# Patient Record
Sex: FEMALE | Race: WHITE | NOT HISPANIC OR LATINO
[De-identification: names, ages, dates, MRNs, and addresses within clinical notes are randomized per-mention and may not be internally consistent; named-entity substitution may affect disease eponyms.]

---

## 2019-03-01 ENCOUNTER — APPOINTMENT (OUTPATIENT)
Dept: FAMILY MEDICINE | Facility: CLINIC | Age: 79
End: 2019-03-01
Payer: MEDICARE

## 2019-03-01 VITALS
HEART RATE: 80 BPM | BODY MASS INDEX: 21.95 KG/M2 | DIASTOLIC BLOOD PRESSURE: 80 MMHG | SYSTOLIC BLOOD PRESSURE: 110 MMHG | WEIGHT: 128.56 LBS | RESPIRATION RATE: 16 BRPM | OXYGEN SATURATION: 97 % | HEIGHT: 64 IN

## 2019-03-01 DIAGNOSIS — K21.9 GASTRO-ESOPHAGEAL REFLUX DISEASE W/OUT ESOPHAGITIS: ICD-10-CM

## 2019-03-01 DIAGNOSIS — M81.0 AGE-RELATED OSTEOPOROSIS W/OUT CURRENT PATHOLOGICAL FRACTURE: ICD-10-CM

## 2019-03-01 DIAGNOSIS — Z82.49 FAMILY HISTORY OF ISCHEMIC HEART DISEASE AND OTHER DISEASES OF THE CIRCULATORY SYSTEM: ICD-10-CM

## 2019-03-01 DIAGNOSIS — Z82.69 FAMILY HISTORY OF OTHER DISEASES OF THE MUSCULOSKELETAL SYSTEM AND CONNECTIVE TISSUE: ICD-10-CM

## 2019-03-01 DIAGNOSIS — Z86.79 PERSONAL HISTORY OF OTHER DISEASES OF THE CIRCULATORY SYSTEM: ICD-10-CM

## 2019-03-01 DIAGNOSIS — F32.9 MAJOR DEPRESSIVE DISORDER, SINGLE EPISODE, UNSPECIFIED: ICD-10-CM

## 2019-03-01 PROCEDURE — 99204 OFFICE O/P NEW MOD 45 MIN: CPT

## 2019-03-02 LAB
INR PPP: 1.6 RATIO
PT BLD: 18.4 SEC

## 2019-03-04 PROBLEM — Z82.69 FAMILY HISTORY OF GOUT: Status: ACTIVE | Noted: 2019-03-04

## 2019-03-04 PROBLEM — Z86.79 HISTORY OF RHEUMATIC HEART DISEASE: Status: RESOLVED | Noted: 2019-03-04 | Resolved: 2019-03-04

## 2019-03-04 PROBLEM — F32.9 DEPRESSION: Status: ACTIVE | Noted: 2019-03-04

## 2019-03-04 PROBLEM — K21.9 GERD (GASTROESOPHAGEAL REFLUX DISEASE): Status: ACTIVE | Noted: 2019-03-01

## 2019-03-04 PROBLEM — M81.0 OSTEOPOROSIS: Status: ACTIVE | Noted: 2019-03-01

## 2019-03-04 PROBLEM — Z82.49 FAMILY HISTORY OF HYPERTENSION: Status: ACTIVE | Noted: 2019-03-04

## 2019-03-04 NOTE — ASSESSMENT
[FreeTextEntry1] : cardiology/cardiothoracic surgery referral: needs cardiothoracic surgeon for aneurysm, aortic, importance of soon appointment with cardiology discussed with patient. \par RTO 1 month for f/u and prn

## 2019-03-04 NOTE — HISTORY OF PRESENT ILLNESS
[FreeTextEntry8] : Patient presents with  for new patient visit to establish care. [place] the end of February to live with her daughter in [place] because her  was requiring more care due to his dementia. They now share home with daughter, living on the first floor of house. Feels well, needs f/u for mechanical heart valve here, had been seeing doctors in [place] regularly. Native of [place], came [place].  \par \par PSH: patient has h/o aortic valve replacement; +[facility] valve in place\par PMH: rheumatic fever, +aneurysm which was last followed last year (?aortic arch, patient unsure where)\par FmHx: father +lung cancer was a smoker\par quit smoking 1974\par cardiothoracic surgeon\par

## 2019-03-04 NOTE — PAST MEDICAL HISTORY
[Postmenopausal] : history of menopause having occurred [Menarche Age ____] : age at menarche was [unfilled] [Total Preg ___] : G: [unfilled] [Living ___] : Living: [unfilled]

## 2019-03-04 NOTE — PHYSICAL EXAM
[No Acute Distress] : no acute distress [Well Nourished] : well nourished [Well Developed] : well developed [Well-Appearing] : well-appearing [Normal Voice/Communication] : normal voice/communication [Normal Sclera/Conjunctiva] : normal sclera/conjunctiva [PERRL] : pupils equal round and reactive to light [No Respiratory Distress] : no respiratory distress  [Clear to Auscultation] : lungs were clear to auscultation bilaterally [No Accessory Muscle Use] : no accessory muscle use [Normal Rate] : normal rate  [Regular Rhythm] : with a regular rhythm [Normal S1, S2] : normal S1 and S2 [No Edema] : there was no peripheral edema [Normal Supraclavicular Nodes] : no supraclavicular lymphadenopathy [Normal Anterior Cervical Nodes] : no anterior cervical lymphadenopathy [No Joint Swelling] : no joint swelling [Grossly Normal Strength/Tone] : grossly normal strength/tone [Normal Gait] : normal gait [Speech Grossly Normal] : speech grossly normal [Memory Grossly Normal] : memory grossly normal [Normal Affect] : the affect was normal [Alert and Oriented x3] : oriented to person, place, and time [Normal Mood] : the mood was normal [Normal Insight/Judgement] : insight and judgment were intact [de-identified] : +upper dentures, bottom teeth have caps s/p fall about 5 years ago

## 2019-03-04 NOTE — REVIEW OF SYSTEMS
[Patient Intake Form Reviewed] : Patient intake form was reviewed [Fatigue] : fatigue [Recent Change In Weight] : ~T recent weight change [Hearing Loss] : hearing loss [Heartburn] : heartburn [Back Pain] : back pain [Hair Changes] : hair changes [Dizziness] : dizziness [Anxiety] : anxiety [Depression] : depression [Easy Bruising] : easy bruising [Negative] : Genitourinary [Fever] : no fever [Chills] : no chills [Hot Flashes] : no hot flashes [Night Sweats] : no night sweats [Earache] : no earache [Nosebleed] : no nosebleeds [Hoarseness] : no hoarseness [Nasal Discharge] : no nasal discharge [Sore Throat] : no sore throat [Postnasal Drip] : no postnasal drip [Abdominal Pain] : no abdominal pain [Nausea] : no nausea [Constipation] : no constipation [Diarrhea] : diarrhea [Vomiting] : no vomiting [Melena] : no melena [Joint Pain] : no joint pain [Joint Stiffness] : no joint stiffness [Joint Swelling] : no joint swelling [Muscle Weakness] : no muscle weakness [Muscle Pain] : no muscle pain [Itching] : no itching [Mole Changes] : no mole changes [Nail Changes] : no nail changes [Skin Rash] : no skin rash [Headache] : no headache [Fainting] : no fainting [Confusion] : no confusion [Memory Loss] : no memory loss [Unsteady Walking] : no ataxia [Suicidal] : not suicidal [Easy Bleeding] : no easy bleeding [Swollen Glands] : no swollen glands [FreeTextEntry2] : lost about 8 pounds over past month while in the stressful process of moving from [place] to [facility], appetite now normal.  [FreeTextEntry4] : +ringing in ears [FreeTextEntry7] : +irritable bowel [de-identified] : +hair loss [de-identified] : +feeling off balance [de-identified] : +taking blood thinner

## 2019-03-12 LAB
INR PPP: 3.12 RATIO
PT BLD: 37.2 SEC

## 2019-04-01 ENCOUNTER — APPOINTMENT (OUTPATIENT)
Dept: FAMILY MEDICINE | Facility: CLINIC | Age: 79
End: 2019-04-01
Payer: MEDICARE

## 2019-04-01 VITALS
HEIGHT: 64 IN | RESPIRATION RATE: 12 BRPM | BODY MASS INDEX: 22.02 KG/M2 | DIASTOLIC BLOOD PRESSURE: 79 MMHG | WEIGHT: 129 LBS | HEART RATE: 71 BPM | SYSTOLIC BLOOD PRESSURE: 140 MMHG

## 2019-04-01 DIAGNOSIS — E87.6 HYPOKALEMIA: ICD-10-CM

## 2019-04-01 PROCEDURE — 99214 OFFICE O/P EST MOD 30 MIN: CPT

## 2019-04-01 RX ORDER — POTASSIUM CHLORIDE 750 MG/1
10 TABLET, EXTENDED RELEASE ORAL DAILY
Qty: 30 | Refills: 3 | Status: DISCONTINUED | COMMUNITY
Start: 2019-03-04 | End: 2019-04-01

## 2019-04-02 NOTE — HISTORY OF PRESENT ILLNESS
[FreeTextEntry1] : Here for f/u, had cardiology apt today, missed the appointment, rescheduled was only available in June with Dr. Flood, advised to call and see any cardiologist in group sooner visit needed. feels well, saw Dr. Pereira for/with ,  is sick, he is losing memory 'so fast.' Difficult for patient. No HA, no blurry vision or epistaxis today, last night had headache, but resolved spontaneously. \par \par ROS: negative except as noted above\par

## 2019-04-02 NOTE — PHYSICAL EXAM
[No Acute Distress] : no acute distress [Well Nourished] : well nourished [Well Developed] : well developed [Well-Appearing] : well-appearing [Normal Voice/Communication] : normal voice/communication [Normal Sclera/Conjunctiva] : normal sclera/conjunctiva [Normal Outer Ear/Nose] : the outer ears and nose were normal in appearance [Normal Oropharynx] : the oropharynx was normal [No JVD] : no jugular venous distention [No Respiratory Distress] : no respiratory distress  [Clear to Auscultation] : lungs were clear to auscultation bilaterally [No Accessory Muscle Use] : no accessory muscle use [Normal Rate] : normal rate  [Regular Rhythm] : with a regular rhythm [Normal S1, S2] : normal S1 and S2 [Normal Gait] : normal gait [Speech Grossly Normal] : speech grossly normal [Memory Grossly Normal] : memory grossly normal [Normal Affect] : the affect was normal [Alert and Oriented x3] : oriented to person, place, and time [Normal Insight/Judgement] : insight and judgment were intact

## 2019-04-02 NOTE — ASSESSMENT
[FreeTextEntry1] : will do renal panel and inr today, paperwork given. \par call for sooner apt for cardiology\par RTO 1 month and prn

## 2019-04-03 LAB
ALBUMIN SERPL ELPH-MCNC: 4.3 G/DL
ANION GAP SERPL CALC-SCNC: 12 MMOL/L
BUN SERPL-MCNC: 13 MG/DL
CALCIUM SERPL-MCNC: 9.7 MG/DL
CHLORIDE SERPL-SCNC: 99 MMOL/L
CO2 SERPL-SCNC: 30 MMOL/L
CREAT SERPL-MCNC: 0.89 MG/DL
GLUCOSE SERPL-MCNC: 104 MG/DL
INR PPP: 2.51 RATIO
PHOSPHATE SERPL-MCNC: 3.6 MG/DL
POTASSIUM SERPL-SCNC: 3.3 MMOL/L
PT BLD: 29.4 SEC
SODIUM SERPL-SCNC: 141 MMOL/L

## 2019-04-08 ENCOUNTER — RX CHANGE (OUTPATIENT)
Age: 79
End: 2019-04-08

## 2019-04-09 ENCOUNTER — RX CHANGE (OUTPATIENT)
Age: 79
End: 2019-04-09

## 2019-04-11 ENCOUNTER — RX RENEWAL (OUTPATIENT)
Age: 79
End: 2019-04-11

## 2019-04-11 LAB — POTASSIUM SERPL-SCNC: 3.6 MMOL/L

## 2019-04-20 LAB
INR PPP: 2.99 RATIO
PT BLD: 34.9 SEC

## 2019-04-29 ENCOUNTER — APPOINTMENT (OUTPATIENT)
Dept: CARDIOLOGY | Facility: CLINIC | Age: 79
End: 2019-04-29
Payer: MEDICARE

## 2019-04-29 ENCOUNTER — APPOINTMENT (OUTPATIENT)
Dept: CARDIOLOGY | Facility: CLINIC | Age: 79
End: 2019-04-29

## 2019-04-29 ENCOUNTER — NON-APPOINTMENT (OUTPATIENT)
Age: 79
End: 2019-04-29

## 2019-04-29 VITALS
HEART RATE: 77 BPM | RESPIRATION RATE: 16 BRPM | SYSTOLIC BLOOD PRESSURE: 160 MMHG | DIASTOLIC BLOOD PRESSURE: 93 MMHG | BODY MASS INDEX: 21.68 KG/M2 | OXYGEN SATURATION: 98 % | HEIGHT: 64 IN | WEIGHT: 127 LBS

## 2019-04-29 VITALS — SYSTOLIC BLOOD PRESSURE: 152 MMHG | DIASTOLIC BLOOD PRESSURE: 86 MMHG

## 2019-04-29 PROCEDURE — 93000 ELECTROCARDIOGRAM COMPLETE: CPT

## 2019-04-29 PROCEDURE — 99204 OFFICE O/P NEW MOD 45 MIN: CPT

## 2019-04-29 NOTE — PHYSICAL EXAM
[General Appearance - Well Developed] : well developed [Normal Appearance] : normal appearance [Well Groomed] : well groomed [General Appearance - Well Nourished] : well nourished [No Deformities] : no deformities [General Appearance - In No Acute Distress] : no acute distress [Normal Conjunctiva] : the conjunctiva exhibited no abnormalities [Eyelids - No Xanthelasma] : the eyelids demonstrated no xanthelasmas [Normal Oral Mucosa] : normal oral mucosa [No Oral Pallor] : no oral pallor [No Oral Cyanosis] : no oral cyanosis [Normal Jugular Venous A Waves Present] : normal jugular venous A waves present [Normal Jugular Venous V Waves Present] : normal jugular venous V waves present [No Jugular Venous West A Waves] : no jugular venous west A waves [Normal Rate] : normal [Normal S1] : normal S1 [Normal S2] : normal S2 [S3] : no S3 [S4] : no S4 [No Murmur] : no murmurs heard [Right Carotid Bruit] : no bruit heard over the right carotid [Left Carotid Bruit] : no bruit heard over the left carotid [Right Femoral Bruit] : no bruit heard over the right femoral artery [Left Femoral Bruit] : no bruit heard over the left femoral artery [2+] : left 2+ [No Abnormalities] : the abdominal aorta was not enlarged and no bruit was heard [No Pitting Edema] : no pitting edema present [Respiration, Rhythm And Depth] : normal respiratory rhythm and effort [Exaggerated Use Of Accessory Muscles For Inspiration] : no accessory muscle use [Auscultation Breath Sounds / Voice Sounds] : lungs were clear to auscultation bilaterally [Abdomen Soft] : soft [Abdomen Tenderness] : non-tender [Abdomen Mass (___ Cm)] : no abdominal mass palpated [Abnormal Walk] : normal gait [Gait - Sufficient For Exercise Testing] : the gait was sufficient for exercise testing [Nail Clubbing] : no clubbing of the fingernails [Cyanosis, Localized] : no localized cyanosis [Petechial Hemorrhages (___cm)] : no petechial hemorrhages [Skin Color & Pigmentation] : normal skin color and pigmentation [] : no rash [No Venous Stasis] : no venous stasis [Skin Lesions] : no skin lesions [No Skin Ulcers] : no skin ulcer [No Xanthoma] : no  xanthoma was observed [Oriented To Time, Place, And Person] : oriented to person, place, and time [Affect] : the affect was normal [Mood] : the mood was normal [No Anxiety] : not feeling anxious

## 2019-04-29 NOTE — REASON FOR VISIT
[Consultation] : a consultation regarding [FreeTextEntry2] : pt relocaTING S/P avr-MECHAN 1994, PT HAD AORTIC ANeurysm followed by CT surg, no sscp or sob

## 2019-04-29 NOTE — DISCUSSION/SUMMARY
[Hypertension] : hypertension [Responding to Treatment] : responding to treatment [Chest CT] : chest CT [Echocardiogram] : echocardiogram [de-identified] : f/u with cts -aortic aneurysm

## 2019-05-10 ENCOUNTER — APPOINTMENT (OUTPATIENT)
Dept: FAMILY MEDICINE | Facility: CLINIC | Age: 79
End: 2019-05-10
Payer: MEDICARE

## 2019-05-10 VITALS
HEIGHT: 64 IN | SYSTOLIC BLOOD PRESSURE: 140 MMHG | RESPIRATION RATE: 16 BRPM | HEART RATE: 80 BPM | TEMPERATURE: 98.2 F | OXYGEN SATURATION: 98 % | BODY MASS INDEX: 21.93 KG/M2 | WEIGHT: 128.44 LBS | DIASTOLIC BLOOD PRESSURE: 80 MMHG

## 2019-05-10 DIAGNOSIS — Z20.828 CONTACT WITH AND (SUSPECTED) EXPOSURE TO OTHER VIRAL COMMUNICABLE DISEASES: ICD-10-CM

## 2019-05-10 LAB
INR PPP: 2.58 RATIO
PT BLD: 30.3 SEC

## 2019-05-10 PROCEDURE — 99214 OFFICE O/P EST MOD 30 MIN: CPT

## 2019-05-10 RX ORDER — TEMAZEPAM 30 MG/1
30 CAPSULE ORAL
Qty: 30 | Refills: 0 | Status: DISCONTINUED | COMMUNITY
Start: 2019-04-01 | End: 2019-05-10

## 2019-05-12 ENCOUNTER — EMERGENCY (EMERGENCY)
Facility: HOSPITAL | Age: 79
LOS: 1 days | Discharge: HOME | End: 2019-05-12
Attending: EMERGENCY MEDICINE | Admitting: EMERGENCY MEDICINE
Payer: MEDICARE

## 2019-05-12 VITALS
WEIGHT: 128.09 LBS | RESPIRATION RATE: 18 BRPM | TEMPERATURE: 98 F | DIASTOLIC BLOOD PRESSURE: 87 MMHG | SYSTOLIC BLOOD PRESSURE: 155 MMHG | OXYGEN SATURATION: 98 % | HEART RATE: 76 BPM

## 2019-05-12 DIAGNOSIS — Z98.891 HISTORY OF UTERINE SCAR FROM PREVIOUS SURGERY: Chronic | ICD-10-CM

## 2019-05-12 DIAGNOSIS — Z95.2 PRESENCE OF PROSTHETIC HEART VALVE: Chronic | ICD-10-CM

## 2019-05-12 PROCEDURE — 99283 EMERGENCY DEPT VISIT LOW MDM: CPT

## 2019-05-12 RX ORDER — AZITHROMYCIN 500 MG/1
500 TABLET, FILM COATED ORAL ONCE
Refills: 0 | Status: COMPLETED | OUTPATIENT
Start: 2019-05-12 | End: 2019-05-12

## 2019-05-12 RX ORDER — AZITHROMYCIN 500 MG/1
1 TABLET, FILM COATED ORAL
Qty: 4 | Refills: 0
Start: 2019-05-12 | End: 2019-05-15

## 2019-05-12 RX ORDER — ACETAMINOPHEN 500 MG
975 TABLET ORAL ONCE
Refills: 0 | Status: COMPLETED | OUTPATIENT
Start: 2019-05-12 | End: 2019-05-12

## 2019-05-12 RX ADMIN — Medication 40 MILLIGRAM(S): at 21:06

## 2019-05-12 RX ADMIN — Medication 975 MILLIGRAM(S): at 21:06

## 2019-05-12 RX ADMIN — AZITHROMYCIN 500 MILLIGRAM(S): 500 TABLET, FILM COATED ORAL at 21:06

## 2019-05-12 NOTE — ED PROVIDER NOTE - CLINICAL SUMMARY MEDICAL DECISION MAKING FREE TEXT BOX
hoarse voice, throat pain, cough. c/w laryngitis. on tamiflu, not c/w flu. will dc tamiflu, start zpak, prednisone. ENT fu

## 2019-05-12 NOTE — ED PROVIDER NOTE - OBJECTIVE STATEMENT
pt c PMH aortic valve replacement on coumadin, c/o 3 days of hoarseness, lost voice, severe, assoc c mild pain in throat, coughing, fullness of bilat ears. no fever/chills. no sob/chest pain.  started on tamiflu for 2 days. no myalgias

## 2019-05-12 NOTE — ED ADULT NURSE NOTE - CHPI ED NUR SYMPTOMS NEG
no chills/no dizziness/no weakness/no nausea/no tingling/no vomiting/no fever/no decreased eating/drinking

## 2019-05-12 NOTE — ED PROVIDER NOTE - ENMT, MLM
Airway patent, Nasal mucosa clear. Mouth with normal mucosa. Throat has no vesicles, no oropharyngeal exudates and uvula is midline.  hoarse, decreased voice. no stridor. no neck swelling

## 2019-05-20 ENCOUNTER — APPOINTMENT (OUTPATIENT)
Dept: FAMILY MEDICINE | Facility: CLINIC | Age: 79
End: 2019-05-20
Payer: MEDICARE

## 2019-05-20 ENCOUNTER — APPOINTMENT (OUTPATIENT)
Dept: OTOLARYNGOLOGY | Facility: CLINIC | Age: 79
End: 2019-05-20
Payer: MEDICARE

## 2019-05-20 VITALS
HEIGHT: 64 IN | TEMPERATURE: 98.2 F | BODY MASS INDEX: 21.53 KG/M2 | RESPIRATION RATE: 14 BRPM | SYSTOLIC BLOOD PRESSURE: 130 MMHG | WEIGHT: 126.13 LBS | HEART RATE: 79 BPM | OXYGEN SATURATION: 97 % | DIASTOLIC BLOOD PRESSURE: 72 MMHG

## 2019-05-20 VITALS
BODY MASS INDEX: 21.51 KG/M2 | SYSTOLIC BLOOD PRESSURE: 130 MMHG | HEART RATE: 77 BPM | DIASTOLIC BLOOD PRESSURE: 74 MMHG | HEIGHT: 64 IN | WEIGHT: 126 LBS

## 2019-05-20 DIAGNOSIS — H92.20 OTORRHAGIA, UNSPECIFIED EAR: ICD-10-CM

## 2019-05-20 DIAGNOSIS — Z87.09 PERSONAL HISTORY OF OTHER DISEASES OF THE RESPIRATORY SYSTEM: ICD-10-CM

## 2019-05-20 DIAGNOSIS — R13.12 DYSPHAGIA, OROPHARYNGEAL PHASE: ICD-10-CM

## 2019-05-20 PROBLEM — E78.00 PURE HYPERCHOLESTEROLEMIA, UNSPECIFIED: Chronic | Status: ACTIVE | Noted: 2019-05-12

## 2019-05-20 PROBLEM — I10 ESSENTIAL (PRIMARY) HYPERTENSION: Chronic | Status: ACTIVE | Noted: 2019-05-12

## 2019-05-20 PROCEDURE — 99214 OFFICE O/P EST MOD 30 MIN: CPT

## 2019-05-20 PROCEDURE — 85610 PROTHROMBIN TIME: CPT | Mod: QW

## 2019-05-20 PROCEDURE — 99204 OFFICE O/P NEW MOD 45 MIN: CPT | Mod: 25

## 2019-05-20 PROCEDURE — 31575 DIAGNOSTIC LARYNGOSCOPY: CPT

## 2019-05-20 NOTE — REVIEW OF SYSTEMS
[Seasonal Allergies] : seasonal allergies [Post Nasal Drip] : post nasal drip [Ear Pain] : ear pain [Ear Itch] : ear itch [Dizziness] : dizziness [Vertigo] : vertigo [Problem Snoring] : problem snoring [Hoarseness] : hoarseness [Throat Clearing] : throat clearing [Throat Pain] : throat pain [Throat Dryness] : throat dryness [Throat Itching] : throat itching [Cough] : cough [Heartburn] : heartburn [Depression] : depression [Negative] : Heme/Lymph [Patient Intake Form Reviewed] : Patient intake form was reviewed [FreeTextEntry1] : difficulty swallowing

## 2019-05-20 NOTE — PHYSICAL EXAM
[No Acute Distress] : no acute distress [Well Nourished] : well nourished [Normal Sclera/Conjunctiva] : normal sclera/conjunctiva [PERRL] : pupils equal round and reactive to light [EOMI] : extraocular movements intact [No JVD] : no jugular venous distention [Supple] : supple [No Respiratory Distress] : no respiratory distress  [No Accessory Muscle Use] : no accessory muscle use [Clear to Auscultation] : lungs were clear to auscultation bilaterally [Soft] : abdomen soft [de-identified] : blood noted right ear canal base , no bulging tm or perforation noted

## 2019-05-20 NOTE — HISTORY OF PRESENT ILLNESS
[FreeTextEntry1] : follow up form er [de-identified] : patient seen in er with laryngitis may 12, complaining of throat pain ear pain\par states she was started on zpak and prednisone 20 once a day for 4 days\par patient currently states she feels better, no ear pain but right ear bleeding 2 days\par also wants rx for Inr, states Dr. cooney office did not order

## 2019-05-20 NOTE — CONSULT LETTER
[FreeTextEntry2] : dr. sanchez [FreeTextEntry1] : Dear Dr.  none,\par \par Thank you for your kind referral. Please refer to my enclosed office notes for SONI ANN . If there are any questions free to contact me.\par  [FreeTextEntry3] : Elio Aquino MD, FACS\par

## 2019-05-20 NOTE — HISTORY OF PRESENT ILLNESS
[de-identified] : uri 1 week ago and ear aches\par dx viral rx antibiotic and prednisone due to dysphagia\par in er\par z pack  completed\par still w throat pain but better\par can swallow food now\par coumadin for aortic valve\par co rt ear blood ?hearing loss-longstanding

## 2019-05-20 NOTE — PROCEDURE
[de-identified] : Indication for procedure:Unable to examine laryngeal structures with mirror exam\par Scope # 5\par Topical anesthesia with viscous xylocaine 2% is applied to the anterior nares.\par A flexible fiberoptic laryngoscope is than introduced through the nares.\par The nasopharynx is clear without mass or inflammation.\par The posterior pharyngeal wall is unremarkable.\par The tongue base and vallecula are unremarkable.  The hypopharynx is unremarkable and unobstructed.\par The supraglottic larynx is within normal limits.\par Both vocal cords are fully mobile with no nodule, polyp or other lesion present.\par There is no edema or erythema overlying the arytenoid cartilages or the inter-arytenoid space.\par The subglottic space is clear.\par The voice has a normal quality.\par

## 2019-05-20 NOTE — ASSESSMENT
[FreeTextEntry1] : small ad canal lacertio w bleeding \par anticoag rx contributing factor\par acute dysphagia ?viral\par now better exam pharynx wnl\par ofloxin gtts\par fu 1 week for removal dried heme ad

## 2019-05-20 NOTE — ASSESSMENT
[FreeTextEntry1] : patient should have INR followed by Dr. cooney, \par will provide patient with inr today and call made to Dr. cooney office re coumadin monitoring\par

## 2019-05-21 LAB
INR PPP: 3.32 RATIO
PT BLD: 39.3 SEC

## 2019-05-22 RX ORDER — BUSPIRONE HYDROCHLORIDE 10 MG/1
10 TABLET ORAL DAILY
Qty: 90 | Refills: 3 | Status: COMPLETED | COMMUNITY
Start: 2019-03-04 | End: 2019-05-22

## 2019-05-22 RX ORDER — AZITHROMYCIN 250 MG/1
250 TABLET, FILM COATED ORAL
Qty: 4 | Refills: 0 | Status: COMPLETED | COMMUNITY
Start: 2019-05-12 | End: 2019-05-22

## 2019-05-22 RX ORDER — TEMAZEPAM 30 MG/1
30 CAPSULE ORAL
Qty: 30 | Refills: 0 | Status: COMPLETED | COMMUNITY
Start: 2019-03-04 | End: 2019-05-22

## 2019-05-24 NOTE — ASSESSMENT
[FreeTextEntry1] : tamiflu instructions reviewed with patient\par Return to office if you feel worse or do not feel better\par

## 2019-05-24 NOTE — HISTORY OF PRESENT ILLNESS
[FreeTextEntry8] : Patient presents with  c/o son in law had flu 3 weeks ago, dtr had it one week ago. Patient feels fatigued, but no fever, no n/v/d. +sore throat, +body aches. \par \par ROS: negative except as noted above\par \par \par

## 2019-05-24 NOTE — PHYSICAL EXAM
[No Acute Distress] : no acute distress [Well Nourished] : well nourished [Well Developed] : well developed [Well-Appearing] : well-appearing [Normal Voice/Communication] : normal voice/communication [Normal Sclera/Conjunctiva] : normal sclera/conjunctiva [Normal Outer Ear/Nose] : the outer ears and nose were normal in appearance [Supple] : supple [No Lymphadenopathy] : no lymphadenopathy [No Respiratory Distress] : no respiratory distress  [Clear to Auscultation] : lungs were clear to auscultation bilaterally [No Accessory Muscle Use] : no accessory muscle use [Normal Rate] : normal rate  [Regular Rhythm] : with a regular rhythm [Normal S1, S2] : normal S1 and S2 [Normal Supraclavicular Nodes] : no supraclavicular lymphadenopathy [Normal Anterior Cervical Nodes] : no anterior cervical lymphadenopathy [Speech Grossly Normal] : speech grossly normal [Memory Grossly Normal] : memory grossly normal [Normal Affect] : the affect was normal [Alert and Oriented x3] : oriented to person, place, and time [Normal Mood] : the mood was normal [Normal Insight/Judgement] : insight and judgment were intact [de-identified] : + erythema, no tonsillar exudates

## 2019-05-28 ENCOUNTER — APPOINTMENT (OUTPATIENT)
Dept: OTOLARYNGOLOGY | Facility: CLINIC | Age: 79
End: 2019-05-28

## 2019-05-29 ENCOUNTER — RX RENEWAL (OUTPATIENT)
Age: 79
End: 2019-05-29

## 2019-06-04 ENCOUNTER — APPOINTMENT (OUTPATIENT)
Dept: NEUROLOGY | Facility: CLINIC | Age: 79
End: 2019-06-04
Payer: MEDICARE

## 2019-06-04 VITALS
OXYGEN SATURATION: 98 % | TEMPERATURE: 98.2 F | HEIGHT: 64 IN | BODY MASS INDEX: 21.85 KG/M2 | HEART RATE: 74 BPM | DIASTOLIC BLOOD PRESSURE: 60 MMHG | WEIGHT: 128 LBS | SYSTOLIC BLOOD PRESSURE: 130 MMHG | RESPIRATION RATE: 17 BRPM

## 2019-06-04 PROCEDURE — 99204 OFFICE O/P NEW MOD 45 MIN: CPT

## 2019-06-04 RX ORDER — LOSARTAN POTASSIUM AND HYDROCHLOROTHIAZIDE 12.5; 5 MG/1; MG/1
50-12.5 TABLET ORAL
Qty: 90 | Refills: 0 | Status: DISCONTINUED | COMMUNITY
Start: 2019-01-08 | End: 2019-06-04

## 2019-06-04 RX ORDER — PREDNISONE 20 MG/1
20 TABLET ORAL
Qty: 8 | Refills: 0 | Status: DISCONTINUED | COMMUNITY
Start: 2019-05-12 | End: 2019-06-04

## 2019-06-04 RX ORDER — OFLOXACIN OTIC 3 MG/ML
0.3 SOLUTION AURICULAR (OTIC) TWICE DAILY
Qty: 1 | Refills: 3 | Status: DISCONTINUED | COMMUNITY
Start: 2019-05-20 | End: 2019-06-04

## 2019-06-04 RX ORDER — TEMAZEPAM 30 MG/1
30 CAPSULE ORAL
Refills: 0 | Status: DISCONTINUED | COMMUNITY
End: 2019-06-04

## 2019-06-04 RX ORDER — OSELTAMIVIR PHOSPHATE 75 MG/1
75 CAPSULE ORAL TWICE DAILY
Qty: 10 | Refills: 0 | Status: DISCONTINUED | COMMUNITY
Start: 2019-05-10 | End: 2019-06-04

## 2019-06-04 RX ORDER — POTASSIUM CHLORIDE 750 MG/1
10 TABLET, FILM COATED, EXTENDED RELEASE ORAL
Qty: 90 | Refills: 0 | Status: DISCONTINUED | COMMUNITY
Start: 2018-12-11 | End: 2019-06-04

## 2019-06-04 NOTE — DISCUSSION/SUMMARY
[FreeTextEntry1] : 79 W who is here for initial consultation of bilateral hand tremors that is likely related to glucose vs. essential tremor. will refer for endocrinology consultation. Perhaps glucose monitor may be helpful. Will have her return with name of medication that was tried by Dr. Fisher. She will f/u with me. Will have staff send release form for records from Dr. Fisher.\par \par Patient was advised to continue to monitor for neurologic symptoms and to notify my office or go to the nearest emergency room if there are any changes. Neurologic issues were discussed with the patient. All questions were answered to complete satisfaction. Education was provided to the patient during this encounter.\par Side effects of the above medications were discussed in detail including but not limited to applicable black box warning and teratogenicity as appropriate. \par Patient was advised to bring previous records to my office. \par \par

## 2019-06-04 NOTE — CONSULT LETTER
[Dear  ___] : Dear  [unfilled], [FreeTextEntry1] : \par \par Thank you very much for allowing me to participate in the care of your patient. Please don't hesitate to contact me with any questions or concerns. \par \par Sincerely,\par Obdulia Pereira MD\par Neurology\par [facility]\par [address]\[place] [address]\par Tel: [phone]\par Email: [email or web address]\par \par

## 2019-06-04 NOTE — HISTORY OF PRESENT ILLNESS
[FreeTextEntry1] : 79 W who is here for initial consultation of many years' duration of bilateral hand tremors. She saw Dr. Fisher in [place] and she was given medication that she cannot recall. It made her sedated and she stopped it. She was asked to bring the medication to the next visit. She has no bradykinesia, no trouble with gait. Her tremors occurred between meals. She states she has seen her PMD in [place] and diabetes was never detected.

## 2019-06-04 NOTE — PHYSICAL EXAM
[General Appearance - Alert] : alert [Oriented To Time, Place, And Person] : oriented to person, place, and time [Person] : oriented to person [Time] : oriented to time [Place] : oriented to place [Short Term Intact] : short term memory intact [Span Intact] : the attention span was normal [Naming Objects] : no difficulty naming common objects [Fluency] : fluency intact [Cranial Nerves Optic (II)] : visual acuity intact bilaterally,  visual fields full to confrontation, pupils equal round and reactive to light [Current Events] : adequate knowledge of current events [Cranial Nerves Oculomotor (III)] : extraocular motion intact [Cranial Nerves Trigeminal (V)] : facial sensation intact symmetrically [Cranial Nerves Glossopharyngeal (IX)] : tongue and palate midline [Cranial Nerves Vestibulocochlear (VIII)] : hearing was intact bilaterally [Cranial Nerves Facial (VII)] : face symmetrical [Motor Tone] : muscle tone was normal in all four extremities [Cranial Nerves Accessory (XI - Cranial And Spinal)] : head turning and shoulder shrug symmetric [Cranial Nerves Hypoglossal (XII)] : there was no tongue deviation with protrusion [Sensation Pain / Temperature Decrease] : pain and temperature was intact [Sensation Tactile Decrease] : light touch was intact [Motor Strength] : muscle strength was normal in all four extremities [Coordination - Dysmetria Impaired Finger-to-Nose Bilateral] : not present [1+] : Patella left 1+ [FreeTextEntry7] : no bradykinesia [Hearing Threshold Finger Rub Not [place]] : hearing was normal [FreeTextEntry8] : no en bloc turning.  [Extraocular Movements] : extraocular movements were intact [Abnormal Walk] : normal gait [Full Pulse] : the pedal pulses are present [] : no rash

## 2019-06-04 NOTE — DATA REVIEWED
[de-identified] : MRI of brain on cd was reviewed. There were some wmid. no major mass or infarct noted. no report waw available.

## 2019-06-06 ENCOUNTER — APPOINTMENT (OUTPATIENT)
Dept: OTOLARYNGOLOGY | Facility: CLINIC | Age: 79
End: 2019-06-06

## 2019-06-07 LAB
25(OH)D3 SERPL-MCNC: 76.8 NG/ML
ALBUMIN SERPL ELPH-MCNC: 4 G/DL
ALP BLD-CCNC: 83 U/L
ALT SERPL-CCNC: 17 U/L
ANION GAP SERPL CALC-SCNC: 12 MMOL/L
AST SERPL-CCNC: 19 U/L
BASOPHILS # BLD AUTO: 0.04 K/UL
BASOPHILS NFR BLD AUTO: 0.8 %
BILIRUB SERPL-MCNC: 0.3 MG/DL
BUN SERPL-MCNC: 16 MG/DL
CALCIUM SERPL-MCNC: 9.6 MG/DL
CHLORIDE SERPL-SCNC: 105 MMOL/L
CHOLEST SERPL-MCNC: 189 MG/DL
CHOLEST/HDLC SERPL: 2.8 RATIO
CO2 SERPL-SCNC: 30 MMOL/L
CREAT SERPL-MCNC: 0.93 MG/DL
EOSINOPHIL # BLD AUTO: 0.19 K/UL
EOSINOPHIL NFR BLD AUTO: 4 %
ESTIMATED AVERAGE GLUCOSE: 126 MG/DL
GLUCOSE SERPL-MCNC: 101 MG/DL
HBA1C MFR BLD HPLC: 6 %
HCT VFR BLD CALC: 38.6 %
HDLC SERPL-MCNC: 67 MG/DL
HGB BLD-MCNC: 12.2 G/DL
IMM GRANULOCYTES NFR BLD AUTO: 0.4 %
INR PPP: 3.96 RATIO
LDLC SERPL CALC-MCNC: 99 MG/DL
LYMPHOCYTES # BLD AUTO: 1.59 K/UL
LYMPHOCYTES NFR BLD AUTO: 33.4 %
MAN DIFF?: NORMAL
MCHC RBC-ENTMCNC: 28.6 PG
MCHC RBC-ENTMCNC: 31.6 GM/DL
MCV RBC AUTO: 90.4 FL
MONOCYTES # BLD AUTO: 0.35 K/UL
MONOCYTES NFR BLD AUTO: 7.4 %
NEUTROPHILS # BLD AUTO: 2.57 K/UL
NEUTROPHILS NFR BLD AUTO: 54 %
PLATELET # BLD AUTO: 195 K/UL
POTASSIUM SERPL-SCNC: 4.4 MMOL/L
PROT SERPL-MCNC: 6.3 G/DL
PT BLD: 47.1 SEC
RBC # BLD: 4.27 M/UL
RBC # FLD: 13.5 %
SODIUM SERPL-SCNC: 146 MMOL/L
TRIGL SERPL-MCNC: 114 MG/DL
TSH SERPL-ACNC: 2.18 UIU/ML
WBC # FLD AUTO: 4.76 K/UL

## 2019-06-20 NOTE — PHYSICAL EXAM
Gastritis / GERD  Not well controlled, not complaint  Hx/o H. pylori therapy  EGD (4/30/18): Gastritis  Cont PPI prn  Avoid triggers and elevate HOB 30 degrees  GI appt 7/2019, referral given   [Midline] : trachea located in midline position [Laryngoscopy Performed] : laryngoscopy was performed, see procedure section for findings [Normal] : no rashes [de-identified] : blood in ad canal tm intact

## 2019-07-18 ENCOUNTER — RX RENEWAL (OUTPATIENT)
Age: 79
End: 2019-07-18

## 2019-07-24 ENCOUNTER — APPOINTMENT (OUTPATIENT)
Dept: FAMILY MEDICINE | Facility: CLINIC | Age: 79
End: 2019-07-24
Payer: MEDICARE

## 2019-07-24 VITALS
SYSTOLIC BLOOD PRESSURE: 124 MMHG | BODY MASS INDEX: 21.68 KG/M2 | HEIGHT: 64 IN | RESPIRATION RATE: 14 BRPM | TEMPERATURE: 98.4 F | OXYGEN SATURATION: 98 % | WEIGHT: 127 LBS | DIASTOLIC BLOOD PRESSURE: 72 MMHG | HEART RATE: 75 BPM

## 2019-07-24 PROCEDURE — 99214 OFFICE O/P EST MOD 30 MIN: CPT | Mod: 25

## 2019-07-24 PROCEDURE — 90670 PCV13 VACCINE IM: CPT

## 2019-07-24 PROCEDURE — G0009: CPT

## 2019-07-24 RX ORDER — BUSPIRONE HYDROCHLORIDE 10 MG/1
10 TABLET ORAL
Refills: 0 | Status: DISCONTINUED | COMMUNITY
End: 2019-07-24

## 2019-07-24 RX ORDER — TOPIRAMATE 25 MG/1
25 TABLET, FILM COATED ORAL DAILY
Qty: 30 | Refills: 11 | Status: DISCONTINUED | COMMUNITY
Start: 2019-03-04 | End: 2019-07-24

## 2019-07-24 NOTE — HISTORY OF PRESENT ILLNESS
[FreeTextEntry1] : Patient presents for f/u visit +mechanical valve and f/u INR check. Just returned from vacation with her son, had a relaxing time in [place].No concerns, needs INR checked, and +h/o hypokalemia.  No palpitations, no bleeding, feels well. Stopped her sleeping med because thinks it is affecting her memory. Med list reconciled with patient. Still having some insomnia. \par \par ROS: negative except as noted above\par

## 2019-07-24 NOTE — PHYSICAL EXAM
[Well Nourished] : well nourished [No Acute Distress] : no acute distress [Well Developed] : well developed [Normal Voice/Communication] : normal voice/communication [Well-Appearing] : well-appearing [Normal Sclera/Conjunctiva] : normal sclera/conjunctiva [Normal Outer Ear/Nose] : the outer ears and nose were normal in appearance [Normal Oropharynx] : the oropharynx was normal [No Accessory Muscle Use] : no accessory muscle use [No Respiratory Distress] : no respiratory distress  [Clear to Auscultation] : lungs were clear to auscultation bilaterally [Normal Rate] : normal rate  [Regular Rhythm] : with a regular rhythm [Normal S1, S2] : normal S1 and S2 [No Edema] : there was no peripheral edema [Speech Grossly Normal] : speech grossly normal [Memory Grossly Normal] : memory grossly normal [Normal Affect] : the affect was normal [Alert and Oriented x3] : oriented to person, place, and time [Normal Insight/Judgement] : insight and judgment were intact [de-identified] : patient declines starting medication for depression now

## 2019-07-29 ENCOUNTER — NON-APPOINTMENT (OUTPATIENT)
Age: 79
End: 2019-07-29

## 2019-07-29 ENCOUNTER — APPOINTMENT (OUTPATIENT)
Dept: CARDIOLOGY | Facility: CLINIC | Age: 79
End: 2019-07-29
Payer: MEDICARE

## 2019-07-29 VITALS
BODY MASS INDEX: 21.68 KG/M2 | HEIGHT: 64 IN | WEIGHT: 127 LBS | SYSTOLIC BLOOD PRESSURE: 125 MMHG | RESPIRATION RATE: 17 BRPM | HEART RATE: 69 BPM | DIASTOLIC BLOOD PRESSURE: 81 MMHG | OXYGEN SATURATION: 97 %

## 2019-07-29 DIAGNOSIS — R94.31 ABNORMAL ELECTROCARDIOGRAM [ECG] [EKG]: ICD-10-CM

## 2019-07-29 PROCEDURE — 93000 ELECTROCARDIOGRAM COMPLETE: CPT

## 2019-07-29 PROCEDURE — 99214 OFFICE O/P EST MOD 30 MIN: CPT

## 2019-07-29 NOTE — PHYSICAL EXAM
[General Appearance - Well Developed] : well developed [Normal Appearance] : normal appearance [Well Groomed] : well groomed [General Appearance - Well Nourished] : well nourished [No Deformities] : no deformities [Normal Conjunctiva] : the conjunctiva exhibited no abnormalities [General Appearance - In No Acute Distress] : no acute distress [Eyelids - No Xanthelasma] : the eyelids demonstrated no xanthelasmas [Normal Oral Mucosa] : normal oral mucosa [No Oral Pallor] : no oral pallor [No Oral Cyanosis] : no oral cyanosis [Normal Jugular Venous A Waves Present] : normal jugular venous A waves present [Normal Jugular Venous V Waves Present] : normal jugular venous V waves present [No Jugular Venous West A Waves] : no jugular venous west A waves [Exaggerated Use Of Accessory Muscles For Inspiration] : no accessory muscle use [Respiration, Rhythm And Depth] : normal respiratory rhythm and effort [Auscultation Breath Sounds / Voice Sounds] : lungs were clear to auscultation bilaterally [Abdomen Soft] : soft [Abdomen Tenderness] : non-tender [Abdomen Mass (___ Cm)] : no abdominal mass palpated [Abnormal Walk] : normal gait [Gait - Sufficient For Exercise Testing] : the gait was sufficient for exercise testing [Nail Clubbing] : no clubbing of the fingernails [Cyanosis, Localized] : no localized cyanosis [Petechial Hemorrhages (___cm)] : no petechial hemorrhages [Skin Color & Pigmentation] : normal skin color and pigmentation [] : no rash [No Venous Stasis] : no venous stasis [Skin Lesions] : no skin lesions [No Skin Ulcers] : no skin ulcer [No Xanthoma] : no  xanthoma was observed [Oriented To Time, Place, And Person] : oriented to person, place, and time [Affect] : the affect was normal [Mood] : the mood was normal [No Anxiety] : not feeling anxious [Normal Rate] : normal [Normal S1] : normal S1 [Normal S2] : normal S2 [No Murmur] : no murmurs heard [2+] : left 2+ [No Abnormalities] : the abdominal aorta was not enlarged and no bruit was heard [No Pitting Edema] : no pitting edema present [S3] : no S3 [S4] : no S4 [Right Carotid Bruit] : no bruit heard over the right carotid [Left Carotid Bruit] : no bruit heard over the left carotid [Right Femoral Bruit] : no bruit heard over the right femoral artery [Left Femoral Bruit] : no bruit heard over the left femoral artery

## 2019-07-29 NOTE — REASON FOR VISIT
[Follow-Up - Clinic] : a clinic follow-up of [FreeTextEntry2] : pt relocaTING S/P avr-MECHAN 1985, PT HAD AORTIC ANeurysm followed by CT surg, no sscp or sob

## 2019-07-29 NOTE — DISCUSSION/SUMMARY
[Hypertension] : hypertension [Responding to Treatment] : responding to treatment [Chest CT] : chest CT [Echocardiogram] : echocardiogram [Stable] : stable [de-identified] : f/u with cts -aortic aneurysm

## 2019-08-01 LAB
ALBUMIN SERPL ELPH-MCNC: 4.1 G/DL
ANION GAP SERPL CALC-SCNC: 10 MMOL/L
BUN SERPL-MCNC: 17 MG/DL
CALCIUM SERPL-MCNC: 9.7 MG/DL
CHLORIDE SERPL-SCNC: 101 MMOL/L
CO2 SERPL-SCNC: 30 MMOL/L
CREAT SERPL-MCNC: 0.89 MG/DL
GLUCOSE SERPL-MCNC: 106 MG/DL
INR PPP: 1.86 RATIO
PHOSPHATE SERPL-MCNC: 3 MG/DL
POTASSIUM SERPL-SCNC: 4.2 MMOL/L
PT BLD: 21.4 SEC
SODIUM SERPL-SCNC: 141 MMOL/L

## 2019-08-04 ENCOUNTER — RX RENEWAL (OUTPATIENT)
Age: 79
End: 2019-08-04

## 2019-08-06 ENCOUNTER — APPOINTMENT (OUTPATIENT)
Dept: NEUROLOGY | Facility: CLINIC | Age: 79
End: 2019-08-06
Payer: MEDICARE

## 2019-08-06 VITALS
OXYGEN SATURATION: 97 % | DIASTOLIC BLOOD PRESSURE: 70 MMHG | HEART RATE: 68 BPM | RESPIRATION RATE: 16 BRPM | TEMPERATURE: 98.2 F | SYSTOLIC BLOOD PRESSURE: 140 MMHG | WEIGHT: 128 LBS | HEIGHT: 64 IN | BODY MASS INDEX: 21.85 KG/M2

## 2019-08-06 DIAGNOSIS — G25.2 OTHER SPECIFIED FORMS OF TREMOR: ICD-10-CM

## 2019-08-06 PROCEDURE — 99214 OFFICE O/P EST MOD 30 MIN: CPT

## 2019-08-06 NOTE — HISTORY OF PRESENT ILLNESS
[FreeTextEntry1] : 79 W who is here for initial consultation of many years' duration of bilateral hand tremors. She saw Dr. Fisher in [place] and she was given medication that she cannot recall. It made her sedated and she stopped it. She was asked to bring the medication to the next visit. She has no bradykinesia, no trouble with gait. Her tremors occurred between meals. She states she has seen her PMD in [place] and diabetes was never detected. \par \par Interval history: Patient records from Dr. Fisher were not received. Patient came in with bottles of Topamax 50 and 25. Patient states she had bad side effects from the Topamax. Her tremors in her hands resolved now that she is eating a regular basis and making sure that she is never hungry. Patient states that she sometimes has upper lip tremor. I reviewed her medications unknown cause tremors. Patient drinks 4 cups ofCuban coffee and 2 cups of American coffee every day. She was advised slowly cut down on the amount of caffeine she takes.

## 2019-08-06 NOTE — PHYSICAL EXAM
[General Appearance - Alert] : alert [Oriented To Time, Place, And Person] : oriented to person, place, and time [Person] : oriented to person [Place] : oriented to place [Time] : oriented to time [Short Term Intact] : short term memory intact [Span Intact] : the attention span was normal [Naming Objects] : no difficulty naming common objects [Fluency] : fluency intact [Current Events] : adequate knowledge of current events [Cranial Nerves Optic (II)] : visual acuity intact bilaterally,  visual fields full to confrontation, pupils equal round and reactive to light [Cranial Nerves Oculomotor (III)] : extraocular motion intact [Cranial Nerves Trigeminal (V)] : facial sensation intact symmetrically [Cranial Nerves Facial (VII)] : face symmetrical [Cranial Nerves Vestibulocochlear (VIII)] : hearing was intact bilaterally [Cranial Nerves Glossopharyngeal (IX)] : tongue and palate midline [Cranial Nerves Accessory (XI - Cranial And Spinal)] : head turning and shoulder shrug symmetric [Cranial Nerves Hypoglossal (XII)] : there was no tongue deviation with protrusion [Motor Tone] : muscle tone was normal in all four extremities [Motor Strength] : muscle strength was normal in all four extremities [Sensation Tactile Decrease] : light touch was intact [Sensation Pain / Temperature Decrease] : pain and temperature was intact [Coordination - Dysmetria Impaired Finger-to-Nose Bilateral] : not present [1+] : Patella left 1+ [FreeTextEntry7] : no bradykinesia [FreeTextEntry8] : no en bloc turning.  [Extraocular Movements] : extraocular movements were intact [Hearing Threshold Finger Rub Not [place]] : hearing was normal [Full Pulse] : the pedal pulses are present [Abnormal Walk] : normal gait [] : no rash

## 2019-08-06 NOTE — DISCUSSION/SUMMARY
[FreeTextEntry1] : 79-year-old woman who is here for initial consultation of bilateral hand tremors to have improved with the regular administration of her meals. She will also followup with PMD to make sure she does not have glucose intolerance. She will try to cut down on caffeine and follow up with me in 3 months. \par \par Patient was advised to continue to monitor for neurologic symptoms and to notify my office or go to the nearest emergency room if there are any changes. Neurologic issues were discussed with the patient. All questions were answered to complete satisfaction. Education was provided to the patient during this encounter.\par Side effects of the above medications were discussed in detail including but not limited to applicable black box warning and teratogenicity as appropriate. \par Patient was advised to bring previous records to my office. \par \par

## 2019-09-03 ENCOUNTER — RX RENEWAL (OUTPATIENT)
Age: 79
End: 2019-09-03

## 2019-09-13 LAB
INR PPP: 3.43 RATIO
PT BLD: 40.6 SEC

## 2019-09-19 LAB
INR PPP: 2.12 RATIO
PT BLD: 24.7 SEC

## 2019-09-30 PROBLEM — Z23 FLU VACCINE NEED: Status: ACTIVE | Noted: 2019-09-30

## 2019-10-01 ENCOUNTER — APPOINTMENT (OUTPATIENT)
Dept: FAMILY MEDICINE | Facility: CLINIC | Age: 79
End: 2019-10-01
Payer: MEDICARE

## 2019-10-01 DIAGNOSIS — Z23 ENCOUNTER FOR IMMUNIZATION: ICD-10-CM

## 2019-10-01 PROCEDURE — G0008: CPT

## 2019-10-01 PROCEDURE — 90686 IIV4 VACC NO PRSV 0.5 ML IM: CPT

## 2019-10-02 ENCOUNTER — MED ADMIN CHARGE (OUTPATIENT)
Age: 79
End: 2019-10-02

## 2019-10-21 LAB
INR PPP: 2.71 RATIO
PT BLD: 32.2 SEC

## 2019-10-25 ENCOUNTER — APPOINTMENT (OUTPATIENT)
Dept: FAMILY MEDICINE | Facility: CLINIC | Age: 79
End: 2019-10-25
Payer: MEDICARE

## 2019-10-25 VITALS — SYSTOLIC BLOOD PRESSURE: 138 MMHG | DIASTOLIC BLOOD PRESSURE: 70 MMHG

## 2019-10-25 VITALS
WEIGHT: 130 LBS | RESPIRATION RATE: 12 BRPM | HEART RATE: 77 BPM | OXYGEN SATURATION: 96 % | DIASTOLIC BLOOD PRESSURE: 87 MMHG | SYSTOLIC BLOOD PRESSURE: 149 MMHG | HEIGHT: 64 IN | BODY MASS INDEX: 22.2 KG/M2

## 2019-10-25 DIAGNOSIS — R73.03 PREDIABETES.: ICD-10-CM

## 2019-10-25 PROCEDURE — 99214 OFFICE O/P EST MOD 30 MIN: CPT

## 2019-10-25 RX ORDER — TEMAZEPAM 30 MG/1
30 CAPSULE ORAL
Qty: 10 | Refills: 0 | Status: DISCONTINUED | COMMUNITY
Start: 2019-05-10 | End: 2019-10-25

## 2019-10-31 NOTE — HISTORY OF PRESENT ILLNESS
[FreeTextEntry1] : Patient presents for f/u visit HTN, mechanical heart valve, insomnia, with . Feeling well. Taking coumadin as directed. Aware should call me on my cell phone the day after blood draw for INR until you get your result that day (coumadin check for blood thinness explained to patient). Still having insomnia, trying not to use Temazepam if not needed, but needs often. Will consider trying ambien. RBA discussed. \par \par ROS: negative except as noted above\par

## 2019-10-31 NOTE — ASSESSMENT
[FreeTextEntry1] : d/w patient blood sugar as it relates to HgA1c\par continue to call for INR results on my cell phone the day after blood draw, every time\par start metformin one tab daily with food, with largest meal\par RTO 3 months for f/u visit

## 2019-10-31 NOTE — PHYSICAL EXAM
[No Acute Distress] : no acute distress [Well Nourished] : well nourished [Well Developed] : well developed [Well-Appearing] : well-appearing [Normal Voice/Communication] : normal voice/communication [Normal Sclera/Conjunctiva] : normal sclera/conjunctiva [Normal Outer Ear/Nose] : the outer ears and nose were normal in appearance [No Respiratory Distress] : no respiratory distress  [No Accessory Muscle Use] : no accessory muscle use [Clear to Auscultation] : lungs were clear to auscultation bilaterally [Normal Rate] : normal rate  [Regular Rhythm] : with a regular rhythm [Normal S1, S2] : normal S1 and S2 [No Edema] : there was no peripheral edema

## 2019-11-22 LAB
INR PPP: 2.96 RATIO
PT BLD: 34.9 SEC

## 2019-11-25 ENCOUNTER — APPOINTMENT (OUTPATIENT)
Dept: CARDIOLOGY | Facility: CLINIC | Age: 79
End: 2019-11-25
Payer: MEDICARE

## 2019-11-25 ENCOUNTER — APPOINTMENT (OUTPATIENT)
Dept: FAMILY MEDICINE | Facility: CLINIC | Age: 79
End: 2019-11-25
Payer: MEDICARE

## 2019-11-25 ENCOUNTER — NON-APPOINTMENT (OUTPATIENT)
Age: 79
End: 2019-11-25

## 2019-11-25 VITALS
TEMPERATURE: 98.2 F | RESPIRATION RATE: 14 BRPM | OXYGEN SATURATION: 98 % | BODY MASS INDEX: 22.4 KG/M2 | HEIGHT: 64 IN | SYSTOLIC BLOOD PRESSURE: 110 MMHG | HEART RATE: 67 BPM | DIASTOLIC BLOOD PRESSURE: 62 MMHG | WEIGHT: 131.19 LBS

## 2019-11-25 VITALS
HEIGHT: 64 IN | OXYGEN SATURATION: 97 % | RESPIRATION RATE: 17 BRPM | HEART RATE: 69 BPM | SYSTOLIC BLOOD PRESSURE: 149 MMHG | DIASTOLIC BLOOD PRESSURE: 75 MMHG | WEIGHT: 134 LBS | BODY MASS INDEX: 22.88 KG/M2

## 2019-11-25 DIAGNOSIS — L03.019 CELLULITIS OF UNSPECIFIED FINGER: ICD-10-CM

## 2019-11-25 PROCEDURE — 99214 OFFICE O/P EST MOD 30 MIN: CPT

## 2019-11-25 PROCEDURE — 93000 ELECTROCARDIOGRAM COMPLETE: CPT

## 2019-11-25 PROCEDURE — 93306 TTE W/DOPPLER COMPLETE: CPT

## 2019-11-25 NOTE — PHYSICAL EXAM
[No Acute Distress] : no acute distress [Well Nourished] : well nourished [Normal Voice/Communication] : normal voice/communication [Well-Appearing] : well-appearing [Well Developed] : well developed [Normal Sclera/Conjunctiva] : normal sclera/conjunctiva [No JVD] : no jugular venous distention [Normal Outer Ear/Nose] : the outer ears and nose were normal in appearance [No Accessory Muscle Use] : no accessory muscle use [No Respiratory Distress] : no respiratory distress  [Clear to Auscultation] : lungs were clear to auscultation bilaterally [Regular Rhythm] : with a regular rhythm [Normal Rate] : normal rate  [Normal S1, S2] : normal S1 and S2 [de-identified] : right distal second digit: approx 6 cm round scab, distal finger slightly erythematous, sl tender

## 2019-11-25 NOTE — PHYSICAL EXAM
[General Appearance - Well Developed] : well developed [Normal Appearance] : normal appearance [Well Groomed] : well groomed [General Appearance - Well Nourished] : well nourished [No Deformities] : no deformities [General Appearance - In No Acute Distress] : no acute distress [Normal Conjunctiva] : the conjunctiva exhibited no abnormalities [Eyelids - No Xanthelasma] : the eyelids demonstrated no xanthelasmas [No Oral Pallor] : no oral pallor [Normal Oral Mucosa] : normal oral mucosa [No Oral Cyanosis] : no oral cyanosis [Normal Jugular Venous A Waves Present] : normal jugular venous A waves present [Normal Jugular Venous V Waves Present] : normal jugular venous V waves present [No Jugular Venous West A Waves] : no jugular venous west A waves [Respiration, Rhythm And Depth] : normal respiratory rhythm and effort [Exaggerated Use Of Accessory Muscles For Inspiration] : no accessory muscle use [Auscultation Breath Sounds / Voice Sounds] : lungs were clear to auscultation bilaterally [Abdomen Soft] : soft [Abdomen Tenderness] : non-tender [Abdomen Mass (___ Cm)] : no abdominal mass palpated [Abnormal Walk] : normal gait [Gait - Sufficient For Exercise Testing] : the gait was sufficient for exercise testing [Nail Clubbing] : no clubbing of the fingernails [Cyanosis, Localized] : no localized cyanosis [Petechial Hemorrhages (___cm)] : no petechial hemorrhages [Skin Color & Pigmentation] : normal skin color and pigmentation [] : no rash [No Venous Stasis] : no venous stasis [Skin Lesions] : no skin lesions [No Skin Ulcers] : no skin ulcer [No Xanthoma] : no  xanthoma was observed [Oriented To Time, Place, And Person] : oriented to person, place, and time [Affect] : the affect was normal [No Anxiety] : not feeling anxious [Mood] : the mood was normal [Normal Rate] : normal [Normal S1] : normal S1 [Normal S2] : normal S2 [No Murmur] : no murmurs heard [2+] : left 2+ [No Pitting Edema] : no pitting edema present [No Abnormalities] : the abdominal aorta was not enlarged and no bruit was heard [S3] : no S3 [S4] : no S4 [Right Carotid Bruit] : no bruit heard over the right carotid [Right Femoral Bruit] : no bruit heard over the right femoral artery [Left Carotid Bruit] : no bruit heard over the left carotid [Left Femoral Bruit] : no bruit heard over the left femoral artery

## 2019-11-25 NOTE — ASSESSMENT
[FreeTextEntry1] : keflex, retake INR in 1 week advised\par if no improvement in 24-48 hours, or worse at any time RTO or go to ER\par

## 2019-11-25 NOTE — HISTORY OF PRESENT ILLNESS
[FreeTextEntry8] : Patient presents c/o almost 2 week ago right second digit distal, hurt on broken chair, painful. No fever. +redness. \par \par ROS: negative except as noted above\par \par \par

## 2019-11-25 NOTE — DISCUSSION/SUMMARY
[Stable] : stable [Hypertension] : hypertension [Responding to Treatment] : responding to treatment [Chest CT] : chest CT [Echocardiogram] : echocardiogram [de-identified] : f/u with cts -aortic aneurysm, get old records

## 2019-12-05 ENCOUNTER — FORM ENCOUNTER (OUTPATIENT)
Age: 79
End: 2019-12-05

## 2019-12-06 ENCOUNTER — OUTPATIENT (OUTPATIENT)
Dept: OUTPATIENT SERVICES | Facility: HOSPITAL | Age: 79
LOS: 1 days | End: 2019-12-06
Payer: MEDICARE

## 2019-12-06 ENCOUNTER — APPOINTMENT (OUTPATIENT)
Dept: CT IMAGING | Facility: HOSPITAL | Age: 79
End: 2019-12-06
Payer: MEDICARE

## 2019-12-06 DIAGNOSIS — Z95.2 PRESENCE OF PROSTHETIC HEART VALVE: Chronic | ICD-10-CM

## 2019-12-06 DIAGNOSIS — Z98.891 HISTORY OF UTERINE SCAR FROM PREVIOUS SURGERY: Chronic | ICD-10-CM

## 2019-12-06 DIAGNOSIS — I72.9 ANEURYSM OF UNSPECIFIED SITE: ICD-10-CM

## 2019-12-06 PROCEDURE — 71275 CT ANGIOGRAPHY CHEST: CPT | Mod: 26

## 2019-12-06 PROCEDURE — 71275 CT ANGIOGRAPHY CHEST: CPT

## 2019-12-06 PROCEDURE — 82565 ASSAY OF CREATININE: CPT

## 2019-12-09 ENCOUNTER — APPOINTMENT (OUTPATIENT)
Dept: FAMILY MEDICINE | Facility: CLINIC | Age: 79
End: 2019-12-09
Payer: MEDICARE

## 2019-12-09 VITALS
DIASTOLIC BLOOD PRESSURE: 62 MMHG | RESPIRATION RATE: 16 BRPM | SYSTOLIC BLOOD PRESSURE: 122 MMHG | BODY MASS INDEX: 22.58 KG/M2 | OXYGEN SATURATION: 96 % | WEIGHT: 132.25 LBS | HEIGHT: 64 IN | HEART RATE: 65 BPM

## 2019-12-09 PROCEDURE — 99214 OFFICE O/P EST MOD 30 MIN: CPT

## 2019-12-09 RX ORDER — CEPHALEXIN 500 MG/1
500 TABLET ORAL
Qty: 30 | Refills: 0 | Status: DISCONTINUED | COMMUNITY
Start: 2019-11-25 | End: 2019-12-09

## 2019-12-10 LAB
INR PPP: 2.98 RATIO
PT BLD: 35.4 SEC

## 2019-12-11 NOTE — PHYSICAL EXAM
[No Acute Distress] : no acute distress [Well Nourished] : well nourished [Well-Appearing] : well-appearing [Well Developed] : well developed [Normal Voice/Communication] : normal voice/communication [Normal Sclera/Conjunctiva] : normal sclera/conjunctiva [Normal Outer Ear/Nose] : the outer ears and nose were normal in appearance [No Lymphadenopathy] : no lymphadenopathy [No Respiratory Distress] : no respiratory distress  [No Accessory Muscle Use] : no accessory muscle use [Normal S1, S2] : normal S1 and S2 [Regular Rhythm] : with a regular rhythm [Clear to Auscultation] : lungs were clear to auscultation bilaterally [Normal Rate] : normal rate  [Normal Supraclavicular Nodes] : no supraclavicular lymphadenopathy [Normal Anterior Cervical Nodes] : no anterior cervical lymphadenopathy [Memory Grossly Normal] : memory grossly normal [No Edema] : there was no peripheral edema [Speech Grossly Normal] : speech grossly normal [Alert and Oriented x3] : oriented to person, place, and time [Normal Affect] : the affect was normal [Normal Insight/Judgement] : insight and judgment were intact [Normal Mood] : the mood was normal

## 2019-12-20 ENCOUNTER — APPOINTMENT (OUTPATIENT)
Dept: NEUROLOGY | Facility: CLINIC | Age: 79
End: 2019-12-20

## 2020-01-14 LAB
INR PPP: 2.66 RATIO
PT BLD: 31.3 SEC

## 2020-02-05 ENCOUNTER — APPOINTMENT (OUTPATIENT)
Dept: FAMILY MEDICINE | Facility: CLINIC | Age: 80
End: 2020-02-05
Payer: MEDICARE

## 2020-02-05 VITALS
OXYGEN SATURATION: 96 % | RESPIRATION RATE: 16 BRPM | HEART RATE: 78 BPM | SYSTOLIC BLOOD PRESSURE: 130 MMHG | BODY MASS INDEX: 22.63 KG/M2 | DIASTOLIC BLOOD PRESSURE: 80 MMHG | HEIGHT: 64 IN | WEIGHT: 132.56 LBS

## 2020-02-05 DIAGNOSIS — I10 ESSENTIAL (PRIMARY) HYPERTENSION: ICD-10-CM

## 2020-02-05 DIAGNOSIS — B35.1 TINEA UNGUIUM: ICD-10-CM

## 2020-02-05 PROCEDURE — 99214 OFFICE O/P EST MOD 30 MIN: CPT

## 2020-02-11 LAB
ESTIMATED AVERAGE GLUCOSE: 114 MG/DL
HBA1C MFR BLD HPLC: 5.6 %
INR PPP: 3.18 RATIO
PT BLD: 37.3 SEC

## 2020-02-13 PROBLEM — I10 HYPERTENSION: Status: ACTIVE | Noted: 2019-03-01

## 2020-02-13 NOTE — HISTORY OF PRESENT ILLNESS
[FreeTextEntry1] : Patient presents for visit with  f/u HTN, atrial fibrillation. Feels well, no complaints. Has become more difficult to care for her  as his dementia worsens.\par \par ROS: negative except as noted above\par

## 2020-02-13 NOTE — PHYSICAL EXAM
[No Acute Distress] : no acute distress [Well Nourished] : well nourished [Well Developed] : well developed [Well-Appearing] : well-appearing [Normal Voice/Communication] : normal voice/communication [Normal Sclera/Conjunctiva] : normal sclera/conjunctiva [No Respiratory Distress] : no respiratory distress  [Normal Outer Ear/Nose] : the outer ears and nose were normal in appearance [No Accessory Muscle Use] : no accessory muscle use [Clear to Auscultation] : lungs were clear to auscultation bilaterally [Normal Rate] : normal rate  [Normal S1, S2] : normal S1 and S2 [Regular Rhythm] : with a regular rhythm [No Edema] : there was no peripheral edema

## 2020-02-24 LAB
INR PPP: 2.93 RATIO
PT BLD: 34.2 SEC

## 2020-03-12 DIAGNOSIS — M26.629 ARTHRALGIA OF TEMPOROMANDIBULAR JOINT,: ICD-10-CM

## 2020-03-18 ENCOUNTER — RESULT CHARGE (OUTPATIENT)
Age: 80
End: 2020-03-18

## 2020-03-18 LAB
INR PPP: 2.71 RATIO
PT BLD: 31.9 SEC

## 2020-03-24 RX ORDER — METFORMIN HYDROCHLORIDE 500 MG/1
500 TABLET, COATED ORAL DAILY
Qty: 90 | Refills: 3 | Status: ACTIVE | COMMUNITY
Start: 2019-10-25 | End: 1900-01-01

## 2020-04-14 LAB
INR PPP: 3.04 RATIO
PT BLD: 35.6 SEC

## 2020-04-22 ENCOUNTER — LABORATORY RESULT (OUTPATIENT)
Age: 80
End: 2020-04-22

## 2020-04-22 ENCOUNTER — RESULT CHARGE (OUTPATIENT)
Age: 80
End: 2020-04-22

## 2020-05-20 LAB
INR PPP: 1.39 RATIO
PT BLD: 16 SEC

## 2020-05-22 DIAGNOSIS — Z20.828 CONTACT WITH AND (SUSPECTED) EXPOSURE TO OTHER VIRAL COMMUNICABLE DISEASES: ICD-10-CM

## 2020-05-27 ENCOUNTER — RESULT CHARGE (OUTPATIENT)
Age: 80
End: 2020-05-27

## 2020-05-27 LAB
INR PPP: 3.4 RATIO
PT BLD: 40.3 SEC

## 2020-06-01 ENCOUNTER — APPOINTMENT (OUTPATIENT)
Dept: CARDIOLOGY | Facility: CLINIC | Age: 80
End: 2020-06-01
Payer: MEDICARE

## 2020-06-01 VITALS — DIASTOLIC BLOOD PRESSURE: 88 MMHG | SYSTOLIC BLOOD PRESSURE: 147 MMHG

## 2020-06-01 VITALS — HEART RATE: 71 BPM | DIASTOLIC BLOOD PRESSURE: 83 MMHG | SYSTOLIC BLOOD PRESSURE: 152 MMHG

## 2020-06-01 PROCEDURE — 99214 OFFICE O/P EST MOD 30 MIN: CPT | Mod: 95

## 2020-06-01 NOTE — HISTORY OF PRESENT ILLNESS
[Home] : at home, [unfilled] , at the time of the visit. [Medical Office: ([facility])___] : at the medical office located in  [Verbal consent obtained from patient] : the patient, [unfilled]

## 2020-06-01 NOTE — PHYSICAL EXAM
[General Appearance - Well Developed] : well developed [Normal Appearance] : normal appearance [Well Groomed] : well groomed [General Appearance - Well Nourished] : well nourished [No Deformities] : no deformities [General Appearance - In No Acute Distress] : no acute distress [Eyelids - No Xanthelasma] : the eyelids demonstrated no xanthelasmas [Normal Conjunctiva] : the conjunctiva exhibited no abnormalities [Normal Oral Mucosa] : normal oral mucosa [No Oral Pallor] : no oral pallor [No Oral Cyanosis] : no oral cyanosis [Normal Jugular Venous A Waves Present] : normal jugular venous A waves present [Normal Jugular Venous V Waves Present] : normal jugular venous V waves present [No Jugular Venous West A Waves] : no jugular venous west A waves [Abnormal Walk] : normal gait [Gait - Sufficient For Exercise Testing] : the gait was sufficient for exercise testing [Nail Clubbing] : no clubbing of the fingernails [Cyanosis, Localized] : no localized cyanosis [Petechial Hemorrhages (___cm)] : no petechial hemorrhages [Skin Color & Pigmentation] : normal skin color and pigmentation [] : no rash [No Venous Stasis] : no venous stasis [Skin Lesions] : no skin lesions [No Skin Ulcers] : no skin ulcer [No Xanthoma] : no  xanthoma was observed [Oriented To Time, Place, And Person] : oriented to person, place, and time [Affect] : the affect was normal [Mood] : the mood was normal [No Anxiety] : not feeling anxious

## 2020-06-01 NOTE — PHYSICAL EXAM
[General Appearance - Well Developed] : well developed [Normal Appearance] : normal appearance [Well Groomed] : well groomed [General Appearance - Well Nourished] : well nourished [No Deformities] : no deformities [General Appearance - In No Acute Distress] : no acute distress [Normal Conjunctiva] : the conjunctiva exhibited no abnormalities [Eyelids - No Xanthelasma] : the eyelids demonstrated no xanthelasmas [Normal Oral Mucosa] : normal oral mucosa [No Oral Pallor] : no oral pallor [No Oral Cyanosis] : no oral cyanosis [Normal Jugular Venous A Waves Present] : normal jugular venous A waves present [Normal Jugular Venous V Waves Present] : normal jugular venous V waves present [No Jugular Venous West A Waves] : no jugular venous west A waves [Abnormal Walk] : normal gait [Gait - Sufficient For Exercise Testing] : the gait was sufficient for exercise testing [Nail Clubbing] : no clubbing of the fingernails [Cyanosis, Localized] : no localized cyanosis [Petechial Hemorrhages (___cm)] : no petechial hemorrhages [Skin Color & Pigmentation] : normal skin color and pigmentation [] : no rash [No Venous Stasis] : no venous stasis [Skin Lesions] : no skin lesions [No Skin Ulcers] : no skin ulcer [No Xanthoma] : no  xanthoma was observed [Oriented To Time, Place, And Person] : oriented to person, place, and time [Affect] : the affect was normal [Mood] : the mood was normal [No Anxiety] : not feeling anxious

## 2020-06-03 NOTE — DISCUSSION/SUMMARY
[Stable] : stable [Hypertension] : hypertension [Responding to Treatment] : responding to treatment [Chest CT] : chest CT [Echocardiogram] : echocardiogram [de-identified] : f/u with cts -aortic aneurysm, get old records

## 2020-06-10 ENCOUNTER — APPOINTMENT (OUTPATIENT)
Dept: CARDIOTHORACIC SURGERY | Facility: CLINIC | Age: 80
End: 2020-06-10
Payer: MEDICARE

## 2020-06-10 VITALS
RESPIRATION RATE: 15 BRPM | SYSTOLIC BLOOD PRESSURE: 150 MMHG | OXYGEN SATURATION: 98 % | TEMPERATURE: 98 F | HEART RATE: 82 BPM | DIASTOLIC BLOOD PRESSURE: 85 MMHG

## 2020-06-10 VITALS — BODY MASS INDEX: 23.39 KG/M2 | WEIGHT: 137 LBS | HEIGHT: 64 IN

## 2020-06-10 VITALS — DIASTOLIC BLOOD PRESSURE: 89 MMHG | SYSTOLIC BLOOD PRESSURE: 146 MMHG

## 2020-06-10 PROCEDURE — 99203 OFFICE O/P NEW LOW 30 MIN: CPT

## 2020-06-10 RX ORDER — WARFARIN SODIUM 4 MG/1
4 TABLET ORAL DAILY
Qty: 90 | Refills: 3 | Status: COMPLETED | COMMUNITY
Start: 2019-03-04 | End: 2020-06-10

## 2020-06-10 RX ORDER — DILTIAZEM HYDROCHLORIDE 180 MG/1
180 CAPSULE, EXTENDED RELEASE ORAL
Qty: 90 | Refills: 3 | Status: COMPLETED | COMMUNITY
Start: 2019-07-18 | End: 2020-06-10

## 2020-06-10 RX ORDER — WARFARIN SODIUM 3 MG/1
3 TABLET ORAL
Qty: 90 | Refills: 3 | Status: COMPLETED | COMMUNITY
Start: 2019-03-04 | End: 2020-06-10

## 2020-06-10 NOTE — DATA REVIEWED
[FreeTextEntry1] : CTA of chest on 12/2019\par HEART AND VASCULATURE: Status post sternotomy and aortic valve \par replacement. Normal heart size without pericardial effusion. \par There is a left sided aortic arch with normal branching pattern of the \par great vessels. No dissection or intramural hematoma. \par Thoracic aorta  luminal measurements:\par *  Aortic root: 31 x 37 mm \par *  Mid ascending aorta at the level of the right pulmonary artery: 48 x \par 50 mm\par *  Transverse arch: 25 x 26 mm\par *  Proximal descending aorta: 30 x 30 mm\par *  Mid descending at the level of the left pulmonary artery: 26 x 26 mm\par *  Diaphragm level: 23 x 23 mm\par LUNGS, AIRWAYS, PLEURA: Patent central airways. \par 1.2 cm left lower lobe nodule (series 5 image 68). Tree-in-bud \par micronodules within the periphery of this nodule likely representing \par impacted airways.\par 0.4 cm basilar right lower lobe nodule (series 5 image 129). 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 0.4 cm basilar \par right lower lobe nodule (series 5 image 129). Recommend follow-up \par noncontrast chest CT in 3 months to determine stability.\par MARIANELA SARMIENTO \par This document has been electronically signed. Dec  9 2019 10:02AM\par

## 2020-06-10 NOTE — HISTORY OF PRESENT ILLNESS
[FreeTextEntry1] : Sarah is a 80 year old female with PMH of afib, HTN, HLD, depression, GERD, status post 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  CTA of chest in 2018 reported ascending aorta 5.0cm, arch 2.8 cm, root was 3.0cm. She feels well today, denies chest pain, palpitations, SOB, JAMES, PND, or syncope. She lives with her  and is active, walking everyday and gardening.

## 2020-06-10 NOTE — CONSULT LETTER
[Dear  ___] : Dear ~APURVA, [Courtesy Letter:] : I had the pleasure of seeing your patient, [unfilled], in my office today. [Please see my note below.] : Please see my note below. [Sincerely,] : Sincerely, [Consult Closing:] : Thank you very much for allowing me to participate in the care of this patient.  If you have any questions, please do not hesitate to contact me. [FreeTextEntry2] : Jamie Adam MD\par [address], [address]\[place]  [address]\par [phone]\par [phone] [FreeTextEntry3] : Elio Vargas MD, FACS\par Attending Surgeon \par Cardiovascular & Thoracic Surgery\par  \par [facility] [employer]\par

## 2020-06-10 NOTE — ASSESSMENT
[FreeTextEntry1] : Sarah is a 80 year old female with PMH of afib, HTN, HLD, depression, GERD, status post 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  CTA of chest in 2018 reported ascending aorta 5.0cm, arch 2.8 cm, root was 3.0cm. She feels well today, denies chest pain, palpitations, SOB, JAMES, PND, or syncope. She lives with her  and is active, walking everyday and gardening.  She had a recent CT scan in 12/19 and at that time she had a 5 cmm ascending aorta.  She is s/p mechanical AVR. in 1994. Upon reviewing her previous reports her aorta has been stable in size since 2013.  \par \par Plan:\par 1) Repeat CTA of December 2020 with follow up visit\par 2) Blood pressure management\par 3) Follow up with Dr. Adam

## 2020-06-10 NOTE — PHYSICAL EXAM
[General Appearance - Alert] : alert [General Appearance - In No Acute Distress] : in no acute distress [Sclera] : the sclera and conjunctiva were normal [PERRL With Normal Accommodation] : pupils were equal in size, round, and reactive to light [Extraocular Movements] : extraocular movements were intact [Outer Ear] : the ears and nose were normal in appearance [Oropharynx] : the oropharynx was normal [Jugular Venous Distention Increased] : there was no jugular-venous distention [Respiration, Rhythm And Depth] : normal respiratory rhythm and effort [] : no respiratory distress [Heart Rate And Rhythm] : heart rate was normal and rhythm regular [Breast Appearance] : normal in appearance [Abdomen Soft] : soft [Bowel Sounds] : normal bowel sounds [Cervical Lymph Nodes Enlarged Posterior Bilaterally] : posterior cervical [Cervical Lymph Nodes Enlarged Anterior Bilaterally] : anterior cervical [No CVA Tenderness] : no ~M costovertebral angle tenderness [Abnormal Walk] : normal gait [Skin Color & Pigmentation] : normal skin color and pigmentation [No Focal Deficits] : no focal deficits [Impaired Insight] : insight and judgment were intact [Oriented To Time, Place, And Person] : oriented to person, place, and time [FreeTextEntry1] : + metallic sounds [Right Carotid Bruit] : no bruit heard over the right carotid [Left Carotid Bruit] : no bruit heard over the left carotid

## 2020-06-29 LAB
INR PPP: 2.26 RATIO
PT BLD: 25.6 SEC

## 2020-07-15 ENCOUNTER — TRANSCRIPTION ENCOUNTER (OUTPATIENT)
Age: 80
End: 2020-07-15

## 2020-08-04 LAB
INR PPP: 3.18 RATIO
PT BLD: 35.5 SEC

## 2020-08-10 ENCOUNTER — APPOINTMENT (OUTPATIENT)
Dept: FAMILY MEDICINE | Facility: CLINIC | Age: 80
End: 2020-08-10

## 2020-09-03 NOTE — ED ADULT NURSE NOTE - OBJECTIVE STATEMENT
no
Pt came to ED c/o sore throat and bilateral ear pain. Pain is 10/10. Pt denies fever, chills, cp, sob, n/v. Pt states that it hurts when she swallows. Pt seen by PCP 2 days ago. Symptoms began 2 days ago and have worsened since then.

## 2020-09-03 NOTE — ED ADULT TRIAGE NOTE - PAIN RATING/NUMBER SCALE (0-10): REST
Patient educated on Rt groin care, no s/s bleeding, discharge instructions provided, pca took patient via wc to front hospitial for patient discharge home  [facility] 10

## 2020-09-14 LAB
25(OH)D3 SERPL-MCNC: 37 NG/ML
ALBUMIN SERPL ELPH-MCNC: 4.4 G/DL
ALP BLD-CCNC: 65 U/L
ALT SERPL-CCNC: 14 U/L
ANION GAP SERPL CALC-SCNC: 13 MMOL/L
AST SERPL-CCNC: 20 U/L
BASOPHILS # BLD AUTO: 0.04 K/UL
BASOPHILS NFR BLD AUTO: 0.6 %
BILIRUB SERPL-MCNC: 0.5 MG/DL
BUN SERPL-MCNC: 18 MG/DL
CALCIUM SERPL-MCNC: 9.9 MG/DL
CHLORIDE SERPL-SCNC: 103 MMOL/L
CHOLEST SERPL-MCNC: 192 MG/DL
CHOLEST/HDLC SERPL: 3 RATIO
CO2 SERPL-SCNC: 26 MMOL/L
CREAT SERPL-MCNC: 1.13 MG/DL
EOSINOPHIL # BLD AUTO: 0.14 K/UL
EOSINOPHIL NFR BLD AUTO: 2.2 %
GLUCOSE SERPL-MCNC: 105 MG/DL
HCT VFR BLD CALC: 38.3 %
HDLC SERPL-MCNC: 65 MG/DL
HGB BLD-MCNC: 12.3 G/DL
IMM GRANULOCYTES NFR BLD AUTO: 0.3 %
LDLC SERPL CALC-MCNC: 99 MG/DL
LYMPHOCYTES # BLD AUTO: 1.49 K/UL
LYMPHOCYTES NFR BLD AUTO: 23.7 %
MAN DIFF?: NORMAL
MCHC RBC-ENTMCNC: 28.5 PG
MCHC RBC-ENTMCNC: 32.1 GM/DL
MCV RBC AUTO: 88.9 FL
MONOCYTES # BLD AUTO: 0.49 K/UL
MONOCYTES NFR BLD AUTO: 7.8 %
NEUTROPHILS # BLD AUTO: 4.12 K/UL
NEUTROPHILS NFR BLD AUTO: 65.4 %
PLATELET # BLD AUTO: 190 K/UL
POTASSIUM SERPL-SCNC: 3.6 MMOL/L
PROT SERPL-MCNC: 6.5 G/DL
RBC # BLD: 4.31 M/UL
RBC # FLD: 14.1 %
SODIUM SERPL-SCNC: 142 MMOL/L
TRIGL SERPL-MCNC: 141 MG/DL
TSH SERPL-ACNC: 0.91 UIU/ML
WBC # FLD AUTO: 6.3 K/UL

## 2020-10-05 ENCOUNTER — APPOINTMENT (OUTPATIENT)
Dept: FAMILY MEDICINE | Facility: CLINIC | Age: 80
End: 2020-10-05
Payer: MEDICARE

## 2020-10-05 PROCEDURE — 99441: CPT

## 2020-10-05 NOTE — HISTORY OF PRESENT ILLNESS
[Home] : at home, [unfilled] , at the time of the visit. [Medical Office: ([facility])___] : at the medical office located in  [Verbal consent obtained from patient] : the patient, [unfilled] [FreeTextEntry1] : Patient presents for telephone visit for f/u re: insomnia and atrial fibrillation. States needs a refill on zolpidem, and going to [place] until about March in a few days. She is feeling well, and will have her inr tested in the next few days before she leaves. Patient advised that her last level was slightly elevated, and should repeat in one week (sooner than 1 month) in the future, patient acknowledged understanding. States she uses ambien sparingly, not every night. \par \par ROS: negative except as noted above\par

## 2020-10-05 NOTE — ASSESSMENT
[FreeTextEntry1] : RTO 3 months for f/u and prn, can be telehealth if you are in [facility]\par

## 2020-10-06 LAB
INR PPP: 3.64 RATIO
PT BLD: 40.4 SEC

## 2020-11-24 ENCOUNTER — RX RENEWAL (OUTPATIENT)
Age: 80
End: 2020-11-24

## 2020-12-21 PROBLEM — Z87.09 HISTORY OF ACUTE PHARYNGITIS: Status: RESOLVED | Noted: 2019-05-20 | Resolved: 2020-12-21

## 2021-06-15 ENCOUNTER — RX RENEWAL (OUTPATIENT)
Age: 81
End: 2021-06-15

## 2021-08-24 NOTE — ED ADULT TRIAGE NOTE - NSWEIGHTCALCTOOLDRUG_GEN_A_CORE
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                  Vtach    24 hour events:   YOLANDA  Subjective:  No abd pain, ROMA in place with serosanguinous output    Medications  Current Facility-Administered Medications   Medication Dose Route Frequency     amiodarone  400 mg Oral BID    Followed by     [START ON 8/31/2021] amiodarone  200 mg Oral Daily     enoxaparin ANTICOAGULANT  40 mg Subcutaneous Q24H     furosemide  40 mg Oral Daily     insulin aspart  1-6 Units Subcutaneous Q4H     lactulose  20 g Oral BID     piperacillin-tazobactam  3.375 g Intravenous Q6H     potassium & sodium phosphates  1 packet Oral or Feeding Tube BID     sodium chloride (PF)  3 mL Intravenous Q8H     spironolactone  100 mg Oral Daily     ursodiol  300 mg Oral BID       Review of systems  A 10-point review of systems was negative.    Examination  BP 94/60 (BP Location: Left arm)   Pulse 79   Temp 98.4  F (36.9  C) (Oral)   Resp 16   Ht 1.651 m (5' 5\")   Wt 68 kg (149 lb 14.6 oz)   SpO2 97%   BMI 24.95 kg/m      Intake/Output Summary (Last 24 hours) at 8/23/2021 0938  Last data filed at 8/23/2021 0500  Gross per 24 hour   Intake 397.84 ml   Output 960 ml   Net -562.16 ml       Constitutional: cooperative, pleasant  Eyes: Sclera icteric  Ears/nose/mouth/throat: mucus membranes moist  Neck: supple  CV: No edema  Respiratory: Unlabored breathing on O2  Abd: Nondistended, +bs, nontender, incisions in place, ROMA with serosanguinous output  Skin: warm, perfused, jaundiced  Neuro: AAO x 3, no asterixis      MELD-Na score: 19 at 8/24/2021  3:39 AM  MELD score: 18 at 8/24/2021  3:39 AM  Calculated from:  Serum Creatinine: 0.58 mg/dL (Using min of 1 mg/dL) at 8/24/2021  3:39 AM  Serum Sodium: 136 mmol/L at 8/24/2021  3:39 AM  Total Bilirubin: 4.2 mg/dL at 8/24/2021  3:39 AM  INR(ratio): 1.81 at 8/24/2021  3:39 AM  Age: 59 years    Radiology  CT Chest 8/19/21:  IMPRESSION:   1. No " evidence of pulmonary embolism.  Contrast mixing artifact in the  left lower lobe superior segment pulmonary artery. No evidence of  right heart strain.  2. Endotracheal tube abutting the caryn.  3. Atelectasis/collapse of the posterior aspects of the bilateral  upper and lower lobes.   4. Tree in bud nodularity involving the left upper, left lower, and  the left middle ribs. Possibilities include infectious and  inflammatory etiologies. Recommend follow-up until resolution.  5. 5 mm pulmonary nodule of the right upper lobe, if the patient is  considered high risk for lung cancer consider follow-up low-dose chest  CT in 12 months.    US RUQ 8/23/21:  Impression:   1.  Cirrhotic liver with retrograde flow in the left portal vein  suggestive of portal hypertension. No evidence for portal venous  thrombosis.  2.  Mild ascites.      Assessment  59 year old with past medical history of hypertension, microscopic otitis, ulcerative colitis, pancreatic mucinous cystadenoma, PSC with cirrhosis who presented for liver transplantation but went into V. tach and surgery was aborted.  Patient was requiring pressor support and was transferred to ICU.    PSC cirrhosis  Intra-op VT   Patient has been extubated and not on pressors, her clinical course is more consistent with V. tach causing instability, less likely to be cholangitis given remarkable improvement.  Pt had cardiology evaluation with recommendation of  cardiac cath as definitive to r/o CAD as possible cause of VTach. Pt has contrast allergy, will be pre-medicated today. Plan for angiogram tomorrow    Recommendations  -- Angiogram as per cardiology  -- Lactulose PO, or Miralax  -- Continue Ursodiol  -- Monitor transaminases, bilirubin, INR  -- Ensure sodium restriction to 2000 mg per day  -- Continue home diuretics  -- Adequate protein diet (1.2 - 1.5g/kg/day)   - Recommend multiple meals during the day, Snacks and shakes during the day/night   - Can use Ensure/Boost  jeffrey GUNDERSON        Thank you for involving us in this patient's care. Please do not hesitate to contact the GI service with any questions or concerns.      Pt care plan discussed with Dr. Leventhal, Hepatology staff physician.    This note was created with voice recognition software, and while reviewed for accuracy, typos may remain.    Audra Fuentes MD  Advance and Transplant Hepatology Fellow  Pager: [phone]        used

## 2022-01-21 NOTE — DISCUSSION/SUMMARY
Linda Sommer is a 18 year old female who presents for her physical exam.    Concerns raised today include sleeping.  She has trouble falling asleep and then has trouble staying asleep.  She is tired throughout the day.  She doesn't nap during the day.  This hasn't adversely affected her school work.  She is a perfectionist with her school work and is under a lot of pressure at school.  She has tried melatonin.  She has been seeing a therapist, she likes her therapist but doesn't see the therapist regularly.   She is also concerned about her periods.  She has irregular periods, there is heavy bleeding with some clots.  She is interested in getting an IUD.  She does have cramps and headaches with her cramps.      In the household no one is ill. In the household no one is on immunosuppressive drugs (e.g., prednisone); no one has cancer; and no one has an immunodeficiency disease including HIV/AIDS.  No TB exposure.      REVIEW OF SYSTEMS see HPI; otherwise all negative.  She tolerated her previous immunizations well.  The patient has seen the dentist and has no major dental problems.   She is now driving, always uses a seat belt.  No shortness of breath, syncope or chest pain with sports.  She is not sexually active but is attracted to both males and females.  The patient does not use tobacco, drugs or alcohol.      PHQ-9 Score:   Recent PHQ 2/9 Score    PHQ 2:  Date Peds PHQ 2 Score Peds PHQ 2 Interpretation   1/21/2022 2 No further screening needed       PHQ 9:  Date Peds PHQ 9 Score Peds PHQ 9 Interpretation   1/21/2022 8 Mild Depression     PAST HISTORY is reviewed.   See tab.    FAMILY HISTORY is reviewed.   See grid.   There is nothing new in the family history since his last examination.    SOCIAL HISTORY is reviewed.   She is at home with her mother, sister, dog and cat.  No smokers at home.  She is doing very well in school, wants to be first in her class and go to [employer] to study aerospace engineering.       PHYSICAL EXAM  GENERAL: Linda Sommer is an alert, vigorous female with appropriate affect. She is quiet today and answers questions politely and is able to engage in conversation. She is in no acute distress.  SKIN: Skin color, texture, turgor are normal. There are no bruises, rashes or lesions. There are no bruises or other signs of injury.  HEAD: The head is atraumatic and normocephalic.  EYES: The eyelids are normal. Both eyes open. The conjunctivae appear normal.  The corneae are clear. There is no fixed deviation of gaze. The monocular cover test at 3 meters reveals no eye movement. Extraocular muscle movements are intact. Red reflexes are seen bilaterally; no dark spots are visualized. The disc margins are sharp bilaterally.  EARS: Linda Sommer responds to the spoken word. Exam of the ears reveals the pinnae to be normal. The external auditory canals are clear and the tympanic membranes are normal.   NOSE: There is no nasal flaring.  THROAT: The oropharynx is normal.   TEETH: The teeth are normal.  NECK: The neck is normal. The thyroid is not palpably enlarged.  LYMPH NODES: There are no palpably enlarged lymph nodes in the neck, axillae, or groin.  TRUNK AND THORAX: There are no lesions on the trunk. The thorax is symmetrical and longer in the transverse than in the AP diameter. There are no retractions.  BREASTS: Elpidio stage V.  LUNGS: The lung fields are clear to auscultation.  HEART: The precordium is quiet. The heart rhythm is grossly regular. S1 and S2 are normal. The heart tones are strong. There are no murmurs. The femoral pulses are normal.  ABDOMEN: There is not an umbilical hernia. The abdomen is flat and soft and not tender. There are no masses. Neither the liver nor the spleen is enlarged. The bowel sounds are normal.  PELVIC EXAM: Elpidio stage V  EXTREMITIES: The extremities are normal. There is no clubbing. There is no edema. Any injuries since the last complete exam have healed with  [Stable] : stable no residua.  NEUROLOGIC: Linda Sommer displays normal tone, sensation, and strength throughout. There are no focal deficits.  PSYCHIATRIC: Linda Sommer is oriented to person, place, time, and general circumstances She has symptoms of depression.  BACK:  Slight scoliosis as noted previously       ASSESSMENT: Well 18 year old female adolescent                                Myopia with astigmatism                                Menorrhagia with irregular cycle                                 Scoliosis                                Positive depression screen    BEHAVIOR: Tobacco, ETOH, drug avoidance - DISCUSSED                 Sexual activity & avoidance / safe sex - DISCUSSED                 Puberty / menstruation - DISCUSSED    GUIDANCE:  School achievement: Discussed  Peer relationships: Discussed  Family relationships: Discussed  Goals for life: Discussed  Regular physical activity: Discussed    PLAN:  Plan per orders.  Follow up with Ophthalmology to monitor vision.  Will refer to GYN to discuss IUD.  Observe scoliosis for now.  Discussed getting in touch with therapist to increase frequency of visits to help address sleep issues.  Diet and exercise discussed.  Will check a CBC, Free T4, TSH and Vitamin D level.  Will update the immunizations today.   The parent was counseled about each component of the vaccine including possible side effects, risks and benefits.    Return for next well exam in 1 year.  RTC sooner prn.                [Hypertension] : hypertension [Chest CT] : chest CT [Echocardiogram] : echocardiogram [Not Responding to Treatment] : not responding to treatment [Ambulatory BP Monitoring] : ambulatory blood pressure monitoring [Outpatient Evaluation] : outpatient evaluation [de-identified] : hi salt in diet, caffeine [de-identified] : may need to inc meds

## 2022-05-07 NOTE — ED PROVIDER NOTE - PRO INTERPRETER NEED 2
Final Anesthesia Post-op Assessment    Patient: Bar Gregory  Procedure(s) Performed: CLOSED REDUCTION WITH INTRADENTAL FIXATION OF LAFORTE FRACTURE  Anesthesia type: General    Vitals Value Taken Time   Temp 36.4 05/06/22 2015   Pulse 74 05/06/22 2015   Resp 15 05/06/22 2015   SpO2 99 05/06/22 2015   /94 05/06/22 2015         Patient Location: PACU Phase 1  Post-op Vital Signs:stable  Level of Consciousness: awake, alert and return to baseline  Respiratory Status: spontaneous ventilation  Cardiovascular stable  Hydration: euvolemic  Pain Management: adequately controlled  Handoff: Handoff to receiving nurse was performed and questions were answered  Vomiting: none  Nausea: None  Airway Patency:patent  Post-op Assessment: no complications and patient tolerated procedure well with no complications      No complications documented.   
English

## 2023-01-09 ENCOUNTER — INPATIENT (INPATIENT)
Facility: HOSPITAL | Age: 83
LOS: 4 days | Discharge: ANOTHER HOSPITAL | DRG: 813 | End: 2023-01-14
Attending: STUDENT IN AN ORGANIZED HEALTH CARE EDUCATION/TRAINING PROGRAM | Admitting: STUDENT IN AN ORGANIZED HEALTH CARE EDUCATION/TRAINING PROGRAM
Payer: MEDICARE

## 2023-01-09 VITALS
RESPIRATION RATE: 16 BRPM | HEART RATE: 77 BPM | SYSTOLIC BLOOD PRESSURE: 132 MMHG | HEIGHT: 62 IN | OXYGEN SATURATION: 95 % | DIASTOLIC BLOOD PRESSURE: 84 MMHG | TEMPERATURE: 98 F | WEIGHT: 138.01 LBS

## 2023-01-09 DIAGNOSIS — Z98.891 HISTORY OF UTERINE SCAR FROM PREVIOUS SURGERY: Chronic | ICD-10-CM

## 2023-01-09 DIAGNOSIS — K92.2 GASTROINTESTINAL HEMORRHAGE, UNSPECIFIED: ICD-10-CM

## 2023-01-09 DIAGNOSIS — Z95.2 PRESENCE OF PROSTHETIC HEART VALVE: Chronic | ICD-10-CM

## 2023-01-09 LAB
ALBUMIN SERPL ELPH-MCNC: 3.8 G/DL — SIGNIFICANT CHANGE UP (ref 3.3–5)
ALP SERPL-CCNC: 88 U/L — SIGNIFICANT CHANGE UP (ref 40–120)
ALT FLD-CCNC: 55 U/L — HIGH (ref 10–45)
ANION GAP SERPL CALC-SCNC: 13 MMOL/L — SIGNIFICANT CHANGE UP (ref 5–17)
APPEARANCE UR: CLEAR — SIGNIFICANT CHANGE UP
APTT BLD: 54.7 SEC — HIGH (ref 27.5–35.5)
AST SERPL-CCNC: 74 U/L — HIGH (ref 10–40)
BACTERIA # UR AUTO: NEGATIVE /HPF — SIGNIFICANT CHANGE UP
BASOPHILS # BLD AUTO: 0 K/UL — SIGNIFICANT CHANGE UP (ref 0–0.2)
BASOPHILS NFR BLD AUTO: 0 % — SIGNIFICANT CHANGE UP (ref 0–2)
BILIRUB SERPL-MCNC: 0.3 MG/DL — SIGNIFICANT CHANGE UP (ref 0.2–1.2)
BILIRUB UR-MCNC: NEGATIVE — SIGNIFICANT CHANGE UP
BLD GP AB SCN SERPL QL: SIGNIFICANT CHANGE UP
BUN SERPL-MCNC: 32 MG/DL — HIGH (ref 7–23)
CALCIUM SERPL-MCNC: 9.8 MG/DL — SIGNIFICANT CHANGE UP (ref 8.4–10.5)
CHLORIDE SERPL-SCNC: 103 MMOL/L — SIGNIFICANT CHANGE UP (ref 96–108)
CO2 SERPL-SCNC: 28 MMOL/L — SIGNIFICANT CHANGE UP (ref 22–31)
COLOR SPEC: YELLOW — SIGNIFICANT CHANGE UP
CREAT SERPL-MCNC: 1.04 MG/DL — SIGNIFICANT CHANGE UP (ref 0.5–1.3)
DIFF PNL FLD: ABNORMAL
EGFR: 55 ML/MIN/1.73M2 — LOW
EOSINOPHIL # BLD AUTO: 0 K/UL — SIGNIFICANT CHANGE UP (ref 0–0.5)
EOSINOPHIL NFR BLD AUTO: 0 % — SIGNIFICANT CHANGE UP (ref 0–6)
EPI CELLS # UR: SIGNIFICANT CHANGE UP
GLUCOSE BLDC GLUCOMTR-MCNC: 101 MG/DL — HIGH (ref 70–99)
GLUCOSE BLDC GLUCOMTR-MCNC: 110 MG/DL — HIGH (ref 70–99)
GLUCOSE SERPL-MCNC: 129 MG/DL — HIGH (ref 70–99)
GLUCOSE UR QL: NEGATIVE — SIGNIFICANT CHANGE UP
HCT VFR BLD CALC: 29.5 % — LOW (ref 34.5–45)
HCT VFR BLD CALC: 30.2 % — LOW (ref 34.5–45)
HGB BLD-MCNC: 9.6 G/DL — LOW (ref 11.5–15.5)
HGB BLD-MCNC: 9.8 G/DL — LOW (ref 11.5–15.5)
INR BLD: 1.2 RATIO — HIGH (ref 0.88–1.16)
INR BLD: 1.22 RATIO — HIGH (ref 0.88–1.16)
INR BLD: 1.54 RATIO — HIGH (ref 0.88–1.16)
INR BLD: 7.28 RATIO — CRITICAL HIGH (ref 0.88–1.16)
KETONES UR-MCNC: ABNORMAL
LEUKOCYTE ESTERASE UR-ACNC: NEGATIVE — SIGNIFICANT CHANGE UP
LIDOCAIN IGE QN: 64 U/L — LOW (ref 73–393)
LYMPHOCYTES # BLD AUTO: 17 % — SIGNIFICANT CHANGE UP (ref 13–44)
LYMPHOCYTES # BLD AUTO: 6.42 K/UL — HIGH (ref 1–3.3)
MANUAL SMEAR VERIFICATION: SIGNIFICANT CHANGE UP
MCHC RBC-ENTMCNC: 28.4 PG — SIGNIFICANT CHANGE UP (ref 27–34)
MCHC RBC-ENTMCNC: 28.4 PG — SIGNIFICANT CHANGE UP (ref 27–34)
MCHC RBC-ENTMCNC: 32.5 GM/DL — SIGNIFICANT CHANGE UP (ref 32–36)
MCHC RBC-ENTMCNC: 32.5 GM/DL — SIGNIFICANT CHANGE UP (ref 32–36)
MCV RBC AUTO: 87.3 FL — SIGNIFICANT CHANGE UP (ref 80–100)
MCV RBC AUTO: 87.5 FL — SIGNIFICANT CHANGE UP (ref 80–100)
MONOCYTES # BLD AUTO: 3.78 K/UL — HIGH (ref 0–0.9)
MONOCYTES NFR BLD AUTO: 10 % — SIGNIFICANT CHANGE UP (ref 2–14)
NEUTROPHILS # BLD AUTO: 3.78 K/UL — SIGNIFICANT CHANGE UP (ref 1.8–7.4)
NEUTROPHILS NFR BLD AUTO: 10 % — LOW (ref 43–77)
NITRITE UR-MCNC: NEGATIVE — SIGNIFICANT CHANGE UP
NRBC # BLD: 0 /100 WBCS — SIGNIFICANT CHANGE UP (ref 0–0)
NRBC # BLD: 10 /100 — HIGH (ref 0–0)
OB PNL STL: POSITIVE
PH UR: 6.5 — SIGNIFICANT CHANGE UP (ref 5–8)
PLAT MORPH BLD: NORMAL — SIGNIFICANT CHANGE UP
PLATELET # BLD AUTO: 54 K/UL — LOW (ref 150–400)
PLATELET # BLD AUTO: 65 K/UL — LOW (ref 150–400)
POTASSIUM SERPL-MCNC: 3.8 MMOL/L — SIGNIFICANT CHANGE UP (ref 3.5–5.3)
POTASSIUM SERPL-SCNC: 3.8 MMOL/L — SIGNIFICANT CHANGE UP (ref 3.5–5.3)
PROT SERPL-MCNC: 6.9 G/DL — SIGNIFICANT CHANGE UP (ref 6–8.3)
PROT UR-MCNC: 15
PROTHROM AB SERPL-ACNC: 14 SEC — HIGH (ref 10.5–13.4)
PROTHROM AB SERPL-ACNC: 14.2 SEC — HIGH (ref 10.5–13.4)
PROTHROM AB SERPL-ACNC: 17.9 SEC — HIGH (ref 10.5–13.4)
PROTHROM AB SERPL-ACNC: 86.1 SEC — HIGH (ref 10.5–13.4)
RAPID RVP RESULT: SIGNIFICANT CHANGE UP
RBC # BLD: 3.38 M/UL — LOW (ref 3.8–5.2)
RBC # BLD: 3.45 M/UL — LOW (ref 3.8–5.2)
RBC # FLD: 14.5 % — SIGNIFICANT CHANGE UP (ref 10.3–14.5)
RBC # FLD: 14.9 % — HIGH (ref 10.3–14.5)
RBC BLD AUTO: NORMAL — SIGNIFICANT CHANGE UP
RBC CASTS # UR COMP ASSIST: ABNORMAL /HPF (ref 0–4)
SARS-COV-2 RNA SPEC QL NAA+PROBE: SIGNIFICANT CHANGE UP
SODIUM SERPL-SCNC: 144 MMOL/L — SIGNIFICANT CHANGE UP (ref 135–145)
SP GR SPEC: 1 — LOW (ref 1.01–1.02)
TROPONIN I, HIGH SENSITIVITY RESULT: 24.2 NG/L — SIGNIFICANT CHANGE UP
UROBILINOGEN FLD QL: NEGATIVE — SIGNIFICANT CHANGE UP
VARIANT LYMPHS # BLD: 63 % — HIGH (ref 0–6)
WBC # BLD: 37.76 K/UL — HIGH (ref 3.8–10.5)
WBC # BLD: 52.29 K/UL — CRITICAL HIGH (ref 3.8–10.5)
WBC # FLD AUTO: 37.76 K/UL — HIGH (ref 3.8–10.5)
WBC # FLD AUTO: 52.29 K/UL — CRITICAL HIGH (ref 3.8–10.5)
WBC UR QL: NEGATIVE /HPF — SIGNIFICANT CHANGE UP (ref 0–5)

## 2023-01-09 PROCEDURE — 71045 X-RAY EXAM CHEST 1 VIEW: CPT | Mod: 26

## 2023-01-09 PROCEDURE — 99291 CRITICAL CARE FIRST HOUR: CPT

## 2023-01-09 PROCEDURE — 74174 CTA ABD&PLVS W/CONTRAST: CPT | Mod: 26,MA

## 2023-01-09 PROCEDURE — 99223 1ST HOSP IP/OBS HIGH 75: CPT

## 2023-01-09 PROCEDURE — 93010 ELECTROCARDIOGRAM REPORT: CPT

## 2023-01-09 RX ORDER — ACETAMINOPHEN 500 MG
650 TABLET ORAL EVERY 6 HOURS
Refills: 0 | Status: DISCONTINUED | OUTPATIENT
Start: 2023-01-09 | End: 2023-01-14

## 2023-01-09 RX ORDER — LANOLIN ALCOHOL/MO/W.PET/CERES
3 CREAM (GRAM) TOPICAL AT BEDTIME
Refills: 0 | Status: DISCONTINUED | OUTPATIENT
Start: 2023-01-09 | End: 2023-01-14

## 2023-01-09 RX ORDER — UBIDECARENONE 100 MG
1 CAPSULE ORAL
Qty: 0 | Refills: 0 | DISCHARGE

## 2023-01-09 RX ORDER — INSULIN LISPRO 100/ML
VIAL (ML) SUBCUTANEOUS
Refills: 0 | Status: DISCONTINUED | OUTPATIENT
Start: 2023-01-09 | End: 2023-01-09

## 2023-01-09 RX ORDER — ACETAMINOPHEN 500 MG
650 TABLET ORAL ONCE
Refills: 0 | Status: COMPLETED | OUTPATIENT
Start: 2023-01-09 | End: 2023-01-09

## 2023-01-09 RX ORDER — DEXTROSE 50 % IN WATER 50 %
25 SYRINGE (ML) INTRAVENOUS ONCE
Refills: 0 | Status: DISCONTINUED | OUTPATIENT
Start: 2023-01-09 | End: 2023-01-14

## 2023-01-09 RX ORDER — FERROUS SULFATE 325(65) MG
325 TABLET ORAL DAILY
Refills: 0 | Status: DISCONTINUED | OUTPATIENT
Start: 2023-01-09 | End: 2023-01-14

## 2023-01-09 RX ORDER — ROSUVASTATIN CALCIUM 5 MG/1
1 TABLET ORAL
Qty: 0 | Refills: 0 | DISCHARGE

## 2023-01-09 RX ORDER — INSULIN LISPRO 100/ML
VIAL (ML) SUBCUTANEOUS EVERY 6 HOURS
Refills: 0 | Status: DISCONTINUED | OUTPATIENT
Start: 2023-01-09 | End: 2023-01-12

## 2023-01-09 RX ORDER — ACETAMINOPHEN 500 MG
1000 TABLET ORAL ONCE
Refills: 0 | Status: COMPLETED | OUTPATIENT
Start: 2023-01-09 | End: 2023-01-09

## 2023-01-09 RX ORDER — PANTOPRAZOLE SODIUM 20 MG/1
40 TABLET, DELAYED RELEASE ORAL ONCE
Refills: 0 | Status: COMPLETED | OUTPATIENT
Start: 2023-01-09 | End: 2023-01-09

## 2023-01-09 RX ORDER — DILTIAZEM HCL 120 MG
1 CAPSULE, EXT RELEASE 24 HR ORAL
Qty: 0 | Refills: 0 | DISCHARGE

## 2023-01-09 RX ORDER — PANTOPRAZOLE SODIUM 20 MG/1
40 TABLET, DELAYED RELEASE ORAL
Refills: 0 | Status: DISCONTINUED | OUTPATIENT
Start: 2023-01-09 | End: 2023-01-11

## 2023-01-09 RX ORDER — INSULIN LISPRO 100/ML
VIAL (ML) SUBCUTANEOUS AT BEDTIME
Refills: 0 | Status: DISCONTINUED | OUTPATIENT
Start: 2023-01-09 | End: 2023-01-09

## 2023-01-09 RX ORDER — DEXTROSE 50 % IN WATER 50 %
15 SYRINGE (ML) INTRAVENOUS ONCE
Refills: 0 | Status: DISCONTINUED | OUTPATIENT
Start: 2023-01-09 | End: 2023-01-14

## 2023-01-09 RX ORDER — PHYTONADIONE (VIT K1) 5 MG
10 TABLET ORAL ONCE
Refills: 0 | Status: COMPLETED | OUTPATIENT
Start: 2023-01-09 | End: 2023-01-09

## 2023-01-09 RX ORDER — VALSARTAN 80 MG/1
320 TABLET ORAL DAILY
Refills: 0 | Status: DISCONTINUED | OUTPATIENT
Start: 2023-01-09 | End: 2023-01-14

## 2023-01-09 RX ORDER — SODIUM CHLORIDE 9 MG/ML
500 INJECTION INTRAMUSCULAR; INTRAVENOUS; SUBCUTANEOUS ONCE
Refills: 0 | Status: COMPLETED | OUTPATIENT
Start: 2023-01-09 | End: 2023-01-09

## 2023-01-09 RX ORDER — SODIUM CHLORIDE 9 MG/ML
1000 INJECTION INTRAMUSCULAR; INTRAVENOUS; SUBCUTANEOUS
Refills: 0 | Status: DISCONTINUED | OUTPATIENT
Start: 2023-01-09 | End: 2023-01-12

## 2023-01-09 RX ORDER — PROTHROMBIN COMPLEX CONCENTRATE (HUMAN) 25.5; 16.5; 24; 22; 22; 26 [IU]/ML; [IU]/ML; [IU]/ML; [IU]/ML; [IU]/ML; [IU]/ML
1500 POWDER, FOR SOLUTION INTRAVENOUS ONCE
Refills: 0 | Status: COMPLETED | OUTPATIENT
Start: 2023-01-09 | End: 2023-01-09

## 2023-01-09 RX ORDER — PREGABALIN 225 MG/1
1 CAPSULE ORAL
Qty: 0 | Refills: 0 | DISCHARGE

## 2023-01-09 RX ORDER — DILTIAZEM HCL 120 MG
240 CAPSULE, EXT RELEASE 24 HR ORAL DAILY
Refills: 0 | Status: DISCONTINUED | OUTPATIENT
Start: 2023-01-09 | End: 2023-01-09

## 2023-01-09 RX ORDER — DEXTROSE 50 % IN WATER 50 %
12.5 SYRINGE (ML) INTRAVENOUS ONCE
Refills: 0 | Status: DISCONTINUED | OUTPATIENT
Start: 2023-01-09 | End: 2023-01-14

## 2023-01-09 RX ORDER — SODIUM CHLORIDE 9 MG/ML
1000 INJECTION, SOLUTION INTRAVENOUS
Refills: 0 | Status: DISCONTINUED | OUTPATIENT
Start: 2023-01-09 | End: 2023-01-14

## 2023-01-09 RX ORDER — METFORMIN HYDROCHLORIDE 850 MG/1
1 TABLET ORAL
Qty: 0 | Refills: 0 | DISCHARGE

## 2023-01-09 RX ORDER — OMEGA-3 ACID ETHYL ESTERS 1 G
1 CAPSULE ORAL
Qty: 0 | Refills: 0 | DISCHARGE

## 2023-01-09 RX ORDER — GLUCAGON INJECTION, SOLUTION 0.5 MG/.1ML
1 INJECTION, SOLUTION SUBCUTANEOUS ONCE
Refills: 0 | Status: DISCONTINUED | OUTPATIENT
Start: 2023-01-09 | End: 2023-01-14

## 2023-01-09 RX ORDER — ASCORBIC ACID 60 MG
1 TABLET,CHEWABLE ORAL
Qty: 0 | Refills: 0 | DISCHARGE

## 2023-01-09 RX ORDER — ONDANSETRON 8 MG/1
4 TABLET, FILM COATED ORAL ONCE
Refills: 0 | Status: DISCONTINUED | OUTPATIENT
Start: 2023-01-09 | End: 2023-01-14

## 2023-01-09 RX ORDER — DILTIAZEM HCL 120 MG
240 CAPSULE, EXT RELEASE 24 HR ORAL DAILY
Refills: 0 | Status: DISCONTINUED | OUTPATIENT
Start: 2023-01-09 | End: 2023-01-14

## 2023-01-09 RX ORDER — ATORVASTATIN CALCIUM 80 MG/1
80 TABLET, FILM COATED ORAL AT BEDTIME
Refills: 0 | Status: DISCONTINUED | OUTPATIENT
Start: 2023-01-09 | End: 2023-01-14

## 2023-01-09 RX ADMIN — PROTHROMBIN COMPLEX CONCENTRATE (HUMAN) 1500 INTERNATIONAL UNIT(S): 25.5; 16.5; 24; 22; 22; 26 POWDER, FOR SOLUTION INTRAVENOUS at 15:32

## 2023-01-09 RX ADMIN — ATORVASTATIN CALCIUM 80 MILLIGRAM(S): 80 TABLET, FILM COATED ORAL at 22:28

## 2023-01-09 RX ADMIN — Medication 1000 MILLIGRAM(S): at 13:54

## 2023-01-09 RX ADMIN — Medication 650 MILLIGRAM(S): at 18:36

## 2023-01-09 RX ADMIN — Medication 102 MILLIGRAM(S): at 15:58

## 2023-01-09 RX ADMIN — PANTOPRAZOLE SODIUM 40 MILLIGRAM(S): 20 TABLET, DELAYED RELEASE ORAL at 13:33

## 2023-01-09 RX ADMIN — SODIUM CHLORIDE 50 MILLILITER(S): 9 INJECTION INTRAMUSCULAR; INTRAVENOUS; SUBCUTANEOUS at 22:30

## 2023-01-09 RX ADMIN — PROTHROMBIN COMPLEX CONCENTRATE (HUMAN) 400 INTERNATIONAL UNIT(S): 25.5; 16.5; 24; 22; 22; 26 POWDER, FOR SOLUTION INTRAVENOUS at 15:22

## 2023-01-09 RX ADMIN — SODIUM CHLORIDE 500 MILLILITER(S): 9 INJECTION INTRAMUSCULAR; INTRAVENOUS; SUBCUTANEOUS at 13:33

## 2023-01-09 RX ADMIN — Medication 400 MILLIGRAM(S): at 13:33

## 2023-01-09 NOTE — ED PROVIDER NOTE - WET READ LAUNCH FT
Alignment is grossly maintained. No other significant abnormality. Left femur complex intertrochanteric fracture. Previous extensive, complex labs, notes and diagnostics reviewed and analyzed. ALLERGIES:    Allergies as of 05/29/2020 - Review Complete 05/29/2020   Allergen Reaction Noted    Lipitor [atorvastatin calcium]  05/25/2020      (please also verify by checking MAR)      I reviewed her [facility] prescription monitoring service data sheets in hopes of eliminating polypharmacy and weaning to the lowest effective dose of pain medications and eliminating the concomitant use of benzodiazepines. I see no medications of concern. I see no habits of combining sedatives and narcotics. Complex Physical Medicine & Rehab Issues Assess & Plan:   1. Severe abnormality of gait and mobility and impaired self-care and ADL's secondary to acute left intratrochanteric fracture progressive osteoporosis and falling. Functional and medical status reassessed regarding patients ability to participate in therapies and patient found to be able to participate in acute intensive comprehensive inpatient rehabilitation program including PT/OT to improve balance, ambulation, ADLs, and to improve the P/AROM. Therapeutic modifications regarding activities in therapies, place, amount of time per day and intensity of therapy made daily. In bed therapies or bedside therapies prn.   2. Bowel and Bladder dysfunction:  frequent toileting, ambulate to bathroom with assistance, check post void residuals. Check for C.difficile x1 if >2 loose stools in 24 hours, continue bowel & bladder program.  Monitor bowel and bladder function. Lactinex 2 PO every AC.   MOM prn, Brown Bomb prn, Glycerin suppository prn, enema prn.  3. Severe left hip fracture related pain as well as generalized OA pain: reassess pain every shift and prior to and after each therapy session, give prn Tylenol and titrating doses of Norco always using lowest Peripheral vascular disease, Sick sinus syndrome-vital signs every shift dose and titrate cardiac medications to include Coumadin, Prinivil, Betapace, consult hospitalist for backup medical  4. Tobacco user-stop smoking counseling-add NicoDerm  5. Vitamin D deficiency-add vitamin D treat for osteoporosis  6. Hyponatremia, Hyperkalemia-supplement accordingly recheck BMP-consider holding ACE inhibitor  7. Whole body arthritis and inflammation due to Lupus-monitor for blood clots patient is on Coumadin she is on daily Deltasone for her lungs and lupus as well as Plaquenil  8. Steroid-induced type 2 diabetes-limit steroids to lowest effective dose add carbohydrate restriction of the diet check hemoglobin A1c  9.  Acute UTI-trial Macrobid check postvoid residuals monitor for retention correct blood sugars    Obdulio Hernandez D.O., PM&R     Attending    [facility] Crystal Rod There are no Wet Read(s) to document. There is 1 Wet Read(s) to document.

## 2023-01-09 NOTE — ED PROVIDER NOTE - OBJECTIVE STATEMENT
82-year-old female presents with epigastric pain for 4 weeks.  Pain is sharp intermittent with no alleviating or aggravating factor.  Pain is currently 5 out of 10 out of 10.  No nausea nausea vomiting diarrhea fever or chills.  Patient is on Coumadin for mechanical heart valve.  History of dizziness upon standing up.  History of black tarry stool for tarry stool for 4 weeks tarry stool for 4 weeks.  Patient was told by her primary care doctor to come to the ED.  PMD is Reginald Barreto.

## 2023-01-09 NOTE — H&P ADULT - ASSESSMENT
82 year old female with PMH DM2, HTN, HLD, Mechanical heart valve on coumadin, AAA presents with black tarry stools found with INR 7.28.    Suspected upper GIB  Symptomatic Anemia  CT Angio A/P without evidence of acute GIB  Hgb 9.8 in ED, guaiac positive stools, currently receiving one unit PRBCs  GI consult pending  maintian NPO, monitor CBC, monitor stools  keep active Type and Screen, Transfuse Hgb<7 (or symptomatic/active bleeding)  continue IV protonix BID  plan for upper endoscopy tomorrow with GI    Supratherapeutic INR  Mechanical Heart Valve on coumadin  INR found to be 7.28  pt received KCentra and Vitamin K in ED  INR now 1.54  continue to hold coumadin    DM2  follow up A1c  pt takes metformin at home  maintain NPO, IVF for now  monitor accuchecks    HTN  chronic condition  pt takes tiadylt  daily and valsartan-HCTZ 320-25 daily  will start with parameters    HLD  chronic condition  continue rosuvastatin    Prophylactic Meaure  hold pharm dvt ppx due to GIB  continue protonix    spoke and provided an update for daughter Wendi Sierra [phone], alternative number son in law Will Venegas Kimberly [phone] 82 year old female with PMH DM2, HTN, HLD, Mechanical heart valve on coumadin, AAA presents with black tarry stools found with INR 7.28.    Suspected upper GIB  Symptomatic Anemia  CT Angio A/P without evidence of acute GIB  Hgb 9.8 in ED, guaiac positive stools, currently receiving one unit PRBCs  GI consult pending  maintian NPO, monitor CBC, monitor stools  keep active Type and Screen, Transfuse Hgb<7 (or symptomatic/active bleeding)  continue IV protonix BID  plan for upper endoscopy tomorrow with GI  hold coumadin    Supratherapeutic INR  Mechanical Heart Valve on coumadin  INR found to be 7.28  pt received KCentra and Vitamin K in ED  INR now 1.54  continue to hold coumadin    DM2  follow up A1c  pt takes metformin at home  maintain NPO, IVF for now  monitor accuchecks    HTN  chronic condition  pt takes tiadylt  daily and valsartan-HCTZ 320-25 daily  will start with parameters    HLD  chronic condition  continue rosuvastatin    Prophylactic Meaure  hold pharm dvt ppx due to GIB  continue protonix    spoke and provided an update for daughter Wendi Sierra [phone], alternative number son in law Will Theo Harris [phone] 82 year old female with PMH DM2, HTN, HLD, Mechanical heart valve on coumadin, AAA presents with black tarry stools found with INR 7.28.    Suspected upper GIB  Symptomatic Anemia  CT Angio A/P without evidence of acute GIB  Hgb 9.8 in ED, guaiac positive stools, currently receiving one unit PRBCs  GI consult pending  maintian NPO, monitor CBC, monitor stools  keep active Type and Screen, Transfuse Hgb<7 (or symptomatic/active bleeding)  continue IV protonix BID  plan for upper endoscopy tomorrow with GI  hold coumadin    Supratherapeutic INR  Mechanical Heart Valve on coumadin  INR found to be 7.28  pt received KCentra and Vitamin K in ED  INR now 1.54  continue to hold coumadin    Thrombocytopenia  PLT 65 on admission  monitor CBC    DM2  follow up A1c  pt takes metformin at home  maintain NPO, IVF for now  monitor accuchecks    HTN  chronic condition  pt takes tiadylt  daily and valsartan-HCTZ 320-25 daily  will start with parameters    HLD  chronic condition  continue rosuvastatin    Prophylactic Meaure  hold pharm dvt ppx due to GIB  continue protonix    spoke and provided an update for daughter Wendi Theo Harris [phone], alternative number son in law Will Theo Harris [phone] 82 year old female with PMH DM2, HTN, HLD, Mechanical heart valve on coumadin, AAA presents with black tarry stools found with INR 7.28.    Suspected upper GIB  Symptomatic Anemia  CT Angio A/P without evidence of acute GIB  Hgb 9.8 in ED, guaiac positive stools, currently receiving one unit PRBCs  follow up H/H at 6pm  GI consult pending  maintian NPO, monitor CBC, monitor stools  keep active Type and Screen, Transfuse Hgb<7 (or symptomatic/active bleeding)  continue IV protonix BID  plan for upper endoscopy tomorrow with GI  hold coumadin (s/p reversal in the ED)    Supratherapeutic INR  Mechanical Heart Valve on coumadin  INR found to be 7.28  pt received KCentra and Vitamin K in ED  INR now 1.54  continue to hold coumadin    Thrombocytopenia  PLT 65 on admission  monitor CBC    DM2  follow up A1c  pt takes metformin at home  maintain NPO, IVF for now  monitor accuchecks    HTN  chronic condition  pt takes tiadylt  daily and valsartan-HCTZ 320-25 daily  will start with parameters    HLD  chronic condition  continue rosuvastatin    Prophylactic Meaure  hold pharm dvt ppx due to GIB  continue protonix    spoke and provided an update for daughter Wendi Venegas Kimberly [phone], alternative number son in law Will Venegas Kimberly [phone]

## 2023-01-09 NOTE — ED ADULT TRIAGE NOTE - CHIEF COMPLAINT QUOTE
BIBA from home pt reports feeling weak and dizzy, possible GI bleed with dark stools and epigastric pain

## 2023-01-09 NOTE — H&P ADULT - HISTORY OF PRESENT ILLNESS
82 year old female with PMH significant for HTN, HLD, DM2, Mechanical Heart Valve on coumadin, AAA presents to ED from home with black stools.  Pt reports visiting her daughter and son in law from [place].  Pt is originally from [place] and reports frequently visiting.  Reports intermittent black stools and epigastric pain described as burning for the past four weeks.  Reports her PMD is in [place], lives at home with her  (who suffers from dementia).  Pt denies any alleviating or aggravating factors to her abdominal pain.  Denies nausea, vomiting or chills.  Reports some infrequent episodes of loose stool.  Reports her PMD was Mary Lou George while she was living in [place].       In ED labs significant for INR 7.28, status post KCentra and IV Phytonadione, INR now 1.54, Hgb 9.8 (currently receiving one unit PRBCs)  CT Angio A/P with no active GIB  82 year old female with PMH significant for HTN, HLD, DM2, Mechanical Heart Valve on coumadin, AAA presents to ED from home with black stools.  Pt reports visiting her daughter and son in law from [place].  Pt is originally from [place] and reports frequently visiting.  Reports intermittent black stools and epigastric pain described as burning for the past four weeks.  Reports her PMD is in [place], lives at home with her  (who suffers from dementia).  Pt denies any alleviating or aggravating factors to her abdominal pain.  Denies nausea, vomiting or chills.  Reports some infrequent episodes of loose stool.  Reports her PMD was Mary Lou George while she was living in [place].  Patient states she had colonoscopy 10 years ago and had polyps removed, had EGD done about 4 months ago and was told she has some "precursor to ulcers". EGD and colonoscopy were done in [place], patient then came to [place] on 12/26 and since then has been having black tarry stool.    In ED labs significant for INR 7.28, status post KCentra and IV Phytonadione, INR now 1.54, Hgb 9.8 (currently receiving one unit PRBCs)  CT Angio A/P with no active GIB

## 2023-01-09 NOTE — ED PROVIDER NOTE - PROGRESS NOTE DETAILS
All results were explained to patient and/or family and a copy of all available results given.  case dw Dr. Velasquez [facility]

## 2023-01-09 NOTE — H&P ADULT - NSHPREVIEWOFSYSTEMS_GEN_ALL_CORE
REVIEW OF SYSTEMS:  CONSTITUTIONAL: No fever, weight loss, or fatigue  EYES: No eye pain, visual disturbances, or discharge  ENMT:  No difficulty hearing, tinnitus, vertigo; No sinus or throat pain  NECK: No pain or stiffness  BREASTS: No pain, masses, or nipple discharge  RESPIRATORY: No cough, wheezing, chills or hemoptysis; No shortness of breath  CARDIOVASCULAR: No chest pain, palpitations, dizziness, or leg swelling  GASTROINTESTINAL: +abdominal or epigastric pain. No nausea, vomiting, or hematemesis; No diarrhea or constipation. +black stools  GENITOURINARY: No dysuria, frequency, hematuria, or incontinence  NEUROLOGICAL: No headaches, memory loss, loss of strength, numbness, or tremors  SKIN: No itching, burning, rashes, or lesions   LYMPH NODES: No enlarged glands  ENDOCRINE: No heat or cold intolerance; No hair loss  MUSCULOSKELETAL: No joint pain or swelling; No muscle, back, or extremity pain  PSYCHIATRIC: No depression, anxiety, mood swings, or difficulty sleeping  HEME/LYMPH: No easy bruising, or bleeding gums  ALLERY AND IMMUNOLOGIC: No hives or eczema    ALL ROS REVIEWED AND NORMAL EXCEPT AS STATED ABOVE

## 2023-01-09 NOTE — CONSULT NOTE ADULT - ASSESSMENT
82 year old with a PMH of mechanical AVR on coumadin, HTN, HLD, and DM on metformin presenting to [facility] today with GIB with elevated INR on Coumadin likley upper in etiololgy. Pt seen / examined in ICU and appears well and is HDS. She is not in any respiratory distress. Pt notes melena and FOB was positive. CT angio negative for active obvious bleed. INR was revered by ED team. Pt evaluated by GI service with recommendation for ppi bid and upper endoscopy tomorrow. Pt is not currently an ICU candidate given the above, however if any changes should occur please reconsult the ICU team as needed. We are recommending the following:      --admit to medicine service with telemetry monitoring  --trend h/h, cbc q6h; goal hgb >7; monitor stools  --recc's per GI, plan for endoscopy tomorrow AM  --ppi bid, keep NPO status  --s/p vitamin K, monitor INR level; hold AC; no antiplatelet meds  --scd for dvt prophylaxis    Discussed above with ICU attending physician and ED physician.  82 year old with a PMH of mechanical AVR on coumadin, HTN, HLD, and DM on metformin presenting to [facility] today with GIB with elevated INR on Coumadin likley upper in etiololgy. Pt seen / examined in ICU and appears well and is HDS. She is not in any respiratory distress. Pt notes melena and FOB was positive. CT angio negative for active obvious bleed. INR was revered by ED team. Pt evaluated by GI service with recommendation for ppi bid and upper endoscopy tomorrow. Pt is not currently an ICU candidate given the above, however if any changes should occur please reconsult the ICU team as needed. We are recommending the following:    Assessment  1. Upper GIB  2. VHD s/p Mechanical AVR - on Coumadin with elevated INR  3.DM2, HTN, High chol    --admit to medicine service with telemetry monitoring  --trend h/h, cbc q6h; goal hgb >7; monitor stools  --recc's per GI, plan for endoscopy tomorrow AM  --ppi bid, keep NPO status  --s/p vitamin K, monitor INR level; hold AC; no antiplatelet meds  --scd for dvt prophylaxis    Discussed above with ICU attending physician and ED physician.

## 2023-01-09 NOTE — H&P ADULT - NSHPSOCIALHISTORY_GEN_ALL_CORE
patient reports living at home in [place] with   denies smoking, drugs or nicotine use    mother  age 97 secondary to fall and hip fracture  father  age 62 from lung cancer, active smoker

## 2023-01-09 NOTE — CONSULT NOTE ADULT - SUBJECTIVE AND OBJECTIVE BOX
Mrs. Parada is an 82 year old with a PMH of mechanical AVR on coumadin, HTN, HLD, and DM on metformin who presented earlier today to [facility] ED per her PCP instructions for 3-4 weeks of dark stools. Per pt she is here visiting family for a few weeks as she lives in [place], and has been eating a lot of blueberries and when she voiced concern for dark stools that her family said it was the blueberries. Pt also endorsed abdominal pain and some fecal incontinence at times. ROS was otherwise negative during assessment.     Patient states she was diagnosed with anemia approximately 8 months prior and was started on iron at that time. She also states when discussed regarding her colonoscopy that her GI and cardiology MD's were not agreeing on the need vs risk for the testing and was still pending. She also states she had an upper endoscopy ~2-4 months ago but this all seemed vague as patient does not know the results, and does not appear to be giving consistent history to various providers. GI service was called by ED MD. Vitamin K given by ED team.     ICU team has been consulted for melena and elevated INR on coumadin. While in ED pt was HDS, and hgb noted as 9.8 with FOB+. Patient states she asked her PCP who directed her to come to ED immediately if she could wait for the testing until she goes back to [place] and they stated no that this was urgent, suggesting she was asymptomatic other than the dark stool and vague abdominal pain.     Upon arrival to ED for evaluation pt was in CT scan obtaining CT angio. Pt then back to ED bed where her PE and above history was taken by the ICU attending physician and myself.         Allergies  No Known Allergies        MEDICATIONS  (STANDING):  acetaminophen     Tablet .. 650 milliGRAM(s) Oral once  ondansetron Injectable 4 milliGRAM(s) IV Push once        Daily Height in cm: 157.48 (2023 11:44)    Daily       Drug Dosing Weight  Height (cm): 157.5 (2023 11:44)  Weight (kg): 62.6 (2023 11:44)  BMI (kg/m2): 25.2 (2023 11:44)  BSA (m2): 1.63 (2023 11:44)      PAST MEDICAL & SURGICAL HISTORY:  Hypertension  Hypercholesteremia  H/O heart valve replacement with mechanical valve  History of 2  sections        ADVANCE DIRECTIVES: Full code       REVIEW OF SYSTEMS: See above        Vital Signs Last 24 Hrs  T(C): 36.4 (2023 11:44), Max: 36.4 (2023 11:44)  T(F): 97.5 (2023 11:44), Max: 97.5 (2023 11:44)  HR: 85 (2023 14:30) (67 - 85)  BP: 154/79 (2023 14:30) (124/77 - 157/85)  BP(mean): 100 (2023 14:30) (80 - 104)  RR: 16 (2023 14:30) (16 - 20)  SpO2: 95% (2023 14:30) (95% - 96%)    O2 Parameters below as of 2023 14:30  Patient On (Oxygen Delivery Method): room air      PHYSICAL EXAM:  GENERAL: NAD, well-groomed, well-developed  HEAD:  Atraumatic, Normocephalic  EYES: EOMI, PERRLA, conjunctiva and sclera clear  ENMT: No tonsillar erythema, exudates, or enlargement; Moist mucous membranes, Good dentition, No lesions  NECK: Supple, No JVD  NERVOUS SYSTEM:  Alert & Oriented X3, Good concentration; Motor Strength 5/5 B/L upper and lower extremities  CHEST/LUNG: Clear to percussion bilaterally; No rales, rhonchi, wheezing, or rubs  HEART: Regular rate and rhythm; No murmurs, rubs, or gallops  ABDOMEN: Soft, ++TTP, Nondistended; Bowel sounds present  EXTREMITIES:  2+ Peripheral Pulses, No clubbing, cyanosis, or edema  LYMPH: No lymphadenopathy noted  SKIN: No rashes or lesions    LABS:  CBC Full  -  ( 2023 12:45 )  WBC Count : 37.76 K/uL  RBC Count : 3.45 M/uL  Hemoglobin : 9.8 g/dL  Hematocrit : 30.2 %  Platelet Count - Automated : 65 K/uL  Mean Cell Volume : 87.5 fl  Mean Cell Hemoglobin : 28.4 pg  Mean Cell Hemoglobin Concentration : 32.5 gm/dL  Auto Neutrophil # : 3.78 K/uL  Auto Lymphocyte # : 6.42 K/uL  Auto Monocyte # : 3.78 K/uL  Auto Eosinophil # : 0.00 K/uL  Auto Basophil # : 0.00 K/uL  Auto Neutrophil % : 10.0 %  Auto Lymphocyte % : 17.0 %  Auto Monocyte % : 10.0 %  Auto Eosinophil % : 0.0 %  Auto Basophil % : 0.0 %        144  |  103  |  32<H>  ----------------------------<  129<H>  3.8   |  28  |  1.04    Ca    9.8      2023 12:45    TPro  6.9  /  Alb  3.8  /  TBili  0.3  /  DBili  x   /  AST  74<H>  /  ALT  55<H>  /  AlkPhos  88          PT/INR - ( 2023 16:43 )   PT: 17.9 sec;   INR: 1.54 ratio    PTT - ( 2023 13:20 )  PTT:54.7 sec      Urinalysis Basic - ( 2023 16:12 )  Color: Yellow / Appearance: Clear / S.005 / pH: x  Gluc: x / Ketone: Trace  / Bili: Negative / Urobili: Negative   Blood: x / Protein: 15 / Nitrite: Negative   Leuk Esterase: Negative / RBC: 5-10 /HPF / WBC Negative /HPF   Sq Epi: x / Non Sq Epi: Neg.-Few / Bacteria: Negative /HPF        RADIOLOGY:  CT Angio Abdomen and Pelvis w/ IV Cont (23 @ 16:03)  IMPRESSION:  No evidence of active GI bleed.  Focal diminished cortical enhancement involving the upper left kidney   concerning for focal pyelonephritis or possibly infarcts.        CRITICAL CARE TIME SPENT: 45 minutes   Mrs. Parada is an 82 year old with a PMH of mechanical AVR on coumadin, HTN, HLD, and DM on metformin who presented earlier today to [facility] ED per her PCP instructions for 3-4 weeks of dark stools. Per pt she is here visiting family for a few weeks as she lives in [place], and has been eating a lot of blueberries and when she voiced concern for dark stools that her family said it was the blueberries. Pt also endorsed abdominal pain and some fecal incontinence at times. ROS was otherwise negative during assessment.     Patient states she was diagnosed with anemia approximately 8 months prior and was started on iron at that time. She also states when discussed regarding her colonoscopy that her GI and cardiology MD's were not agreeing on the need vs risk for the testing and was still pending. She also states she had an upper endoscopy ~2-4 months ago but this all seemed vague as patient does not know the results, and does not appear to be giving consistent history to various providers. GI service was called by ED MD. Vitamin K given by ED team.     ICU team has been consulted for melena and elevated INR on coumadin. While in ED pt was HDS, and hgb noted as 9.8 with FOB+. Patient states she asked her PCP who directed her to come to ED immediately if she could wait for the testing until she goes back to [place] and they stated no that this was urgent, suggesting she was asymptomatic other than the dark stool and vague abdominal pain.     Upon arrival to ED for evaluation pt was in CT scan obtaining CT angio. Pt then back to ED bed where her PE and above history was taken by the ICU attending physician and myself.         Allergies  No Known Allergies        MEDICATIONS  (STANDING):  acetaminophen     Tablet .. 650 milliGRAM(s) Oral once  ondansetron Injectable 4 milliGRAM(s) IV Push once        Daily Height in cm: 157.48 (2023 11:44)    Daily       Drug Dosing Weight  Height (cm): 157.5 (2023 11:44)  Weight (kg): 62.6 (2023 11:44)  BMI (kg/m2): 25.2 (2023 11:44)  BSA (m2): 1.63 (2023 11:44)      PAST MEDICAL & SURGICAL HISTORY:  Hypertension  Hypercholesteremia  H/O heart valve replacement with mechanical valve  History of 2  sections        ADVANCE DIRECTIVES: Full code       REVIEW OF SYSTEMS: See above        Vital Signs Last 24 Hrs  T(C): 36.4 (2023 11:44), Max: 36.4 (2023 11:44)  T(F): 97.5 (2023 11:44), Max: 97.5 (2023 11:44)  HR: 85 (2023 14:30) (67 - 85)  BP: 154/79 (2023 14:30) (124/77 - 157/85)  BP(mean): 100 (2023 14:30) (80 - 104)  RR: 16 (2023 14:30) (16 - 20)  SpO2: 95% (2023 14:30) (95% - 96%)    O2 Parameters below as of 2023 14:30  Patient On (Oxygen Delivery Method): room air      PHYSICAL EXAM:  GENERAL: NAD, well-groomed, well-developed  HEAD:  Atraumatic, Normocephalic  EYES: EOMI, PERRLA, conjunctiva and sclera clear  ENMT: No tonsillar erythema, exudates, or enlargement; Moist mucous membranes, Good dentition, No lesions  NECK: Supple, No JVD  NERVOUS SYSTEM:  Alert & Oriented X3, Good concentration; Motor Strength 5/5 B/L upper and lower extremities  CHEST/LUNG: Clear to percussion bilaterally; No rales, rhonchi, wheezing, or rubs  HEART: Regular rate and rhythm; No murmurs, rubs, or gallops  ABDOMEN: Soft, ++TTP, Nondistended; Bowel sounds present  EXTREMITIES:  2+ Peripheral Pulses, No clubbing, cyanosis, or edema  LYMPH: No lymphadenopathy noted  SKIN: No rashes or lesions    LABS:  CBC Full  -  ( 2023 12:45 )  WBC Count : 37.76 K/uL  RBC Count : 3.45 M/uL  Hemoglobin : 9.8 g/dL  Hematocrit : 30.2 %  Platelet Count - Automated : 65 K/uL  Mean Cell Volume : 87.5 fl  Mean Cell Hemoglobin : 28.4 pg  Mean Cell Hemoglobin Concentration : 32.5 gm/dL  Auto Neutrophil # : 3.78 K/uL  Auto Lymphocyte # : 6.42 K/uL  Auto Monocyte # : 3.78 K/uL  Auto Eosinophil # : 0.00 K/uL  Auto Basophil # : 0.00 K/uL  Auto Neutrophil % : 10.0 %  Auto Lymphocyte % : 17.0 %  Auto Monocyte % : 10.0 %  Auto Eosinophil % : 0.0 %  Auto Basophil % : 0.0 %        144  |  103  |  32<H>  ----------------------------<  129<H>  3.8   |  28  |  1.04    Ca    9.8      2023 12:45    TPro  6.9  /  Alb  3.8  /  TBili  0.3  /  DBili  x   /  AST  74<H>  /  ALT  55<H>  /  AlkPhos  88          PT/INR - ( 2023 16:43 )   PT: 17.9 sec;   INR: 1.54 ratio    PTT - ( 2023 13:20 )  PTT:54.7 sec      Urinalysis Basic - ( 2023 16:12 )  Color: Yellow / Appearance: Clear / S.005 / pH: x  Gluc: x / Ketone: Trace  / Bili: Negative / Urobili: Negative   Blood: x / Protein: 15 / Nitrite: Negative   Leuk Esterase: Negative / RBC: 5-10 /HPF / WBC Negative /HPF   Sq Epi: x / Non Sq Epi: Neg.-Few / Bacteria: Negative /HPF    WBC Trend  23 @ 12:45   -  37.76<H>    H/H Trend  23 @ 12:45   -   9.8<L>/ 30.2<L>    Stool Occult Blood  23 @ 12:45   -   Positive<!>    Platelet Trend  23 @ 12:45   -  65<L>          RADIOLOGY:  CT Angio Abdomen and Pelvis w/ IV Cont (23 @ 16:03)  IMPRESSION:  No evidence of active GI bleed.  Focal diminished cortical enhancement involving the upper left kidney   concerning for focal pyelonephritis or possibly infarcts.        CRITICAL CARE TIME SPENT: 45 minutes

## 2023-01-09 NOTE — H&P ADULT - NSHPLABSRESULTS_GEN_ALL_CORE
9.8    37.76 )-----------( 65       ( 2023 12:45 )             30.2           144  |  103  |  32  ----------------------------<  129  3.8   |  28  |  1.04    Ca    9.8      2023 12:45    TPro  6.9  /  Alb  3.8  /  TBili  0.3  /  DBili  x   /  AST  74  /  ALT  55  /  AlkPhos  88      PT/INR - ( 2023 16:43 )   PT: 17.9 sec;   INR: 1.54 ratio         PTT - ( 2023 13:20 )  PTT:54.7 sec  CARDIAC MARKERS ( 2023 12:45 )  x     / 24.2 ng/L / x     / x     / x                  Urinalysis Basic - ( 2023 16:12 )    Color: Yellow / Appearance: Clear / S.005 / pH: x  Gluc: x / Ketone: Trace  / Bili: Negative / Urobili: Negative   Blood: x / Protein: 15 / Nitrite: Negative   Leuk Esterase: Negative / RBC: 5-10 /HPF / WBC Negative /HPF   Sq Epi: x / Non Sq Epi: Neg.-Few / Bacteria: Negative /HPF    < from: CT Angio Abdomen and Pelvis w/ IV Cont (23 @ 16:03) >    IMPRESSION:  No evidence of active GI bleed.  Focal diminished cortical enhancement involving the upper left kidney   concerning for focal pyelonephritis or possibly infarcts.        --- End of Report ---            MADHAVI TONG MD; Attending Radiologist  This document has been electronically signed. 2023  5:00PM    < end of copied text >

## 2023-01-09 NOTE — ED PROVIDER NOTE - NSICDXPASTSURGICALHX_GEN_ALL_CORE_FT
PAST SURGICAL HISTORY:  H/O heart valve replacement with mechanical valve     History of 2  sections

## 2023-01-09 NOTE — H&P ADULT - NSHPPHYSICALEXAM_GEN_ALL_CORE
T(C): 36.4 (01-09-23 @ 11:44), Max: 36.4 (01-09-23 @ 11:44)  HR: 85 (01-09-23 @ 14:30) (67 - 85)  BP: 154/79 (01-09-23 @ 14:30) (124/77 - 157/85)  RR: 16 (01-09-23 @ 14:30) (16 - 20)  SpO2: 95% (01-09-23 @ 14:30) (95% - 96%)  Wt(kg): --Vital Signs Last 24 Hrs  T(C): 36.4 (09 Jan 2023 11:44), Max: 36.4 (09 Jan 2023 11:44)  T(F): 97.5 (09 Jan 2023 11:44), Max: 97.5 (09 Jan 2023 11:44)  HR: 85 (09 Jan 2023 14:30) (67 - 85)  BP: 154/79 (09 Jan 2023 14:30) (124/77 - 157/85)  BP(mean): 100 (09 Jan 2023 14:30) (80 - 104)  RR: 16 (09 Jan 2023 14:30) (16 - 20)  SpO2: 95% (09 Jan 2023 14:30) (95% - 96%)    Parameters below as of 09 Jan 2023 14:30  Patient On (Oxygen Delivery Method): room air        PHYSICAL EXAM:  GENERAL: NAD, well-groomed, well-developed  HEAD:  Atraumatic, Normocephalic  EYES: EOMI, PERRLA, conjunctiva and sclera clear  ENMT: No tonsillar erythema, exudates, or enlargement; Moist mucous membranes, Good dentition, No lesions  NECK: Supple, No JVD, Normal thyroid  NERVOUS SYSTEM:  Alert & Oriented X3, Good concentration; Motor Strength 5/5 B/L upper and lower extremities; DTRs 2+ intact and symmetric  CHEST/LUNG: Clear to percussion bilaterally; No rales, rhonchi, wheezing, or rubs  HEART: Regular rate and rhythm; No murmurs, rubs, or gallops  ABDOMEN: Soft, Nontender, Nondistended; Bowel sounds present  EXTREMITIES:  2+ Peripheral Pulses, No clubbing, cyanosis, or edema  LYMPH: No lymphadenopathy noted  SKIN: No rashes or lesions

## 2023-01-09 NOTE — H&P ADULT - NS ATTEND AMEND GEN_ALL_CORE FT
82 year old female with PMH DM2, HTN, HLD, Mechanical heart valve on coumadin, AAA presents with black tarry stools found with INR 7.28.    #Suspected upper GIB  #Symptomatic Anemia  - likely with bleeding ulcer, states had EGD 4 months ago and possible ulcers in the past  - s/p 1U PRBC in the ED due to active bleeding and receiving coumadin reversal  - follow up H/H for 6pm  - NPO for now, IVF  - protonix 40mg IV BID  - continue to hold coumadin   - discussed with GI NP Maren, will have EGD tomorrow

## 2023-01-09 NOTE — ED ADULT NURSE NOTE - OBJECTIVE STATEMENT
Assumed pt care for a 82 yr old female complaining of abdominal pain and dark tarry stool. Pt reports it has been on going for a week. IV established. Labs collected. Awaiting further disposition.

## 2023-01-10 ENCOUNTER — RESULT REVIEW (OUTPATIENT)
Age: 83
End: 2023-01-10

## 2023-01-10 ENCOUNTER — TRANSCRIPTION ENCOUNTER (OUTPATIENT)
Age: 83
End: 2023-01-10

## 2023-01-10 DIAGNOSIS — D69.6 THROMBOCYTOPENIA, UNSPECIFIED: ICD-10-CM

## 2023-01-10 DIAGNOSIS — K92.2 GASTROINTESTINAL HEMORRHAGE, UNSPECIFIED: ICD-10-CM

## 2023-01-10 LAB
A1C WITH ESTIMATED AVERAGE GLUCOSE RESULT: 6.1 % — HIGH (ref 4–5.6)
ALBUMIN SERPL ELPH-MCNC: 3.2 G/DL — LOW (ref 3.3–5)
ALBUMIN SERPL ELPH-MCNC: 3.5 G/DL — SIGNIFICANT CHANGE UP (ref 3.3–5)
ALP SERPL-CCNC: 70 U/L — SIGNIFICANT CHANGE UP (ref 40–120)
ALP SERPL-CCNC: 78 U/L — SIGNIFICANT CHANGE UP (ref 40–120)
ALT FLD-CCNC: 49 U/L — HIGH (ref 10–45)
ALT FLD-CCNC: 56 U/L — HIGH (ref 10–45)
ANION GAP SERPL CALC-SCNC: 12 MMOL/L — SIGNIFICANT CHANGE UP (ref 5–17)
APPEARANCE UR: CLEAR — SIGNIFICANT CHANGE UP
APTT BLD: 26.4 SEC — LOW (ref 27.5–35.5)
AST SERPL-CCNC: 59 U/L — HIGH (ref 10–40)
AST SERPL-CCNC: 72 U/L — HIGH (ref 10–40)
BASOPHILS # BLD AUTO: 0.06 K/UL — SIGNIFICANT CHANGE UP (ref 0–0.2)
BASOPHILS # BLD AUTO: 0.11 K/UL — SIGNIFICANT CHANGE UP (ref 0–0.2)
BASOPHILS NFR BLD AUTO: 0.1 % — SIGNIFICANT CHANGE UP (ref 0–2)
BASOPHILS NFR BLD AUTO: 0.4 % — SIGNIFICANT CHANGE UP (ref 0–2)
BILIRUB DIRECT SERPL-MCNC: 0.1 MG/DL — SIGNIFICANT CHANGE UP (ref 0–0.3)
BILIRUB INDIRECT FLD-MCNC: 0.3 MG/DL — SIGNIFICANT CHANGE UP (ref 0.2–1)
BILIRUB SERPL-MCNC: 0.4 MG/DL — SIGNIFICANT CHANGE UP (ref 0.2–1.2)
BILIRUB SERPL-MCNC: 0.6 MG/DL — SIGNIFICANT CHANGE UP (ref 0.2–1.2)
BILIRUB UR-MCNC: NEGATIVE — SIGNIFICANT CHANGE UP
BUN SERPL-MCNC: 22 MG/DL — SIGNIFICANT CHANGE UP (ref 7–23)
CALCIUM SERPL-MCNC: 9.6 MG/DL — SIGNIFICANT CHANGE UP (ref 8.4–10.5)
CHLORIDE SERPL-SCNC: 104 MMOL/L — SIGNIFICANT CHANGE UP (ref 96–108)
CO2 SERPL-SCNC: 26 MMOL/L — SIGNIFICANT CHANGE UP (ref 22–31)
COLOR SPEC: YELLOW — SIGNIFICANT CHANGE UP
CREAT SERPL-MCNC: 0.92 MG/DL — SIGNIFICANT CHANGE UP (ref 0.5–1.3)
DIFF PNL FLD: ABNORMAL
EGFR: 63 ML/MIN/1.73M2 — SIGNIFICANT CHANGE UP
EOSINOPHIL # BLD AUTO: 0.09 K/UL — SIGNIFICANT CHANGE UP (ref 0–0.5)
EOSINOPHIL # BLD AUTO: 0.1 K/UL — SIGNIFICANT CHANGE UP (ref 0–0.5)
EOSINOPHIL NFR BLD AUTO: 0.2 % — SIGNIFICANT CHANGE UP (ref 0–6)
EOSINOPHIL NFR BLD AUTO: 0.4 % — SIGNIFICANT CHANGE UP (ref 0–6)
EPI CELLS # UR: SIGNIFICANT CHANGE UP
ESTIMATED AVERAGE GLUCOSE: 128 MG/DL — HIGH (ref 68–114)
FIBRINOGEN PPP-MCNC: 657 MG/DL — HIGH (ref 340–550)
GLUCOSE BLDC GLUCOMTR-MCNC: 101 MG/DL — HIGH (ref 70–99)
GLUCOSE BLDC GLUCOMTR-MCNC: 102 MG/DL — HIGH (ref 70–99)
GLUCOSE BLDC GLUCOMTR-MCNC: 105 MG/DL — HIGH (ref 70–99)
GLUCOSE BLDC GLUCOMTR-MCNC: 108 MG/DL — HIGH (ref 70–99)
GLUCOSE BLDC GLUCOMTR-MCNC: 98 MG/DL — SIGNIFICANT CHANGE UP (ref 70–99)
GLUCOSE SERPL-MCNC: 110 MG/DL — HIGH (ref 70–99)
GLUCOSE UR QL: NEGATIVE — SIGNIFICANT CHANGE UP
HAPTOGLOB SERPL-MCNC: 29 MG/DL — LOW (ref 34–200)
HCT VFR BLD CALC: 29.9 % — LOW (ref 34.5–45)
HCT VFR BLD CALC: 32.8 % — LOW (ref 34.5–45)
HGB BLD-MCNC: 10.7 G/DL — LOW (ref 11.5–15.5)
HGB BLD-MCNC: 9.9 G/DL — LOW (ref 11.5–15.5)
IMM GRANULOCYTES NFR BLD AUTO: 0.7 % — SIGNIFICANT CHANGE UP (ref 0–0.9)
IMM GRANULOCYTES NFR BLD AUTO: 0.8 % — SIGNIFICANT CHANGE UP (ref 0–0.9)
INR BLD: 1.08 RATIO — SIGNIFICANT CHANGE UP (ref 0.88–1.16)
KETONES UR-MCNC: ABNORMAL
LACTATE SERPL-SCNC: 0.6 MMOL/L — LOW (ref 0.7–2)
LDH SERPL L TO P-CCNC: 789 U/L — HIGH (ref 50–242)
LEUKOCYTE ESTERASE UR-ACNC: NEGATIVE — SIGNIFICANT CHANGE UP
LYMPHOCYTES # BLD AUTO: 22.32 K/UL — HIGH (ref 1–3.3)
LYMPHOCYTES # BLD AUTO: 39.1 K/UL — HIGH (ref 1–3.3)
LYMPHOCYTES # BLD AUTO: 78.4 % — HIGH (ref 13–44)
LYMPHOCYTES # BLD AUTO: 82.7 % — HIGH (ref 13–44)
MCHC RBC-ENTMCNC: 28.7 PG — SIGNIFICANT CHANGE UP (ref 27–34)
MCHC RBC-ENTMCNC: 28.9 PG — SIGNIFICANT CHANGE UP (ref 27–34)
MCHC RBC-ENTMCNC: 32.6 GM/DL — SIGNIFICANT CHANGE UP (ref 32–36)
MCHC RBC-ENTMCNC: 33.1 GM/DL — SIGNIFICANT CHANGE UP (ref 32–36)
MCV RBC AUTO: 87.4 FL — SIGNIFICANT CHANGE UP (ref 80–100)
MCV RBC AUTO: 87.9 FL — SIGNIFICANT CHANGE UP (ref 80–100)
MONOCYTES # BLD AUTO: 3.2 K/UL — HIGH (ref 0–0.9)
MONOCYTES # BLD AUTO: 4.52 K/UL — HIGH (ref 0–0.9)
MONOCYTES NFR BLD AUTO: 11.2 % — SIGNIFICANT CHANGE UP (ref 2–14)
MONOCYTES NFR BLD AUTO: 9.6 % — SIGNIFICANT CHANGE UP (ref 2–14)
NEUTROPHILS # BLD AUTO: 2.52 K/UL — SIGNIFICANT CHANGE UP (ref 1.8–7.4)
NEUTROPHILS # BLD AUTO: 3.12 K/UL — SIGNIFICANT CHANGE UP (ref 1.8–7.4)
NEUTROPHILS NFR BLD AUTO: 6.6 % — LOW (ref 43–77)
NEUTROPHILS NFR BLD AUTO: 8.9 % — LOW (ref 43–77)
NITRITE UR-MCNC: NEGATIVE — SIGNIFICANT CHANGE UP
NRBC # BLD: 0 /100 WBCS — SIGNIFICANT CHANGE UP (ref 0–0)
NRBC # BLD: 0 /100 WBCS — SIGNIFICANT CHANGE UP (ref 0–0)
PH UR: 6 — SIGNIFICANT CHANGE UP (ref 5–8)
PLATELET # BLD AUTO: 48 K/UL — LOW (ref 150–400)
PLATELET # BLD AUTO: 58 K/UL — LOW (ref 150–400)
POTASSIUM SERPL-MCNC: 3.5 MMOL/L — SIGNIFICANT CHANGE UP (ref 3.5–5.3)
POTASSIUM SERPL-SCNC: 3.5 MMOL/L — SIGNIFICANT CHANGE UP (ref 3.5–5.3)
PROT SERPL-MCNC: 6.2 G/DL — SIGNIFICANT CHANGE UP (ref 6–8.3)
PROT SERPL-MCNC: 6.8 G/DL — SIGNIFICANT CHANGE UP (ref 6–8.3)
PROT UR-MCNC: 30 MG/DL
PROTHROM AB SERPL-ACNC: 12.5 SEC — SIGNIFICANT CHANGE UP (ref 10.5–13.4)
RBC # BLD: 3.42 M/UL — LOW (ref 3.8–5.2)
RBC # BLD: 3.73 M/UL — LOW (ref 3.8–5.2)
RBC # FLD: 14.6 % — HIGH (ref 10.3–14.5)
RBC # FLD: 14.7 % — HIGH (ref 10.3–14.5)
RBC CASTS # UR COMP ASSIST: SIGNIFICANT CHANGE UP /HPF (ref 0–4)
SODIUM SERPL-SCNC: 142 MMOL/L — SIGNIFICANT CHANGE UP (ref 135–145)
SP GR SPEC: 1.01 — SIGNIFICANT CHANGE UP (ref 1.01–1.02)
TROPONIN I, HIGH SENSITIVITY RESULT: 43.3 NG/L — SIGNIFICANT CHANGE UP
TROPONIN I, HIGH SENSITIVITY RESULT: 59.6 NG/L — HIGH
UROBILINOGEN FLD QL: NEGATIVE — SIGNIFICANT CHANGE UP
WBC # BLD: 28.46 K/UL — HIGH (ref 3.8–10.5)
WBC # BLD: 47.28 K/UL — CRITICAL HIGH (ref 3.8–10.5)
WBC # FLD AUTO: 28.46 K/UL — HIGH (ref 3.8–10.5)
WBC # FLD AUTO: 47.28 K/UL — CRITICAL HIGH (ref 3.8–10.5)
WBC UR QL: SIGNIFICANT CHANGE UP /HPF (ref 0–5)

## 2023-01-10 PROCEDURE — 88305 TISSUE EXAM BY PATHOLOGIST: CPT | Mod: 26

## 2023-01-10 PROCEDURE — 43239 EGD BIOPSY SINGLE/MULTIPLE: CPT

## 2023-01-10 PROCEDURE — 93306 TTE W/DOPPLER COMPLETE: CPT | Mod: 26

## 2023-01-10 PROCEDURE — 99223 1ST HOSP IP/OBS HIGH 75: CPT

## 2023-01-10 PROCEDURE — 99233 SBSQ HOSP IP/OBS HIGH 50: CPT

## 2023-01-10 PROCEDURE — 99223 1ST HOSP IP/OBS HIGH 75: CPT | Mod: 25

## 2023-01-10 PROCEDURE — 99222 1ST HOSP IP/OBS MODERATE 55: CPT

## 2023-01-10 PROCEDURE — 88312 SPECIAL STAINS GROUP 1: CPT | Mod: 26

## 2023-01-10 RX ORDER — CEFTRIAXONE 500 MG/1
1000 INJECTION, POWDER, FOR SOLUTION INTRAMUSCULAR; INTRAVENOUS EVERY 24 HOURS
Refills: 0 | Status: DISCONTINUED | OUTPATIENT
Start: 2023-01-10 | End: 2023-01-11

## 2023-01-10 RX ORDER — SOD SULF/SODIUM/NAHCO3/KCL/PEG
1000 SOLUTION, RECONSTITUTED, ORAL ORAL ONCE
Refills: 0 | Status: COMPLETED | OUTPATIENT
Start: 2023-01-10 | End: 2023-01-10

## 2023-01-10 RX ORDER — SOD SULF/SODIUM/NAHCO3/KCL/PEG
1000 SOLUTION, RECONSTITUTED, ORAL ORAL ONCE
Refills: 0 | Status: COMPLETED | OUTPATIENT
Start: 2023-01-11 | End: 2023-01-11

## 2023-01-10 RX ADMIN — Medication 650 MILLIGRAM(S): at 21:18

## 2023-01-10 RX ADMIN — Medication 1000 MILLILITER(S): at 17:20

## 2023-01-10 RX ADMIN — PANTOPRAZOLE SODIUM 40 MILLIGRAM(S): 20 TABLET, DELAYED RELEASE ORAL at 17:15

## 2023-01-10 RX ADMIN — ATORVASTATIN CALCIUM 80 MILLIGRAM(S): 80 TABLET, FILM COATED ORAL at 21:17

## 2023-01-10 RX ADMIN — CEFTRIAXONE 100 MILLIGRAM(S): 500 INJECTION, POWDER, FOR SOLUTION INTRAMUSCULAR; INTRAVENOUS at 22:24

## 2023-01-10 RX ADMIN — Medication 650 MILLIGRAM(S): at 22:17

## 2023-01-10 NOTE — CONSULT NOTE ADULT - NS ATTEND AMEND GEN_ALL_CORE FT
pt seen and examined  CT scan results reviewed  patient states symptoms with black stool x 3 weeks. suspect slow bleed over active rapid bleed.   denies dizziness /headaches, abd pain, n/v, cp to me  intiial high INR noted to be high, repeat was low. patient already receiving vit k when evaluating  will need repeat Labs  PPI BID   EGD in am  NPO
Patient seen and examined at the bedside. Agree with the assessment and plan of NP Jaime.  See EGD note.

## 2023-01-10 NOTE — CONSULT NOTE ADULT - ASSESSMENT
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    ECG consistent with normal sinus rhythm and LBBB, similar to old ECG from office, no acute ischemic ST abnormalities. Troponins negative x 2, no signs of ACS. Prior echo report consistent with normal LV and RV systolic function. The patient denies exertional angina or dyspnea, appears to have an exercise tolerance > 4 METs. No signs of CHF.    Recommendations:  [] Pre-operative cardiac risk stratification: The patient is at moderate-to-high cardiovascular risk prior to EGD and/or colonoscopy, may proceed at this level of risk. The patient has a mechanical aortic valve and should be on anticoagulation with heparin drip but due to severe thrombocytopenia and active GI bleed the risk for catastrophic bleeding with heparin appears high, would consult Hematology for assistance.     Discussed with Dr. Hussein. We will continue to follow along.    Fahad Bruno MD  Cardiology

## 2023-01-10 NOTE — PATIENT PROFILE ADULT - FALL HARM RISK - HARM RISK INTERVENTIONS

## 2023-01-10 NOTE — CONSULT NOTE ADULT - PROBLEM SELECTOR RECOMMENDATION 9
Patient admitted with possible sepsis (leukocytosis, anemia and thrombocytopenia) and supratherapeutic INR, likely the cause of GI bleed.  Reviewed CT angio showed no evidence of active bleed for a possible focal pyelonephritis.  Agree with discontinuing anticoagulation for as long as patient is anemic and platelet count < 50K.  Would recommend further transfusion support.  Cardiac evaluation in progress.  We will follow-up CBC and INR in a.m.  Caution should be exerted in reversing anticoagulation, given patient's mechanical valve.  Case discussed with hospitalist on-call.

## 2023-01-10 NOTE — PROGRESS NOTE ADULT - SUBJECTIVE AND OBJECTIVE BOX
Patient is a 82y old  Female who presents with a chief complaint of Symptomatic Anemia (10 Bill 2023 10:53)      Subjective and overnight events:  Patient seen and examined at bedside. two episodes dark colored diarrhea today. admits to some epigastric pain with heart burn. no fever, chills, sob, cp, n/v.    ALLERGIES:  No Known Allergies    MEDICATIONS  (STANDING):  atorvastatin 80 milliGRAM(s) Oral at bedtime  dextrose 5%. 1000 milliLiter(s) (100 mL/Hr) IV Continuous <Continuous>  dextrose 5%. 1000 milliLiter(s) (50 mL/Hr) IV Continuous <Continuous>  dextrose 50% Injectable 25 Gram(s) IV Push once  dextrose 50% Injectable 12.5 Gram(s) IV Push once  dextrose 50% Injectable 25 Gram(s) IV Push once  diltiazem    milliGRAM(s) Oral daily  ferrous    sulfate 325 milliGRAM(s) Oral daily  glucagon  Injectable 1 milliGRAM(s) IntraMuscular once  hydrochlorothiazide 25 milliGRAM(s) Oral daily  insulin lispro (ADMELOG) corrective regimen sliding scale   SubCutaneous every 6 hours  ondansetron Injectable 4 milliGRAM(s) IV Push once  pantoprazole  Injectable 40 milliGRAM(s) IV Push two times a day  polyethylene glycol/electrolyte Solution 1000 milliLiter(s) Oral once  sodium chloride 0.9%. 1000 milliLiter(s) (50 mL/Hr) IV Continuous <Continuous>  valsartan 320 milliGRAM(s) Oral daily    MEDICATIONS  (PRN):  acetaminophen     Tablet .. 650 milliGRAM(s) Oral every 6 hours PRN Temp greater or equal to 38C (100.4F), Mild Pain (1 - 3)  aluminum hydroxide/magnesium hydroxide/simethicone Suspension 30 milliLiter(s) Oral every 4 hours PRN Dyspepsia  dextrose Oral Gel 15 Gram(s) Oral once PRN Blood Glucose LESS THAN 70 milliGRAM(s)/deciliter  melatonin 3 milliGRAM(s) Oral at bedtime PRN Insomnia    Vital Signs Last 24 Hrs  T(F): 97.9 (10 Bill 2023 09:40), Max: 99.2 (10 Bill 2023 05:34)  HR: 93 (10 Bill 2023 09:40) (71 - 95)  BP: 144/86 (10 Bill 2023 09:40) (96/82 - 153/105)  RR: 15 (10 Bill 2023 09:40) (15 - 18)  SpO2: 97% (10 Bill 2023 09:40) (96% - 99%)  I&O's Summary    PHYSICAL EXAM:  General: NAD, A/O x 3  ENT: MMM  Neck: Supple, No JVD  Lungs: Clear to auscultation bilaterally  Cardio: RRR, S1/S2, No murmurs  Abdomen: Soft, epigastric tenderness, Nondistended; Bowel sounds present  Extremities: No calf tenderness, No pitting edema    LABS:                        9.9    28.46 )-----------( 48       ( 10 Bill 2023 08:48 )             29.9     01-10    142  |  104  |  22  ----------------------------<  110  3.5   |  26  |  0.92    Ca    9.6      10 Bill 2023 07:00    TPro  6.2  /  Alb  3.2  /  TBili  0.4  /  DBili  0.1  /  AST  59  /  ALT  49  /  AlkPhos  70  0110      PT/INR - ( 10 Bill 2023 08:48 )   PT: 12.5 sec;   INR: 1.08 ratio         PTT - ( 10 Bill 2023 08:48 )  PTT:26.4 sec  Lactate, Blood: 0.6 mmol/L (01-10 @ 08:48)    CARDIAC MARKERS ( 10 Bill 2023 08:48 )  x     / 43.3 ng/L / x     / x     / x      CARDIAC MARKERS ( 2023 12:45 )  x     / 24.2 ng/L / x     / x     / x                        POCT Blood Glucose.: 105 mg/dL (10 Bill 2023 13:22)  POCT Blood Glucose.: 98 mg/dL (10 Bill 2023 11:55)  POCT Blood Glucose.: 108 mg/dL (10 Bill 2023 05:51)  POCT Blood Glucose.: 101 mg/dL (2023 23:15)  POCT Blood Glucose.: 110 mg/dL (2023 19:07)      Urinalysis Basic - ( 10 Bill 2023 10:42 )    Color: Yellow / Appearance: Clear / S.015 / pH: x  Gluc: x / Ketone: Small  / Bili: Negative / Urobili: Negative   Blood: x / Protein: 30 mg/dL / Nitrite: Negative   Leuk Esterase: Negative / RBC: 0-4 /HPF / WBC 0-2 /HPF   Sq Epi: x / Non Sq Epi: Neg.-Few / Bacteria: x            RADIOLOGY & ADDITIONAL TESTS:  < from: CT Angio Abdomen and Pelvis w/ IV Cont (23 @ 16:03) >  IMPRESSION:  No evidence of active GI bleed.  Focal diminished cortical enhancement involving the upper left kidney   concerning for focal pyelonephritis or possibly infarcts.        --- End of Report ---    < end of copied text >      Care Discussed with Consultants/Other Providers: spoke to hematology, hold AC

## 2023-01-10 NOTE — PROGRESS NOTE ADULT - ASSESSMENT
82 year old female with PMH DM2, HTN, HLD, Mechanical heart valve on coumadin, AAA presents with black tarry stools found with INR 7.28.    Suspected upper GIB  Symptomatic Anemia  CT Angio A/P without evidence of acute GIB  Hgb 9.8 in ED, guaiac positive stools, s/p one unit of pRBC transfusion 1/9  GI consult appreciated  s/p EGD today, negative  Colonoscopy tomorrow  NPO after midnight  keep active Type and Screen, Transfuse Hgb<7 (or symptomatic/active bleeding)  IV protonix BID  hold coumadin (s/p reversal in the ED)    Supratherapeutic INR  Mechanical Heart Valve on coumadin  INR found to be 7.28  pt received KCentra and Vitamin K in ED  INR now 1.08  continue to hold coumadin due to GI bleeds   case discussed with hematology, hold AC when platelet count less than 50    Thrombocytopenia  PLT 48 today  hold AC  hematology consult    Leukocytosis with lymphocytosis   Hemolytic anemia   LDH elevated and haptoglobin low  check GI PCR, stool culture, blood culture  Infectious disease consult  Hematology consult      DM2  Hba1c :6.1  pt takes metformin at home  maintain NPO, IVF for now  ISS  monitor accuchecks    HTN  chronic condition  pt takes tiadylt  daily and valsartan-HCTZ 320-25 daily  cardizem 240mg, HCTZ, Valsartan with parameters    HLD  chronic condition  continue rosuvastatin    Prophylactic Meaure  hold pharm dvt ppx due to GIB  continue protonix    spoke and provided an update for daughter Wendi Sierra [phone], 1/10 alternative number son in law Will Sierra [phone] 82 year old female with PMH DM2, HTN, HLD, Mechanical heart valve on coumadin, AAA presents with black tarry stools found with INR 7.28.    Suspected upper GIB  Symptomatic Anemia  CT Angio A/P without evidence of acute GIB  Hgb 9.8 in ED, guaiac positive stools, s/p one unit of pRBC transfusion 1/9  GI consult appreciated  s/p EGD today, negative  Colonoscopy tomorrow  NPO after midnight  keep active Type and Screen, Transfuse Hgb<7 (or symptomatic/active bleeding)  IV protonix BID  hold coumadin (s/p reversal in the ED)    Supratherapeutic INR  Mechanical Heart Valve on coumadin  INR found to be 7.28  pt received KCentra and Vitamin K in ED  INR now 1.08  continue to hold coumadin due to GI bleeds   case discussed with hematology, hold AC when platelet count less than 50    Thrombocytopenia  PLT 48 today  hold AC  hematology consult    Leukocytosis with lymphocytosis   Hemolytic anemia   LDH elevated and haptoglobin low  check GI PCR, stool culture, blood culture  Infectious disease consult  Hematology consult    Incidental finding of possible focal L kidney infarct  CT abd reviewed. focal diminished cortical enhancement involving left upper kidney concerning for focal pyelonephritis or possibly infarcts  UA negative twice...suspect CT findings more likely to be focal infarct..   Urology consult      DM2  Hba1c :6.1  pt takes metformin at home  maintain NPO, IVF for now  ISS  monitor accuchecks    HTN  chronic condition  pt takes tiadylt  daily and valsartan-HCTZ 320-25 daily  cardizem 240mg, HCTZ, Valsartan with parameters    HLD  chronic condition  continue rosuvastatin    Prophylactic Meaure  hold pharm dvt ppx due to GIB  continue protonix    spoke and provided an update for daughter Wendi Sierra [phone], 1/10 alternative number son in law Will Sierra [phone] 82 year old female with PMH DM2, HTN, HLD, Mechanical heart valve on coumadin, AAA presents with black tarry stools found with INR 7.28.    Suspected GIB  Symptomatic Anemia  CT Angio A/P without evidence of acute GIB  Hgb 9.8 in ED, guaiac positive stools, s/p one unit of pRBC transfusion 1/9  GI consult appreciated  s/p EGD today, negative  Colonoscopy tomorrow  NPO after midnight  keep active Type and Screen, Transfuse Hgb<7 (or symptomatic/active bleeding)  IV protonix BID  hold coumadin (s/p reversal in the ED)    Supratherapeutic INR  Mechanical Heart Valve on coumadin  INR found to be 7.28  pt received KCentra and Vitamin K in ED  INR now 1.08  continue to hold coumadin due to GI bleeds   case discussed with hematology, hold AC when platelet count less than 50    Thrombocytopenia  PLT 48 today  hold AC  hematology consult    Leukocytosis with lymphocytosis   Hemolytic anemia   LDH elevated and haptoglobin low  check GI PCR, stool culture, blood culture  Infectious disease consult  Hematology consult    Incidental finding of possible focal L kidney infarct  CT abd reviewed. focal diminished cortical enhancement involving left upper kidney concerning for focal pyelonephritis or possibly infarcts  UA negative twice...suspect CT findings more likely to be focal infarct..   Urology consult      DM2  Hba1c :6.1  pt takes metformin at home  maintain NPO, IVF for now  ISS  monitor accuchecks    HTN  chronic condition  pt takes tiadylt  daily and valsartan-HCTZ 320-25 daily  cardizem 240mg, HCTZ, Valsartan with parameters    HLD  chronic condition  continue rosuvastatin    Prophylactic Meaure  hold pharm dvt ppx due to GIB  continue protonix    spoke and provided an update for daughter Wendi Sierra [phone], 1/10 alternative number son in law Will Sierra [phone]

## 2023-01-10 NOTE — CONSULT NOTE ADULT - SUBJECTIVE AND OBJECTIVE BOX
INTERVAL HPI/OVERNIGHT EVENTS:  HPI:  82 year old female with PMH significant for HTN, HLD, DM2, Mechanical Heart Valve on coumadin, AAA presents to ED from home with black stools.  Pt reports visiting her daughter and son in law from [place].  Pt is originally from [place] and reports frequently visiting.  Reports intermittent black stools and epigastric pain described as burning for the past four weeks.  Reports her PMD is in [place], lives at home with her  (who suffers from dementia).  Pt denies any alleviating or aggravating factors to her abdominal pain.  Denies nausea, vomiting or chills.  Reports some infrequent episodes of loose stool.  Reports her PMD was [facility] while she was living in [place].  Patient states she had colonoscopy 10 years ago and had polyps removed, had EGD done about 4 months ago and was told she has some "precursor to ulcers". EGD and colonoscopy were done in [place], patient then came to [place] on  and since then has been having black tarry stool.    In ED labs significant for INR 7.28, status post KCentra and IV Phytonadione, INR now 1.54, Hgb 9.8 (currently receiving one unit PRBCs)  CT Angio A/P with no active GIB  (2023 17:55)    GI Consulted to see patient due to dark stools x 4 weeks and anemia. Patient seen and examined in ER. Patient reports epigastric pain since admission. Denies previous history of symptoms. She notes EGD 4 months ago in [place] due anemia. She states they did not do colonoscopy at that time due to her age. Denies unintentional weight loss or loss of appetite. No additional blood thinners other then coumadin.     MEDICATIONS  (STANDING):  atorvastatin 80 milliGRAM(s) Oral at bedtime  dextrose 5%. 1000 milliLiter(s) (100 mL/Hr) IV Continuous <Continuous>  dextrose 5%. 1000 milliLiter(s) (50 mL/Hr) IV Continuous <Continuous>  dextrose 50% Injectable 25 Gram(s) IV Push once  dextrose 50% Injectable 12.5 Gram(s) IV Push once  dextrose 50% Injectable 25 Gram(s) IV Push once  diltiazem    milliGRAM(s) Oral daily  ferrous    sulfate 325 milliGRAM(s) Oral daily  glucagon  Injectable 1 milliGRAM(s) IntraMuscular once  hydrochlorothiazide 25 milliGRAM(s) Oral daily  insulin lispro (ADMELOG) corrective regimen sliding scale   SubCutaneous every 6 hours  ondansetron Injectable 4 milliGRAM(s) IV Push once  pantoprazole  Injectable 40 milliGRAM(s) IV Push two times a day  polyethylene glycol/electrolyte Solution 1000 milliLiter(s) Oral once  sodium chloride 0.9%. 1000 milliLiter(s) (50 mL/Hr) IV Continuous <Continuous>  valsartan 320 milliGRAM(s) Oral daily    MEDICATIONS  (PRN):  acetaminophen     Tablet .. 650 milliGRAM(s) Oral every 6 hours PRN Temp greater or equal to 38C (100.4F), Mild Pain (1 - 3)  aluminum hydroxide/magnesium hydroxide/simethicone Suspension 30 milliLiter(s) Oral every 4 hours PRN Dyspepsia  dextrose Oral Gel 15 Gram(s) Oral once PRN Blood Glucose LESS THAN 70 milliGRAM(s)/deciliter  melatonin 3 milliGRAM(s) Oral at bedtime PRN Insomnia      Allergies    No Known Allergies    Intolerances        PAST MEDICAL & SURGICAL HISTORY:  Hypertension      Hypercholesteremia      H/O heart valve replacement with mechanical valve      History of 2  sections          REVIEW OF SYSTEMS: negative unless indicated in HPI    non smoker  no etoh abuse     PHYSICAL EXAM:   Vital Signs:  Vital Signs Last 24 Hrs  T(C): 36.6 (10 Bill 2023 09:40), Max: 37.3 (2023 17:30)  T(F): 97.9 (10 Bill 2023 09:40), Max: 99.2 (10 Bill 2023 05:34)  HR: 93 (10 Bill 2023 09:40) (71 - 95)  BP: 144/86 (10 Bill 2023 09:40) (96/82 - 153/105)  BP(mean): --  RR: 15 (10 Bill 2023 09:40) (15 - 18)  SpO2: 97% (10 Bill 2023 09:40) (96% - 99%)    Parameters below as of 10 Bill 2023 05:34  Patient On (Oxygen Delivery Method): room air      Daily     Daily I&O's Summary      GENERAL:  Appears stated age,  HEENT:  NC/AT,  conjunctivae clear and pink,  CHEST:  Full & symmetric excursion, no increased effort, breath sounds clear  HEART:  Regular rhythm, S1, S2, no murmur  ABDOMEN:  Soft, non-tender, non-distended, normoactive bowel sounds,  EXTEREMITIES:  no edema  SKIN:  No rash/warm/dry  NEURO:  Alert, oriented,       LABS:                        9.9    28.46 )-----------( 48       ( 10 Bill 2023 08:48 )             29.9     01-10    142  |  104  |  22  ----------------------------<  110<H>  3.5   |  26  |  0.92    Ca    9.6      10 Bill 2023 07:00    TPro  6.2  /  Alb  3.2<L>  /  TBili  0.4  /  DBili  0.1  /  AST  59<H>  /  ALT  49<H>  /  AlkPhos  70  01-10    PT/INR - ( 10 Bill 2023 08:48 )   PT: 12.5 sec;   INR: 1.08 ratio         PTT - ( 10 Bill 2023 08:48 )  PTT:26.4 sec  Urinalysis Basic - ( 10 Bill 2023 10:42 )    Color: Yellow / Appearance: Clear / S.015 / pH: x  Gluc: x / Ketone: Small  / Bili: Negative / Urobili: Negative   Blood: x / Protein: 30 mg/dL / Nitrite: Negative   Leuk Esterase: Negative / RBC: 0-4 /HPF / WBC 0-2 /HPF   Sq Epi: x / Non Sq Epi: Neg.-Few / Bacteria: x      Lipase, Serum: 64 U/L ( @ 12:45)    RADIOLOGY & ADDITIONAL TESTS:     82 year old female with PMH significant for HTN, HLD, DM2, Mechanical Heart Valve on coumadin, AAA presents to ED from home with black stools.  Pt reports visiting her daughter and son in law from [place].  Pt is originally from [place] and reports frequently visiting.  Reports intermittent black stools and epigastric pain described as burning for the past four weeks.  Reports her PMD is in [place], lives at home with her  (who suffers from dementia).  Pt denies any alleviating or aggravating factors to her abdominal pain.  Denies nausea, vomiting or chills.  Reports some infrequent episodes of loose stool.  Reports her PMD was [facility] while she was living in [place].  Patient states she had colonoscopy 10 years ago and had polyps removed, had EGD done about 4 months ago and was told she has some "precursor to ulcers". EGD and colonoscopy were done in [place], patient then came to [place] on  and since then has been having black tarry stool.    In ED labs significant for INR 7.28, status post KCentra and IV Phytonadione, INR now 1.54, Hgb 9.8 (currently receiving one unit PRBCs)  CT Angio A/P with no active GIB  (2023 17:55)    GI Consulted to see patient due to dark stools x 4 weeks and anemia. Patient seen and examined in ER. Patient reports epigastric pain since admission. Denies previous history of symptoms. She notes EGD 4 months ago in [place] due anemia. She states they did not do colonoscopy at that time due to her age. Denies unintentional weight loss or loss of appetite. No additional blood thinners other then coumadin.     MEDICATIONS  (STANDING):  atorvastatin 80 milliGRAM(s) Oral at bedtime  dextrose 5%. 1000 milliLiter(s) (100 mL/Hr) IV Continuous <Continuous>  dextrose 5%. 1000 milliLiter(s) (50 mL/Hr) IV Continuous <Continuous>  dextrose 50% Injectable 25 Gram(s) IV Push once  dextrose 50% Injectable 12.5 Gram(s) IV Push once  dextrose 50% Injectable 25 Gram(s) IV Push once  diltiazem    milliGRAM(s) Oral daily  ferrous    sulfate 325 milliGRAM(s) Oral daily  glucagon  Injectable 1 milliGRAM(s) IntraMuscular once  hydrochlorothiazide 25 milliGRAM(s) Oral daily  insulin lispro (ADMELOG) corrective regimen sliding scale   SubCutaneous every 6 hours  ondansetron Injectable 4 milliGRAM(s) IV Push once  pantoprazole  Injectable 40 milliGRAM(s) IV Push two times a day  polyethylene glycol/electrolyte Solution 1000 milliLiter(s) Oral once  sodium chloride 0.9%. 1000 milliLiter(s) (50 mL/Hr) IV Continuous <Continuous>  valsartan 320 milliGRAM(s) Oral daily    MEDICATIONS  (PRN):  acetaminophen     Tablet .. 650 milliGRAM(s) Oral every 6 hours PRN Temp greater or equal to 38C (100.4F), Mild Pain (1 - 3)  aluminum hydroxide/magnesium hydroxide/simethicone Suspension 30 milliLiter(s) Oral every 4 hours PRN Dyspepsia  dextrose Oral Gel 15 Gram(s) Oral once PRN Blood Glucose LESS THAN 70 milliGRAM(s)/deciliter  melatonin 3 milliGRAM(s) Oral at bedtime PRN Insomnia      Allergies    No Known Allergies    Intolerances        PAST MEDICAL & SURGICAL HISTORY:  Hypertension      Hypercholesteremia      H/O heart valve replacement with mechanical valve      History of 2  sections          REVIEW OF SYSTEMS: negative unless indicated in HPI    non smoker  no etoh abuse     PHYSICAL EXAM:   Vital Signs:  Vital Signs Last 24 Hrs  T(C): 36.6 (10 Bill 2023 09:40), Max: 37.3 (2023 17:30)  T(F): 97.9 (10 Bill 2023 09:40), Max: 99.2 (10 Bill 2023 05:34)  HR: 93 (10 Bill 2023 09:40) (71 - 95)  BP: 144/86 (10 Bill 2023 09:40) (96/82 - 153/105)  BP(mean): --  RR: 15 (10 Bill 2023 09:40) (15 - 18)  SpO2: 97% (10 Bill 2023 09:40) (96% - 99%)    Parameters below as of 10 Bill 2023 05:34  Patient On (Oxygen Delivery Method): room air      Daily     Daily I&O's Summary      GENERAL:  Appears stated age, NAD  HEENT:  NC/AT,  conjunctivae clear and pink  CHEST: clear  HEART:  RRR at present +click  ABDOMEN:  Soft, non-tender, non-distended, normoactive bowel sounds,  EXTREMITIES:  no edema  SKIN:  No rash or jaundice  NEURO:  Alert, oriented      LABS:                        9.9    28.46 )-----------( 48       ( 10 Bill 2023 08:48 )             29.9     01-10    142  |  104  |  22  ----------------------------<  110<H>  3.5   |  26  |  0.92    Ca    9.6      10 Bill 2023 07:00    TPro  6.2  /  Alb  3.2<L>  /  TBili  0.4  /  DBili  0.1  /  AST  59<H>  /  ALT  49<H>  /  AlkPhos  70  01-10    PT/INR - ( 10 Bill 2023 08:48 )   PT: 12.5 sec;   INR: 1.08 ratio         PTT - ( 10 Bill 2023 08:48 )  PTT:26.4 sec  Urinalysis Basic - ( 10 Bill 2023 10:42 )    Color: Yellow / Appearance: Clear / S.015 / pH: x  Gluc: x / Ketone: Small  / Bili: Negative / Urobili: Negative   Blood: x / Protein: 30 mg/dL / Nitrite: Negative   Leuk Esterase: Negative / RBC: 0-4 /HPF / WBC 0-2 /HPF   Sq Epi: x / Non Sq Epi: Neg.-Few / Bacteria: x      Lipase, Serum: 64 U/L ( @ 12:45)

## 2023-01-10 NOTE — CONSULT NOTE ADULT - SUBJECTIVE AND OBJECTIVE BOX
SONI ANN,  82y Female  MRN: [id number]  ATTENDING: Dr. Mihai Hussein      HPI:  82F, PMHx atrial fibrillation, HTN, HLD, depression, GERD, s/p [facility] mechanical AVR, (–[facility]) for rheumatic aortic valve stenosis, ascending aortic aneurysm, admitted with black tarry stool and epigastric pain and found anemic and thrombocytopenic.  Her last colonoscopy was reportedly 10 years ago and had EGD approximately 4-month ago diagnosed with ulcers.  In the emergency room was found with INR of 7.28–received Kcentra and vitamin K.  Anticoagulation held as patient's platelets below 50,000.  CT angio A/P showed no evidence of active GI bleed; focal diminished cortical enhancement involving the upper left kidney concerning for focal pyelonephritis or possibly infarcts.  Hematology asked to comment on above.  Received transfusion support in ED.    PAST MEDICAL & SURGICAL HISTORY:  Hypertension  Hypercholesteremia  H/O heart valve replacement with mechanical valve  History of 2  sections    MEDICATION:  atorvastatin 80 milliGRAM(s) Oral at bedtime  dextrose 5%. 1000 milliLiter(s) IV Continuous <Continuous>  dextrose 5%. 1000 milliLiter(s) IV Continuous <Continuous>  dextrose 50% Injectable 25 Gram(s) IV Push once  dextrose 50% Injectable 12.5 Gram(s) IV Push once  dextrose 50% Injectable 25 Gram(s) IV Push once  diltiazem    milliGRAM(s) Oral daily  ferrous    sulfate 325 milliGRAM(s) Oral daily  glucagon  Injectable 1 milliGRAM(s) IntraMuscular once  hydrochlorothiazide 25 milliGRAM(s) Oral daily  insulin lispro (ADMELOG) corrective regimen sliding scale   SubCutaneous every 6 hours  ondansetron Injectable 4 milliGRAM(s) IV Push once  pantoprazole  Injectable 40 milliGRAM(s) IV Push two times a day  sodium chloride 0.9%. 1000 milliLiter(s) IV Continuous <Continuous>  valsartan 320 milliGRAM(s) Oral daily    ALLERGIES:  No Known Allergies      FAMILY HISTORY:  Reviewed, non-contributory: [ x]     SOCIAL HISTORY:  Tobacco: YES [ ]  ; NO [x ]; Former smoker [ ]  Alcohol:   YES [ ]  ; NO [ x]; Social alcohol user [ ]    REVIEW SYSTEMS:  Constitutional: no fever, chills, night sweats, no weight loss  HEENT: denies visual changes; no oral ulcers, dysphagia, no epistaxis;   Respiratory: no dyspnea , wheezing, cough, hemoptysis  Cardiovascular: denies acute chest pain, palpitations  GI: no loss of appetite, dark stools, or abdominal tenderness / pain; no change in bowel habits.  Musculoskeletal: no new back pain, bone/ joint pain ,no extremity swelling  Integumentary: denies pruritus; no skin rash, bruises, no  suspicious skin lesions  Neurologic: denies peripheral numbness, no dizziness, no gait problems.  Heme: no reported easy bruisability; no lymph node enlargement    VITALS:  T(C): 36.6, Max: 37.3 (23 @ 20:13)  T(F): 97.9, Max: 99.2 (01-10-23 @ 05:34)  HR: 93 (71 - 95)  BP: 144/86 (129/72 - 153/105)  SpO2: 97% (96% - 99%)    PHYSICAL EXAM:  Constitutional: alert, well developed  HEENT: normocephalic, anicteric sclerae, no mucositis or thrush  Respiratory: bilateral clear to auscultation anteriorly  Cardiovascular : S1, S2 regular, rhythmic, no murmurs, gallops or rubs  Abdomen: soft, distended, + normoactive BS, no palpable HS- megaly  Extremities: no tenderness;  -c/c/e, pulses equal bilaterally  Integumentary: no rashes, scars, or lesions suggestive of malignancy  Neurologic: no gross focal deficits    LABS:  (01-10) WBC: 28.46 K/uL,Hemoglobin: 9.9 g/dL, Hematocrit: 29.9 %,  Platelet: 48 K/uL  (01-10) Na: 142 mmol/L ; K: 3.5 mmol/L ; BUN: 22 mg/dL ; Cr: 0.92 mg/dL.  PT/INR - ( 10 Bill 2023 08:48 )   PT: 12.5 sec;   INR: 1.08 ratio    PTT - ( 10 Bill 2023 08:48 )  PTT:26.4 sec    RADIOLOGY:  ACC: [id number] EXAM:  CT ANGIO ABD PELV (W)AW IC                          PROCEDURE DATE:  2023          INTERPRETATION:  CLINICAL INFORMATION: Abdominal pain. Evaluate for GI   bleed.    COMPARISON: None.    CONTRAST/COMPLICATIONS:  IV Contrast: Omnipaque 350  90 cc administered   10 cc discarded  Oral Contrast: Other  Complications: None reported at time of study completion    PROCEDURE:  CT of the Abdomen and Pelvis was performed.  Precontrast, Arterial and Delayed phases were performed.  Sagittal and coronal reformats were performed.    FINDINGS:  LOWER CHEST: Trace right basilar reticular scarring/atelectasis. Coronary   artery calcification.    LIVER: Subcentimeter sized hypodense foci segment 2 and segment 6, too   small to characterize. The liver is enlarged measuring approximately 20   cm.  BILE DUCTS: Normal caliber.  GALLBLADDER: Within normal limits.  SPLEEN: Within normal limits.  PANCREAS: Within normal limits.  ADRENALS: Within normal limits.  KIDNEYS/URETERS: A combination of bilateral renal cysts measuring up to   10 cm on the right side and too small to characterize hypodense foci   bilaterally. Focal diminished cortical enhancement upper left kidney   concerning for focal pyelonephritis or infarcts. Correlateclinically.    BLADDER: Within normal limits.  REPRODUCTIVE ORGANS: Hysterectomy.    BOWEL: No bowel obstruction. Appendix is not visualized. No evidence of   inflammation in the pericecal region. No evidence of contrast   extravasation or pooling tosuggest active GI bleed. Multifocal   uncomplicated diverticula. Collapsed sigmoid limiting the evaluation of   subtle abnormalities.  PERITONEUM: No ascites.  VESSELS: Atherosclerotic changes.  RETROPERITONEUM/LYMPH NODES: No lymphadenopathy.  ABDOMINAL WALL: Within normal limits.  BONES: Degenerative changes.    IMPRESSION:  No evidence of active GI bleed.  Focal diminished cortical enhancement involving the upper left kidney   concerning for focal pyelonephritis or possibly infarcts.

## 2023-01-10 NOTE — CONSULT NOTE ADULT - ASSESSMENT
GI Consulted to see patient due to dark stools x 4 weeks and anemia. Patient seen and examined in ER. Patient reports epigastric pain since admission. Denies previous history of symptoms. She notes EGD 4 months ago in [place] due anemia. She states they did not do colonoscopy at that time due to her age. Denies unintentional weight loss or loss of appetite. No additional blood thinners other then coumadin.    GI consulted to see patient due to dark stools x 4 weeks and anemia. Patient seen and examined in ER. Patient reports epigastric pain since admission. Denies previous history of symptoms. She notes EGD 4 months ago in [place] due anemia. She states they did not do colonoscopy at that time due to her age. Denies unintentional weight loss or loss of appetite. No additional anticoagulants other than Coumadin.

## 2023-01-10 NOTE — CONSULT NOTE ADULT - PROBLEM SELECTOR RECOMMENDATION 9
NPO  Monitor stool  Monitor h/h  Keep active type and screen  Transfuse < 7 or symptomatic  Continue PPI  Upper endoscopy later today  Would get hematology consult given elevated WBC and low plts NPO  Monitor stool  Monitor h/h  Keep active type and screen  Transfuse < 7 or symptomatic  Continue PPI  Upper endoscopy (EGD) later today  Recommend hematology consult given elevated WBC and low platelets

## 2023-01-10 NOTE — CONSULT NOTE ADULT - SUBJECTIVE AND OBJECTIVE BOX
SONI ANN  [id number]      HPI:    Soni Ann is an 82 year old woman with past medical history of Atrial fibrillation, [facility] Mechanical AVR ( at [facility] for rheumatic AV stenosis, on coumadin), Dilated ascending aorta (4.8 x 5 cm in 2019) & Hypertension presents with dark black stools.    The patient reports that she was visiting family here but currently lives in [place]. Reports that she previously followed with cardiologist, Dr. Adam but now follows with a cardiologist in [place]. Denies exertional chest pain or shortness of breath.    ALLERGIES:  No Known Allergies      PAST MEDICAL & SURGICAL HISTORY:  Hypertension  Hypercholesteremia  H/O heart valve replacement with mechanical valve  History of 2  sections    CURRENT MEDICATIONS:  acetaminophen     Tablet .. 650 milliGRAM(s) Oral every 6 hours PRN  aluminum hydroxide/magnesium hydroxide/simethicone Suspension 30 milliLiter(s) Oral every 4 hours PRN  atorvastatin 80 milliGRAM(s) Oral at bedtime  dextrose 5%. 1000 milliLiter(s) IV Continuous <Continuous>  dextrose 5%. 1000 milliLiter(s) IV Continuous <Continuous>  dextrose 50% Injectable 25 Gram(s) IV Push once  dextrose 50% Injectable 12.5 Gram(s) IV Push once  dextrose 50% Injectable 25 Gram(s) IV Push once  dextrose Oral Gel 15 Gram(s) Oral once PRN  diltiazem    milliGRAM(s) Oral daily  ferrous    sulfate 325 milliGRAM(s) Oral daily  glucagon  Injectable 1 milliGRAM(s) IntraMuscular once  hydrochlorothiazide 25 milliGRAM(s) Oral daily  insulin lispro (ADMELOG) corrective regimen sliding scale   SubCutaneous every 6 hours  melatonin 3 milliGRAM(s) Oral at bedtime PRN  ondansetron Injectable 4 milliGRAM(s) IV Push once  pantoprazole  Injectable 40 milliGRAM(s) IV Push two times a day  sodium chloride 0.9%. 1000 milliLiter(s) IV Continuous <Continuous>  valsartan 320 milliGRAM(s) Oral daily      ROS:  All 10 systems reviewed and positives noted in HPI    OBJECTIVE:    VITAL SIGNS:  Vital Signs Last 24 Hrs  T(C): 36.6 (10 Bill 2023 09:40), Max: 37.3 (2023 17:30)  T(F): 97.9 (10 Bill 2023 09:40), Max: 99.2 (10 Bill 2023 05:34)  HR: 93 (10 Bill 2023 09:40) (67 - 95)  BP: 144/86 (10 Bill 2023 09:40) (96/82 - 157/85)  BP(mean): 100 (2023 14:30) (80 - 104)  RR: 15 (10 Bill 2023 09:40) (15 - 20)  SpO2: 97% (10 Bill 2023 09:40) (95% - 99%)    Parameters below as of 10 Bill 2023 05:34  Patient On (Oxygen Delivery Method): room air        PHYSICAL EXAM:  General: no acute distress  HEENT: sclera anicteric  Neck: supple  CVS: JVP ~ 7 cm H20, RRR, s1, s2  Chest: unlabored respirations, clear to auscultation  Abdomen: non-distended  Extremities: trace lower extremity edema b/l  Neuro: awake, alert & oriented x 3  Psych: normal affect      LABS:                        9.9    28.46 )-----------( 48       ( 10 Bill 2023 08:48 )             29.9     01-10    142  |  104  |  22  ----------------------------<  110<H>  3.5   |  26  |  0.92    Ca    9.6      10 Bill 2023 07:00    TPro  6.2  /  Alb  3.2<L>  /  TBili  0.4  /  DBili  0.1  /  AST  59<H>  /  ALT  49<H>  /  AlkPhos  70  01-10        PT/INR - ( 10 Bill 2023 08:48 )   PT: 12.5 sec;   INR: 1.08 ratio         PTT - ( 10 Bill 2023 08:48 )  PTT:26.4 sec      Outpatient ECG (19): normal sinus rhythm, LBBB    Outpatient TTE (2019):  Normal LV systolic function, septal motion c/w cardiac surgery  Normal RV size and function  Mild MR, Mild TR  No pericardial effusion     ECG (23): normal sinus rhythm, LBBB

## 2023-01-11 ENCOUNTER — RESULT REVIEW (OUTPATIENT)
Age: 83
End: 2023-01-11

## 2023-01-11 ENCOUNTER — TRANSCRIPTION ENCOUNTER (OUTPATIENT)
Age: 83
End: 2023-01-11

## 2023-01-11 LAB
ALBUMIN SERPL ELPH-MCNC: 3.5 G/DL — SIGNIFICANT CHANGE UP (ref 3.3–5)
ALP SERPL-CCNC: 75 U/L — SIGNIFICANT CHANGE UP (ref 40–120)
ALT FLD-CCNC: 40 U/L — SIGNIFICANT CHANGE UP (ref 10–45)
ANION GAP SERPL CALC-SCNC: 11 MMOL/L — SIGNIFICANT CHANGE UP (ref 5–17)
AST SERPL-CCNC: 56 U/L — HIGH (ref 10–40)
BASOPHILS # BLD AUTO: 0.07 K/UL — SIGNIFICANT CHANGE UP (ref 0–0.2)
BASOPHILS NFR BLD AUTO: 0.2 % — SIGNIFICANT CHANGE UP (ref 0–2)
BILIRUB DIRECT SERPL-MCNC: 0.1 MG/DL — SIGNIFICANT CHANGE UP (ref 0–0.3)
BILIRUB INDIRECT FLD-MCNC: 0.4 MG/DL — SIGNIFICANT CHANGE UP (ref 0.2–1)
BILIRUB SERPL-MCNC: 0.5 MG/DL — SIGNIFICANT CHANGE UP (ref 0.2–1.2)
BUN SERPL-MCNC: 12 MG/DL — SIGNIFICANT CHANGE UP (ref 7–23)
CALCIUM SERPL-MCNC: 8.9 MG/DL — SIGNIFICANT CHANGE UP (ref 8.4–10.5)
CHLORIDE SERPL-SCNC: 106 MMOL/L — SIGNIFICANT CHANGE UP (ref 96–108)
CO2 SERPL-SCNC: 26 MMOL/L — SIGNIFICANT CHANGE UP (ref 22–31)
CREAT SERPL-MCNC: 0.79 MG/DL — SIGNIFICANT CHANGE UP (ref 0.5–1.3)
CULTURE RESULTS: SIGNIFICANT CHANGE UP
EGFR: 76 ML/MIN/1.73M2 — SIGNIFICANT CHANGE UP
EOSINOPHIL # BLD AUTO: 0.06 K/UL — SIGNIFICANT CHANGE UP (ref 0–0.5)
EOSINOPHIL NFR BLD AUTO: 0.2 % — SIGNIFICANT CHANGE UP (ref 0–6)
EPEC DNA STL QL NAA+PROBE: DETECTED
GI PCR PANEL: DETECTED
GLUCOSE BLDC GLUCOMTR-MCNC: 100 MG/DL — HIGH (ref 70–99)
GLUCOSE BLDC GLUCOMTR-MCNC: 101 MG/DL — HIGH (ref 70–99)
GLUCOSE BLDC GLUCOMTR-MCNC: 115 MG/DL — HIGH (ref 70–99)
GLUCOSE BLDC GLUCOMTR-MCNC: 92 MG/DL — SIGNIFICANT CHANGE UP (ref 70–99)
GLUCOSE BLDC GLUCOMTR-MCNC: 99 MG/DL — SIGNIFICANT CHANGE UP (ref 70–99)
GLUCOSE SERPL-MCNC: 101 MG/DL — HIGH (ref 70–99)
HCT VFR BLD CALC: 27.9 % — LOW (ref 34.5–45)
HCT VFR BLD CALC: 29.8 % — LOW (ref 34.5–45)
HGB BLD-MCNC: 9 G/DL — LOW (ref 11.5–15.5)
HGB BLD-MCNC: 9.7 G/DL — LOW (ref 11.5–15.5)
IMM GRANULOCYTES NFR BLD AUTO: 0.8 % — SIGNIFICANT CHANGE UP (ref 0–0.9)
LYMPHOCYTES # BLD AUTO: 28.44 K/UL — HIGH (ref 1–3.3)
LYMPHOCYTES # BLD AUTO: 82.6 % — HIGH (ref 13–44)
MCHC RBC-ENTMCNC: 28.7 PG — SIGNIFICANT CHANGE UP (ref 27–34)
MCHC RBC-ENTMCNC: 29 PG — SIGNIFICANT CHANGE UP (ref 27–34)
MCHC RBC-ENTMCNC: 32.3 GM/DL — SIGNIFICANT CHANGE UP (ref 32–36)
MCHC RBC-ENTMCNC: 32.6 GM/DL — SIGNIFICANT CHANGE UP (ref 32–36)
MCV RBC AUTO: 88.9 FL — SIGNIFICANT CHANGE UP (ref 80–100)
MCV RBC AUTO: 89 FL — SIGNIFICANT CHANGE UP (ref 80–100)
MONOCYTES # BLD AUTO: 3.07 K/UL — HIGH (ref 0–0.9)
MONOCYTES NFR BLD AUTO: 8.9 % — SIGNIFICANT CHANGE UP (ref 2–14)
NEUTROPHILS # BLD AUTO: 2.51 K/UL — SIGNIFICANT CHANGE UP (ref 1.8–7.4)
NEUTROPHILS NFR BLD AUTO: 7.3 % — LOW (ref 43–77)
NRBC # BLD: 0 /100 WBCS — SIGNIFICANT CHANGE UP (ref 0–0)
PLATELET # BLD AUTO: 40 K/UL — LOW (ref 150–400)
PLATELET # BLD AUTO: 68 K/UL — LOW (ref 150–400)
POTASSIUM SERPL-MCNC: 3.5 MMOL/L — SIGNIFICANT CHANGE UP (ref 3.5–5.3)
POTASSIUM SERPL-SCNC: 3.5 MMOL/L — SIGNIFICANT CHANGE UP (ref 3.5–5.3)
PROT SERPL-MCNC: 6.6 G/DL — SIGNIFICANT CHANGE UP (ref 6–8.3)
RBC # BLD: 3.14 M/UL — LOW (ref 3.8–5.2)
RBC # BLD: 3.35 M/UL — LOW (ref 3.8–5.2)
RBC # FLD: 14.7 % — HIGH (ref 10.3–14.5)
RBC # FLD: 14.8 % — HIGH (ref 10.3–14.5)
SODIUM SERPL-SCNC: 143 MMOL/L — SIGNIFICANT CHANGE UP (ref 135–145)
SPECIMEN SOURCE: SIGNIFICANT CHANGE UP
TROPONIN I, HIGH SENSITIVITY RESULT: 74.9 NG/L — HIGH
WBC # BLD: 30.82 K/UL — HIGH (ref 3.8–10.5)
WBC # BLD: 34.42 K/UL — HIGH (ref 3.8–10.5)
WBC # FLD AUTO: 30.82 K/UL — HIGH (ref 3.8–10.5)
WBC # FLD AUTO: 34.42 K/UL — HIGH (ref 3.8–10.5)

## 2023-01-11 PROCEDURE — 88305 TISSUE EXAM BY PATHOLOGIST: CPT | Mod: 26

## 2023-01-11 PROCEDURE — 45385 COLONOSCOPY W/LESION REMOVAL: CPT

## 2023-01-11 PROCEDURE — 76705 ECHO EXAM OF ABDOMEN: CPT | Mod: 26

## 2023-01-11 PROCEDURE — 93010 ELECTROCARDIOGRAM REPORT: CPT

## 2023-01-11 PROCEDURE — 99232 SBSQ HOSP IP/OBS MODERATE 35: CPT

## 2023-01-11 PROCEDURE — 99233 SBSQ HOSP IP/OBS HIGH 50: CPT

## 2023-01-11 RX ORDER — PANTOPRAZOLE SODIUM 20 MG/1
40 TABLET, DELAYED RELEASE ORAL
Refills: 0 | Status: DISCONTINUED | OUTPATIENT
Start: 2023-01-12 | End: 2023-01-14

## 2023-01-11 RX ADMIN — PANTOPRAZOLE SODIUM 40 MILLIGRAM(S): 20 TABLET, DELAYED RELEASE ORAL at 05:08

## 2023-01-11 RX ADMIN — VALSARTAN 320 MILLIGRAM(S): 80 TABLET ORAL at 05:07

## 2023-01-11 RX ADMIN — ATORVASTATIN CALCIUM 80 MILLIGRAM(S): 80 TABLET, FILM COATED ORAL at 20:42

## 2023-01-11 RX ADMIN — Medication 650 MILLIGRAM(S): at 22:05

## 2023-01-11 RX ADMIN — Medication 240 MILLIGRAM(S): at 05:06

## 2023-01-11 RX ADMIN — Medication 650 MILLIGRAM(S): at 21:06

## 2023-01-11 RX ADMIN — Medication 1000 MILLILITER(S): at 04:50

## 2023-01-11 NOTE — PROGRESS NOTE ADULT - SUBJECTIVE AND OBJECTIVE BOX
SONI ANN  [id number]      Chief Complaint: Melena/Mechanical AVR/Elevated troponins    Interval History: The patient denies angina, dyspnea or palpitations.     Tele: not on telemetry       Current meds:   acetaminophen     Tablet .. 650 milliGRAM(s) Oral every 6 hours PRN  aluminum hydroxide/magnesium hydroxide/simethicone Suspension 30 milliLiter(s) Oral every 4 hours PRN  atorvastatin 80 milliGRAM(s) Oral at bedtime  cefTRIAXone   IVPB 1000 milliGRAM(s) IV Intermittent every 24 hours  dextrose 5%. 1000 milliLiter(s) IV Continuous <Continuous>  dextrose 5%. 1000 milliLiter(s) IV Continuous <Continuous>  dextrose 50% Injectable 25 Gram(s) IV Push once  dextrose 50% Injectable 12.5 Gram(s) IV Push once  dextrose 50% Injectable 25 Gram(s) IV Push once  dextrose Oral Gel 15 Gram(s) Oral once PRN  diltiazem    milliGRAM(s) Oral daily  ferrous    sulfate 325 milliGRAM(s) Oral daily  glucagon  Injectable 1 milliGRAM(s) IntraMuscular once  hydrochlorothiazide 25 milliGRAM(s) Oral daily  insulin lispro (ADMELOG) corrective regimen sliding scale   SubCutaneous every 6 hours  melatonin 3 milliGRAM(s) Oral at bedtime PRN  ondansetron Injectable 4 milliGRAM(s) IV Push once  pantoprazole  Injectable 40 milliGRAM(s) IV Push two times a day  sodium chloride 0.9%. 1000 milliLiter(s) IV Continuous <Continuous>  valsartan 320 milliGRAM(s) Oral daily      Objective:     Vital Signs:   T(C): 36.9 (01-11-23 @ 08:49), Max: 37.5 (01-10-23 @ 19:43)  HR: 75 (01-11-23 @ 08:49) (75 - 93)  BP: 147/82 (01-11-23 @ 08:49) (139/66 - 147/82)  RR: 18 (01-11-23 @ 08:49) (15 - 20)  SpO2: 96% (01-11-23 @ 08:49) (95% - 99%)  Wt(kg): --    PHYSICAL EXAM:  General: no acute distress  HEENT: sclera anicteric  Neck: supple  CVS: JVP ~ 7 cm H20, RRR, s1, s2  Chest: unlabored respirations, clear to auscultation  Abdomen: non-distended  Extremities: trace lower extremity edema b/l  Neuro: awake, alert & oriented x 3  Psych: normal affect    Labs:   11 Jan 2023 06:40    143    |  106    |  12     ----------------------------<  101    3.5     |  26     |  0.79     Ca    8.9        11 Jan 2023 06:40    TPro  6.6    /  Alb  3.5    /  TBili  0.5    /  DBili  0.1    /  AST  56     /  ALT  40     /  AlkPhos  75     11 Jan 2023 06:40                          9.7    34.42 )-----------( 40       ( 11 Jan 2023 06:40 )             29.8     PT/INR - ( 10 Bill 2023 08:48 )   PT: 12.5 sec;   INR: 1.08 ratio         PTT - ( 10 Bill 2023 08:48 )  PTT:26.4 sec          Outpatient ECG (11/25/19): normal sinus rhythm, LBBB    Outpatient TTE (2019):  Normal LV systolic function, septal motion c/w cardiac surgery  Normal RV size and function  Mild MR, Mild TR  No pericardial effusion     TTE (1/20/23):   1. Technically difficult study with poor endocardial visualization.   2. Normal global left ventricular systolic function.   3. Left ventricular ejection fraction, by visual estimation, is 55 to 60%.   4. Mildly increased LV wall thickness.   5. Normal left ventricular internal cavity size.   6. Abnormal septal motion likely due to conduction delay.   7. The right ventricle is not well visualized, appears top normal in   size with normal systolic function.   8. The left atrium appears top normal in size.   9. The right atrium is not well visualized, appears dilated.  10. Mild to moderate mitral valve regurgitation.  11. Mild-moderate tricuspid regurgitation.  12. There is a mechanical valve in the aortic position, not well   visualized, peak velocity is within normal limits.  13. There is at least borderline pulmonary hypertension, estimated RVSP 34 mmHg.  14. There is no evidence of pericardial effusion.  15. There appears to be a large fluid-filled collection near the liver,   recommend clinical correlation.      ECG (1/9/23): normal sinus rhythm, LBBB

## 2023-01-11 NOTE — PROGRESS NOTE ADULT - ASSESSMENT
Assessment:  Sarah Parada is an 82 year old woman with past medical history of Atrial fibrillation, [facility] Mechanical AVR (1994 at [facility] for rheumatic AV stenosis, on coumadin), Dilated ascending aorta (4.8 x 5 cm in 12/2019) & Hypertension presents with dark black stools, found to have severe anemia, severe thrombocytopenia, supratherapeutic INR and positive FOBT.    ECG consistent with normal sinus rhythm and LBBB, similar to old ECG from office, no acute ischemic ST abnormalities. The patient denies exertional angina or dyspnea, appears to have an exercise tolerance > 4 METs. No signs of CHF.    Echo consistent with normal LVEF 55-60%, mild to moderate MR and mild to moderate TR.     Recommendations:  [] Pre-operative cardiac risk stratification: The patient is now s/p EGD consistent with gastritis, plan is for colonoscopy today. Patient denies angina or dyspnea, may proceed at a moderate-to-high cardiovascular risk prior. The patient has a mechanical aortic valve and should be on anticoagulation with heparin drip but due to severe thrombocytopenia and active GI bleed the risk for catastrophic bleeding with heparin appears high, please avoid using Vitamin K in this patient, monitor INR, will need heparin drip bridging back to coumadin. Follow up Heme, they recommend to avoid anticoagulation as patient is anemic with platelet count <50k  [] Elevated troponin: Patient is asymptomatic likely demand ischemia from anemia/GI bleed    Will sign out to cardiologist to follow along tomorrow.     Fahad Bruno MD  Cardiology

## 2023-01-11 NOTE — CONSULT NOTE ADULT - CONSULT REASON
Pre-operative cardiac risk stratification prior to EGD
GIB
thrombocytopenia; management anticoagulation
GI Bleed
leukocytosis and hemolytic anemia

## 2023-01-11 NOTE — PROGRESS NOTE ADULT - PROBLEM SELECTOR PLAN 1
Patient presenting with anemia and thrombocytopenia; off anticoagulant.  Work-up with both images, blood work, blood cultures unrevealing yet.  Patient started on antibiotics empirically.  Hematologic profile suggestive of a reactive state; predominant lymphocytosis and marked leukocytosis may be associated with atypical infections.  Work-up for an underlying lymphoproliferative disorder would not be done in the setting of an acute infectious process.  Platelet count improving; consider resuming anticoagulation given patient's mechanical valve if platelet count stable in a.m.  Case discussed with hospitalist team. Patient presenting with anemia and thrombocytopenia; off anticoagulant.  Work-up with both images, blood work, blood cultures unrevealing yet.  Patient started on antibiotics empirically.  Hematologic profile suggestive of a reactive state; predominant lymphocytosis and marked leukocytosis may be associated with atypical infections.  Work-up for an underlying lymphoproliferative disorder would not be done in the setting of an acute infectious process.  Platelet count improving; consider resuming anticoagulation given patient's mechanical valve if platelet count stable in a.m. No evidence of hemolysis ( normal indirect bilirubin).  Case discussed with hospitalist team.

## 2023-01-11 NOTE — PROGRESS NOTE ADULT - SUBJECTIVE AND OBJECTIVE BOX
SONI ANN, 82y Female  MRN: [id number]  ATTENDING: Mihai Hussein    HPI:  82F, PMHx atrial fibrillation, HTN, HLD, depression, GERD, s/p [facility] mechanical AVR, (1994–[facility]) for rheumatic aortic valve stenosis, ascending aortic aneurysm, admitted with black tarry stool and epigastric pain and found anemic and thrombocytopenic.  Her last colonoscopy was reportedly 10 years ago and had EGD approximately 4-month ago diagnosed with ulcers.  In the emergency room was found with INR of 7.28–received Kcentra and vitamin K.  Anticoagulation held as patient's platelets below 50,000.  CT angio A/P showed no evidence of active GI bleed; focal diminished cortical enhancement involving the upper left kidney concerning for focal pyelonephritis or possibly infarcts.  Hematology asked to comment on above.  Received transfusion support in ED.    MEDICATIONS:  acetaminophen     Tablet .. 650 milliGRAM(s) Oral every 6 hours PRN  aluminum hydroxide/magnesium hydroxide/simethicone Suspension 30 milliLiter(s) Oral every 4 hours PRN  atorvastatin 80 milliGRAM(s) Oral at bedtime  cefTRIAXone   IVPB 1000 milliGRAM(s) IV Intermittent every 24 hours  dextrose 5%. 1000 milliLiter(s) IV Continuous <Continuous>  dextrose 5%. 1000 milliLiter(s) IV Continuous <Continuous>  dextrose 50% Injectable 25 Gram(s) IV Push once  dextrose 50% Injectable 12.5 Gram(s) IV Push once  dextrose 50% Injectable 25 Gram(s) IV Push once  dextrose Oral Gel 15 Gram(s) Oral once PRN  diltiazem    milliGRAM(s) Oral daily  ferrous    sulfate 325 milliGRAM(s) Oral daily  glucagon  Injectable 1 milliGRAM(s) IntraMuscular once  hydrochlorothiazide 25 milliGRAM(s) Oral daily  insulin lispro (ADMELOG) corrective regimen sliding scale   SubCutaneous every 6 hours  melatonin 3 milliGRAM(s) Oral at bedtime PRN  ondansetron Injectable 4 milliGRAM(s) IV Push once  sodium chloride 0.9%. 1000 milliLiter(s) IV Continuous <Continuous>  valsartan 320 milliGRAM(s) Oral daily    All other medications reviewed.    SUBJECTIVE:  Alert; appears non-toxic.  No active bleeding.    VITALS:  T(C): 36.9 (01-11-23 @ 15:00), Max: 37.5 (01-10-23 @ 19:43)  T(F): 98.5 (01-11-23 @ 15:00), Max: 99.5 (01-10-23 @ 19:43)  HR: 79 (01-11-23 @ 15:00) (75 - 82)  BP: 153/75 (01-11-23 @ 15:00) (139/66 - 153/75)    PHYSICAL EXAM:  Constitutional: alert, well developed  HEENT: normocephalic, anicteric sclerae, no mucositis or thrush  Respiratory: bilateral clear to auscultation anteriorly  Cardiovascular : S1, S2 regular, rhythmic, no murmurs, gallops or rubs  Abdomen: soft, distended, + normoactive BS, no palpable HS- megaly  Extremities: no tenderness;  -c/c/e, pulses equal bilaterally    LABS:  Hemoglobin: 9.0 g/dL (01-11-23 @ 16:00)  Hemoglobin: 9.7 g/dL (01-11-23 @ 06:40)  Hemoglobin: 9.9 g/dL (01-10-23 @ 08:48)    Platelet Count - Automated: 68 K/uL (01-11-23 @ 16:00)  Platelet Count - Automated: 40 K/uL (01-11-23 @ 06:40)  Platelet Count - Automated: 48 K/uL (01-10-23 @ 08:48)    (01-11) WBC: 30.82 K/uL,Hemoglobin: 9.0 g/dL, Hematocrit: 27.9 %,  Platelet: 68 K/uL  (01-11) Na: 143 mmol/L ; K: 3.5 mmol/L ; BUN: 12 mg/dL ; Cr: 0.79 mg/dL.    RADIOLOGY:  ACC: 12900001 EXAM:  CT ANGIO ABD PELV (W)AW IC                          PROCEDURE DATE:  01/09/2023          INTERPRETATION:  CLINICAL INFORMATION: Abdominal pain. Evaluate for GI   bleed.    COMPARISON: None.    CONTRAST/COMPLICATIONS:  IV Contrast: Omnipaque 350  90 cc administered   10 cc discarded  Oral Contrast: Other  Complications: None reported at time of study completion    PROCEDURE:  CT of the Abdomen and Pelvis was performed.  Precontrast, Arterial and Delayed phases were performed.  Sagittal and coronal reformats were performed.    FINDINGS:  LOWER CHEST: Trace right basilar reticular scarring/atelectasis. Coronary   artery calcification.    LIVER: Subcentimeter sized hypodense foci segment 2 and segment 6, too   small to characterize. The liver is enlarged measuring approximately 20   cm.  BILE DUCTS: Normal caliber.  GALLBLADDER: Within normal limits.  SPLEEN: Within normal limits.  PANCREAS: Within normal limits.  ADRENALS: Within normal limits.  KIDNEYS/URETERS: A combination of bilateral renal cysts measuring up to   10 cm on the right side and too small to characterize hypodense foci   bilaterally. Focal diminished cortical enhancement upper left kidney   concerning for focal pyelonephritis or infarcts. Correlateclinically.    BLADDER: Within normal limits.  REPRODUCTIVE ORGANS: Hysterectomy.    BOWEL: No bowel obstruction. Appendix is not visualized. No evidence of   inflammation in the pericecal region. No evidence of contrast   extravasation or pooling tosuggest active GI bleed. Multifocal   uncomplicated diverticula. Collapsed sigmoid limiting the evaluation of   subtle abnormalities.  PERITONEUM: No ascites.  VESSELS: Atherosclerotic changes.  RETROPERITONEUM/LYMPH NODES: No lymphadenopathy.  ABDOMINAL WALL: Within normal limits.  BONES: Degenerative changes.    IMPRESSION:  No evidence of active GI bleed.  Focal diminished cortical enhancement involving the upper left kidney   concerning for focal pyelonephritis or possibly infarcts.

## 2023-01-11 NOTE — PROGRESS NOTE ADULT - SUBJECTIVE AND OBJECTIVE BOX
Patient is a 82y old  Female who presents with a chief complaint of Symptomatic Anemia (2023 16:34)      Subjective and overnight events:  Patient seen and examined at bedside. no complaints today, no fever, chills, sob, cp, abd pain.     ALLERGIES:  No Known Allergies    MEDICATIONS  (STANDING):  atorvastatin 80 milliGRAM(s) Oral at bedtime  cefTRIAXone   IVPB 1000 milliGRAM(s) IV Intermittent every 24 hours  dextrose 5%. 1000 milliLiter(s) (100 mL/Hr) IV Continuous <Continuous>  dextrose 5%. 1000 milliLiter(s) (50 mL/Hr) IV Continuous <Continuous>  dextrose 50% Injectable 25 Gram(s) IV Push once  dextrose 50% Injectable 12.5 Gram(s) IV Push once  dextrose 50% Injectable 25 Gram(s) IV Push once  diltiazem    milliGRAM(s) Oral daily  ferrous    sulfate 325 milliGRAM(s) Oral daily  glucagon  Injectable 1 milliGRAM(s) IntraMuscular once  hydrochlorothiazide 25 milliGRAM(s) Oral daily  insulin lispro (ADMELOG) corrective regimen sliding scale   SubCutaneous every 6 hours  ondansetron Injectable 4 milliGRAM(s) IV Push once  sodium chloride 0.9%. 1000 milliLiter(s) (50 mL/Hr) IV Continuous <Continuous>  valsartan 320 milliGRAM(s) Oral daily    MEDICATIONS  (PRN):  acetaminophen     Tablet .. 650 milliGRAM(s) Oral every 6 hours PRN Temp greater or equal to 38C (100.4F), Mild Pain (1 - 3)  aluminum hydroxide/magnesium hydroxide/simethicone Suspension 30 milliLiter(s) Oral every 4 hours PRN Dyspepsia  dextrose Oral Gel 15 Gram(s) Oral once PRN Blood Glucose LESS THAN 70 milliGRAM(s)/deciliter  melatonin 3 milliGRAM(s) Oral at bedtime PRN Insomnia    Vital Signs Last 24 Hrs  T(F): 98.5 (2023 15:00), Max: 99.5 (10 Bill 2023 19:43)  HR: 79 (2023 15:00) (75 - 82)  BP: 153/75 (2023 15:00) (139/66 - 153/75)  RR: 18 (2023 15:00) (18 - 20)  SpO2: 96% (2023 15:00) (95% - 99%)  I&O's Summary    PHYSICAL EXAM:  General: NAD, A/O x 3  ENT: MMM  Neck: Supple, No JVD  Lungs: Clear to auscultation bilaterally  Cardio: RRR, S1/S2, No murmurs  Abdomen: Soft, Nontender, Nondistended; Bowel sounds present  Extremities: No calf tenderness, No pitting edema    LABS:                        9.0    30.82 )-----------( 68       ( 2023 16:00 )             27.9     11    143  |  106  |  12  ----------------------------<  101  3.5   |  26  |  0.79    Ca    8.9      2023 06:40    TPro  6.6  /  Alb  3.5  /  TBili  0.5  /  DBili  0.1  /  AST  56  /  ALT  40  /  AlkPhos  75  01-11      PT/INR - ( 10 Bill 2023 08:48 )   PT: 12.5 sec;   INR: 1.08 ratio         PTT - ( 10 Bill 2023 08:48 )  PTT:26.4 sec  Lactate, Blood: 0.6 mmol/L (01-10 @ 08:48)    CARDIAC MARKERS ( 2023 00:30 )  x     / 74.9 ng/L / x     / x     / x      CARDIAC MARKERS ( 10 Bill 2023 15:58 )  x     / 59.6 ng/L / x     / x     / x      CARDIAC MARKERS ( 10 Bill 2023 08:48 )  x     / 43.3 ng/L / x     / x     / x      CARDIAC MARKERS ( 2023 12:45 )  x     / 24.2 ng/L / x     / x     / x                        POCT Blood Glucose.: 92 mg/dL (2023 15:04)  POCT Blood Glucose.: 101 mg/dL (2023 13:34)  POCT Blood Glucose.: 99 mg/dL (2023 05:12)  POCT Blood Glucose.: 101 mg/dL (10 Bill 2023 23:43)      Urinalysis Basic - ( 10 Bill 2023 10:42 )    Color: Yellow / Appearance: Clear / S.015 / pH: x  Gluc: x / Ketone: Small  / Bili: Negative / Urobili: Negative   Blood: x / Protein: 30 mg/dL / Nitrite: Negative   Leuk Esterase: Negative / RBC: 0-4 /HPF / WBC 0-2 /HPF   Sq Epi: x / Non Sq Epi: Neg.-Few / Bacteria: x        Culture - Urine (collected 10 Bill 2023 10:42)  Source: Clean Catch Clean Catch (Midstream)  Final Report (2023 15:55):    >=3 organisms. Probable collection contamination.    Culture - Blood (collected 10 Bill 2023 08:48)  Source: .Blood Blood  Preliminary Report (2023 13:02):    No growth to date.    Culture - Blood (collected 10 Bill 2023 08:48)  Source: .Blood Blood  Preliminary Report (2023 13:02):    No growth to date.          RADIOLOGY & ADDITIONAL TESTS:    Care Discussed with Consultants/Other Providers:

## 2023-01-11 NOTE — CONSULT NOTE ADULT - ASSESSMENT
Patient is a 82y female with a past history of Aortic stenosis, [facility] AVR over 20 years ago, known ascending aortic aneurysm, HTN, HLD,and GERD who was admitted 1/10 with a history of black tarry stools x weeks.she also ha\d an elevated INR. She reportedly had EGD in [place] 4 months ago and has had a remote colonoscopy. She has lived in [place], currently lives in [place], was born in [place].  No fevers , chills, night sweats. No recent antibiotic use. CT of A/P showed possible renal infarcts vs focal pyelonephritis. Her UA was bland. She also has an anemia, some evidence of hemolysis with elevated LDH and low haptoglobin,however total bili is normal and a marked leukocytosis with a lymphocytic predominance. Her wbc was over 30,000 with 80-90% lymphocytes.She is s/p EGD on 1/10 with non erosive gastritis found. .CTX was started empirically.  While occult infection is possible, she is unaware of any fevers, reports a good appetite, and no clear localizing signs of infection.  Her differential is very abnormal, mostly lymphocytes, ? if she has an underlying lymphoproliferative disorder which could be accounting for some component of thrombocytopenia.Clinical picture not suggestive of Babesiosis or Mycoplasma infections, which can be associated with hemolytic anemias.  Suggest:  1. Await blood cultures  2.Tend to doubt pyelo with no pyuria, no flank tenderness, no fever, and no dysuria  3.CTX okay pending blood and urine cultures. PVE in differential but would typically be associated with positive blood cultures which are pending.  4.Consider RUQ sonogram as TTE describes fluid collection around liver, not well visualized on CT scan  5,Hep B/C serologies with mild transaminitis  6.GI f/u, heme f/u

## 2023-01-11 NOTE — PROGRESS NOTE ADULT - ASSESSMENT
82 year old female with PMH DM2, HTN, HLD, Mechanical heart valve on coumadin, AAA presents with black tarry stools found with INR 7.28.    Suspected GIB  Symptomatic Anemia  CT Angio A/P without evidence of acute GIB  Hgb 9.8 in ED, guaiac positive stools, s/p one unit of pRBC transfusion 1/9  s/p EGD today, negative  Colonoscopy today showed grade I   GI recommends outpatient capsule studies   resume coumadin if platelet count > 50 tomorrow  keep active Type and Screen, Transfuse Hgb<7 (or symptomatic/active bleeding)  hold coumadin (s/p reversal in the ED)    Supratherapeutic INR  Mechanical Heart Valve on coumadin  INR found to be 7.28  pt received KCentra and Vitamin K in ED  INR now 1.08  continue to hold coumadin due to thrombocytopenia   resume coumadin if platelet count > 50 tomorrow    Thrombocytopenia  PLT 40 this morning   s/p 1 unit  of platelet transfusion pre colonoscopy today  hematology consult appreciated     Leukocytosis with lymphocytosis   acute anemia   leukocytosis suspect reactive in nature per hematology, follow up outpt workup for lymphoproliferative disorder  LDH elevated and haptoglobin low, 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 cont rocephin for now and blood cx. urine cx pending  Hematology consult appreciated     Incidental finding of possible focal L kidney infarct  CT abd reviewed. focal diminished cortical enhancement involving left upper kidney concerning for focal pyelonephritis or possibly infarcts  UA negative twice...suspect CT findings more likely to be focal infarct..   Urology consult      DM2  Hba1c :6.1  pt takes metformin at home  maintain NPO, IVF for now  ISS  monitor accuchecks    HTN  chronic condition  pt takes tiadylt  daily and valsartan-HCTZ 320-25 daily  cardizem 240mg, HCTZ, Valsartan with parameters    HLD  chronic condition  continue rosuvastatin    Prophylactic Meaure  hold pharm dvt ppx due to GIB  continue protonix    spoke and provided an update for daughter Wendi Sierra [phone], 1/10 alternative number son in law Will Sierra [phone]    spoke to pt's daughter, Wendi, today on the phone 1/11

## 2023-01-11 NOTE — PRE-OP CHECKLIST - 1.
· Patient with known history of daily alcohol consumption (1 gallon vodka daily)  · Witnessed generalized tonic-clonic seizure per reports upon alcohol cessation  · No known previous history of seizures or alcohol withdrawal seizure  · Imaging studies obtained prior to transfer: CT scan of the head was negative for any bleed with no aspiration on chest x-ray  · CT cervical spine and CT facial bones obtained upon admission 3/8/2022- both negative for acute abnormalities  · Exam positive for sequela from his seizure activity with noticeable dry oral and nasal blood  · Continue seizure precautions   · Alcohol withdrawal management as below Colonoscopy

## 2023-01-11 NOTE — CONSULT NOTE ADULT - SUBJECTIVE AND OBJECTIVE BOX
HPI:   Patient is a 82y female with a past history of Aortic stenosis, [facility] AVR over 20 years ago, known ascending aortic aneurysm, HTN, HLD,and GERD who was admitted 1/10 with a history of black tarry stools x weeks. She reportedly had EGD in [place] 4 months ago and has had a remote colonoscopy.   No fevers , chills, night sweats. No recent antibiotic use. CT of A/P showed possible renal infarcts vs focal pyelonephritis. Her UA was bland. She also has an anemia, some evidence of hemolysis with elevated LDH and low haptoglobin,however total bili is normal and a marked leukocytosis with a lymphocytic predominance. Her wbc was over 30,000 with 80-90% lymphocytes.She is s/p EGD on 1/10 with non erosive gastritis found. .CTX was started empirically.    REVIEW OF SYSTEMS:  All other review of systems negative (Comprehensive ROS)    PAST MEDICAL & SURGICAL HISTORY:  Hypertension      Hypercholesteremia      H/O heart valve replacement with mechanical valve      History of 2  sections          Allergies    No Known Allergies    Intolerances        Antimicrobials Day #  :day 2  cefTRIAXone   IVPB 1000 milliGRAM(s) IV Intermittent every 24 hours    Other Medications:  acetaminophen     Tablet .. 650 milliGRAM(s) Oral every 6 hours PRN  aluminum hydroxide/magnesium hydroxide/simethicone Suspension 30 milliLiter(s) Oral every 4 hours PRN  atorvastatin 80 milliGRAM(s) Oral at bedtime  dextrose 5%. 1000 milliLiter(s) IV Continuous <Continuous>  dextrose 5%. 1000 milliLiter(s) IV Continuous <Continuous>  dextrose 50% Injectable 25 Gram(s) IV Push once  dextrose 50% Injectable 12.5 Gram(s) IV Push once  dextrose 50% Injectable 25 Gram(s) IV Push once  dextrose Oral Gel 15 Gram(s) Oral once PRN  diltiazem    milliGRAM(s) Oral daily  ferrous    sulfate 325 milliGRAM(s) Oral daily  glucagon  Injectable 1 milliGRAM(s) IntraMuscular once  hydrochlorothiazide 25 milliGRAM(s) Oral daily  insulin lispro (ADMELOG) corrective regimen sliding scale   SubCutaneous every 6 hours  melatonin 3 milliGRAM(s) Oral at bedtime PRN  ondansetron Injectable 4 milliGRAM(s) IV Push once  pantoprazole  Injectable 40 milliGRAM(s) IV Push two times a day  sodium chloride 0.9%. 1000 milliLiter(s) IV Continuous <Continuous>  valsartan 320 milliGRAM(s) Oral daily      FAMILY HISTORY:      SOCIAL HISTORY:  Smoking:  x   ETOH:x     Drug Use: x       T(F): 98.9 (23 @ 04:50), Max: 99.5 (01-10-23 @ 19:43)  HR: 76 (23 @ 04:50)  BP: 145/99 (23 @ 04:50)  RR: 20 (23 @ 04:50)  SpO2: 99% (23 @ 04:50)  Wt(kg): --    PHYSICAL EXAM:  General: alert, no acute distress  Eyes:  anicteric, no conjunctival injection, no discharge  Oropharynx: no lesions or injection 	  Neck: supple, without adenopathy  Lungs: clear to auscultation  Heart: regular rate and rhythm; soft bernie  Abdomen: soft, nondistended, nontender, without mass or organomegaly  Skin: no lesions  Extremities: no clubbing, cyanosis, or edema  Neurologic: alert, oriented, moves all extremities    LAB RESULTS:                        9.7    34.42 )-----------( 40       ( 2023 06:40 )             29.8     -10    142  |  104  |  22  ----------------------------<  110<H>  3.5   |  26  |  0.92    Ca    9.6      10 Bill 2023 07:00    TPro  6.2  /  Alb  3.2<L>  /  TBili  0.4  /  DBili  0.1  /  AST  59<H>  /  ALT  49<H>  /  AlkPhos  70  01-10    LIVER FUNCTIONS - ( 10 Bill 2023 08:48 )  Alb: 3.2 g/dL / Pro: 6.2 g/dL / ALK PHOS: 70 U/L / ALT: 49 U/L / AST: 59 U/L / GGT: x           Urinalysis Basic - ( 10 Bill 2023 10:42 )    Color: Yellow / Appearance: Clear / S.015 / pH: x  Gluc: x / Ketone: Small  / Bili: Negative / Urobili: Negative   Blood: x / Protein: 30 mg/dL / Nitrite: Negative   Leuk Esterase: Negative / RBC: 0-4 /HPF / WBC 0-2 /HPF   Sq Epi: x / Non Sq Epi: Neg.-Few / Bacteria: x        MICROBIOLOGY:  RECENT CULTURES:        RADIOLOGY REVIEWED:  < from: CT Angio Abdomen and Pelvis w/ IV Cont (23 @ 16:03) >  FINDINGS:  LOWER CHEST: Trace right basilar reticular scarring/atelectasis. Coronary   artery calcification.    LIVER: Subcentimeter sized hypodense foci segment 2 and segment 6, too   small to characterize. The liver is enlarged measuring approximately 20   cm.  BILE DUCTS: Normal caliber.  GALLBLADDER: Within normal limits.  SPLEEN: Within normal limits.  PANCREAS: Within normal limits.  ADRENALS: Within normal limits.  KIDNEYS/URETERS: A combination of bilateral renal cysts measuring up to   10 cm on the right side and too small to characterize hypodense foci   bilaterally. Focal diminished cortical enhancement upper left kidney   concerning for focal pyelonephritis or infarcts. Correlateclinically.    BLADDER: Within normal limits.  REPRODUCTIVE ORGANS: Hysterectomy.    BOWEL: No bowel obstruction. Appendix is not visualized. No evidence of   inflammation in the pericecal region. No evidence of contrast   extravasation or pooling tosuggest active GI bleed. Multifocal   uncomplicated diverticula. Collapsed sigmoid limiting the evaluation of   subtle abnormalities.  PERITONEUM: No ascites.  VESSELS: Atherosclerotic changes.  RETROPERITONEUM/LYMPH NODES: No lymphadenopathy.  ABDOMINAL WALL: Within normal limits.  BONES: Degenerative changes.    IMPRESSION:  No evidence of active GI bleed.  Focal diminished cortical enhancement involving the upper left kidney   concerning for focal pyelonephritis or possibly infarcts.    < from: TTE Echo Complete w/o Contrast w/ Doppler (01.10.23 @ 16:32) >  Summary:   1. Technically difficult study with poor endocardial visualization.   2. Normal global left ventricular systolic function.   3. Left ventricular ejection fraction, by visual estimation, is 55 to   60%.   4. Mildly increased LV wall thickness.   5. Normal left ventricular internal cavity size.   6. Abnormal septal motion likely due to conduction delay.   7. The right ventricle is not well visualized, appears top normal in   size with normal systolic function.   8. The left atrium appears top normal in size.   9. The right atrium is not well visualized, appears dilated.  10. Mild to moderate mitral valve regurgitation.  11. Mild-moderate tricuspid regurgitation.  12. There is a mechanical valve in the aortic position, not well   visualized, peak velocity is within normal limits.  13. There is at least borderline pulmonary hypertension, estimated RVSP   34 mmHg.  14. There is no evidence of pericardial effusion.  15. There appears to be a large fluid-filled collection near the liver,   recommend clinical correlation.    < end of copied text >

## 2023-01-12 LAB
ALBUMIN SERPL ELPH-MCNC: 3.6 G/DL — SIGNIFICANT CHANGE UP (ref 3.3–5)
ALP SERPL-CCNC: 87 U/L — SIGNIFICANT CHANGE UP (ref 40–120)
ALT FLD-CCNC: 45 U/L — SIGNIFICANT CHANGE UP (ref 10–45)
ANION GAP SERPL CALC-SCNC: 12 MMOL/L — SIGNIFICANT CHANGE UP (ref 5–17)
APTT BLD: 28.4 SEC — SIGNIFICANT CHANGE UP (ref 27.5–35.5)
AST SERPL-CCNC: 50 U/L — HIGH (ref 10–40)
BASOPHILS # BLD AUTO: 0 K/UL — SIGNIFICANT CHANGE UP (ref 0–0.2)
BASOPHILS NFR BLD AUTO: 0 % — SIGNIFICANT CHANGE UP (ref 0–2)
BILIRUB DIRECT SERPL-MCNC: 0.1 MG/DL — SIGNIFICANT CHANGE UP (ref 0–0.3)
BILIRUB INDIRECT FLD-MCNC: 0.5 MG/DL — SIGNIFICANT CHANGE UP (ref 0.2–1)
BILIRUB SERPL-MCNC: 0.6 MG/DL — SIGNIFICANT CHANGE UP (ref 0.2–1.2)
BUN SERPL-MCNC: 11 MG/DL — SIGNIFICANT CHANGE UP (ref 7–23)
CALCIUM SERPL-MCNC: 9.5 MG/DL — SIGNIFICANT CHANGE UP (ref 8.4–10.5)
CHLORIDE SERPL-SCNC: 101 MMOL/L — SIGNIFICANT CHANGE UP (ref 96–108)
CO2 SERPL-SCNC: 25 MMOL/L — SIGNIFICANT CHANGE UP (ref 22–31)
CREAT SERPL-MCNC: 0.84 MG/DL — SIGNIFICANT CHANGE UP (ref 0.5–1.3)
EGFR: 71 ML/MIN/1.73M2 — SIGNIFICANT CHANGE UP
ELLIPTOCYTES BLD QL SMEAR: SLIGHT — SIGNIFICANT CHANGE UP
EOSINOPHIL # BLD AUTO: 0 K/UL — SIGNIFICANT CHANGE UP (ref 0–0.5)
EOSINOPHIL NFR BLD AUTO: 0 % — SIGNIFICANT CHANGE UP (ref 0–6)
GLUCOSE BLDC GLUCOMTR-MCNC: 101 MG/DL — HIGH (ref 70–99)
GLUCOSE BLDC GLUCOMTR-MCNC: 107 MG/DL — HIGH (ref 70–99)
GLUCOSE BLDC GLUCOMTR-MCNC: 109 MG/DL — HIGH (ref 70–99)
GLUCOSE BLDC GLUCOMTR-MCNC: 92 MG/DL — SIGNIFICANT CHANGE UP (ref 70–99)
GLUCOSE SERPL-MCNC: 100 MG/DL — HIGH (ref 70–99)
HAPTOGLOB SERPL-MCNC: 72 MG/DL — SIGNIFICANT CHANGE UP (ref 34–200)
HAV IGM SER-ACNC: SIGNIFICANT CHANGE UP
HBV CORE AB SER-ACNC: SIGNIFICANT CHANGE UP
HBV CORE IGM SER-ACNC: SIGNIFICANT CHANGE UP
HBV E AB SER-ACNC: SIGNIFICANT CHANGE UP
HBV E AG SER-ACNC: SIGNIFICANT CHANGE UP
HBV SURFACE AB SER-ACNC: SIGNIFICANT CHANGE UP
HBV SURFACE AG SER-ACNC: SIGNIFICANT CHANGE UP
HCT VFR BLD CALC: 29.5 % — LOW (ref 34.5–45)
HCV AB S/CO SERPL IA: 0.07 S/CO — SIGNIFICANT CHANGE UP (ref 0–0.99)
HCV AB S/CO SERPL IA: 0.09 S/CO — SIGNIFICANT CHANGE UP (ref 0–0.99)
HCV AB SERPL-IMP: SIGNIFICANT CHANGE UP
HCV AB SERPL-IMP: SIGNIFICANT CHANGE UP
HGB BLD-MCNC: 10.1 G/DL — LOW (ref 11.5–15.5)
INR BLD: 1.16 RATIO — SIGNIFICANT CHANGE UP (ref 0.88–1.16)
LDH SERPL L TO P-CCNC: 740 U/L — HIGH (ref 50–242)
LYMPHOCYTES # BLD AUTO: 10 % — LOW (ref 13–44)
LYMPHOCYTES # BLD AUTO: 3.38 K/UL — HIGH (ref 1–3.3)
MANUAL SMEAR VERIFICATION: SIGNIFICANT CHANGE UP
MCHC RBC-ENTMCNC: 29.2 PG — SIGNIFICANT CHANGE UP (ref 27–34)
MCHC RBC-ENTMCNC: 34.2 GM/DL — SIGNIFICANT CHANGE UP (ref 32–36)
MCV RBC AUTO: 85.3 FL — SIGNIFICANT CHANGE UP (ref 80–100)
MONOCYTES # BLD AUTO: 4.39 K/UL — HIGH (ref 0–0.9)
MONOCYTES NFR BLD AUTO: 13 % — SIGNIFICANT CHANGE UP (ref 2–14)
MYELOCYTES NFR BLD: 2 % — HIGH (ref 0–0)
NEUTROPHILS # BLD AUTO: 4.05 K/UL — SIGNIFICANT CHANGE UP (ref 1.8–7.4)
NEUTROPHILS NFR BLD AUTO: 10 % — LOW (ref 43–77)
NEUTS BAND # BLD: 2 % — SIGNIFICANT CHANGE UP (ref 0–8)
NRBC # BLD: 0 /100 — SIGNIFICANT CHANGE UP (ref 0–0)
PLAT MORPH BLD: NORMAL — SIGNIFICANT CHANGE UP
PLATELET # BLD AUTO: 59 K/UL — LOW (ref 150–400)
POTASSIUM SERPL-MCNC: 3.3 MMOL/L — LOW (ref 3.5–5.3)
POTASSIUM SERPL-SCNC: 3.3 MMOL/L — LOW (ref 3.5–5.3)
PROT SERPL-MCNC: 7 G/DL — SIGNIFICANT CHANGE UP (ref 6–8.3)
PROTHROM AB SERPL-ACNC: 13.5 SEC — HIGH (ref 10.5–13.4)
RBC # BLD: 3.46 M/UL — LOW (ref 3.8–5.2)
RBC # FLD: 14.6 % — HIGH (ref 10.3–14.5)
RBC BLD AUTO: ABNORMAL
RETICS #: 37.7 K/UL — SIGNIFICANT CHANGE UP (ref 25–125)
RETICS/RBC NFR: 1.1 % — SIGNIFICANT CHANGE UP (ref 0.5–2.5)
SCHISTOCYTES BLD QL AUTO: SLIGHT — SIGNIFICANT CHANGE UP
SMUDGE CELLS # BLD: PRESENT — SIGNIFICANT CHANGE UP
SODIUM SERPL-SCNC: 138 MMOL/L — SIGNIFICANT CHANGE UP (ref 135–145)
SURGICAL PATHOLOGY STUDY: SIGNIFICANT CHANGE UP
TROPONIN I, HIGH SENSITIVITY RESULT: 64.3 NG/L — HIGH
VARIANT LYMPHS # BLD: 63 % — HIGH (ref 0–6)
WBC # BLD: 33.75 K/UL — HIGH (ref 3.8–10.5)
WBC # FLD AUTO: 33.75 K/UL — HIGH (ref 3.8–10.5)

## 2023-01-12 PROCEDURE — 99233 SBSQ HOSP IP/OBS HIGH 50: CPT

## 2023-01-12 PROCEDURE — 99232 SBSQ HOSP IP/OBS MODERATE 35: CPT

## 2023-01-12 RX ORDER — INSULIN LISPRO 100/ML
VIAL (ML) SUBCUTANEOUS
Refills: 0 | Status: DISCONTINUED | OUTPATIENT
Start: 2023-01-12 | End: 2023-01-14

## 2023-01-12 RX ORDER — HEPARIN SODIUM 5000 [USP'U]/ML
5000 INJECTION INTRAVENOUS; SUBCUTANEOUS EVERY 6 HOURS
Refills: 0 | Status: DISCONTINUED | OUTPATIENT
Start: 2023-01-12 | End: 2023-01-12

## 2023-01-12 RX ORDER — HEPARIN SODIUM 5000 [USP'U]/ML
INJECTION INTRAVENOUS; SUBCUTANEOUS
Qty: 25000 | Refills: 0 | Status: DISCONTINUED | OUTPATIENT
Start: 2023-01-12 | End: 2023-01-12

## 2023-01-12 RX ORDER — WARFARIN SODIUM 2.5 MG/1
5 TABLET ORAL AT BEDTIME
Refills: 0 | Status: COMPLETED | OUTPATIENT
Start: 2023-01-12 | End: 2023-01-12

## 2023-01-12 RX ORDER — HEPARIN SODIUM 5000 [USP'U]/ML
2500 INJECTION INTRAVENOUS; SUBCUTANEOUS EVERY 6 HOURS
Refills: 0 | Status: DISCONTINUED | OUTPATIENT
Start: 2023-01-12 | End: 2023-01-12

## 2023-01-12 RX ORDER — INSULIN LISPRO 100/ML
VIAL (ML) SUBCUTANEOUS AT BEDTIME
Refills: 0 | Status: DISCONTINUED | OUTPATIENT
Start: 2023-01-12 | End: 2023-01-14

## 2023-01-12 RX ORDER — POTASSIUM CHLORIDE 20 MEQ
40 PACKET (EA) ORAL ONCE
Refills: 0 | Status: COMPLETED | OUTPATIENT
Start: 2023-01-12 | End: 2023-01-12

## 2023-01-12 RX ADMIN — PANTOPRAZOLE SODIUM 40 MILLIGRAM(S): 20 TABLET, DELAYED RELEASE ORAL at 05:30

## 2023-01-12 RX ADMIN — VALSARTAN 320 MILLIGRAM(S): 80 TABLET ORAL at 05:30

## 2023-01-12 RX ADMIN — Medication 325 MILLIGRAM(S): at 11:20

## 2023-01-12 RX ADMIN — ATORVASTATIN CALCIUM 80 MILLIGRAM(S): 80 TABLET, FILM COATED ORAL at 21:40

## 2023-01-12 RX ADMIN — WARFARIN SODIUM 5 MILLIGRAM(S): 2.5 TABLET ORAL at 21:41

## 2023-01-12 RX ADMIN — Medication 240 MILLIGRAM(S): at 05:30

## 2023-01-12 RX ADMIN — Medication 40 MILLIEQUIVALENT(S): at 11:20

## 2023-01-12 NOTE — PHARMACOTHERAPY INTERVENTION NOTE - OUTCOME
Discussion/Summary   Your kidney and liver tests are normal. Your cholesterol is 228- normal is less than 200.         accepted

## 2023-01-12 NOTE — [facility]I QUERY NOTE - NSCDIOTHERTXTBX_GEN_ALL_CORE_HH
Presents with Suspected GIB, Symptomatic Anemia.    Notes - Supratherapeutic INR  Mechanical Heart Valve on coumadin  INR found to be 7.28  pt received KCentra and Vitamin K in ED  hold coumadin    Hem/Onc consult  - (leukocytosis, anemia and thrombocytopenia) and supratherapeutic INR, likely the cause of GI bleed.  EGD/colonoscopy 1/11/2023–no source of active bleeding.  GI considers capsule endoscopy.      Please clarify causal relationship between GIB and Coumadin    - GIB due to Coumadin  - GIB not due to Coumadin  - Other(specify)    Thank you.

## 2023-01-12 NOTE — PHARMACOTHERAPY INTERVENTION NOTE - COMMENTS
Met with patient and reviewed current medications. Pt reports taking CoQ10, omega 3, and vitamin E, as well as other OTC medications. Discussed that these supplements may increase the risk of bleeding with warfarin, and advised patient not to start anything new without discussing with her doctor. Pt verbalized understanding, all questions answered.

## 2023-01-12 NOTE — PROGRESS NOTE ADULT - ASSESSMENT
82 year old female with PMH DM2, HTN, HLD, Mechanical heart valve on coumadin, AAA presents with black tarry stools found with INR 7.28.    Suspected GIB  Symptomatic Anemia  CT Angio A/P without evidence of acute GIB  Hgb 9.8 in ED, guaiac positive stools, s/p one unit of pRBC transfusion 1/9  s/p EGD, negative  Colonoscopy today showed grade I internal hemorrhoid. polys in transverse colon and hepatic flexure  GI recommends outpatient capsule studies   pt s/p platelet transfusion on 1/11, platelet count >50 today, start coumadin tonight   monitor H/H closely   keep active Type and Screen, Transfuse Hgb<7 (or symptomatic/active bleeding)    Supratherapeutic INR  Mechanical Heart Valve on coumadin  INR found to be 7.28 on admission   pt received KCentra and Vitamin K in ED  appreciate hematology, resume AC if platelet stable above 50  coumadin 5mg tonight and monitor INR and H/H    Thrombocytopenia  s/p 1 unit  of platelet transfusion 1/11  hematology consult appreciated     Leukocytosis with lymphocytosis   acute anemia   leukocytosis suspect reactive in nature per hematology, follow up outpt workup for lymphoproliferative disorder  LDH elevated and haptoglobin 29 (low) on 1/10, 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  appreciate ID, EPEC is of little clinical significance and no clear pathogenic role.  blood culture negative, urine cx likely contaminant  discontinue antibiotics   Hematology consult appreciated     Incidental finding of possible focal L kidney infarct  CT abd reviewed. focal diminished cortical enhancement involving left upper kidney concerning for focal pyelonephritis or possibly infarcts  UA negative twice. and urine cx showed contaminant..suspect CT findings more likely to be focal infarct..   renal function stable.      DM2  Hba1c :6.1  pt takes metformin at home  maintain NPO, IVF for now  ISS  monitor accuchecks    HTN  chronic condition  pt takes tiadylt  daily and valsartan-HCTZ 320-25 daily  cardizem 240mg, HCTZ, Valsartan with parameters    HLD  chronic condition  continue rosuvastatin    Prophylactic Meaure  hold pharm dvt ppx due to possible GIB  continue protonix    spoke and provided an update for daughter Wendi Sierra [phone], 1/10 alternative number son in law Will Sierra [phone]    spoke to pt's son in law by bedside today 1/12 82 year old female with PMH DM2, HTN, HLD, Mechanical heart valve on coumadin, AAA presents with black tarry stools found with INR 7.28.    Suspected GIB  Symptomatic Anemia  CT Angio A/P without evidence of acute GIB  Hgb 9.8 in ED, guaiac positive stools, s/p one unit of pRBC transfusion 1/9  s/p EGD, negative  Colonoscopy today showed grade I internal hemorrhoid. polys in transverse colon and hepatic flexure  GI recommends outpatient capsule studies   pt s/p platelet transfusion on 1/11, platelet count >50 today, start coumadin tonight   monitor H/H closely   keep active Type and Screen, Transfuse Hgb<7 (or symptomatic/active bleeding)    Supratherapeutic INR  Mechanical Heart Valve on coumadin  INR found to be 7.28 on admission   pt received KCentra and Vitamin K in ED  appreciate hematology, resume AC if platelet stable above 50  restart coumadin 5mg tonight and monitor INR and H/H    Thrombocytopenia  s/p 1 unit  of platelet transfusion 1/11  Platelet remains above 50 today  hematology consult appreciated     Leukocytosis with lymphocytosis   acute anemia   leukocytosis suspect reactive in nature per hematology, follow up outpt workup for lymphoproliferative disorder  LDH elevated and haptoglobin 29 (low) on 1/10, 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  appreciate ID, EPEC is of little clinical significance and no clear pathogenic role.  blood culture negative, urine cx likely contaminant  discontinue antibiotics   Hematology consult appreciated     Incidental finding of possible focal L kidney infarct  CT abd reviewed. focal diminished cortical enhancement involving left upper kidney concerning for focal pyelonephritis or possibly infarcts  UA negative twice. and urine cx showed contaminant..suspect CT findings more likely to be focal infarct..   renal function stable.      DM2  Hba1c :6.1  pt takes metformin at home  maintain NPO, IVF for now  ISS  monitor accuchecks    HTN  chronic condition  pt takes tiadylt  daily and valsartan-HCTZ 320-25 daily  cardizem 240mg, HCTZ, Valsartan with parameters    HLD  chronic condition  continue rosuvastatin    Prophylactic Meaure  hold pharm dvt ppx due to possible GIB  continue protonix    spoke and provided an update for daughter Wendi Sierra [phone], 1/10 alternative number son in law Will Venegas Kimberly [phone]    spoke to pt's son in law by bedside today 1/12

## 2023-01-12 NOTE — PROGRESS NOTE ADULT - ASSESSMENT
Patient is a 82y female with a past history of Aortic stenosis, [facility] AVR over 20 years ago, known ascending aortic aneurysm, HTN, HLD,and GERD who was admitted 1/10 with a history of black tarry stools x weeks.she also ha\d an elevated INR. She reportedly had EGD in [place] 4 months ago and has had a remote colonoscopy. She has lived in [place], currently lives in [place], was born in [place].  No fevers , chills, night sweats. No recent antibiotic use. CT of A/P showed possible renal infarcts vs focal pyelonephritis. Her UA was bland. She also has an anemia, some evidence of hemolysis with elevated LDH and low haptoglobin,however total bili is normal and a marked leukocytosis with a lymphocytic predominance. Her wbc was over 30,000 with 80-90% lymphocytes.She is s/p EGD on 1/10 with non erosive gastritis found. .CTX was started empirically.  While occult infection is possible, she is unaware of any fevers, reports a good appetite, and no clear localizing signs of infection.  Her differential is very abnormal, mostly lymphocytes, ? if she has an underlying lymphoproliferative disorder which could be accounting for some component of thrombocytopenia.Clinical picture not suggestive of Babesiosis or Mycoplasma infections, which can be associated with hemolytic anemias.  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  Her blood cultures are negative, Platelets   remain about 60,000 and she has a lymphocytic predominance with atypical lymphocytes.Her acute Hep Panel is negative.EGD/Colonoscopy without source of bleed.Polymicrobic urine likely contaminants.Atypical lymphs reported on diff do not seem to correlate with active infection.  Suggest:  1.Agree with stopping CTX, no clear indication.  2.Favor observation off antibiotics.  3, With no history of fever and no constitutional symptoms I am reluctant to advise additional ID w/u.  4.Capsule endoscopy per GI.  5. Anticoagulation per other services

## 2023-01-12 NOTE — PROGRESS NOTE ADULT - PROBLEM SELECTOR PLAN 1
Patient had CT angio A/P without evidence of acute GI bleed.  EGD/colonoscopy 1/11/2023–no source of active bleeding.  GI considers capsule endoscopy.  Platelet count above 60,000.  Patient transfused 1 unit PRBC and 1 unit platelet on 1/9/2023.  Hematologic picture reactive; continues on Rocephin.  Pending culture results  Plan to resume warfarin given patient's [facility] mechanical valve, if platelet count today above 50,000.  Case discussed with hospitalist team.

## 2023-01-12 NOTE — PROGRESS NOTE ADULT - ASSESSMENT
82 year old woman admitted with symptomatic anemia, black tarry stools concerning for GI bleed. + thrombocytopenia   Cardiac history includes AF, s/p mechanical AVR in the remote past maintained on warfarin.  Ao aneurysm  HTN  GI workup with EGD and colonoscopy is negative   Elevated troponin likely due to demand ischemia     Plan  - cautious warfarin to resume when OK with Hematology   - monitor INR target 2.5-3.5  - monitor CBC including platelets  - GI workup in progress... ? capsule endoscopy     discussed with patient and with Medicine team

## 2023-01-12 NOTE — PROGRESS NOTE ADULT - SUBJECTIVE AND OBJECTIVE BOX
SUBJ:  Patient is a 82y old  Female who presents with a chief complaint of Symptomatic Anemia (2023 07:43)  patient seen and examined  chart is reviewed  case discussed with Dr. Bruno  patient feeling "OK"... no complaints         PAST MEDICAL & SURGICAL HISTORY:  Hypertension      Hypercholesteremia      H/O heart valve replacement with mechanical valve      History of 2  sections          MEDICATIONS  (STANDING):  atorvastatin 80 milliGRAM(s) Oral at bedtime  dextrose 5%. 1000 milliLiter(s) (100 mL/Hr) IV Continuous <Continuous>  dextrose 5%. 1000 milliLiter(s) (50 mL/Hr) IV Continuous <Continuous>  dextrose 50% Injectable 25 Gram(s) IV Push once  dextrose 50% Injectable 12.5 Gram(s) IV Push once  dextrose 50% Injectable 25 Gram(s) IV Push once  diltiazem    milliGRAM(s) Oral daily  ferrous    sulfate 325 milliGRAM(s) Oral daily  glucagon  Injectable 1 milliGRAM(s) IntraMuscular once  heparin  Infusion.  Unit(s)/Hr (11 mL/Hr) IV Continuous <Continuous>  hydrochlorothiazide 25 milliGRAM(s) Oral daily  insulin lispro (ADMELOG) corrective regimen sliding scale   SubCutaneous every 6 hours  ondansetron Injectable 4 milliGRAM(s) IV Push once  pantoprazole    Tablet 40 milliGRAM(s) Oral before breakfast  valsartan 320 milliGRAM(s) Oral daily  warfarin 5 milliGRAM(s) Oral at bedtime    MEDICATIONS  (PRN):  acetaminophen     Tablet .. 650 milliGRAM(s) Oral every 6 hours PRN Temp greater or equal to 38C (100.4F), Mild Pain (1 - 3)  aluminum hydroxide/magnesium hydroxide/simethicone Suspension 30 milliLiter(s) Oral every 4 hours PRN Dyspepsia  dextrose Oral Gel 15 Gram(s) Oral once PRN Blood Glucose LESS THAN 70 milliGRAM(s)/deciliter  heparin   Injectable 5000 Unit(s) IV Push every 6 hours PRN For aPTT less than 40  heparin   Injectable 2500 Unit(s) IV Push every 6 hours PRN For aPTT between 40 - 57  melatonin 3 milliGRAM(s) Oral at bedtime PRN Insomnia          Vital Signs Last 24 Hrs  T(C): 36.8 (2023 05:46), Max: 36.9 (2023 15:00)  T(F): 98.3 (2023 05:46), Max: 98.5 (2023 15:00)  HR: 80 (2023 05:46) (79 - 80)  BP: 167/77 (2023 05:46) (153/75 - 167/77)  BP(mean): 101 (2023 15:00) (101 - 101)  RR: 18 (2023 05:46) (18 - 18)  SpO2: 95% (2023 05:46) (95% - 96%)    Parameters below as of 2023 05:46  Patient On (Oxygen Delivery Method): room air        REVIEW OF SYSTEMS:  CONSTITUTIONAL: fatigue   RESPIRATORY: No cough, wheezing, chills or hemoptysis; No shortness of breath  CARDIOVASCULAR: No chest pain or chest pressure.  No shortness of breath or dyspnea on exertion.  No palpitations, dizziness, light headedness, syncope or near syncope.  No edema, no orthopnea.   NEUROLOGICAL: No headaches, memory loss, loss of strength, numbness, or tremors      PHYSICAL EXAM  Constitutional:  WDWN. No acute distress  HEENT: normocephalic, atraumatic.  PERRLA. EOMI  Neck : No JVD. no carotid bruits  Lungs:  clear to auscultation bilaterally. no rhonchi. no wheezing  Heart:  S1 and S2. No S3, S4. II/VI systolic murmur. + click   Abdomen:  soft, non tender.  Extremities: No clubbing, cyanoisis or edema  Nuerologic:  A+O x 3. No focal deficits  Skin:  no rashes        LABS:                        10.1   33.75 )-----------( 59       ( 2023 08:35 )             29.5         138  |  101  |  11  ----------------------------<  100<H>  3.3<L>   |  25  |  0.84    Ca    9.5      2023 08:35    TPro  7.0  /  Alb  3.6  /  TBili  0.6  /  DBili  0.1  /  AST  50<H>  /  ALT  45  /  AlkPhos  87      I&O's Summary    2023 07:01  -  2023 07:00  --------------------------------------------------------  IN: 100 mL / OUT: 0 mL / NET: 100 mL    2023 07:01  -  2023 11:45  --------------------------------------------------------  IN: 200 mL / OUT: 0 mL / NET: 200 mL    < from: 12 Lead ECG (23 @ 08:09) >  Diagnosis Line Normal sinus rhythm  Left bundle branch block  Nonspecific ST abnormality  Abnormal ECG  When compared with ECG of 2023 12:34,  T wave inversion no longer evident in Lateral leads    < end of copied text >  < from: TTE Echo Complete w/o Contrast w/ Doppler (01.10.23 @ 16:32) >   1. Technically difficult study with poor endocardial visualization.   2. Normal global left ventricular systolic function.   3. Left ventricular ejection fraction, by visual estimation, is 55 to   60%.   4. Mildly increased LV wall thickness.   5. Normal left ventricular internal cavity size.   6. Abnormal septal motion likely due to conduction delay.   7. The right ventricle is not well visualized, appears top normal in   size with normal systolic function.   8. The left atrium appears top normal in size.   9. The right atrium is not well visualized, appears dilated.  10. Mild to moderate mitral valve regurgitation.  11. Mild-moderate tricuspid regurgitation.  12. There is a mechanical valve in the aortic position, not well   visualized, peak velocity is within normal limits.  13. There is at least borderline pulmonary hypertension, estimated RVSP   34 mmHg.  14. There is no evidence of pericardial effusion.  15. There appears to be a large fluid-filled collection near the liver,   recommend clinical correlation.    < end of copied text >

## 2023-01-12 NOTE — PROGRESS NOTE ADULT - SUBJECTIVE AND OBJECTIVE BOX
CC: f/u for possible infection, leukocytosis    Patient reports: she is feeling well today. No respiratory, GI or  complaints. S/P EGD and colonoscopy-negative    REVIEW OF SYSTEMS:  All other review of systems negative (Comprehensive ROS)    Antimicrobials Day #  :s/p CTX    Other Medications Reviewed  MEDICATIONS  (STANDING):  atorvastatin 80 milliGRAM(s) Oral at bedtime  dextrose 5%. 1000 milliLiter(s) (100 mL/Hr) IV Continuous <Continuous>  dextrose 5%. 1000 milliLiter(s) (50 mL/Hr) IV Continuous <Continuous>  dextrose 50% Injectable 25 Gram(s) IV Push once  dextrose 50% Injectable 12.5 Gram(s) IV Push once  dextrose 50% Injectable 25 Gram(s) IV Push once  diltiazem    milliGRAM(s) Oral daily  ferrous    sulfate 325 milliGRAM(s) Oral daily  glucagon  Injectable 1 milliGRAM(s) IntraMuscular once  hydrochlorothiazide 25 milliGRAM(s) Oral daily  insulin lispro (ADMELOG) corrective regimen sliding scale   SubCutaneous three times a day before meals  insulin lispro (ADMELOG) corrective regimen sliding scale   SubCutaneous at bedtime  ondansetron Injectable 4 milliGRAM(s) IV Push once  pantoprazole    Tablet 40 milliGRAM(s) Oral before breakfast  valsartan 320 milliGRAM(s) Oral daily  warfarin 5 milliGRAM(s) Oral at bedtime    T(F): 98.2 (01-12-23 @ 12:01), Max: 98.5 (01-11-23 @ 15:00)  HR: 84 (01-12-23 @ 12:01)  BP: 154/81 (01-12-23 @ 12:01)  RR: 18 (01-12-23 @ 12:01)  SpO2: 95% (01-12-23 @ 12:01)  Wt(kg): --    PHYSICAL EXAM:  General: alert, no acute distress  Eyes:  anicteric, no conjunctival injection, no discharge  Oropharynx: no lesions or injection 	  Neck: supple, without adenopathy  Lungs: clear to auscultation  Heart: regular rate and rhythm;   Abdomen: soft, nondistended, nontender, without mass or organomegaly  Skin: no lesions  Extremities: no clubbing, cyanosis, or edema  Neurologic: alert, oriented, moves all extremities    LAB RESULTS:                        10.1   33.75 )-----------( 59       ( 12 Jan 2023 08:35 )             29.5     01-12    138  |  101  |  11  ----------------------------<  100<H>  3.3<L>   |  25  |  0.84    Ca    9.5      12 Jan 2023 08:35    TPro  7.0  /  Alb  3.6  /  TBili  0.6  /  DBili  0.1  /  AST  50<H>  /  ALT  45  /  AlkPhos  87  01-12    LIVER FUNCTIONS - ( 12 Jan 2023 08:35 )  Alb: 3.6 g/dL / Pro: 7.0 g/dL / ALK PHOS: 87 U/L / ALT: 45 U/L / AST: 50 U/L / GGT: x             MICROBIOLOGY:  RECENT CULTURES:  01-10 @ 10:42 Clean Catch Clean Catch (Midstream)     >=3 organisms. Probable collection contamination.      01-10 @ 08:48 .Blood Blood     No growth to date.          RADIOLOGY REVIEWED:    < from: US Abdomen Limited (01.11.23 @ 12:56) >  TECHNIQUE: Sonography of the right upper quadrant.    FINDINGS:    Liver: Within normal limits.  Bile ducts: Normal caliber. Common bile duct measures 5 mm.  Gallbladder: Evidence of sludge. Mild nonspecific wall thickening   measuring 4 mm.  Pancreas: Visualized portions are within normal limits.  Right kidney: 10.3 cm. No hydronephrosis. Evidence of 3 dominant cyst   measuring 7.3 cm, 10.9 cm, 9.8 cm.  Ascites: None.  IVC: Visualized portions are within normal limits.    IMPRESSION:    Right renal cysts    < end of copied text >  < from: CT Angio Abdomen and Pelvis w/ IV Cont (01.09.23 @ 16:03) >  IMPRESSION:  No evidence of active GI bleed.  Focal diminished cortical enhancement involving the upper left kidney   concerning for focal pyelonephritis or possibly infarcts.    < end of copied text >

## 2023-01-12 NOTE — PROGRESS NOTE ADULT - SUBJECTIVE AND OBJECTIVE BOX
Patient is a 82y old  Female who presents with a chief complaint of Symptomatic Anemia (2023 14:55)      Subjective and overnight events:  Patient seen and examined at bedside. reports epigastric pain much improved. no diarrhea. no fever, chills, sob, cp, abd pain.    ALLERGIES:  No Known Allergies    MEDICATIONS  (STANDING):  atorvastatin 80 milliGRAM(s) Oral at bedtime  dextrose 5%. 1000 milliLiter(s) (100 mL/Hr) IV Continuous <Continuous>  dextrose 5%. 1000 milliLiter(s) (50 mL/Hr) IV Continuous <Continuous>  dextrose 50% Injectable 25 Gram(s) IV Push once  dextrose 50% Injectable 12.5 Gram(s) IV Push once  dextrose 50% Injectable 25 Gram(s) IV Push once  diltiazem    milliGRAM(s) Oral daily  ferrous    sulfate 325 milliGRAM(s) Oral daily  glucagon  Injectable 1 milliGRAM(s) IntraMuscular once  hydrochlorothiazide 25 milliGRAM(s) Oral daily  insulin lispro (ADMELOG) corrective regimen sliding scale   SubCutaneous three times a day before meals  insulin lispro (ADMELOG) corrective regimen sliding scale   SubCutaneous at bedtime  ondansetron Injectable 4 milliGRAM(s) IV Push once  pantoprazole    Tablet 40 milliGRAM(s) Oral before breakfast  valsartan 320 milliGRAM(s) Oral daily  warfarin 5 milliGRAM(s) Oral at bedtime    MEDICATIONS  (PRN):  acetaminophen     Tablet .. 650 milliGRAM(s) Oral every 6 hours PRN Temp greater or equal to 38C (100.4F), Mild Pain (1 - 3)  aluminum hydroxide/magnesium hydroxide/simethicone Suspension 30 milliLiter(s) Oral every 4 hours PRN Dyspepsia  dextrose Oral Gel 15 Gram(s) Oral once PRN Blood Glucose LESS THAN 70 milliGRAM(s)/deciliter  melatonin 3 milliGRAM(s) Oral at bedtime PRN Insomnia    Vital Signs Last 24 Hrs  T(F): 98.2 (2023 12:01), Max: 98.3 (2023 05:46)  HR: 84 (2023 12:01) (80 - 84)  BP: 154/81 (2023 12:01) (154/81 - 167/77)  RR: 18 (2023 12:01) (18 - 18)  SpO2: 95% (2023 12:01) (95% - 95%)  I&O's Summary    2023 07:01  -  2023 07:00  --------------------------------------------------------  IN: 100 mL / OUT: 0 mL / NET: 100 mL    2023 07:01  -  2023 16:21  --------------------------------------------------------  IN: 400 mL / OUT: 0 mL / NET: 400 mL      PHYSICAL EXAM:  General: NAD, A/O x 3  ENT: MMM  Neck: Supple, No JVD  Lungs: Clear to auscultation bilaterally  Cardio: RRR, S1/S2, No murmurs  Abdomen: Soft, Nontender, Nondistended; Bowel sounds present  Extremities: No calf tenderness, No pitting edema    LABS:                        10.1   33.75 )-----------( 59       ( 2023 08:35 )             29.5     12    138  |  101  |  11  ----------------------------<  100  3.3   |  25  |  0.84    Ca    9.5      2023 08:35    TPro  7.0  /  Alb  3.6  /  TBili  0.6  /  DBili  0.1  /  AST  50  /  ALT  45  /  AlkPhos  87  -12      PT/INR - ( 2023 10:14 )   PT: 13.5 sec;   INR: 1.16 ratio         PTT - ( 2023 10:14 )  PTT:28.4 sec  Lactate, Blood: 0.6 mmol/L (01-10 @ 08:48)    CARDIAC MARKERS ( 2023 10:14 )  x     / 64.3 ng/L / x     / x     / x      CARDIAC MARKERS ( 2023 00:30 )  x     / 74.9 ng/L / x     / x     / x      CARDIAC MARKERS ( 10 Bill 2023 15:58 )  x     / 59.6 ng/L / x     / x     / x      CARDIAC MARKERS ( 10 Bill 2023 08:48 )  x     / 43.3 ng/L / x     / x     / x            POCT Blood Glucose.: 109 mg/dL (2023 11:16)  POCT Blood Glucose.: 92 mg/dL (2023 07:45)  POCT Blood Glucose.: 100 mg/dL (2023 20:41)  POCT Blood Glucose.: 115 mg/dL (2023 18:03)      Urinalysis Basic - ( 10 Bill 2023 10:42 )    Color: Yellow / Appearance: Clear / S.015 / pH: x  Gluc: x / Ketone: Small  / Bili: Negative / Urobili: Negative   Blood: x / Protein: 30 mg/dL / Nitrite: Negative   Leuk Esterase: Negative / RBC: 0-4 /HPF / WBC 0-2 /HPF   Sq Epi: x / Non Sq Epi: Neg.-Few / Bacteria: x        Culture - Stool (collected 2023 10:36)  Source: .Stool Feces inna jv  Preliminary Report (2023 15:32):    No enteric pathogens to date: Final culture pending    Culture - Urine (collected 10 Bill 2023 10:42)  Source: Clean Catch Clean Catch (Midstream)  Final Report (2023 15:55):    >=3 organisms. Probable collection contamination.    Culture - Blood (collected 10 Bill 2023 08:48)  Source: .Blood Blood  Preliminary Report (2023 13:02):    No growth to date.    Culture - Blood (collected 10 Bill 2023 08:48)  Source: .Blood Blood  Preliminary Report (2023 13:02):    No growth to date.          RADIOLOGY & ADDITIONAL TESTS:    Care Discussed with Consultants/Other Providers:

## 2023-01-12 NOTE — PROGRESS NOTE ADULT - SUBJECTIVE AND OBJECTIVE BOX
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  Her last colonoscopy was reportedly 10 years ago and had EGD approximately 4-month ago diagnosed with ulcers.  In the emergency room was found with INR of 7.28–received Kcentra and vitamin K.  Anticoagulation held as patient's platelets below 50,000.  CT angio A/P showed no evidence of active GI bleed; focal diminished cortical enhancement involving the upper left kidney concerning for focal pyelonephritis or possibly infarcts.  Hematology asked to comment on above.  Received transfusion support in ED.    MEDICATIONS:  acetaminophen     Tablet .. 650 milliGRAM(s) Oral every 6 hours PRN  aluminum hydroxide/magnesium hydroxide/simethicone Suspension 30 milliLiter(s) Oral every 4 hours PRN  atorvastatin 80 milliGRAM(s) Oral at bedtime  cefTRIAXone   IVPB 1000 milliGRAM(s) IV Intermittent every 24 hours  dextrose 5%. 1000 milliLiter(s) IV Continuous <Continuous>  dextrose 5%. 1000 milliLiter(s) IV Continuous <Continuous>  dextrose 50% Injectable 25 Gram(s) IV Push once  dextrose 50% Injectable 12.5 Gram(s) IV Push once  dextrose 50% Injectable 25 Gram(s) IV Push once  dextrose Oral Gel 15 Gram(s) Oral once PRN  diltiazem    milliGRAM(s) Oral daily  ferrous    sulfate 325 milliGRAM(s) Oral daily  glucagon  Injectable 1 milliGRAM(s) IntraMuscular once  hydrochlorothiazide 25 milliGRAM(s) Oral daily  insulin lispro (ADMELOG) corrective regimen sliding scale   SubCutaneous every 6 hours  melatonin 3 milliGRAM(s) Oral at bedtime PRN  ondansetron Injectable 4 milliGRAM(s) IV Push once  sodium chloride 0.9%. 1000 milliLiter(s) IV Continuous <Continuous>  valsartan 320 milliGRAM(s) Oral daily    All other medications reviewed.    SUBJECTIVE:  Stable clinically; no new events over night.    VITALS:  T(C): 36.9 (01-11-23 @ 15:00), Max: 37.5 (01-10-23 @ 19:43)  T(F): 98.5 (01-11-23 @ 15:00), Max: 99.5 (01-10-23 @ 19:43)  HR: 79 (01-11-23 @ 15:00) (75 - 82)  BP: 153/75 (01-11-23 @ 15:00) (139/66 - 153/75)    PHYSICAL EXAM:  Constitutional: alert, well developed  HEENT: normocephalic, anicteric sclerae, no mucositis or thrush  Respiratory: bilateral clear to auscultation anteriorly  Cardiovascular : S1, S2 regular, rhythmic, no murmurs, gallops or rubs  Abdomen: soft, distended, + normoactive BS, no palpable HS- megaly  Extremities: no tenderness;  -c/c/e, pulses equal bilaterally    LABS:  Hemoglobin: 9.0 g/dL (01-11-23 @ 16:00)  Hemoglobin: 9.7 g/dL (01-11-23 @ 06:40)  Hemoglobin: 9.9 g/dL (01-10-23 @ 08:48)    Platelet Count - Automated: 68 K/uL (01-11-23 @ 16:00)  Platelet Count - Automated: 40 K/uL (01-11-23 @ 06:40)  Platelet Count - Automated: 48 K/uL (01-10-23 @ 08:48)    (01-11) WBC: 30.82 K/uL,Hemoglobin: 9.0 g/dL, Hematocrit: 27.9 %,  Platelet: 68 K/uL  (01-11) Na: 143 mmol/L ; K: 3.5 mmol/L ; BUN: 12 mg/dL ; Cr: 0.79 mg/dL.    RADIOLOGY:  ACC: [id number] EXAM:  CT ANGIO ABD PELV (W)AW IC                          PROCEDURE DATE:  01/09/2023          INTERPRETATION:  CLINICAL INFORMATION: Abdominal pain. Evaluate for GI   bleed.    COMPARISON: None.    CONTRAST/COMPLICATIONS:  IV Contrast: Omnipaque 350  90 cc administered   10 cc discarded  Oral Contrast: Other  Complications: None reported at time of study completion    PROCEDURE:  CT of the Abdomen and Pelvis was performed.  Precontrast, Arterial and Delayed phases were performed.  Sagittal and coronal reformats were performed.    FINDINGS:  LOWER CHEST: Trace right basilar reticular scarring/atelectasis. Coronary   artery calcification.    LIVER: Subcentimeter sized hypodense foci segment 2 and segment 6, too   small to characterize. The liver is enlarged measuring approximately 20   cm.  BILE DUCTS: Normal caliber.  GALLBLADDER: Within normal limits.  SPLEEN: Within normal limits.  PANCREAS: Within normal limits.  ADRENALS: Within normal limits.  KIDNEYS/URETERS: A combination of bilateral renal cysts measuring up to   10 cm on the right side and too small to characterize hypodense foci   bilaterally. Focal diminished cortical enhancement upper left kidney   concerning for focal pyelonephritis or infarcts. Correlateclinically.    BLADDER: Within normal limits.  REPRODUCTIVE ORGANS: Hysterectomy.    BOWEL: No bowel obstruction. Appendix is not visualized. No evidence of   inflammation in the pericecal region. No evidence of contrast   extravasation or pooling tosuggest active GI bleed. Multifocal   uncomplicated diverticula. Collapsed sigmoid limiting the evaluation of   subtle abnormalities.  PERITONEUM: No ascites.  VESSELS: Atherosclerotic changes.  RETROPERITONEUM/LYMPH NODES: No lymphadenopathy.  ABDOMINAL WALL: Within normal limits.  BONES: Degenerative changes.    IMPRESSION:  No evidence of active GI bleed.  Focal diminished cortical enhancement involving the upper left kidney   concerning for focal pyelonephritis or possibly infarcts.

## 2023-01-13 ENCOUNTER — TRANSCRIPTION ENCOUNTER (OUTPATIENT)
Age: 83
End: 2023-01-13

## 2023-01-13 LAB
ANION GAP SERPL CALC-SCNC: 12 MMOL/L — SIGNIFICANT CHANGE UP (ref 5–17)
APTT BLD: 18.7 SEC — LOW (ref 27.5–35.5)
APTT BLD: 20.8 SEC — LOW (ref 27.5–35.5)
BASOPHILS # BLD AUTO: 0.17 K/UL — SIGNIFICANT CHANGE UP (ref 0–0.2)
BASOPHILS NFR BLD AUTO: 0.3 % — SIGNIFICANT CHANGE UP (ref 0–2)
BLD GP AB SCN SERPL QL: SIGNIFICANT CHANGE UP
BUN SERPL-MCNC: 18 MG/DL — SIGNIFICANT CHANGE UP (ref 7–23)
CALCIUM SERPL-MCNC: 8.7 MG/DL — SIGNIFICANT CHANGE UP (ref 8.4–10.5)
CHLORIDE SERPL-SCNC: 102 MMOL/L — SIGNIFICANT CHANGE UP (ref 96–108)
CO2 SERPL-SCNC: 23 MMOL/L — SIGNIFICANT CHANGE UP (ref 22–31)
CREAT SERPL-MCNC: 0.93 MG/DL — SIGNIFICANT CHANGE UP (ref 0.5–1.3)
CULTURE RESULTS: SIGNIFICANT CHANGE UP
EGFR: 62 ML/MIN/1.73M2 — SIGNIFICANT CHANGE UP
EOSINOPHIL # BLD AUTO: 0.16 K/UL — SIGNIFICANT CHANGE UP (ref 0–0.5)
EOSINOPHIL NFR BLD AUTO: 0.2 % — SIGNIFICANT CHANGE UP (ref 0–6)
FIBRINOGEN PPP-MCNC: 578 MG/DL — HIGH (ref 340–550)
GLUCOSE BLDC GLUCOMTR-MCNC: 103 MG/DL — HIGH (ref 70–99)
GLUCOSE BLDC GLUCOMTR-MCNC: 123 MG/DL — HIGH (ref 70–99)
GLUCOSE BLDC GLUCOMTR-MCNC: 123 MG/DL — HIGH (ref 70–99)
GLUCOSE BLDC GLUCOMTR-MCNC: 96 MG/DL — SIGNIFICANT CHANGE UP (ref 70–99)
GLUCOSE SERPL-MCNC: 108 MG/DL — HIGH (ref 70–99)
HCT VFR BLD CALC: 25.3 % — LOW (ref 34.5–45)
HCT VFR BLD CALC: 26.5 % — LOW (ref 34.5–45)
HGB BLD-MCNC: 8.2 G/DL — LOW (ref 11.5–15.5)
HGB BLD-MCNC: 8.8 G/DL — LOW (ref 11.5–15.5)
IMM GRANULOCYTES NFR BLD AUTO: 0.5 % — SIGNIFICANT CHANGE UP (ref 0–0.9)
INR BLD: 1.1 RATIO — SIGNIFICANT CHANGE UP (ref 0.88–1.16)
INR BLD: 1.22 RATIO — HIGH (ref 0.88–1.16)
LYMPHOCYTES # BLD AUTO: 58.69 K/UL — HIGH (ref 1–3.3)
LYMPHOCYTES # BLD AUTO: 89.8 % — HIGH (ref 13–44)
MCHC RBC-ENTMCNC: 28.7 PG — SIGNIFICANT CHANGE UP (ref 27–34)
MCHC RBC-ENTMCNC: 29.2 PG — SIGNIFICANT CHANGE UP (ref 27–34)
MCHC RBC-ENTMCNC: 32.4 GM/DL — SIGNIFICANT CHANGE UP (ref 32–36)
MCHC RBC-ENTMCNC: 33.2 GM/DL — SIGNIFICANT CHANGE UP (ref 32–36)
MCV RBC AUTO: 86.3 FL — SIGNIFICANT CHANGE UP (ref 80–100)
MCV RBC AUTO: 90 FL — SIGNIFICANT CHANGE UP (ref 80–100)
MONOCYTES # BLD AUTO: 3.41 K/UL — HIGH (ref 0–0.9)
MONOCYTES NFR BLD AUTO: 5.2 % — SIGNIFICANT CHANGE UP (ref 2–14)
NEUTROPHILS # BLD AUTO: 2.55 K/UL — SIGNIFICANT CHANGE UP (ref 1.8–7.4)
NEUTROPHILS NFR BLD AUTO: 4 % — LOW (ref 43–77)
NRBC # BLD: 0 /100 WBCS — SIGNIFICANT CHANGE UP (ref 0–0)
NRBC # BLD: 0 /100 WBCS — SIGNIFICANT CHANGE UP (ref 0–0)
PLATELET # BLD AUTO: 40 K/UL — LOW (ref 150–400)
PLATELET # BLD AUTO: 60 K/UL — LOW (ref 150–400)
POTASSIUM SERPL-MCNC: 3.1 MMOL/L — LOW (ref 3.5–5.3)
POTASSIUM SERPL-SCNC: 3.1 MMOL/L — LOW (ref 3.5–5.3)
PROTHROM AB SERPL-ACNC: 12.8 SEC — SIGNIFICANT CHANGE UP (ref 10.5–13.4)
PROTHROM AB SERPL-ACNC: 14.2 SEC — HIGH (ref 10.5–13.4)
RBC # BLD: 2.81 M/UL — LOW (ref 3.8–5.2)
RBC # BLD: 3.07 M/UL — LOW (ref 3.8–5.2)
RBC # FLD: 14.9 % — HIGH (ref 10.3–14.5)
RBC # FLD: 15.1 % — HIGH (ref 10.3–14.5)
SARS-COV-2 RNA SPEC QL NAA+PROBE: SIGNIFICANT CHANGE UP
SODIUM SERPL-SCNC: 137 MMOL/L — SIGNIFICANT CHANGE UP (ref 135–145)
SPECIMEN SOURCE: SIGNIFICANT CHANGE UP
WBC # BLD: 41.77 K/UL — CRITICAL HIGH (ref 3.8–10.5)
WBC # BLD: 65.32 K/UL — CRITICAL HIGH (ref 3.8–10.5)
WBC # FLD AUTO: 41.77 K/UL — CRITICAL HIGH (ref 3.8–10.5)
WBC # FLD AUTO: 65.32 K/UL — CRITICAL HIGH (ref 3.8–10.5)

## 2023-01-13 PROCEDURE — 88189 FLOWCYTOMETRY/READ 16 & >: CPT

## 2023-01-13 PROCEDURE — 99232 SBSQ HOSP IP/OBS MODERATE 35: CPT

## 2023-01-13 PROCEDURE — 99233 SBSQ HOSP IP/OBS HIGH 50: CPT

## 2023-01-13 RX ORDER — WARFARIN SODIUM 2.5 MG/1
5 TABLET ORAL ONCE
Refills: 0 | Status: DISCONTINUED | OUTPATIENT
Start: 2023-01-13 | End: 2023-01-13

## 2023-01-13 RX ORDER — POTASSIUM CHLORIDE 20 MEQ
40 PACKET (EA) ORAL ONCE
Refills: 0 | Status: COMPLETED | OUTPATIENT
Start: 2023-01-13 | End: 2023-01-13

## 2023-01-13 RX ORDER — WARFARIN SODIUM 2.5 MG/1
5 TABLET ORAL ONCE
Refills: 0 | Status: COMPLETED | OUTPATIENT
Start: 2023-01-13 | End: 2023-01-13

## 2023-01-13 RX ADMIN — PANTOPRAZOLE SODIUM 40 MILLIGRAM(S): 20 TABLET, DELAYED RELEASE ORAL at 05:22

## 2023-01-13 RX ADMIN — Medication 325 MILLIGRAM(S): at 11:37

## 2023-01-13 RX ADMIN — Medication 40 MILLIEQUIVALENT(S): at 11:37

## 2023-01-13 RX ADMIN — Medication 240 MILLIGRAM(S): at 05:22

## 2023-01-13 RX ADMIN — ATORVASTATIN CALCIUM 80 MILLIGRAM(S): 80 TABLET, FILM COATED ORAL at 21:25

## 2023-01-13 RX ADMIN — WARFARIN SODIUM 5 MILLIGRAM(S): 2.5 TABLET ORAL at 21:25

## 2023-01-13 NOTE — DISCHARGE NOTE PROVIDER - NSDCMRMEDTOKEN_GEN_ALL_CORE_FT
biotin 1000 mcg oral tablet: 1 tab(s) orally once a day  calcium citrate 950 mg (200 mg elemental calcium) oral tablet: 1 tab(s) orally once a day  CoQ10 100 mg oral capsule: 1 cap(s) orally once a day  ferrous sulfate 325 mg (65 mg elemental iron) oral tablet: 1 tab(s) orally once a day  Melatonin 10 mg oral capsule: 1 cap(s) orally once a day (at bedtime)  metFORMIN 500 mg oral tablet, extended release: 1 tab(s) orally once a day  Omega-3 350 mg oral capsule: 1 cap(s) orally once a day  omeprazole 20 mg oral delayed release capsule: 1 cap(s) orally once a day  rosuvastatin 40 mg oral tablet: 1 tab(s) orally once a day  Tiadylt  mg/24 hours oral capsule, extended release: 1 cap(s) orally once a day  valsartan-hydrochlorothiazide 320 mg-25 mg oral tablet: 1 tab(s) orally once a day  Vitamin B1 250 mg oral tablet: 2 tab(s) orally once a day  Vitamin B12 1000 mcg oral tablet: 1 tab(s) orally once a day  Vitamin C 500 mg oral tablet: 1 tab(s) orally once a day  warfarin 2.5 mg oral tablet: 1 tab(s) orally once a day (sometimes 3mg depending on INR)

## 2023-01-13 NOTE — CHART NOTE - NSCHARTNOTEFT_GEN_A_CORE
Attempted to see patient and patient currently at CT Scan. Per records noted to have black tarry stool x 4 weeks.    Keep NPO  Monitor CBC Q 6  Monitor Stool  Keep active type and screen  Transfuse < 7 or symptomatic  PPI IV BID  Reverse INR  Upper Endoscopy tomorrow
Discussed case with Dr. Ortiz (hematology). Concerned for possible blast crisis. Plan to transfer patient to [facility], [address]. Accepting physician Dr Sonia Duffy. Spoke with transfer center who will confirm with Dr Duffy and then place patient on list for bed. Patient is aware of and in agreement with plan. Transfer packet completed and placed in chart. Family updated by Dr Ortiz
S/P EGD/Colonoscopy, no source of bleeding noted. Outpatient capsule study. Further anticoagulation recs by hematology. Will sign off, please reconsult as needed.

## 2023-01-13 NOTE — PROGRESS NOTE ADULT - SUBJECTIVE AND OBJECTIVE BOX
`SUBJ:  Patient is a 82y old  Female who presents with a chief complaint of Symptomatic Anemia (2023 07:47)  patient seen and examined  chart is reviewed  patient sitting up in bed... no complaints       PAST MEDICAL & SURGICAL HISTORY:  Hypertension      Hypercholesteremia      H/O heart valve replacement with mechanical valve      History of 2  sections          MEDICATIONS  (STANDING):  atorvastatin 80 milliGRAM(s) Oral at bedtime  dextrose 5%. 1000 milliLiter(s) (50 mL/Hr) IV Continuous <Continuous>  dextrose 5%. 1000 milliLiter(s) (100 mL/Hr) IV Continuous <Continuous>  dextrose 50% Injectable 25 Gram(s) IV Push once  dextrose 50% Injectable 12.5 Gram(s) IV Push once  dextrose 50% Injectable 25 Gram(s) IV Push once  diltiazem    milliGRAM(s) Oral daily  ferrous    sulfate 325 milliGRAM(s) Oral daily  glucagon  Injectable 1 milliGRAM(s) IntraMuscular once  hydrochlorothiazide 25 milliGRAM(s) Oral daily  insulin lispro (ADMELOG) corrective regimen sliding scale   SubCutaneous at bedtime  insulin lispro (ADMELOG) corrective regimen sliding scale   SubCutaneous three times a day before meals  ondansetron Injectable 4 milliGRAM(s) IV Push once  pantoprazole    Tablet 40 milliGRAM(s) Oral before breakfast  valsartan 320 milliGRAM(s) Oral daily    MEDICATIONS  (PRN):  acetaminophen     Tablet .. 650 milliGRAM(s) Oral every 6 hours PRN Temp greater or equal to 38C (100.4F), Mild Pain (1 - 3)  aluminum hydroxide/magnesium hydroxide/simethicone Suspension 30 milliLiter(s) Oral every 4 hours PRN Dyspepsia  dextrose Oral Gel 15 Gram(s) Oral once PRN Blood Glucose LESS THAN 70 milliGRAM(s)/deciliter  melatonin 3 milliGRAM(s) Oral at bedtime PRN Insomnia          Vital Signs Last 24 Hrs  T(C): 36.9 (2023 05:14), Max: 37.3 (2023 21:02)  T(F): 98.5 (2023 05:14), Max: 99.1 (2023 21:02)  HR: 80 (2023 05:14) (80 - 81)  BP: 130/79 (2023 05:14) (123/73 - 130/79)  BP(mean): --  RR: 18 (2023 05:14) (18 - 19)  SpO2: 95% (2023 05:14) (95% - 98%)    Parameters below as of 2023 05:14  Patient On (Oxygen Delivery Method): room air        REVIEW OF SYSTEMS:  CONSTITUTIONAL: fatigue   RESPIRATORY: No cough, wheezing, chills or hemoptysis; No shortness of breath  CARDIOVASCULAR: No chest pain or chest pressure.  No shortness of breath or dyspnea on exertion.  No palpitations, dizziness, light headedness, syncope or near syncope.  No edema, no orthopnea.   NEUROLOGICAL: No headaches, memory loss, loss of strength, numbness, or tremors      PHYSICAL EXAM  Constitutional:  WDWN. No acute distress  HEENT: normocephalic, atraumatic.  PERRLA. EOMI  Neck : No JVD. no carotid bruits  Lungs:  clear to auscultation bilaterally. no rhonchi. no wheezing  Heart:  S1 and S2. No S3, S4. ll/Vl systolic murmur.  + click   Abdomen:  soft, non tender.  Extremities: No clubbing, cyanoisis or edema  Nuerologic:  A+O x 3. No focal deficits  Skin:  no rashes        LABS:                        8.8    41.77 )-----------( 40       ( 2023 05:00 )             26.5         137  |  102  |  18  ----------------------------<  108<H>  3.1<L>   |  23  |  0.93    Ca    8.7      2023 05:00    TPro  7.0  /  Alb  3.6  /  TBili  0.6  /  DBili  0.1  /  AST  50<H>  /  ALT  45  /  AlkPhos  87      I&O's Summary    2023 07:01  -  2023 07:00  --------------------------------------------------------  IN: 400 mL / OUT: 0 mL / NET: 400 mL    2023 07:01  -  2023 12:37  --------------------------------------------------------  IN: 200 mL / OUT: 0 mL / NET: 200 mL    < from: 12 Lead ECG (23 @ 08:09) >    Diagnosis Line Normal sinus rhythm  Left bundle branch block  Nonspecific ST abnormality  Abnormal ECG  When compared with ECG of 2023 12:34,  T wave inversion no longer evident in Lateral leads    < end of copied text >  < from: TTE Echo Complete w/o Contrast w/ Doppler (01.10.23 @ 16:32) >  1. Technically difficult study with poor endocardial visualization.   2. Normal global left ventricular systolic function.   3. Left ventricular ejection fraction, by visual estimation, is 55 to   60%.   4. Mildly increased LV wall thickness.   5. Normal left ventricular internal cavity size.   6. Abnormal septal motion likely due to conduction delay.   7. The right ventricle is not well visualized, appears top normal in   size with normal systolic function.   8. The left atrium appears top normal in size.   9. The right atrium is not well visualized, appears dilated.  10. Mild to moderate mitral valve regurgitation.  11. Mild-moderate tricuspid regurgitation.  12. There is a mechanical valve in the aortic position, not well   visualized, peak velocity is within normal limits.  13. There is at least borderline pulmonary hypertension, estimated RVSP   34 mmHg.  14. There is no evidence of pericardial effusion.  15. There appears to be a large fluid-filled collection near the liver,   recommend clinical correlation.    [id number] Fahad Bruno MD Electronically signed on   1/10/2023 at 6:22:00 PM            *** Final ***    < end of copied text >

## 2023-01-13 NOTE — DIETITIAN INITIAL EVALUATION ADULT - OTHER INFO
year old female with PMH significant for HTN, HLD, DM2, Mechanical Heart Valve on coumadin, AAA presents to ED from home with black stools.  Admitted with Symptomatic Anemia, Patient reports black tarry stools PTA, guaiac positive stools, s/p one unit of PRBC transfusion on 1/9, S/P EGD/Colonoscopy, no source of bleeding noted. Outpatient capsule study. Onc/Heme consult noted , Patient tolerating current diet well , appetite is reported good consuming 100% of meals as observed , (+) BM (1/13) A1c 6.1% , no edema , POCT noted Patient consumes a NCS diet PTA , POCT reviewed , continue current diet

## 2023-01-13 NOTE — PROGRESS NOTE ADULT - ASSESSMENT
Patient is a 82y female with a past history of Aortic stenosis, [facility] AVR over 20 years ago, known ascending aortic aneurysm, HTN, HLD,and GERD who was admitted 1/10 with a history of black tarry stools x weeks.she also ha\d an elevated INR. She reportedly had EGD in [place] 4 months ago and has had a remote colonoscopy. She has lived in [place], currently lives in [place], was born in [place].  No fevers , chills, night sweats. No recent antibiotic use. CT of A/P showed possible renal infarcts vs focal pyelonephritis. Her UA was bland. She also has an anemia, some evidence of hemolysis with elevated LDH and low haptoglobin,however total bili is normal and a marked leukocytosis with a lymphocytic predominance. Her wbc was over 30,000 with 80-90% lymphocytes.She is s/p EGD on 1/10 with non erosive gastritis found. .CTX was started empirically.  While occult infection is possible, she is unaware of any fevers, reports a good appetite, and no clear localizing signs of infection.  Her differential is very abnormal, mostly lymphocytes, ? if she has an underlying lymphoproliferative disorder which could be accounting for some component of thrombocytopenia.Clinical picture not suggestive of Babesiosis or Mycoplasma infections, which can be associated with hemolytic anemias.  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  Her blood cultures are negative, Platelets   remain about 40,000 and she has a lymphocytic predominance with atypical lymphocytes.Her acute Hep Panel is negative.EGD/Colonoscopy without source of bleed.Polymicrobic urine likely contaminants.Atypical lymphs reported on diff do not seem to correlate with active infection. CTX stopped after 2 doses on 1/12.No clinical signs of infection  Suggest:  1.Favor observation off antibiotics.  2, With no history of fever and no constitutional symptoms I am reluctant to advise additional ID w/u.  3.Capsule endoscopy per GI.  4. Anticoagulation per other services  5. She will need formal Heme evaluation once discharged to evaluate abnormal CBC.

## 2023-01-13 NOTE — DIETITIAN INITIAL EVALUATION ADULT - PERTINENT MEDS FT
MEDICATIONS  (STANDING):  atorvastatin 80 milliGRAM(s) Oral at bedtime  dextrose 5%. 1000 milliLiter(s) (50 mL/Hr) IV Continuous <Continuous>  dextrose 5%. 1000 milliLiter(s) (100 mL/Hr) IV Continuous <Continuous>  dextrose 50% Injectable 25 Gram(s) IV Push once  dextrose 50% Injectable 12.5 Gram(s) IV Push once  dextrose 50% Injectable 25 Gram(s) IV Push once  diltiazem    milliGRAM(s) Oral daily  ferrous    sulfate 325 milliGRAM(s) Oral daily  glucagon  Injectable 1 milliGRAM(s) IntraMuscular once  hydrochlorothiazide 25 milliGRAM(s) Oral daily  insulin lispro (ADMELOG) corrective regimen sliding scale   SubCutaneous at bedtime  insulin lispro (ADMELOG) corrective regimen sliding scale   SubCutaneous three times a day before meals  ondansetron Injectable 4 milliGRAM(s) IV Push once  pantoprazole    Tablet 40 milliGRAM(s) Oral before breakfast  valsartan 320 milliGRAM(s) Oral daily    MEDICATIONS  (PRN):  acetaminophen     Tablet .. 650 milliGRAM(s) Oral every 6 hours PRN Temp greater or equal to 38C (100.4F), Mild Pain (1 - 3)  aluminum hydroxide/magnesium hydroxide/simethicone Suspension 30 milliLiter(s) Oral every 4 hours PRN Dyspepsia  dextrose Oral Gel 15 Gram(s) Oral once PRN Blood Glucose LESS THAN 70 milliGRAM(s)/deciliter  melatonin 3 milliGRAM(s) Oral at bedtime PRN Insomnia

## 2023-01-13 NOTE — DISCHARGE NOTE PROVIDER - CARE PROVIDER_API CALL
Reginald Barreto)  Family Medicine  [address] [place] [address]  Phone: [phone]  Fax: [phone]  Follow Up Time:

## 2023-01-13 NOTE — PROGRESS NOTE ADULT - SUBJECTIVE AND OBJECTIVE BOX
Patient is a 82y old  Female who presents with a chief complaint of Symptomatic Anemia (13 Jan 2023 07:47)    Patient seen and examined at bedside. No overnight events reported. Patient is resting in bed, she denies symptoms. Would like to walk. Had brown BM this AM    ALLERGIES:  No Known Allergies    MEDICATIONS  (STANDING):  atorvastatin 80 milliGRAM(s) Oral at bedtime  diltiazem    milliGRAM(s) Oral daily  ferrous    sulfate 325 milliGRAM(s) Oral daily  hydrochlorothiazide 25 milliGRAM(s) Oral daily  insulin lispro (ADMELOG) corrective regimen sliding scale   SubCutaneous at bedtime  insulin lispro (ADMELOG) corrective regimen sliding scale   SubCutaneous three times a day before meals  ondansetron Injectable 4 milliGRAM(s) IV Push once  pantoprazole    Tablet 40 milliGRAM(s) Oral before breakfast  potassium chloride    Tablet ER 40 milliEquivalent(s) Oral once  valsartan 320 milliGRAM(s) Oral daily    MEDICATIONS  (PRN):  acetaminophen     Tablet .. 650 milliGRAM(s) Oral every 6 hours PRN Temp greater or equal to 38C (100.4F), Mild Pain (1 - 3)  aluminum hydroxide/magnesium hydroxide/simethicone Suspension 30 milliLiter(s) Oral every 4 hours PRN Dyspepsia  dextrose Oral Gel 15 Gram(s) Oral once PRN Blood Glucose LESS THAN 70 milliGRAM(s)/deciliter  melatonin 3 milliGRAM(s) Oral at bedtime PRN Insomnia    Vital Signs Last 24 Hrs  T(F): 98.5 (13 Jan 2023 05:14), Max: 99.1 (12 Jan 2023 21:02)  HR: 80 (13 Jan 2023 05:14) (80 - 84)  BP: 130/79 (13 Jan 2023 05:14) (123/73 - 154/81)  RR: 18 (13 Jan 2023 05:14) (18 - 19)  SpO2: 95% (13 Jan 2023 05:14) (95% - 98%)    I&O's Summary  12 Jan 2023 07:01  -  13 Jan 2023 07:00  --------------------------------------------------------  IN: 400 mL / OUT: 0 mL / NET: 400 mL    13 Jan 2023 07:01  -  13 Jan 2023 10:54  --------------------------------------------------------  IN: 200 mL / OUT: 0 mL / NET: 200 mL    PHYSICAL EXAM:  General: NAD, A/O x 3  ENT: No gross hearing impairment, Moist mucous membranes, no thrush  Neck: Supple, No JVD  Lungs: Clear to auscultation bilaterally, good air entry, non-labored breathing  Cardio: RRR, S1/S2, No murmur  Abdomen: Soft, Nontender, Nondistended; Bowel sounds present  Extremities: No calf tenderness, No cyanosis, No pitting edema  Psych: Appropriate mood and affect    LABS:                        8.8    41.77 )-----------( 40       ( 13 Jan 2023 05:00 )             26.5     01-13    137  |  102  |  18  ----------------------------<  108  3.1   |  23  |  0.93    Ca    8.7      13 Jan 2023 05:00    TPro  7.0  /  Alb  3.6  /  TBili  0.6  /  DBili  0.1  /  AST  50  /  ALT  45  /  AlkPhos  87  01-12    Lipase, Serum: 64 U/L (01-09-23 @ 12:45)    PT/INR - ( 13 Jan 2023 05:00 )   PT: 12.8 sec;   INR: 1.10 ratio     PTT - ( 13 Jan 2023 05:00 )  PTT:18.7 sec    CARDIAC MARKERS ( 12 Jan 2023 10:14 )  x     / 64.3 ng/L / x     / x     / x      CARDIAC MARKERS ( 11 Jan 2023 00:30 )  x     / 74.9 ng/L / x     / x     / x      CARDIAC MARKERS ( 10 Bill 2023 15:58 )  x     / 59.6 ng/L / x     / x     / x        POCT Blood Glucose.: 103 mg/dL (13 Jan 2023 08:44)  POCT Blood Glucose.: 107 mg/dL (12 Jan 2023 21:42)  POCT Blood Glucose.: 101 mg/dL (12 Jan 2023 16:27)  POCT Blood Glucose.: 109 mg/dL (12 Jan 2023 11:16)    Culture - Stool (collected 11 Jan 2023 10:36)  Source: .Stool Feces inna jv  Preliminary Report (12 Jan 2023 15:32):    No enteric pathogens to date: Final culture pending    Culture - Urine (collected 10 Bill 2023 10:42)  Source: Clean Catch Clean Catch (Midstream)  Final Report (11 Jan 2023 15:55):    >=3 organisms. Probable collection contamination.    Culture - Blood (collected 10 Bill 2023 08:48)  Source: .Blood Blood  Preliminary Report (11 Jan 2023 13:02):    No growth to date.    Culture - Blood (collected 10 Bill 2023 08:48)  Source: .Blood Blood  Preliminary Report (11 Jan 2023 13:02):    No growth to date.      RADIOLOGY & ADDITIONAL TESTS:    Care Discussed with Consultants/Other Providers:    Patient is a 82y old  Female who presents with a chief complaint of Symptomatic Anemia (13 Jan 2023 07:47)    Patient seen and examined at bedside. No overnight events reported. Patient is resting in bed, she denies symptoms. Would like to walk. Had brown BM this AM    ALLERGIES:  No Known Allergies    MEDICATIONS  (STANDING):  atorvastatin 80 milliGRAM(s) Oral at bedtime  diltiazem    milliGRAM(s) Oral daily  ferrous    sulfate 325 milliGRAM(s) Oral daily  hydrochlorothiazide 25 milliGRAM(s) Oral daily  insulin lispro (ADMELOG) corrective regimen sliding scale   SubCutaneous at bedtime  insulin lispro (ADMELOG) corrective regimen sliding scale   SubCutaneous three times a day before meals  ondansetron Injectable 4 milliGRAM(s) IV Push once  pantoprazole    Tablet 40 milliGRAM(s) Oral before breakfast  potassium chloride    Tablet ER 40 milliEquivalent(s) Oral once  valsartan 320 milliGRAM(s) Oral daily    MEDICATIONS  (PRN):  acetaminophen     Tablet .. 650 milliGRAM(s) Oral every 6 hours PRN Temp greater or equal to 38C (100.4F), Mild Pain (1 - 3)  aluminum hydroxide/magnesium hydroxide/simethicone Suspension 30 milliLiter(s) Oral every 4 hours PRN Dyspepsia  dextrose Oral Gel 15 Gram(s) Oral once PRN Blood Glucose LESS THAN 70 milliGRAM(s)/deciliter  melatonin 3 milliGRAM(s) Oral at bedtime PRN Insomnia    Vital Signs Last 24 Hrs  T(F): 98.5 (13 Jan 2023 05:14), Max: 99.1 (12 Jan 2023 21:02)  HR: 80 (13 Jan 2023 05:14) (80 - 84)  BP: 130/79 (13 Jan 2023 05:14) (123/73 - 154/81)  RR: 18 (13 Jan 2023 05:14) (18 - 19)  SpO2: 95% (13 Jan 2023 05:14) (95% - 98%)    I&O's Summary  12 Jan 2023 07:01  -  13 Jan 2023 07:00  --------------------------------------------------------  IN: 400 mL / OUT: 0 mL / NET: 400 mL    13 Jan 2023 07:01  -  13 Jan 2023 10:54  --------------------------------------------------------  IN: 200 mL / OUT: 0 mL / NET: 200 mL    PHYSICAL EXAM:  General: NAD, A/O x 3  ENT: No gross hearing impairment, Moist mucous membranes, no thrush  Neck: Supple, No JVD  Lungs: Clear to auscultation bilaterally, good air entry, non-labored breathing  Cardio: RRR, S1/S2, No murmur  Abdomen: Soft, Nontender, Nondistended; Bowel sounds present  Extremities: No calf tenderness, No cyanosis, No pitting edema  Psych: Appropriate mood and affect    LABS:                        8.8    41.77 )-----------( 40       ( 13 Jan 2023 05:00 )             26.5     01-13    137  |  102  |  18  ----------------------------<  108  3.1   |  23  |  0.93    Ca    8.7      13 Jan 2023 05:00    TPro  7.0  /  Alb  3.6  /  TBili  0.6  /  DBili  0.1  /  AST  50  /  ALT  45  /  AlkPhos  87  01-12    Lipase, Serum: 64 U/L (01-09-23 @ 12:45)    PT/INR - ( 13 Jan 2023 05:00 )   PT: 12.8 sec;   INR: 1.10 ratio     PTT - ( 13 Jan 2023 05:00 )  PTT:18.7 sec    CARDIAC MARKERS ( 12 Jan 2023 10:14 )  x     / 64.3 ng/L / x     / x     / x      CARDIAC MARKERS ( 11 Jan 2023 00:30 )  x     / 74.9 ng/L / x     / x     / x      CARDIAC MARKERS ( 10 Bill 2023 15:58 )  x     / 59.6 ng/L / x     / x     / x        POCT Blood Glucose.: 103 mg/dL (13 Jan 2023 08:44)  POCT Blood Glucose.: 107 mg/dL (12 Jan 2023 21:42)  POCT Blood Glucose.: 101 mg/dL (12 Jan 2023 16:27)  POCT Blood Glucose.: 109 mg/dL (12 Jan 2023 11:16)    Culture - Stool (collected 11 Jan 2023 10:36)  Source: .Stool Feces inna jv  Preliminary Report (12 Jan 2023 15:32):    No enteric pathogens to date: Final culture pending    Culture - Urine (collected 10 Bill 2023 10:42)  Source: Clean Catch Clean Catch (Midstream)  Final Report (11 Jan 2023 15:55):    >=3 organisms. Probable collection contamination.    Culture - Blood (collected 10 Bill 2023 08:48)  Source: .Blood Blood  Preliminary Report (11 Jan 2023 13:02):    No growth to date.    Culture - Blood (collected 10 Bill 2023 08:48)  Source: .Blood Blood  Preliminary Report (11 Jan 2023 13:02):    No growth to date.    RADIOLOGY & ADDITIONAL TESTS:    Care Discussed with Consultants/Other Providers:

## 2023-01-13 NOTE — DISCHARGE NOTE PROVIDER - NSDCCPCAREPLAN_GEN_ALL_CORE_FT
PRINCIPAL DISCHARGE DIAGNOSIS  Diagnosis: GI bleed  Assessment and Plan of Treatment:       SECONDARY DISCHARGE DIAGNOSES  Diagnosis: Thrombocytopenia  Assessment and Plan of Treatment:      PRINCIPAL DISCHARGE DIAGNOSIS  Diagnosis: GI bleed  Assessment and Plan of Treatment: Patient was admitted to medicine. GI was consulted, patient underwent EGD/colonoscopy. EGD negative. Colonoscopy showed grade I internal hemorrhoid. Polyps in transverse colon and hepatic flexure. GI recommended outpatient capsule studies. Hemoglobin remained stable and GI signed off case. Patient with brown BMs at this time.Hematology was consulted for leukocytosis with lymphocytosis, acute anemia, and thrombocytopenia. Initially it was thought that hematologic profile suggestive of a reactive state, however growing concern for possible blast crisis. Hematology advised urgent transfer to [facility].      SECONDARY DISCHARGE DIAGNOSES  Diagnosis: Thrombocytopenia  Assessment and Plan of Treatment:

## 2023-01-13 NOTE — PROGRESS NOTE ADULT - ASSESSMENT
82 year old woman admitted with symptomatic anemia, black tarry stools concerning for GI bleed. + thrombocytopenia   Cardiac history includes AF, s/p mechanical AVR in the remote past maintained on warfarin.  Ao aneurysm  HTN  GI workup with EGD and colonoscopy is negative   Elevated troponin likely due to demand ischemia     Plan  - cautious warfarin if OK with Heme   - monitor INR target 2.5-3.5  - monitor CBC including platelets  - GI workup in progress... ? capsule endoscopy     discussed with patient and with Medicine team

## 2023-01-13 NOTE — PROGRESS NOTE ADULT - ASSESSMENT
82 year old female with PMH DM2, HTN, HLD, Mechanical heart valve on coumadin, AAA presents with black tarry stools found with INR 7.28.    #Suspected GIB  #Symptomatic Anemia  - CT Angio A/P without evidence of acute GIB  - Hgb 9.8 in ED, guaiac positive stools, s/p one unit of pRBC transfusion on 1/9  - s/p EGD, negative  - s/p colonoscopy, showed grade I internal hemorrhoid. Polyps in transverse colon and hepatic flexure  - GI recommends outpatient capsule studies   - Pt s/p platelet transfusion on 1/11, platelet count >50 on 1/12  - Coumadin restarted on 1/12  - Monitor H&H closely   - Keep active Type and Screen, Transfuse Hgb<7 (or symptomatic/active bleeding)  - GI has signed off  - Hematology following    #Supratherapeutic INR  #Mechanical heart valve on coumadin  - INR found to be 7.28 on admission   - Pt received KCentra and Vitamin K in ED  - Appreciate hematology recs, advised resume AC if platelet stable above 50  - Coumadin resumed on 1/12 - 5 mg   - Home coumadin dose 2.5/3 depending on INR  - Daily INR and coumadin dosing    #Thrombocytopenia  - s/p 1 unit of platelets on 1/11  - Coumadin resumed on 1/12 as coumadin remained above 50  - Monitor platelets   - Hematology consult appreciated     #Leukocytosis with lymphocytosis   #Acute anemia   - Leukocytosis suspect reactive in nature per hematology, follow up outpt workup for lymphoproliferative disorder  - LDH elevated and haptoglobin 29 (low) on 1/10, 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  - Appreciate ID, EPEC is of little clinical significance and no clear pathogenic role.  - Blood culture negative, urine cx likely contaminant  - Discontinue antibiotics   - Hematology consult appreciated     #Incidental finding of possible focal L kidney infarct  - CT abd reviewed. Focal diminished cortical enhancement involving left upper kidney concerning for focal pyelonephritis or possibly infarcts  - UA negative twice and urine cx showed contaminant. Suspect CT findings more likely to be focal infarct..   - Renal function stable.    #DM2  - Hba1c 6.1%  - Pt takes metformin at home  - Continue accuchecks/ISS    #HTN  - Chronic condition  - Home meds: Tiadylt  daily and valsartan-HCTZ 320-25 daily  - Continue cardizem 240mg, HCTZ, and Valsartan with parameters while admitted     #HLD  - Chronic condition  - Continue rosuvastatin    #DVT ppx  - Resumed coumadin     Dispo:     Daughter Wendi Sierra [phone], alternative number son-in-law Will Sierra [phone]     82 year old female with PMH DM2, HTN, HLD, Mechanical heart valve on coumadin, AAA presents with black tarry stools found with INR 7.28.    #Suspected GIB  #Symptomatic Anemia  - CT Angio A/P without evidence of acute GIB  - Hgb 9.8 in ED, guaiac positive stools, s/p one unit of pRBC transfusion on 1/9  - s/p EGD, negative  - s/p colonoscopy, showed grade I internal hemorrhoid. Polyps in transverse colon and hepatic flexure  - GI recommends outpatient capsule studies   - Monitor H&H closely   - Keep active Type and Screen, Transfuse Hgb<7 (or symptomatic/active bleeding)  - Pt s/p platelet transfusion on 1/11, platelet count >50 on 1/12 so coumadin was resumed. Plt 40 today, will f/u with hematology re: anticoagulation recs   - Coumadin restarted on 1/12  - GI has signed off  - Hematology following    #Supratherapeutic INR  #Mechanical heart valve on coumadin  - INR found to be 7.28 on admission   - Pt received KCentra and Vitamin K in ED  - Goal INR 2.5-3.5  - Appreciate hematology recs, advised resume AC if platelet stable above 50, plt now 40, will f/u today  - Coumadin resumed on 1/12 - 5 mg   - Home coumadin dose 2.5/3 depending on INR  - Daily INR and coumadin dosing    #Thrombocytopenia  - s/p 1 unit of platelets on 1/11  - Coumadin resumed on 1/12 as coumadin remained above 50  - Monitor platelets  - Hematology consult appreciated     #Leukocytosis with lymphocytosis   #Acute anemia   - Leukocytosis suspect reactive in nature per hematology, follow up outpt workup for lymphoproliferative disorder  - LDH elevated and haptoglobin 29 (low) on 1/10, 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  - Appreciate ID, EPEC is of little clinical significance and no clear pathogenic role.  - Blood culture negative, urine cx likely contaminant  - Discontinue antibiotics   - Hematology consult appreciated     #Incidental finding of possible focal L kidney infarct  - CT abd reviewed. Focal diminished cortical enhancement involving left upper kidney concerning for focal pyelonephritis or possibly infarcts  - UA negative twice and urine cx showed contaminant. Suspect CT findings more likely to be focal infarct..   - Renal function stable.    #DM2  - Hba1c 6.1%  - Pt takes metformin at home  - Continue accuchecks/ISS    #HTN  - Chronic condition  - Home meds: Tiadylt  daily and valsartan-HCTZ 320-25 daily  - Continue cardizem 240mg, HCTZ, and Valsartan with parameters while admitted     #HLD  - Chronic condition  - Continue rosuvastatin    #DVT ppx  - Resumed coumadin     Dispo: PT eval pending. Not medically cleared - INR not therapeutic     Daughter Wendi Venegas Townsend [phone], alternative number son-in-law Will Theo Harris [phone]     82 year old female with PMH DM2, HTN, HLD, Mechanical heart valve on coumadin, AAA presents with black tarry stools found with INR 7.28.    #Suspected GIB  #Symptomatic Anemia  - CT Angio A/P without evidence of acute GIB  - Hgb 9.8 in ED, guaiac positive stools, s/p one unit of pRBC transfusion on 1/9  - s/p EGD, negative  - s/p colonoscopy, showed grade I internal hemorrhoid. Polyps in transverse colon and hepatic flexure  - GI recommends outpatient capsule studies   - Monitor H&H closely   - Keep active Type and Screen, Transfuse Hgb<7 (or symptomatic/active bleeding)  - Pt s/p platelet transfusion on 1/11, platelet count >50 on 1/12 so coumadin was resumed. Plt 40 today, will f/u with hematology re: anticoagulation recs   - Coumadin restarted on 1/12  - GI has signed off  - Hematology following    #Supratherapeutic INR  #Mechanical heart valve on coumadin  - INR found to be 7.28 on admission   - Pt received KCentra and Vitamin K in ED  - Goal INR 2.5-3.5  - Appreciate hematology recs, advised resume AC if platelet stable above 50, plt now 40, will f/u today  - Coumadin resumed on 1/12 - 5 mg   - Home coumadin dose 2.5/3 depending on INR  - Daily INR and coumadin dosing    #Thrombocytopenia  - s/p 1 unit of platelets on 1/11  - Coumadin resumed on 1/12 as coumadin remained above 50  - Monitor platelets  - Hematology consult appreciated     #Leukocytosis with lymphocytosis   #Acute anemia   - Leukocytosis suspect reactive in nature per hematology, follow up outpt workup for lymphoproliferative disorder  - LDH elevated and haptoglobin 29 (low) on 1/10, 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  - Appreciate ID, EPEC is of little clinical significance and no clear pathogenic role.  - Blood culture negative, urine cx likely contaminant  - Discontinue antibiotics   - Hematology consult appreciated     #Incidental finding of possible focal L kidney infarct  - CT abd reviewed. Focal diminished cortical enhancement involving left upper kidney concerning for focal pyelonephritis or possibly infarcts  - UA negative twice and urine cx showed contaminant. Suspect CT findings more likely to be focal infarct..   - Renal function stable.    #DM2  - Hba1c 6.1%  - Pt takes metformin at home  - Continue accuchecks/ISS    #HTN  - Chronic condition  - Home meds: Tiadylt  daily and valsartan-HCTZ 320-25 daily  - Continue cardizem 240mg, HCTZ, and Valsartan with parameters while admitted     #HLD  - Chronic condition  - Continue rosuvastatin    #DVT ppx  - Resumed coumadin     Dispo: PT eval pending. Not medically cleared - INR not therapeutic     1/13: Updated daughter Wendi Sierra [phone]  Alternative number: son-in-law Will Sierra [phone]     82 year old female with PMH DM2, HTN, HLD, Mechanical heart valve on coumadin, AAA presents with black tarry stools found with INR 7.28.    #Suspected GIB  #Symptomatic Anemia  - CT Angio A/P without evidence of acute GIB  - Hgb 9.8 in ED, guaiac positive stools, s/p one unit of pRBC transfusion on 1/9  - s/p EGD, negative  - s/p colonoscopy, showed grade I internal hemorrhoid. Polyps in transverse colon and hepatic flexure  - GI recommends outpatient capsule studies   - Monitor H&H closely   - Keep active Type and Screen, Transfuse Hgb<7 (or symptomatic/active bleeding)  - Pt s/p platelet transfusion on 1/11, platelet count >50 on 1/12 so coumadin was resumed. Plt 40 today, will f/u with hematology re: anticoagulation recs   - Coumadin restarted on 1/12  - GI has signed off  - Hematology following    #Supratherapeutic INR  #Mechanical heart valve on coumadin  - INR found to be 7.28 on admission   - Pt received KCentra and Vitamin K in ED  - Goal INR 2.5-3.5  - Appreciate hematology recs, advised resume AC if platelet stable above 50, plt now 40, will f/u today  - Coumadin resumed on 1/12 - 5 mg   - Home coumadin dose 2.5/3 depending on INR  - Daily INR and coumadin dosing    #Thrombocytopenia  - s/p 1 unit of platelets on 1/11  - Coumadin resumed on 1/12 as coumadin remained above 50  - Monitor platelets  - Hematology consult appreciated     #Leukocytosis with lymphocytosis   #Acute anemia   - Leukocytosis suspect reactive in nature per hematology, follow up outpt workup for lymphoproliferative disorder  - LDH elevated and haptoglobin 29 (low) on 1/10, 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  - Appreciate ID, EPEC is of little clinical significance and no clear pathogenic role.  - Blood culture negative, urine cx likely contaminant  - Discontinue antibiotics   - Hematology consult appreciated     #Incidental finding of possible focal L kidney infarct  - CT abd reviewed. Focal diminished cortical enhancement involving left upper kidney concerning for focal pyelonephritis or possibly infarcts  - UA negative twice and urine cx showed contaminant. Suspect CT findings more likely to be focal infarct..   - Renal function stable.    #Hypokalemia  - K = 3.4  - Supplement     #DM2  - Hba1c 6.1%  - Pt takes metformin at home  - Continue accuchecks/ISS    #HTN  - Chronic condition  - Home meds: Tiadylt  daily and valsartan-HCTZ 320-25 daily  - Continue cardizem 240mg, HCTZ, and Valsartan with parameters while admitted     #HLD  - Chronic condition  - Continue rosuvastatin    #DVT ppx  - Resumed coumadin     Dispo: PT eval pending. Not medically cleared - INR not therapeutic     1/13: Updated daughter Wendi Sierra [phone]  Alternative number: son-in-law Will Sierra [phone]

## 2023-01-13 NOTE — PROGRESS NOTE ADULT - SUBJECTIVE AND OBJECTIVE BOX
CC: f/u for leukocytosis    Patient reports: no complaints, she is feeling well    REVIEW OF SYSTEMS:  All other review of systems negative (Comprehensive ROS)    Antimicrobials Day #  :off    Other Medications Reviewed  MEDICATIONS  (STANDING):  atorvastatin 80 milliGRAM(s) Oral at bedtime  dextrose 5%. 1000 milliLiter(s) (50 mL/Hr) IV Continuous <Continuous>  dextrose 5%. 1000 milliLiter(s) (100 mL/Hr) IV Continuous <Continuous>  dextrose 50% Injectable 25 Gram(s) IV Push once  dextrose 50% Injectable 12.5 Gram(s) IV Push once  dextrose 50% Injectable 25 Gram(s) IV Push once  diltiazem    milliGRAM(s) Oral daily  ferrous    sulfate 325 milliGRAM(s) Oral daily  glucagon  Injectable 1 milliGRAM(s) IntraMuscular once  hydrochlorothiazide 25 milliGRAM(s) Oral daily  insulin lispro (ADMELOG) corrective regimen sliding scale   SubCutaneous at bedtime  insulin lispro (ADMELOG) corrective regimen sliding scale   SubCutaneous three times a day before meals  ondansetron Injectable 4 milliGRAM(s) IV Push once  pantoprazole    Tablet 40 milliGRAM(s) Oral before breakfast  valsartan 320 milliGRAM(s) Oral daily    T(F): 98.5 (01-13-23 @ 05:14), Max: 99.1 (01-12-23 @ 21:02)  HR: 80 (01-13-23 @ 05:14)  BP: 130/79 (01-13-23 @ 05:14)  RR: 18 (01-13-23 @ 05:14)  SpO2: 95% (01-13-23 @ 05:14)  Wt(kg): --    PHYSICAL EXAM:  General: alert, no acute distress  Eyes:  anicteric, no conjunctival injection, no discharge  Oropharynx: no lesions or injection 	  Neck: supple, without adenopathy  Lungs: clear to auscultation  Heart: regular rate and rhythm; no murmur, rubs or gallops  Abdomen: soft, nondistended, nontender, without mass or organomegaly  Skin: no lesions  Extremities: no clubbing, cyanosis, or edema  Neurologic: alert, oriented, moves all extremities    LAB RESULTS:                        8.8    41.77 )-----------( 40       ( 13 Jan 2023 05:00 )             26.5     01-13    137  |  102  |  18  ----------------------------<  108<H>  3.1<L>   |  23  |  0.93    Ca    8.7      13 Jan 2023 05:00    TPro  7.0  /  Alb  3.6  /  TBili  0.6  /  DBili  0.1  /  AST  50<H>  /  ALT  45  /  AlkPhos  87  01-12    LIVER FUNCTIONS - ( 12 Jan 2023 08:35 )  Alb: 3.6 g/dL / Pro: 7.0 g/dL / ALK PHOS: 87 U/L / ALT: 45 U/L / AST: 50 U/L / GGT: x             MICROBIOLOGY:  RECENT CULTURES:  01-11 @ 10:36 .Stool Feces inna jv     No enteric pathogens to date: Final culture pending      01-10 @ 10:42 Clean Catch Clean Catch (Midstream)     >=3 organisms. Probable collection contamination.      01-10 @ 08:48 .Blood Blood     No growth to date.          RADIOLOGY REVIEWED:  < from: US Abdomen Limited (01.11.23 @ 12:56) >  INTERPRETATION:  CLINICAL INFORMATION: Liver fluid collection.    COMPARISON: CT dated 01/09/2023.    TECHNIQUE: Sonography of the right upper quadrant.    FINDINGS:    Liver: Within normal limits.  Bile ducts: Normal caliber. Common bile duct measures 5 mm.  Gallbladder: Evidence of sludge. Mild nonspecific wall thickening   measuring 4 mm.  Pancreas: Visualized portions are within normal limits.  Right kidney: 10.3 cm. No hydronephrosis. Evidence of 3 dominant cyst   measuring 7.3 cm, 10.9 cm, 9.8 cm.  Ascites: None.  IVC: Visualized portions are within normal limits.    IMPRESSION:    Right renal cysts.    < end of copied text >

## 2023-01-13 NOTE — DIETITIAN INITIAL EVALUATION ADULT - PERTINENT LABORATORY DATA
01-13    137  |  102  |  18  ----------------------------<  108<H>  3.1<L>   |  23  |  0.93    Ca    8.7      13 Jan 2023 05:00    TPro  7.0  /  Alb  3.6  /  TBili  0.6  /  DBili  0.1  /  AST  50<H>  /  ALT  45  /  AlkPhos  87  01-12  POCT Blood Glucose.: 96 mg/dL (01-13-23 @ 12:47)  A1C with Estimated Average Glucose Result: 6.1 % (01-10-23 @ 07:00)

## 2023-01-13 NOTE — DISCHARGE NOTE PROVIDER - HOSPITAL COURSE
Hospital Course  HPI:  82 year old female with PMH DM2, HTN, HLD, Mechanical heart valve on coumadin, AAA presented to ED on 1/9/23 for black tarry stools and burning epigastric pain. In ED she was found to have INR 7.28 with guiac positive stool. Significant leukocytosis and thrombocytopenia. Pt received KCentra and Vitamin K in ED. She received 1 unit PRBCs in ED. CT Angio A/P without evidence of acute GIB.    Patient was admitted to medicine. GI was consulted, patient underwent EGD/colonoscopy. EGD negative. Colonoscopy showed grade I internal hemorrhoid. Polyps in transverse colon and hepatic flexure. GI recommended outpatient capsule studies. Hemoglobin remained stable and GI signed off case. Patient with brown BMs at this time.    Following KCentra/Vitamin K, INR became subtherapeutic. Heparin infusion to bridge was not given due to thrombocytopenia. Cardiology and hematology consulted. Patient was given 1 unit platelets on 1/11, and coumadin was resumed on 1/12 as platelets at that time were greater than 50. On 1/13 patient was given another unit of platelets as platelets back down to 40.     Hematology was consulted for leukocytosis with lymphocytosis, acute anemia, and thrombocytopenia. Initially it was thought that hematologic profile suggestive of a reactive state, however growing concern for possible blast crisis. Hematology advised urgent transfer to [facility].     Patient accepted for transfer to [facility] by Dr. Sonia Duffy. Awaiting bed assignment. Stable for transfer.       Discharging Provider:  Contact Info: 			 Please call with any questions or concerns.    Outpatient Provider: Dr Reginald Barreto   Hospital Course  HPI:  82 year old female with PMH DM2, HTN, HLD, Mechanical heart valve on coumadin, AAA presented to ED on 1/9/23 for black tarry stools and burning epigastric pain. In ED she was found to have INR 7.28 with guiac positive stool. Significant leukocytosis and thrombocytopenia. Pt received KCentra and Vitamin K in ED. She received 1 unit PRBCs in ED. CT Angio A/P without evidence of acute GIB.    Patient was admitted to medicine. GI was consulted, patient underwent EGD/colonoscopy. EGD negative. Colonoscopy showed grade I internal hemorrhoid. Polyps in transverse colon and hepatic flexure. GI recommended outpatient capsule studies. Hemoglobin remained stable and GI signed off case. Patient with brown BMs at this time.    Following KCentra/Vitamin K, INR became subtherapeutic. Heparin infusion to bridge was not given due to thrombocytopenia. Cardiology and hematology consulted. Patient was given 1 unit platelets on 1/11, and coumadin was resumed on 1/12 as platelets at that time were greater than 50. On 1/13 patient was given another unit of platelets as platelets back down to 40.     Hematology was consulted for leukocytosis with lymphocytosis, acute anemia, and thrombocytopenia. Initially it was thought that hematologic profile suggestive of a reactive state, however growing concern for possible blast crisis. Hematology advised urgent transfer to [facility].     Patient accepted for transfer to [facility] by Dr. Sonia Duffy. Awaiting bed assignment. Stable for transfer.       Discharging Provider: Muna GARRIDO   Contact Info: [phone]			 Please call with any questions or concerns.    Outpatient Provider: Dr Reginald Barreto- notified

## 2023-01-13 NOTE — DIETITIAN INITIAL EVALUATION ADULT - PHYSICAL ASSESSMENT SHOULDERS
none Patient Specific Counseling (Will Not Stick From Patient To Patient): Completed 5-FU Detail Level: Detailed

## 2023-01-14 ENCOUNTER — TRANSCRIPTION ENCOUNTER (OUTPATIENT)
Age: 83
End: 2023-01-14

## 2023-01-14 ENCOUNTER — INPATIENT (INPATIENT)
Facility: HOSPITAL | Age: 83
LOS: 26 days | Discharge: HOME | DRG: 834 | End: 2023-02-10
Attending: INTERNAL MEDICINE | Admitting: INTERNAL MEDICINE
Payer: MEDICARE

## 2023-01-14 VITALS
WEIGHT: 134.26 LBS | HEIGHT: 62.6 IN | SYSTOLIC BLOOD PRESSURE: 124 MMHG | TEMPERATURE: 98 F | DIASTOLIC BLOOD PRESSURE: 83 MMHG | HEART RATE: 81 BPM | RESPIRATION RATE: 18 BRPM | OXYGEN SATURATION: 98 %

## 2023-01-14 VITALS
TEMPERATURE: 98 F | OXYGEN SATURATION: 98 % | SYSTOLIC BLOOD PRESSURE: 120 MMHG | DIASTOLIC BLOOD PRESSURE: 76 MMHG | RESPIRATION RATE: 16 BRPM | HEART RATE: 88 BPM

## 2023-01-14 DIAGNOSIS — Z98.891 HISTORY OF UTERINE SCAR FROM PREVIOUS SURGERY: Chronic | ICD-10-CM

## 2023-01-14 DIAGNOSIS — Z95.2 PRESENCE OF PROSTHETIC HEART VALVE: Chronic | ICD-10-CM

## 2023-01-14 DIAGNOSIS — C95.91 LEUKEMIA, UNSPECIFIED, IN REMISSION: ICD-10-CM

## 2023-01-14 LAB
BLD GP AB SCN SERPL QL: SIGNIFICANT CHANGE UP
GLUCOSE BLDC GLUCOMTR-MCNC: 113 MG/DL — HIGH (ref 70–99)
GLUCOSE BLDC GLUCOMTR-MCNC: 123 MG/DL — HIGH (ref 70–99)
GLUCOSE BLDC GLUCOMTR-MCNC: 84 MG/DL — SIGNIFICANT CHANGE UP (ref 70–99)
GLUCOSE BLDC GLUCOMTR-MCNC: 97 MG/DL — SIGNIFICANT CHANGE UP (ref 70–99)
HBV CORE AB SER-ACNC: SIGNIFICANT CHANGE UP
HBV SURFACE AB SER-ACNC: SIGNIFICANT CHANGE UP
HBV SURFACE AG SER-ACNC: SIGNIFICANT CHANGE UP
LDH SERPL L TO P-CCNC: 776 U/L — HIGH (ref 50–242)
URATE SERPL-MCNC: 6.6 MG/DL — SIGNIFICANT CHANGE UP (ref 2.5–7)

## 2023-01-14 PROCEDURE — 99239 HOSP IP/OBS DSCHRG MGMT >30: CPT

## 2023-01-14 PROCEDURE — 85610 PROTHROMBIN TIME: CPT

## 2023-01-14 PROCEDURE — 85384 FIBRINOGEN ACTIVITY: CPT

## 2023-01-14 PROCEDURE — 80074 ACUTE HEPATITIS PANEL: CPT

## 2023-01-14 PROCEDURE — 96374 THER/PROPH/DIAG INJ IV PUSH: CPT

## 2023-01-14 PROCEDURE — 85027 COMPLETE CBC AUTOMATED: CPT

## 2023-01-14 PROCEDURE — P9016: CPT

## 2023-01-14 PROCEDURE — P9037: CPT

## 2023-01-14 PROCEDURE — 83010 ASSAY OF HAPTOGLOBIN QUANT: CPT

## 2023-01-14 PROCEDURE — 88185 FLOWCYTOMETRY/TC ADD-ON: CPT

## 2023-01-14 PROCEDURE — 85730 THROMBOPLASTIN TIME PARTIAL: CPT

## 2023-01-14 PROCEDURE — 86704 HEP B CORE ANTIBODY TOTAL: CPT

## 2023-01-14 PROCEDURE — 82955 ASSAY OF G6PD ENZYME: CPT

## 2023-01-14 PROCEDURE — 80048 BASIC METABOLIC PNL TOTAL CA: CPT

## 2023-01-14 PROCEDURE — 0225U NFCT DS DNA&RNA 21 SARSCOV2: CPT

## 2023-01-14 PROCEDURE — 87350 HEPATITIS BE AG IA: CPT

## 2023-01-14 PROCEDURE — 36430 TRANSFUSION BLD/BLD COMPNT: CPT

## 2023-01-14 PROCEDURE — 85045 AUTOMATED RETICULOCYTE COUNT: CPT

## 2023-01-14 PROCEDURE — 87205 SMEAR GRAM STAIN: CPT

## 2023-01-14 PROCEDURE — 87507 IADNA-DNA/RNA PROBE TQ 12-25: CPT

## 2023-01-14 PROCEDURE — 86923 COMPATIBILITY TEST ELECTRIC: CPT

## 2023-01-14 PROCEDURE — 86803 HEPATITIS C AB TEST: CPT

## 2023-01-14 PROCEDURE — 87040 BLOOD CULTURE FOR BACTERIA: CPT

## 2023-01-14 PROCEDURE — 86900 BLOOD TYPING SEROLOGIC ABO: CPT

## 2023-01-14 PROCEDURE — 84550 ASSAY OF BLOOD/URIC ACID: CPT

## 2023-01-14 PROCEDURE — 80076 HEPATIC FUNCTION PANEL: CPT

## 2023-01-14 PROCEDURE — 82272 OCCULT BLD FECES 1-3 TESTS: CPT

## 2023-01-14 PROCEDURE — 82962 GLUCOSE BLOOD TEST: CPT

## 2023-01-14 PROCEDURE — 74174 CTA ABD&PLVS W/CONTRAST: CPT | Mod: MA

## 2023-01-14 PROCEDURE — 86901 BLOOD TYPING SEROLOGIC RH(D): CPT

## 2023-01-14 PROCEDURE — 36415 COLL VENOUS BLD VENIPUNCTURE: CPT

## 2023-01-14 PROCEDURE — 87045 FECES CULTURE AEROBIC BACT: CPT

## 2023-01-14 PROCEDURE — 93010 ELECTROCARDIOGRAM REPORT: CPT

## 2023-01-14 PROCEDURE — 93005 ELECTROCARDIOGRAM TRACING: CPT

## 2023-01-14 PROCEDURE — 87340 HEPATITIS B SURFACE AG IA: CPT

## 2023-01-14 PROCEDURE — 85025 COMPLETE CBC W/AUTO DIFF WBC: CPT

## 2023-01-14 PROCEDURE — 93306 TTE W/DOPPLER COMPLETE: CPT

## 2023-01-14 PROCEDURE — 87086 URINE CULTURE/COLONY COUNT: CPT

## 2023-01-14 PROCEDURE — 88305 TISSUE EXAM BY PATHOLOGIST: CPT

## 2023-01-14 PROCEDURE — 87635 SARS-COV-2 COVID-19 AMP PRB: CPT

## 2023-01-14 PROCEDURE — P9100: CPT

## 2023-01-14 PROCEDURE — 96375 TX/PRO/DX INJ NEW DRUG ADDON: CPT

## 2023-01-14 PROCEDURE — 83615 LACTATE (LD) (LDH) ENZYME: CPT

## 2023-01-14 PROCEDURE — 86707 HEPATITIS BE ANTIBODY: CPT

## 2023-01-14 PROCEDURE — 99291 CRITICAL CARE FIRST HOUR: CPT | Mod: 25

## 2023-01-14 PROCEDURE — 88312 SPECIAL STAINS GROUP 1: CPT

## 2023-01-14 PROCEDURE — 83036 HEMOGLOBIN GLYCOSYLATED A1C: CPT

## 2023-01-14 PROCEDURE — 83605 ASSAY OF LACTIC ACID: CPT

## 2023-01-14 PROCEDURE — 86706 HEP B SURFACE ANTIBODY: CPT

## 2023-01-14 PROCEDURE — 71045 X-RAY EXAM CHEST 1 VIEW: CPT

## 2023-01-14 PROCEDURE — G0452: CPT | Mod: 26

## 2023-01-14 PROCEDURE — 76705 ECHO EXAM OF ABDOMEN: CPT

## 2023-01-14 PROCEDURE — 83690 ASSAY OF LIPASE: CPT

## 2023-01-14 PROCEDURE — 81001 URINALYSIS AUTO W/SCOPE: CPT

## 2023-01-14 PROCEDURE — 88189 FLOWCYTOMETRY/READ 16 & >: CPT

## 2023-01-14 PROCEDURE — 84484 ASSAY OF TROPONIN QUANT: CPT

## 2023-01-14 PROCEDURE — 87046 STOOL CULTR AEROBIC BACT EA: CPT

## 2023-01-14 PROCEDURE — 80053 COMPREHEN METABOLIC PANEL: CPT

## 2023-01-14 PROCEDURE — 86850 RBC ANTIBODY SCREEN: CPT

## 2023-01-14 RX ORDER — DILTIAZEM HCL 120 MG
240 CAPSULE, EXT RELEASE 24 HR ORAL DAILY
Refills: 0 | Status: DISCONTINUED | OUTPATIENT
Start: 2023-01-14 | End: 2023-02-10

## 2023-01-14 RX ORDER — VALSARTAN 80 MG/1
320 TABLET ORAL DAILY
Refills: 0 | Status: DISCONTINUED | OUTPATIENT
Start: 2023-01-14 | End: 2023-02-10

## 2023-01-14 RX ORDER — LANOLIN ALCOHOL/MO/W.PET/CERES
5 CREAM (GRAM) TOPICAL AT BEDTIME
Refills: 0 | Status: DISCONTINUED | OUTPATIENT
Start: 2023-01-14 | End: 2023-02-10

## 2023-01-14 RX ORDER — ATORVASTATIN CALCIUM 80 MG/1
80 TABLET, FILM COATED ORAL AT BEDTIME
Refills: 0 | Status: DISCONTINUED | OUTPATIENT
Start: 2023-01-14 | End: 2023-02-04

## 2023-01-14 RX ORDER — SODIUM CHLORIDE 9 MG/ML
1000 INJECTION, SOLUTION INTRAVENOUS
Refills: 0 | Status: DISCONTINUED | OUTPATIENT
Start: 2023-01-14 | End: 2023-02-10

## 2023-01-14 RX ORDER — GLUCAGON INJECTION, SOLUTION 0.5 MG/.1ML
1 INJECTION, SOLUTION SUBCUTANEOUS ONCE
Refills: 0 | Status: DISCONTINUED | OUTPATIENT
Start: 2023-01-14 | End: 2023-02-10

## 2023-01-14 RX ORDER — DEXTROSE 50 % IN WATER 50 %
15 SYRINGE (ML) INTRAVENOUS ONCE
Refills: 0 | Status: DISCONTINUED | OUTPATIENT
Start: 2023-01-14 | End: 2023-02-10

## 2023-01-14 RX ORDER — PANTOPRAZOLE SODIUM 20 MG/1
40 TABLET, DELAYED RELEASE ORAL
Refills: 0 | Status: DISCONTINUED | OUTPATIENT
Start: 2023-01-14 | End: 2023-02-03

## 2023-01-14 RX ORDER — POTASSIUM CHLORIDE 20 MEQ
20 PACKET (EA) ORAL ONCE
Refills: 0 | Status: COMPLETED | OUTPATIENT
Start: 2023-01-14 | End: 2023-01-14

## 2023-01-14 RX ORDER — ACETAMINOPHEN 500 MG
650 TABLET ORAL EVERY 6 HOURS
Refills: 0 | Status: DISCONTINUED | OUTPATIENT
Start: 2023-01-14 | End: 2023-02-10

## 2023-01-14 RX ORDER — DEXTROSE 50 % IN WATER 50 %
25 SYRINGE (ML) INTRAVENOUS ONCE
Refills: 0 | Status: DISCONTINUED | OUTPATIENT
Start: 2023-01-14 | End: 2023-02-10

## 2023-01-14 RX ORDER — PREGABALIN 225 MG/1
1000 CAPSULE ORAL DAILY
Refills: 0 | Status: DISCONTINUED | OUTPATIENT
Start: 2023-01-14 | End: 2023-02-10

## 2023-01-14 RX ORDER — ASCORBIC ACID 60 MG
500 TABLET,CHEWABLE ORAL DAILY
Refills: 0 | Status: DISCONTINUED | OUTPATIENT
Start: 2023-01-14 | End: 2023-02-10

## 2023-01-14 RX ORDER — DEXTROSE 50 % IN WATER 50 %
12.5 SYRINGE (ML) INTRAVENOUS ONCE
Refills: 0 | Status: DISCONTINUED | OUTPATIENT
Start: 2023-01-14 | End: 2023-02-10

## 2023-01-14 RX ORDER — OMEGA-3 ACID ETHYL ESTERS 1 G
4 CAPSULE ORAL DAILY
Refills: 0 | Status: DISCONTINUED | OUTPATIENT
Start: 2023-01-14 | End: 2023-01-14

## 2023-01-14 RX ORDER — INSULIN LISPRO 100/ML
VIAL (ML) SUBCUTANEOUS
Refills: 0 | Status: DISCONTINUED | OUTPATIENT
Start: 2023-01-14 | End: 2023-02-10

## 2023-01-14 RX ADMIN — Medication 20 MILLIEQUIVALENT(S): at 09:34

## 2023-01-14 RX ADMIN — PREGABALIN 1000 MICROGRAM(S): 225 CAPSULE ORAL at 13:06

## 2023-01-14 RX ADMIN — ATORVASTATIN CALCIUM 80 MILLIGRAM(S): 80 TABLET, FILM COATED ORAL at 21:16

## 2023-01-14 RX ADMIN — Medication 5 MILLIGRAM(S): at 21:17

## 2023-01-14 RX ADMIN — PANTOPRAZOLE SODIUM 40 MILLIGRAM(S): 20 TABLET, DELAYED RELEASE ORAL at 05:56

## 2023-01-14 RX ADMIN — Medication 500 MILLIGRAM(S): at 13:06

## 2023-01-14 RX ADMIN — Medication 650 MILLIGRAM(S): at 05:55

## 2023-01-14 RX ADMIN — VALSARTAN 320 MILLIGRAM(S): 80 TABLET ORAL at 05:55

## 2023-01-14 RX ADMIN — Medication 240 MILLIGRAM(S): at 05:55

## 2023-01-14 NOTE — H&P ADULT - HISTORY OF PRESENT ILLNESS
82 year old female with atrial fibrillation, s/p [employer] mechanical AVR, (1994–[facility]) for rheumatic aortic valve stenosis, ascending aortic aneurysm, HTN,  HLD, depression, GERD transferred from [facility] for further work-up and management of leukocytosis associated with thrombocytopenia and anemia. Patient initially presented to [facility] on 1/9/23 with complaints of black tarry stool and epigastric pain, and was subsequently found to be with anemia, thrombocytopenia and leukocytosis (lymphocytic predominant). She was also noted to have a supratherapeutic INR. She had CT angio A/P which showed no evidence of active GIB, but showing focal diminished cortical enhancement involving the upper left kidney concerning for focal pyelonephritis or possibly infarct. EGD without any acute findings. Colonoscopy showed showed grade I internal hemorrhoid and polyps in transverse colon and hepatic flexure. 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

## 2023-01-14 NOTE — H&P ADULT - ATTENDING COMMENTS
83 YO F with a PMhx of A.fib, s/p [employer] mechanical AVR, (1994–[facility]) for rheumatic aortic valve stenosis, ascending aortic aneurysm, HTN,  HLD, depression, [facility]ERD [facility] and management of leukocytosis associated with thrombocytopenia and anemia. She was also noted to have a supratherapeutic INR. CT angio A/P which showed no evidence of active GIB, but showing focal diminished cortical enhancement involving the upper left kidney concerning for focal pyelonephritis or possibly infarct. EGD without any acute findings. Colonoscopy showed showed grade I internal hemorrhoid and polyps in transverse colon and hepatic flexure. 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 She is now admitted to [facility] on 1/14/23 for further work up.    Leukocytosis  -infectious work up done at [facility] unremarkable  -CBC shows 65.3 with mostly lymphocytes  -will send flow cytometry  -will need BMBx on Tuesday  -will obtain baseline echo  -will get PICC line .  -Monitor LDH, Uric Acid and CMP daily   -keep Hb >7, plt >10

## 2023-01-14 NOTE — PROGRESS NOTE ADULT - ASSESSMENT
82 year old female with PMH DM2, HTN, HLD, Mechanical heart valve on coumadin, AAA presents with black tarry stools found with INR 7.28.    #Suspected GIB  #Symptomatic Anemia  - CT Angio A/P without evidence of acute GIB  - Hgb 9.8 in ED, guaiac positive stools, s/p one unit of pRBC transfusion on 1/9  - s/p EGD, negative  - s/p colonoscopy, showed grade I internal hemorrhoid. Polyps in transverse colon and hepatic flexure  - GI recommends outpatient capsule studies   - Monitor H&H closely - today 8.2/25.3    - Keep active Type and Screen, Transfuse Hgb<7 (or symptomatic/active bleeding)  - Pt s/p platelet transfusion on 1/11, platelet count >50 on 1/12 so coumadin was resumed. current plts is 60   - Coumadin restarted on 1/12  - GI has signed off  - Hematology following    #Supratherapeutic INR- resolved   #Mechanical heart valve on coumadin  - INR found to be 7.28 on admission   - Pt received KCentra and Vitamin K in ED  - Goal INR 2.5-3.5  - Appreciate hematology recs, advised resume AC if platelet stable above 50, plt now 40, will f/u today  - Coumadin resumed on 1/12 - 5 mg   - Home coumadin dose 2.5/3 depending on INR  - Daily INR and coumadin dosing- INR 1.22    #Thrombocytopenia  - s/p 1 unit of platelets on 1/11  - Coumadin resumed on 1/12 as coumadin remained above 50  - Monitor platelets- 60 today   - Hematology consult appreciated     #Leukocytosis with lymphocytosis   #Acute anemia   - Leukocytosis - Transfer to [place] for further work up   - LDH elevated and haptoglobin 29 (low) on 1/10, 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  - Appreciate ID, EPEC is of little clinical significance and no clear pathogenic role.  - Blood culture negative, urine cx likely contaminant  - Discontinue antibiotics   - Hematology consult appreciated     #Incidental finding of possible focal L kidney infarct  - CT abd reviewed. Focal diminished cortical enhancement involving left upper kidney concerning for focal pyelonephritis or possibly infarcts  - UA negative twice and urine cx showed contaminant. Suspect CT findings more likely to be focal infarct..   - Renal function stable.    #Hypokalemia  - K = 3.4  - Supplement     #DM2  - Hba1c 6.1%  - Pt takes metformin at home  - Continue accuchecks/ISS  POCT Blood Glucose.: 123 mg/dL (13 Jan 2023 21:23)  POCT Blood Glucose.: 123 mg/dL (13 Jan 2023 16:47)  POCT Blood Glucose.: 96 mg/dL (13 Jan 2023 12:47)  POCT Blood Glucose.: 103 mg/dL (13 Jan 2023 08:44)      #HTN  - Chronic condition  - Home meds: Tiadylt  daily and valsartan-HCTZ 320-25 daily  - Continue cardizem 240mg, HCTZ, and Valsartan with parameters while admitted     #HLD  - Chronic condition  - Continue rosuvastatin    #DVT ppx  - Resumed coumadin     Dispo: PT eval pending. Not medically cleared - INR not therapeutic     1/14: Updated daughter Wendi Sierra [phone]  Alternative number: son-in-law Will Theo Harris [phone]     82 year old female with PMH DM2, HTN, HLD, Mechanical heart valve on coumadin, AAA presents with black tarry stools found with INR 7.28.    #Suspected GIB  #Symptomatic Anemia  - CT Angio A/P without evidence of acute GIB  - Hgb 9.8 in ED, guaiac positive stools, s/p one unit of pRBC transfusion on 1/9  - s/p EGD, negative  - s/p colonoscopy, showed grade I internal hemorrhoid. Polyps in transverse colon and hepatic flexure  - GI recommends outpatient capsule studies   - Monitor H&H closely - today 8.2/25.3    - Keep active Type and Screen, Transfuse Hgb<7 (or symptomatic/active bleeding)  - Pt s/p platelet transfusion on 1/11, platelet count >50 on 1/12 so coumadin was resumed. current plts is 60 today after plt transfusion   - Coumadin restarted on 1/12  - GI has signed off  - Hematology following- transferring to [place] for further work up for lymphoma     #Supratherapeutic INR- resolved   #Mechanical heart valve on coumadin  - INR found to be 7.28 on admission   - Pt received KCentra and Vitamin K in ED  - Goal INR 2.5-3.5  - Appreciate hematology recs, advised resume AC if platelet stable above 50, plt now 40, will f/u today  - Coumadin resumed on 1/12 - 5 mg   - Home coumadin dose 2.5/3 depending on INR  - Daily INR and coumadin dosing- 5mg tonight  INR 1.22    #Thrombocytopenia  - s/p 1 unit of platelets on 1/11  - Coumadin resumed on 1/12 as coumadin remained above 50  - Monitor platelets- plts 60 today   - Hematology consult appreciated     #Leukocytosis with lymphocytosis   #Acute anemia   - Leukocytosis - Transfer to [place] for further work up   - LDH elevated and haptoglobin 29 (low) on 1/10, 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  - Appreciate ID, EPEC is of little clinical significance and no clear pathogenic role.  - Blood culture negative, urine cx likely contaminant  - Discontinue antibiotics   - Hematology consult appreciated     #Incidental finding of possible focal L kidney infarct  - CT abd reviewed. Focal diminished cortical enhancement involving left upper kidney concerning for focal pyelonephritis or possibly infarcts  - UA negative twice and urine cx showed contaminant. Suspect CT findings more likely to be focal infarct..   - Renal function stable.    #Hypokalemia  - K = 3.1  - supplement    #DM2  - Hba1c 6.1%  - Pt takes metformin at home  - Continue accuchecks/ISS  POCT Blood Glucose.: 123 mg/dL (13 Jan 2023 21:23)  POCT Blood Glucose.: 123 mg/dL (13 Jan 2023 16:47)  POCT Blood Glucose.: 96 mg/dL (13 Jan 2023 12:47)  POCT Blood Glucose.: 103 mg/dL (13 Jan 2023 08:44)      #HTN  - Chronic condition  - Home meds: Tiadylt  daily and valsartan-HCTZ 320-25 daily  - Continue cardizem 240mg, HCTZ, and Valsartan with parameters while admitted     #HLD  - Chronic condition  - Continue rosuvastatin    #DVT ppx  - Resumed coumadin     Dispo: PT eval pending. Not medically cleared - INR not therapeutic     1/15: Updated daughter Wendi Sierra [phone]  Alternative number: son-in-law Will Sierra [phone]

## 2023-01-14 NOTE — PROGRESS NOTE ADULT - REASON FOR ADMISSION
Symptomatic Anemia

## 2023-01-14 NOTE — DISCHARGE NOTE NURSING/CASE MANAGEMENT/SOCIAL WORK - PATIENT PORTAL LINK FT
You can access the FollowMyHealth Patient Portal offered by [facility] by registering at the following website: [email or web address]. By joining [employer]’s FollowMyHealth portal, you will also be able to view your health information using other applications (apps) compatible with our system.

## 2023-01-14 NOTE — PATIENT PROFILE ADULT - FALL HARM RISK - HARM RISK INTERVENTIONS

## 2023-01-14 NOTE — PROGRESS NOTE ADULT - SUBJECTIVE AND OBJECTIVE BOX
Patient is a 82y old  Female who presents with a chief complaint of Symptomatic Anemia (13 Jan 2023 18:41)      Patient seen and examined at bedside.    ALLERGIES:  No Known Allergies    MEDICATIONS  (STANDING):  atorvastatin 80 milliGRAM(s) Oral at bedtime  dextrose 5%. 1000 milliLiter(s) (100 mL/Hr) IV Continuous <Continuous>  dextrose 5%. 1000 milliLiter(s) (50 mL/Hr) IV Continuous <Continuous>  dextrose 50% Injectable 25 Gram(s) IV Push once  dextrose 50% Injectable 12.5 Gram(s) IV Push once  dextrose 50% Injectable 25 Gram(s) IV Push once  diltiazem    milliGRAM(s) Oral daily  ferrous    sulfate 325 milliGRAM(s) Oral daily  glucagon  Injectable 1 milliGRAM(s) IntraMuscular once  hydrochlorothiazide 25 milliGRAM(s) Oral daily  insulin lispro (ADMELOG) corrective regimen sliding scale   SubCutaneous three times a day before meals  insulin lispro (ADMELOG) corrective regimen sliding scale   SubCutaneous at bedtime  ondansetron Injectable 4 milliGRAM(s) IV Push once  pantoprazole    Tablet 40 milliGRAM(s) Oral before breakfast  valsartan 320 milliGRAM(s) Oral daily    MEDICATIONS  (PRN):  acetaminophen     Tablet .. 650 milliGRAM(s) Oral every 6 hours PRN Temp greater or equal to 38C (100.4F), Mild Pain (1 - 3)  aluminum hydroxide/magnesium hydroxide/simethicone Suspension 30 milliLiter(s) Oral every 4 hours PRN Dyspepsia  dextrose Oral Gel 15 Gram(s) Oral once PRN Blood Glucose LESS THAN 70 milliGRAM(s)/deciliter  melatonin 3 milliGRAM(s) Oral at bedtime PRN Insomnia    Vital Signs Last 24 Hrs  T(F): 98.1 (14 Jan 2023 05:10), Max: 98.8 (13 Jan 2023 20:03)  HR: 81 (14 Jan 2023 05:10) (75 - 86)  BP: 150/73 (14 Jan 2023 05:10) (123/69 - 158/76)  RR: 16 (14 Jan 2023 05:10) (16 - 17)  SpO2: 95% (14 Jan 2023 05:10) (95% - 100%)  I&O's Summary    13 Jan 2023 07:01  -  14 Jan 2023 07:00  --------------------------------------------------------  IN: 200 mL / OUT: 0 mL / NET: 200 mL      PHYSICAL EXAM:  General: NAD, A/O x 3  ENT: MMM  Neck: Supple, No JVD  Lungs: Clear to auscultation bilaterally, Non labored breathing   Cardio: RRR, S1/S2, No murmurs  Abdomen: Soft, Nontender, Nondistended; Bowel sounds present  Extremities: No calf tenderness, No pitting edema    LABS:                        8.2    65.32 )-----------( 60       ( 13 Jan 2023 21:40 )             25.3     01-13    137  |  102  |  18  ----------------------------<  108  3.1   |  23  |  0.93    Ca    8.7      13 Jan 2023 05:00    TPro  7.0  /  Alb  3.6  /  TBili  0.6  /  DBili  0.1  /  AST  50  /  ALT  45  /  AlkPhos  87  01-12      PT/INR - ( 13 Jan 2023 21:40 )   PT: 14.2 sec;   INR: 1.22 ratio         PTT - ( 13 Jan 2023 21:40 )  PTT:20.8 sec    CARDIAC MARKERS ( 12 Jan 2023 10:14 )  x     / 64.3 ng/L / x     / x     / x                        POCT Blood Glucose.: 123 mg/dL (13 Jan 2023 21:23)  POCT Blood Glucose.: 123 mg/dL (13 Jan 2023 16:47)  POCT Blood Glucose.: 96 mg/dL (13 Jan 2023 12:47)  POCT Blood Glucose.: 103 mg/dL (13 Jan 2023 08:44)          Culture - Stool (collected 11 Jan 2023 10:36)  Source: .Stool Feces inna carias  Final Report (13 Jan 2023 18:38):    No enteric gram negative rods isolated    No enteric pathogens isolated.    (Stool culture examined for Salmonella,    Shigella, Campylobacter, Aeromonas, Plesiomonas,    Vibrio, E.coli O157 and Yersinia)    Culture - Urine (collected 10 Bill 2023 10:42)  Source: Clean Catch Clean Catch (Midstream)  Final Report (11 Jan 2023 15:55):    >=3 organisms. Probable collection contamination.    Culture - Blood (collected 10 Bill 2023 08:48)  Source: .Blood Blood  Preliminary Report (11 Jan 2023 13:02):    No growth to date.    Culture - Blood (collected 10 Bill 2023 08:48)  Source: .Blood Blood  Preliminary Report (11 Jan 2023 13:02):    No growth to date.      COVID-19 PCR: NotDetec (01-13-23 @ 21:40)    RADIOLOGY & ADDITIONAL TESTS:    Care Discussed with Consultants/Other Providers:    Patient is a 82y old  Female who presents with a chief complaint of Symptomatic Anemia (13 Jan 2023 18:41)      Patient seen and examined at bedside.  Pt without complaints over night. Being transferred to [place] this am.  Requested that her family be able to see her prior to transfer.  Nurse aware and family on there way     ALLERGIES:  No Known Allergies    MEDICATIONS  (STANDING):  atorvastatin 80 milliGRAM(s) Oral at bedtime  dextrose 5%. 1000 milliLiter(s) (100 mL/Hr) IV Continuous <Continuous>  dextrose 5%. 1000 milliLiter(s) (50 mL/Hr) IV Continuous <Continuous>  dextrose 50% Injectable 25 Gram(s) IV Push once  dextrose 50% Injectable 12.5 Gram(s) IV Push once  dextrose 50% Injectable 25 Gram(s) IV Push once  diltiazem    milliGRAM(s) Oral daily  ferrous    sulfate 325 milliGRAM(s) Oral daily  glucagon  Injectable 1 milliGRAM(s) IntraMuscular once  hydrochlorothiazide 25 milliGRAM(s) Oral daily  insulin lispro (ADMELOG) corrective regimen sliding scale   SubCutaneous three times a day before meals  insulin lispro (ADMELOG) corrective regimen sliding scale   SubCutaneous at bedtime  ondansetron Injectable 4 milliGRAM(s) IV Push once  pantoprazole    Tablet 40 milliGRAM(s) Oral before breakfast  valsartan 320 milliGRAM(s) Oral daily    MEDICATIONS  (PRN):  acetaminophen     Tablet .. 650 milliGRAM(s) Oral every 6 hours PRN Temp greater or equal to 38C (100.4F), Mild Pain (1 - 3)  aluminum hydroxide/magnesium hydroxide/simethicone Suspension 30 milliLiter(s) Oral every 4 hours PRN Dyspepsia  dextrose Oral Gel 15 Gram(s) Oral once PRN Blood Glucose LESS THAN 70 milliGRAM(s)/deciliter  melatonin 3 milliGRAM(s) Oral at bedtime PRN Insomnia    Vital Signs Last 24 Hrs  T(F): 98.1 (14 Jan 2023 05:10), Max: 98.8 (13 Jan 2023 20:03)  HR: 81 (14 Jan 2023 05:10) (75 - 86)  BP: 150/73 (14 Jan 2023 05:10) (123/69 - 158/76)  RR: 16 (14 Jan 2023 05:10) (16 - 17)  SpO2: 95% (14 Jan 2023 05:10) (95% - 100%)  I&O's Summary    13 Jan 2023 07:01  -  14 Jan 2023 07:00  --------------------------------------------------------  IN: 200 mL / OUT: 0 mL / NET: 200 mL      PHYSICAL EXAM:  General: 81 y/o female in  NAD, A/O x 3  ENT: MMM  Neck: Supple, No JVD  Lungs: Clear to auscultation bilaterally, Non labored breathing   Cardio: RRR, S1/S2, No murmurs  Abdomen: Soft, Nontender, Nondistended; Bowel sounds present  Extremities: No calf tenderness, No pitting edema    LABS:                        8.2    65.32 )-----------( 60       ( 13 Jan 2023 21:40 )             25.3     01-13    137  |  102  |  18  ----------------------------<  108  3.1   |  23  |  0.93    Ca    8.7      13 Jan 2023 05:00    TPro  7.0  /  Alb  3.6  /  TBili  0.6  /  DBili  0.1  /  AST  50  /  ALT  45  /  AlkPhos  87  01-12      PT/INR - ( 13 Jan 2023 21:40 )   PT: 14.2 sec;   INR: 1.22 ratio         PTT - ( 13 Jan 2023 21:40 )  PTT:20.8 sec    CARDIAC MARKERS ( 12 Jan 2023 10:14 )  x     / 64.3 ng/L / x     / x     / x                        POCT Blood Glucose.: 123 mg/dL (13 Jan 2023 21:23)  POCT Blood Glucose.: 123 mg/dL (13 Jan 2023 16:47)  POCT Blood Glucose.: 96 mg/dL (13 Jan 2023 12:47)  POCT Blood Glucose.: 103 mg/dL (13 Jan 2023 08:44)          Culture - Stool (collected 11 Jan 2023 10:36)  Source: .Stool Feces inna carias  Final Report (13 Jan 2023 18:38):    No enteric gram negative rods isolated    No enteric pathogens isolated.    (Stool culture examined for Salmonella,    Shigella, Campylobacter, Aeromonas, Plesiomonas,    Vibrio, E.coli O157 and Yersinia)    Culture - Urine (collected 10 Bill 2023 10:42)  Source: Clean Catch Clean Catch (Midstream)  Final Report (11 Jan 2023 15:55):    >=3 organisms. Probable collection contamination.    Culture - Blood (collected 10 Bill 2023 08:48)  Source: .Blood Blood  Preliminary Report (11 Jan 2023 13:02):    No growth to date.    Culture - Blood (collected 10 Bill 2023 08:48)  Source: .Blood Blood  Preliminary Report (11 Jan 2023 13:02):    No growth to date.      COVID-19 PCR: NotDetec (01-13-23 @ 21:40)    RADIOLOGY & ADDITIONAL TESTS:    Care Discussed with Consultants/Other Providers:

## 2023-01-14 NOTE — PROGRESS NOTE ADULT - PROVIDER SPECIALTY LIST ADULT
Hospitalist
Cardiology
Cardiology
Hospitalist
Infectious Disease
Cardiology
Heme/Onc
Hospitalist
Infectious Disease
Heme/Onc

## 2023-01-14 NOTE — DISCHARGE NOTE NURSING/CASE MANAGEMENT/SOCIAL WORK - NSDCPEFALRISK_GEN_ALL_CORE
For information on Fall & Injury Prevention, visit: [email or web address] OR  [email or web address] OR  https://www.cdc.gov/steadi/patient.html

## 2023-01-14 NOTE — PROGRESS NOTE ADULT - NS ATTEND AMEND GEN_ALL_CORE FT
Tr to [facility] for heme/onc w/u
PLT 40 today. Hematology follow up pending   Cardio, Infectious Diseases input noted    Give 1u PLT today. Coumadin 5mg tonight. Follow INR

## 2023-01-14 NOTE — H&P ADULT - ASSESSMENT
82 year old female with atrial fibrillation, s/p [employer] [facility] mechanical AVR, (1994–[facility]) for rheumatic aortic valve stenosis, ascending aortic aneurysm, HTN,  HLD, depression, GERD transferred from [facility] for further work-up and management of leukocytosis associated with thrombocytopenia and anemia. She was also noted to have a supratherapeutic INR. CT angio A/P which showed no evidence of active GIB, but showing focal diminished cortical enhancement involving the upper left kidney concerning for focal pyelonephritis or possibly infarct. EGD without any acute findings. Colonoscopy showed showed grade I internal hemorrhoid and polyps in transverse colon and hepatic flexure. 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 Now admitted to [facility] for further assessment for possible underlying hematologic malignancy given worsening of her leukocytosis, thrombocytopenia and anemia.    Leukocytosis  -Patient with rising leukocytosis; initially 39 on presentation to  hospital, now increased to 65.3. Infectious work-up done at outpatient hospital showed negative UA, UCX, BCX, CTA without obvious evidence of infection. GI PCR did reveal EPEC however per ID at , not clinically significant and patient afebrile without other evidence of overt infection. Hepatitis serologies negative   -Leukocytosis is of lymphocytic predominance, which raised concerning for underlying hematologic disorder given patient also anemia and thrombocytopenic  -Peripheral smear reviewed  -Flow cytometry sheet filled out and given to nurse with tubes  -May need BMBx based on findings from above pending work-up    Anemia  -Suspect multifactorial in setting of recent reported GIB, supratherapeutic INR, iron deficiency and now concern for heme malignancy   - CT angio A/P which showed no evidence of active GIB, but showing focal diminished cortical enhancement involving the upper left kidney concerning for focal pyelonephritis or possibly infarct. EGD without any acute findings. Colonoscopy showed showed grade I internal hemorrhoid and polyps in transverse colon and hepatic flexure.   -Hapto initially 29 but on repeat normalized to the 70s; LDH elevated, Bili not elevated; overall picture not suggestive of hemolysis  -Monitor CBC with differential daily and transfuse to maintain Hb >7      Thrombocytopenia   -Pt with thrombocytopenia of unclear etiology, not septic or with strong evidence for underlying infection  -Work-up as above    Mechanical Mitral Valve/ Afib  -on warfarin at home, supratherapeutic on admission  -now resumed; c/w with 5mg nightly and adjust dose based on INR  -If platelets less than 50K; hold warfarin    Incidental finding of possible focal L kidney infarct  - CT abd reviewed. Focal diminished cortical enhancement involving left upper kidney concerning for focal pyelonephritis or possibly infarcts  - UA negative twice and urine cx showed contaminant. Suspect CT findings more likely to be focal infarct..   - Renal function stable.    HTN  -- Home meds: Tiadylt  daily and valsartan-HCTZ 320-25 daily  - Continue cardizem 240mg, HCTZ, and Valsartan with parameters while admitted       DM2  - Hba1c 6.1%  - Pt takes metformin at home  - Continue accuchecks/ISS      #HLD  - On rosuvastatin at home; Atorvastatin while admitted   82 year old female with atrial fibrillation, s/p [employer] [facility] mechanical AVR, (1994–[facility]) for rheumatic aortic valve stenosis, ascending aortic aneurysm, HTN,  HLD, depression, GERD transferred from [facility] for further work-up and management of leukocytosis associated with thrombocytopenia and anemia. She was also noted to have a supratherapeutic INR. CT angio A/P which showed no evidence of active GIB, but showing focal diminished cortical enhancement involving the upper left kidney concerning for focal pyelonephritis or possibly infarct. EGD without any acute findings. Colonoscopy showed showed grade I internal hemorrhoid and polyps in transverse colon and hepatic flexure. 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 Now admitted to [facility] for further assessment for possible underlying hematologic malignancy given worsening of her leukocytosis, thrombocytopenia and anemia.    Leukocytosis  -Patient with rising leukocytosis; initially 39 on presentation to  hospital, now increased to 65.3. Infectious work-up done at outpatient hospital showed negative UA, UCX, BCX, CTA without obvious evidence of infection. GI PCR did reveal EPEC however per ID at , not clinically significant and patient afebrile without other evidence of overt infection. Hepatitis serologies negative   -Leukocytosis is of lymphocytic predominance, which raised concerning for underlying hematologic disorder given patient also anemia and thrombocytopenic  -Peripheral smear reviewed  -Flow cytometry sheet filled out and given to nurse with tubes  -May need BMBx based on findings from above pending work-up  -Monitor LDH, Uric Acid and CMP daily     Anemia  -Suspect multifactorial in setting of recent reported GIB, supratherapeutic INR, iron deficiency and now concern for heme malignancy   - CT angio A/P which showed no evidence of active GIB, but showing focal diminished cortical enhancement involving the upper left kidney concerning for focal pyelonephritis or possibly infarct. EGD without any acute findings. Colonoscopy showed showed grade I internal hemorrhoid and polyps in transverse colon and hepatic flexure.   -Hapto initially 29 but on repeat normalized to the 70s; LDH elevated, Bili not elevated; overall picture not suggestive of hemolysis  -Monitor CBC with differential daily and transfuse to maintain Hb >7      Thrombocytopenia   -Pt with thrombocytopenia of unclear etiology, not septic or with strong evidence for underlying infection  -Monitor CBC with differential daily and transfuse to maintain platelet count >10, >20 if febrile, and >50 prior to procedures.  -Work-up as above    Mechanical Mitral Valve/ Afib  -on warfarin at home, supratherapeutic on admission  -now resumed; c/w with 5mg nightly and adjust dose based on INR  -If platelets less than 50K; hold warfarin  -Check PT/INR daily     Incidental finding of possible focal L kidney infarct  - CT abd reviewed. Focal diminished cortical enhancement involving left upper kidney concerning for focal pyelonephritis or possibly infarcts  - UA negative twice and urine cx showed contaminant. Suspect CT findings more likely to be focal infarct..   - Renal function stable.    HTN  -- Home meds: Tiadylt  daily and valsartan-HCTZ 320-25 daily  - Continue cardizem 240mg, HCTZ, and Valsartan with parameters while admitted       DM2  - Hba1c 6.1%  - Pt takes metformin at home  - Continue accuchecks/ISS      #HLD  - On rosuvastatin at home; Atorvastatin while admitted

## 2023-01-15 DIAGNOSIS — Z29.9 ENCOUNTER FOR PROPHYLACTIC MEASURES, UNSPECIFIED: ICD-10-CM

## 2023-01-15 DIAGNOSIS — E11.9 TYPE 2 DIABETES MELLITUS WITHOUT COMPLICATIONS: ICD-10-CM

## 2023-01-15 DIAGNOSIS — B99.9 UNSPECIFIED INFECTIOUS DISEASE: ICD-10-CM

## 2023-01-15 DIAGNOSIS — C91.00 ACUTE LYMPHOBLASTIC LEUKEMIA NOT HAVING ACHIEVED REMISSION: ICD-10-CM

## 2023-01-15 DIAGNOSIS — I48.20 CHRONIC ATRIAL FIBRILLATION, UNSPECIFIED: ICD-10-CM

## 2023-01-15 DIAGNOSIS — I10 ESSENTIAL (PRIMARY) HYPERTENSION: ICD-10-CM

## 2023-01-15 DIAGNOSIS — I62.00 NONTRAUMATIC SUBDURAL HEMORRHAGE, UNSPECIFIED: ICD-10-CM

## 2023-01-15 LAB
ALBUMIN SERPL ELPH-MCNC: 4.2 G/DL — SIGNIFICANT CHANGE UP (ref 3.3–5)
ALP SERPL-CCNC: 98 U/L — SIGNIFICANT CHANGE UP (ref 40–120)
ALT FLD-CCNC: 28 U/L — SIGNIFICANT CHANGE UP (ref 10–45)
ANION GAP SERPL CALC-SCNC: 12 MMOL/L — SIGNIFICANT CHANGE UP (ref 5–17)
AST SERPL-CCNC: 47 U/L — HIGH (ref 10–40)
BASOPHILS # BLD AUTO: 0 K/UL — SIGNIFICANT CHANGE UP (ref 0–0.2)
BASOPHILS NFR BLD AUTO: 0 % — SIGNIFICANT CHANGE UP (ref 0–2)
BILIRUB SERPL-MCNC: 0.5 MG/DL — SIGNIFICANT CHANGE UP (ref 0.2–1.2)
BLD GP AB SCN SERPL QL: NEGATIVE — SIGNIFICANT CHANGE UP
BUN SERPL-MCNC: 18 MG/DL — SIGNIFICANT CHANGE UP (ref 7–23)
CALCIUM SERPL-MCNC: 10.3 MG/DL — SIGNIFICANT CHANGE UP (ref 8.4–10.5)
CHLORIDE SERPL-SCNC: 100 MMOL/L — SIGNIFICANT CHANGE UP (ref 96–108)
CO2 SERPL-SCNC: 28 MMOL/L — SIGNIFICANT CHANGE UP (ref 22–31)
CREAT SERPL-MCNC: 0.92 MG/DL — SIGNIFICANT CHANGE UP (ref 0.5–1.3)
CULTURE RESULTS: SIGNIFICANT CHANGE UP
CULTURE RESULTS: SIGNIFICANT CHANGE UP
EGFR: 63 ML/MIN/1.73M2 — SIGNIFICANT CHANGE UP
EOSINOPHIL # BLD AUTO: 0 K/UL — SIGNIFICANT CHANGE UP (ref 0–0.5)
EOSINOPHIL NFR BLD AUTO: 0 % — SIGNIFICANT CHANGE UP (ref 0–6)
GLUCOSE BLDC GLUCOMTR-MCNC: 101 MG/DL — HIGH (ref 70–99)
GLUCOSE BLDC GLUCOMTR-MCNC: 107 MG/DL — HIGH (ref 70–99)
GLUCOSE BLDC GLUCOMTR-MCNC: 117 MG/DL — HIGH (ref 70–99)
GLUCOSE BLDC GLUCOMTR-MCNC: 119 MG/DL — HIGH (ref 70–99)
GLUCOSE SERPL-MCNC: 112 MG/DL — HIGH (ref 70–99)
HAPTOGLOB SERPL-MCNC: 55 MG/DL — SIGNIFICANT CHANGE UP (ref 34–200)
HCT VFR BLD CALC: 31.1 % — LOW (ref 34.5–45)
HGB BLD-MCNC: 10.1 G/DL — LOW (ref 11.5–15.5)
INR BLD: 1.53 RATIO — HIGH (ref 0.88–1.16)
LDH SERPL L TO P-CCNC: 921 U/L — HIGH (ref 50–242)
LYMPHOCYTES # BLD AUTO: 16.28 K/UL — HIGH (ref 1–3.3)
LYMPHOCYTES # BLD AUTO: 21 % — SIGNIFICANT CHANGE UP (ref 13–44)
MAGNESIUM SERPL-MCNC: 1.6 MG/DL — SIGNIFICANT CHANGE UP (ref 1.6–2.6)
MCHC RBC-ENTMCNC: 28.8 PG — SIGNIFICANT CHANGE UP (ref 27–34)
MCHC RBC-ENTMCNC: 32.5 GM/DL — SIGNIFICANT CHANGE UP (ref 32–36)
MCV RBC AUTO: 88.6 FL — SIGNIFICANT CHANGE UP (ref 80–100)
MONOCYTES # BLD AUTO: 0 K/UL — SIGNIFICANT CHANGE UP (ref 0–0.9)
MONOCYTES NFR BLD AUTO: 0 % — LOW (ref 2–14)
NEUTROPHILS # BLD AUTO: 3.1 K/UL — SIGNIFICANT CHANGE UP (ref 1.8–7.4)
NEUTROPHILS NFR BLD AUTO: 4 % — LOW (ref 43–77)
PHOSPHATE SERPL-MCNC: 4.2 MG/DL — SIGNIFICANT CHANGE UP (ref 2.5–4.5)
PLATELET # BLD AUTO: 64 K/UL — LOW (ref 150–400)
POTASSIUM SERPL-MCNC: 3.9 MMOL/L — SIGNIFICANT CHANGE UP (ref 3.5–5.3)
POTASSIUM SERPL-SCNC: 3.9 MMOL/L — SIGNIFICANT CHANGE UP (ref 3.5–5.3)
PROT SERPL-MCNC: 7 G/DL — SIGNIFICANT CHANGE UP (ref 6–8.3)
PROTHROM AB SERPL-ACNC: 17.8 SEC — HIGH (ref 10.5–13.4)
RBC # BLD: 3.51 M/UL — LOW (ref 3.8–5.2)
RBC # FLD: 15.5 % — HIGH (ref 10.3–14.5)
RH IG SCN BLD-IMP: POSITIVE — SIGNIFICANT CHANGE UP
SODIUM SERPL-SCNC: 140 MMOL/L — SIGNIFICANT CHANGE UP (ref 135–145)
SPECIMEN SOURCE: SIGNIFICANT CHANGE UP
SPECIMEN SOURCE: SIGNIFICANT CHANGE UP
URATE SERPL-MCNC: 6.9 MG/DL — SIGNIFICANT CHANGE UP (ref 2.5–7)
WBC # BLD: 77.52 K/UL — CRITICAL HIGH (ref 3.8–10.5)
WBC # FLD AUTO: 77.52 K/UL — CRITICAL HIGH (ref 3.8–10.5)

## 2023-01-15 PROCEDURE — 70450 CT HEAD/BRAIN W/O DYE: CPT | Mod: 26

## 2023-01-15 RX ORDER — PHYTONADIONE (VIT K1) 5 MG
5 TABLET ORAL DAILY
Refills: 0 | Status: DISCONTINUED | OUTPATIENT
Start: 2023-01-15 | End: 2023-01-15

## 2023-01-15 RX ORDER — ALLOPURINOL 300 MG
300 TABLET ORAL DAILY
Refills: 0 | Status: DISCONTINUED | OUTPATIENT
Start: 2023-01-15 | End: 2023-01-27

## 2023-01-15 RX ORDER — ACETAMINOPHEN 500 MG
650 TABLET ORAL ONCE
Refills: 0 | Status: COMPLETED | OUTPATIENT
Start: 2023-01-15 | End: 2023-01-15

## 2023-01-15 RX ORDER — PHYTONADIONE (VIT K1) 5 MG
5 TABLET ORAL DAILY
Refills: 0 | Status: DISCONTINUED | OUTPATIENT
Start: 2023-01-15 | End: 2023-01-16

## 2023-01-15 RX ADMIN — Medication 500 MILLIGRAM(S): at 11:33

## 2023-01-15 RX ADMIN — Medication 300 MILLIGRAM(S): at 11:35

## 2023-01-15 RX ADMIN — Medication 240 MILLIGRAM(S): at 05:43

## 2023-01-15 RX ADMIN — Medication 650 MILLIGRAM(S): at 12:32

## 2023-01-15 RX ADMIN — PANTOPRAZOLE SODIUM 40 MILLIGRAM(S): 20 TABLET, DELAYED RELEASE ORAL at 08:38

## 2023-01-15 RX ADMIN — Medication 5 MILLIGRAM(S): at 22:28

## 2023-01-15 RX ADMIN — VALSARTAN 320 MILLIGRAM(S): 80 TABLET ORAL at 05:43

## 2023-01-15 RX ADMIN — PREGABALIN 1000 MICROGRAM(S): 225 CAPSULE ORAL at 11:33

## 2023-01-15 RX ADMIN — ATORVASTATIN CALCIUM 80 MILLIGRAM(S): 80 TABLET, FILM COATED ORAL at 22:28

## 2023-01-15 RX ADMIN — Medication 650 MILLIGRAM(S): at 11:32

## 2023-01-15 RX ADMIN — Medication 5 MILLIGRAM(S): at 19:24

## 2023-01-15 NOTE — PROGRESS NOTE ADULT - PROBLEM SELECTOR PLAN 4
and mechanical AVR on coumadin now on hold in setting of acute subdural hemorrhage  play on telemonitor  rate controlled at present

## 2023-01-15 NOTE — CONSULT NOTE ADULT - SUBJECTIVE AND OBJECTIVE BOX
p (1480)    82F mult med hx (inc afib on coumadin [last took 2d ago]) adm med for leukocytosis a/w thrombocytopenia/anemia. CTH showing thin L convexity SDH. Exam: AOx3, FC, PERRL, EOMI, no facial, 5/5 throughout, no drift        --Anticoagulation:    =====================  PAST MEDICAL HISTORY   Hypertension    Hypercholesteremia      PAST SURGICAL HISTORY   H/O heart valve replacement with mechanical valve    History of 2  sections          MEDICATIONS:  Antibiotics:    Neuro:  acetaminophen     Tablet .. 650 milliGRAM(s) Oral every 6 hours PRN  melatonin 5 milliGRAM(s) Oral at bedtime    Other:  allopurinol 300 milliGRAM(s) Oral daily  aluminum hydroxide/magnesium hydroxide/simethicone Suspension 30 milliLiter(s) Oral every 4 hours PRN  ascorbic acid 500 milliGRAM(s) Oral daily  atorvastatin 80 milliGRAM(s) Oral at bedtime  cyanocobalamin 1000 MICROGram(s) Oral daily  dextrose 5%. 1000 milliLiter(s) IV Continuous <Continuous>  dextrose 5%. 1000 milliLiter(s) IV Continuous <Continuous>  dextrose 50% Injectable 25 Gram(s) IV Push once  dextrose 50% Injectable 12.5 Gram(s) IV Push once  dextrose Oral Gel 15 Gram(s) Oral once PRN  diltiazem    milliGRAM(s) Oral daily  glucagon  Injectable 1 milliGRAM(s) IntraMuscular once  hydrochlorothiazide 25 milliGRAM(s) Oral daily  insulin lispro (ADMELOG) corrective regimen sliding scale   SubCutaneous three times a day before meals  pantoprazole    Tablet 40 milliGRAM(s) Oral before breakfast  valsartan 320 milliGRAM(s) Oral daily      SOCIAL HISTORY:   Occupation:   Marital Status:     FAMILY HISTORY:      ROS: Negative except per HPI    LABS:  PT/INR - ( 15 Bill 2023 09:38 )   PT: 17.8 sec;   INR: 1.53 ratio         PTT - ( 2023 21:40 )  PTT:20.8 sec                        10.1   77.52 )-----------( 64       ( 15 Bill 2023 09:38 )             31.1     01-15    140  |  100  |  18  ----------------------------<  112<H>  3.9   |  28  |  0.92    Ca    10.3      15 Bill 2023 09:38  Phos  4.2     01-15  Mg     1.6     01-15    TPro  7.0  /  Alb  4.2  /  TBili  0.5  /  DBili  x   /  AST  47<H>  /  ALT  28  /  AlkPhos  98  01-15

## 2023-01-15 NOTE — PROGRESS NOTE ADULT - PROBLEM SELECTOR PLAN 5
-- Home meds: Tiadylt  daily and valsartan-HCTZ 320-25 daily  - Continue cardizem 240mg, HCTZ, and Valsartan with parameters while admitted

## 2023-01-15 NOTE — PROGRESS NOTE ADULT - SUBJECTIVE AND OBJECTIVE BOX
Diagnosis    Protocol/Chemo Regimen:  Day:      Pt endorsed:    Review of Systems:      Pain scale:                                        Location:    Diet:     Allergies    No Known Allergies    Intolerances        ANTIMICROBIALS      HEME/ONC MEDICATIONS      STANDING MEDICATIONS  ascorbic acid 500 milliGRAM(s) Oral daily  atorvastatin 80 milliGRAM(s) Oral at bedtime  cyanocobalamin 1000 MICROGram(s) Oral daily  dextrose 5%. 1000 milliLiter(s) IV Continuous <Continuous>  dextrose 5%. 1000 milliLiter(s) IV Continuous <Continuous>  dextrose 50% Injectable 25 Gram(s) IV Push once  dextrose 50% Injectable 12.5 Gram(s) IV Push once  diltiazem    milliGRAM(s) Oral daily  glucagon  Injectable 1 milliGRAM(s) IntraMuscular once  hydrochlorothiazide 25 milliGRAM(s) Oral daily  insulin lispro (ADMELOG) corrective regimen sliding scale   SubCutaneous three times a day before meals  melatonin 5 milliGRAM(s) Oral at bedtime  pantoprazole    Tablet 40 milliGRAM(s) Oral before breakfast  valsartan 320 milliGRAM(s) Oral daily      PRN MEDICATIONS  acetaminophen     Tablet .. 650 milliGRAM(s) Oral every 6 hours PRN  aluminum hydroxide/magnesium hydroxide/simethicone Suspension 30 milliLiter(s) Oral every 4 hours PRN  dextrose Oral Gel 15 Gram(s) Oral once PRN        Vital Signs Last 24 Hrs  T(C): 36.7 (15 Bill 2023 05:20), Max: 36.8 (14 Jan 2023 14:25)  T(F): 98 (15 Bill 2023 05:20), Max: 98.2 (14 Jan 2023 14:25)  HR: 84 (15 Bill 2023 05:20) (72 - 88)  BP: 124/74 (15 Bill 2023 05:20) (113/92 - 149/80)  BP(mean): --  RR: 16 (15 Bill 2023 05:20) (16 - 18)  SpO2: 94% (15 Bill 2023 05:20) (94% - 98%)    Parameters below as of 15 Bill 2023 05:20  Patient On (Oxygen Delivery Method): room air        PHYSICAL EXAM  General: adult in NAD  HEENT: clear oropharynx, anicteric sclera, pink conjunctiva  Neck: supple  CV: normal S1/S2 RRR  Lungs: positive air movement b/l ant lungs,clear to auscultation, no wheezes, no rales  Abdomen: soft non-tender non-distended, no hepatosplenomegaly  Ext: no clubbing cyanosis or edema  Skin: no rashes and no petechiae  Neuro: alert and oriented X 4, no focal deficits  Central Line: normal    LABS:    Blood Cultures:                           8.2    65.32 )-----------( 60       ( 13 Jan 2023 21:40 )             25.3         Mean Cell Volume : 90.0 fl  Mean Cell Hemoglobin : 29.2 pg  Mean Cell Hemoglobin Concentration : 32.4 gm/dL  Auto Neutrophil # : 2.55 K/uL  Auto Lymphocyte # : 58.69 K/uL  Auto Monocyte # : 3.41 K/uL  Auto Eosinophil # : 0.16 K/uL  Auto Basophil # : 0.17 K/uL  Auto Neutrophil % : 4.0 %  Auto Lymphocyte % : 89.8 %  Auto Monocyte % : 5.2 %  Auto Eosinophil % : 0.2 %  Auto Basophil % : 0.3 %                  PT/INR - ( 13 Jan 2023 21:40 )   PT: 14.2 sec;   INR: 1.22 ratio         PTT - ( 13 Jan 2023 21:40 )  PTT:20.8 sec        RADIOLOGY & ADDITIONAL STUDIES:         Diagnosis: newly diagnosed B-ALL    Protocol/Chemo Regimen: to be determined  Day: n/a     Pt endorsed: headache    Review of Systems: denies n/v/d; chest pain      Pain scale:                                        Location:    Diet:     Allergies    No Known Allergies      STANDING MEDICATIONS  ascorbic acid 500 milliGRAM(s) Oral daily  atorvastatin 80 milliGRAM(s) Oral at bedtime  cyanocobalamin 1000 MICROGram(s) Oral daily  dextrose 5%. 1000 milliLiter(s) IV Continuous <Continuous>  dextrose 5%. 1000 milliLiter(s) IV Continuous <Continuous>  dextrose 50% Injectable 25 Gram(s) IV Push once  dextrose 50% Injectable 12.5 Gram(s) IV Push once  diltiazem    milliGRAM(s) Oral daily  glucagon  Injectable 1 milliGRAM(s) IntraMuscular once  hydrochlorothiazide 25 milliGRAM(s) Oral daily  insulin lispro (ADMELOG) corrective regimen sliding scale   SubCutaneous three times a day before meals  melatonin 5 milliGRAM(s) Oral at bedtime  pantoprazole    Tablet 40 milliGRAM(s) Oral before breakfast  valsartan 320 milliGRAM(s) Oral daily      PRN MEDICATIONS  acetaminophen     Tablet .. 650 milliGRAM(s) Oral every 6 hours PRN  aluminum hydroxide/magnesium hydroxide/simethicone Suspension 30 milliLiter(s) Oral every 4 hours PRN  dextrose Oral Gel 15 Gram(s) Oral once PRN        Vital Signs Last 24 Hrs  T(C): 36.7 (15 Bill 2023 05:20), Max: 36.8 (14 Jan 2023 14:25)  T(F): 98 (15 Bill 2023 05:20), Max: 98.2 (14 Jan 2023 14:25)  HR: 84 (15 Bill 2023 05:20) (72 - 88)  BP: 124/74 (15 Bill 2023 05:20) (113/92 - 149/80)  BP(mean): --  RR: 16 (15 Bill 2023 05:20) (16 - 18)  SpO2: 94% (15 Bill 2023 05:20) (94% - 98%)    Parameters below as of 15 Bill 2023 05:20  Patient On (Oxygen Delivery Method): room air        PHYSICAL EXAM  General: adult in NAD  HEENT: clear oropharynx, anicteric sclera, pink conjunctiva  Neck: supple  CV: normal S1/S2 RRR  Lungs: positive air movement b/l ant lungs,clear to auscultation, no wheezes, no rales  Abdomen: soft non-tender non-distended, no hepatosplenomegaly  Ext: no clubbing cyanosis or edema  Skin: no rashes and no petechiae  Neuro: alert and oriented X 4, no focal deficits  Central Line: normal    LABS:    Blood Cultures:                           8.2    65.32 )-----------( 60       ( 13 Jan 2023 21:40 )             25.3         Mean Cell Volume : 90.0 fl  Mean Cell Hemoglobin : 29.2 pg  Mean Cell Hemoglobin Concentration : 32.4 gm/dL  Auto Neutrophil # : 2.55 K/uL  Auto Lymphocyte # : 58.69 K/uL  Auto Monocyte # : 3.41 K/uL  Auto Eosinophil # : 0.16 K/uL  Auto Basophil # : 0.17 K/uL  Auto Neutrophil % : 4.0 %  Auto Lymphocyte % : 89.8 %  Auto Monocyte % : 5.2 %  Auto Eosinophil % : 0.2 %  Auto Basophil % : 0.3 %                  PT/INR - ( 13 Jan 2023 21:40 )   PT: 14.2 sec;   INR: 1.22 ratio         PTT - ( 13 Jan 2023 21:40 )  PTT:20.8 sec        RADIOLOGY & ADDITIONAL STUDIES:         Diagnosis: newly diagnosed B-ALL    Protocol/Chemo Regimen: to be determined  Day: n/a     Pt endorsed: headache    Review of Systems: denies n/v/d; chest pain    Pain scale: 8/10               Location: head    Diet: regular    Allergies: No Known Allergies    STANDING MEDICATIONS  ascorbic acid 500 milliGRAM(s) Oral daily  atorvastatin 80 milliGRAM(s) Oral at bedtime  cyanocobalamin 1000 MICROGram(s) Oral daily  dextrose 5%. 1000 milliLiter(s) IV Continuous <Continuous>  dextrose 5%. 1000 milliLiter(s) IV Continuous <Continuous>  dextrose 50% Injectable 25 Gram(s) IV Push once  dextrose 50% Injectable 12.5 Gram(s) IV Push once  diltiazem    milliGRAM(s) Oral daily  glucagon  Injectable 1 milliGRAM(s) IntraMuscular once  hydrochlorothiazide 25 milliGRAM(s) Oral daily  insulin lispro (ADMELOG) corrective regimen sliding scale   SubCutaneous three times a day before meals  melatonin 5 milliGRAM(s) Oral at bedtime  pantoprazole    Tablet 40 milliGRAM(s) Oral before breakfast  valsartan 320 milliGRAM(s) Oral daily      PRN MEDICATIONS  acetaminophen     Tablet .. 650 milliGRAM(s) Oral every 6 hours PRN  aluminum hydroxide/magnesium hydroxide/simethicone Suspension 30 milliLiter(s) Oral every 4 hours PRN  dextrose Oral Gel 15 Gram(s) Oral once PRN      Vital Signs Last 24 Hrs  T(C): 36.7 (15 Bill 2023 05:20), Max: 36.8 (14 Jan 2023 14:25)  T(F): 98 (15 Bill 2023 05:20), Max: 98.2 (14 Jan 2023 14:25)  HR: 84 (15 Bill 2023 05:20) (72 - 88)  BP: 124/74 (15 Bill 2023 05:20) (113/92 - 149/80)  RR: 16 (15 Bill 2023 05:20) (16 - 18)  SpO2: 94% (15 Bill 2023 05:20) (94% - 98%)    Parameters below as of 15 Bill 2023 05:20  Patient On (Oxygen Delivery Method): room air    PHYSICAL EXAM  General: adult in NAD  HEENT: clear oropharynx, anicteric sclera, pink conjunctiva  Neck: supple  CV: normal S1/S2 RRR  Lungs: positive air movement b/l ant lungs,clear to auscultation, no wheezes, no rales  Abdomen: soft non-tender non-distended  Ext: no clubbing cyanosis or edema  Skin: no rashes and no petechiae  Neuro: alert and oriented X 4, no focal deficits  Central Line: normal    LABS:                      10.1   77.52 )-----------( 64       ( 15 Bill 2023 09:38 )             31.1     15 Bill 2023 09:38    140    |  100    |  18     ----------------------------<  112    3.9     |  28     |  0.92     Ca    10.3       15 Bill 2023 09:38  Phos  4.2       15 Bill 2023 09:38  Mg     1.6       15 Bill 2023 09:38    TPro  7.0    /  Alb  4.2    /  TBili  0.5    /  DBili  x      /  AST  47     /  ALT  28     /  AlkPhos  98     15 Bill 2023 09:38    PT/INR - ( 15 Bill 2023 09:38 )   PT: 17.8 sec;   INR: 1.53 ratio    PTT - ( 13 Jan 2023 21:40 )  PTT:20.8 sec    CAPILLARY BLOOD GLUCOSE  POCT Blood Glucose.: 107 mg/dL (15 Bill 2023 12:17)  POCT Blood Glucose.: 101 mg/dL (15 Bill 2023 08:11)  POCT Blood Glucose.: 123 mg/dL (14 Jan 2023 21:55)    LIVER FUNCTIONS - ( 15 Bill 2023 09:38 )  Alb: 4.2 g/dL / Pro: 7.0 g/dL / ALK PHOS: 98 U/L / ALT: 28 U/L / AST: 47 U/L / GGT: x           RADIOLOGY & ADDITIONAL STUDIES:  from: CT Head No Cont (01.15.23 @ 17:05)   INTERPRETATION:  Acute left parietal extraaxial hemorrhage, measuring up   to 8mm at greatest depth.  No midline shift.  There is left holohemispheric increased density in the subdural space.    Differential includes chronic subdural hematoma and hygroma.

## 2023-01-15 NOTE — CONSULT NOTE ADULT - ASSESSMENT
82 year old female with atrial fibrillation, s/p Saint Darian mechanical AVR, (1992–[facility]) for rheumatic aortic valve stenosis, ascending aortic aneurysm, HTN,  HLD, depression, GERD transferred from [facility] for further work-up and management of leukocytosis associated with thrombocytopenia and anemia in the setting of ALL course complicated by L SDH.     1. ALL   2. L SDH  3. Mechanical AVR  4. Atrial Fibrillation       RECOMMENDATIONS:      Note not final until signed by attending       Tristan Rashid MD  Cardiology Fellow PGY-5  Phone: [phone]    For all New Consults  [email or web address]   Login: [employer] 82 year old female with atrial fibrillation, s/p Saint Darian mechanical AVR, (1994–[facility]) for rheumatic aortic valve stenosis, ascending aortic aneurysm, HTN,  HLD, depression, GERD transferred from [facility] for further work-up and management of leukocytosis associated with thrombocytopenia and anemia in the setting of ALL course complicated by L SDH.     1. ALL   2. L SDH  3. Mechanical AVR  4. Atrial Fibrillation       RECOMMENDATIONS:  - AC on hold as per Neurosurgery given L SDH patient has a mechanical AVR goal INR 2.5-3.5; AC on hold given low platelets and risk for catastrophic bleed repeat CTH at 9PM no interval change will need to clarify with Neurosurgery when safe to resume AC  - TTE shows aortic valve not well visualized but no gross abnormalities   - On Diltiazem for rate control goal HR < 110  - Continue Valsartan and HCTZ for hypertension       Note not final until signed by attending       Tristan Rashid MD  Cardiology Fellow PGY-5  Phone: [phone]    For all New Consults  [email or web address]   Login: [email or web address]

## 2023-01-15 NOTE — PROGRESS NOTE ADULT - ASSESSMENT
82 year old female with atrial fibrillation, s/p Saint Darian mechanical AVR, (1992–[facility]) for rheumatic aortic valve stenosis, ascending aortic aneurysm, HTN,  HLD, depression, GERD transferred from [facility] for further work-up and management of leukocytosis associated with thrombocytopenia and anemia. She was also noted to have a supratherapeutic INR. CT angio A/P which showed no evidence of active GIB, but showing focal diminished cortical enhancement involving the upper left kidney concerning for focal pyelonephritis or possibly infarct. EGD without any acute findings. Colonoscopy showed showed grade I internal hemorrhoid and polyps in transverse colon and hepatic flexure. 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 Now admitted to [facility] for further assessment for possible underlying hematologic malignancy given worsening of her leukocytosis, thrombocytopenia and anemia.    Leukocytosis  -Patient with rising leukocytosis; initially 39 on presentation to  hospital, now increased to 65.3. Infectious work-up done at outpatient hospital showed negative UA, UCX, BCX, CTA without obvious evidence of infection. GI PCR did reveal EPEC however per ID at , not clinically significant and patient afebrile without other evidence of overt infection. Hepatitis serologies negative   -Leukocytosis is of lymphocytic predominance, which raised concerning for underlying hematologic disorder given patient also anemia and thrombocytopenic  -Peripheral smear reviewed  -Flow cytometry sheet filled out and given to nurse with tubes  -May need BMBx based on findings from above pending work-up  -Monitor LDH, Uric Acid and CMP daily     Anemia  -Suspect multifactorial in setting of recent reported GIB, supratherapeutic INR, iron deficiency and now concern for heme malignancy   - CT angio A/P which showed no evidence of active GIB, but showing focal diminished cortical enhancement involving the upper left kidney concerning for focal pyelonephritis or possibly infarct. EGD without any acute findings. Colonoscopy showed showed grade I internal hemorrhoid and polyps in transverse colon and hepatic flexure.   -Hapto initially 29 but on repeat normalized to the 70s; LDH elevated, Bili not elevated; overall picture not suggestive of hemolysis  -Monitor CBC with differential daily and transfuse to maintain Hb >7      Thrombocytopenia   -Pt with thrombocytopenia of unclear etiology, not septic or with strong evidence for underlying infection  -Monitor CBC with differential daily and transfuse to maintain platelet count >10, >20 if febrile, and >50 prior to procedures.  -Work-up as above    Mechanical Mitral Valve/ Afib  -on warfarin at home, supratherapeutic on admission  -now resumed; c/w with 5mg nightly and adjust dose based on INR  -If platelets less than 50K; hold warfarin  -Check PT/INR daily     Incidental finding of possible focal L kidney infarct  - CT abd reviewed. Focal diminished cortical enhancement involving left upper kidney concerning for focal pyelonephritis or possibly infarcts  - UA negative twice and urine cx showed contaminant. Suspect CT findings more likely to be focal infarct..   - Renal function stable.    HTN  -- Home meds: Tiadylt  daily and valsartan-HCTZ 320-25 daily  - Continue cardizem 240mg, HCTZ, and Valsartan with parameters while admitted       DM2  - Hba1c 6.1%  - Pt takes metformin at home  - Continue accuchecks/ISS      #HLD  - On rosuvastatin at home; Atorvastatin while admitted   82 year old female with atrial fibrillation, s/p Saint Darian mechanical AVR, (1992–[facility]) for rheumatic aortic valve stenosis, ascending aortic aneurysm, HTN,  HLD, depression, GERD transferred from [facility] for further work-up and management of leukocytosis associated with thrombocytopenia and anemia. She was also noted to have a supratherapeutic INR. CT angio A/P which showed no evidence of active GIB, but showing focal diminished cortical enhancement involving the upper left kidney concerning for focal pyelonephritis or possibly infarct. EGD without any acute findings. Colonoscopy showed showed grade I internal hemorrhoid and polyps in transverse colon and hepatic flexure. 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 Now admitted to [facility] for further assessment for possible underlying hematologic malignancy given worsening of her leukocytosis, thrombocytopenia and anemia.    Leukocytosis  -Patient with rising leukocytosis; initially 39 on presentation to  hospital, now increased to 65.3. Infectious work-up done at outpatient hospital showed negative UA, UCX, BCX, CTA without obvious evidence of infection. GI PCR did reveal EPEC however per ID at , not clinically significant and patient afebrile without other evidence of overt infection. Hepatitis serologies negative   -Leukocytosis is of lymphocytic predominance, which raised concerning for underlying hematologic disorder given patient also anemia and thrombocytopenic  -Peripheral smear reviewed  -Flow cytometry sheet filled out and given to nurse with tubes  -May need BMBx based on findings from above pending work-up  -Monitor LDH, Uric Acid and CMP daily     Anemia  -Suspect multifactorial in setting of recent reported GIB, supratherapeutic INR, iron deficiency and now concern for heme malignancy   - CT angio A/P which showed no evidence of active GIB, but showing focal diminished cortical enhancement involving the upper left kidney concerning for focal pyelonephritis or possibly infarct. EGD without any acute findings. Colonoscopy showed showed grade I internal hemorrhoid and polyps in transverse colon and hepatic flexure.   -Hapto initially 29 but on repeat normalized to the 70s; LDH elevated, Bili not elevated; overall picture not suggestive of hemolysis  -Monitor CBC with differential daily and transfuse to maintain Hb >7      Thrombocytopenia   -Pt with thrombocytopenia of unclear etiology, not septic or with strong evidence for underlying infection  -Monitor CBC with differential daily and transfuse to maintain platelet count >10, >20 if febrile, and >50 prior to procedures.  -Work-up as above    Mechanical Mitral Valve/ Afib  -on warfarin at home, supratherapeutic on admission  -now resumed; c/w with 5mg nightly and adjust dose based on INR  -If platelets less than 50K; hold warfarin  -Check PT/INR daily     Incidental finding of possible focal L kidney infarct  - CT abd reviewed. Focal diminished cortical enhancement involving left upper kidney concerning for focal pyelonephritis or possibly infarcts  - UA negative twice and urine cx showed contaminant. Suspect CT findings more likely to be focal infarct..   - Renal function stable.    HTN  -- Home meds: Tiadylt  daily and valsartan-HCTZ 320-25 daily  - Continue cardizem 240mg, HCTZ, and Valsartan with parameters while admitted       DM2  - Hba1c 6.1%  - Pt takes metformin at home  - Continue accuchecks/ISS      #HLD  - On rosuvastatin at home; Atorvastatin while admitted   82 year old female with atrial fibrillation, s/p Saint Darian mechanical AVR, (1992–[facility]) for rheumatic aortic valve stenosis, ascending aortic aneurysm, HTN,  HLD, depression, GERD transferred from [facility] for further work-up and management of leukocytosis associated with thrombocytopenia and anemia. She was also noted to have a supratherapeutic INR. CT angio A/P which showed no evidence of active GIB, but showing focal diminished cortical enhancement involving the upper left kidney concerning for focal pyelonephritis or possibly infarct. EGD without any acute findings. Colonoscopy showed showed grade I internal hemorrhoid and polyps in transverse colon and hepatic flexure. 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 Now admitted to [facility] for further assessment for possible underlying hematologic malignancy given worsening of her leukocytosis, thrombocytopenia and anemia. 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

## 2023-01-15 NOTE — CONSULT NOTE ADULT - ASSESSMENT
82F mult med hx (inc afib on coumadin [last took 2d ago]) adm med for leukocytosis a/w thrombocytopenia/anemia. CTH showing thin L convexity SDH. Exam: AOx3, FC, PERRL, EOMI, no facial, 5/5 throughout, no drift  -No acute nsgy intervention  -Plt 64, INR 1.53. Rec plt goal >100.   -4h repeat CTH at 8:40pm  -Hold AC/AP  -No need for keppra at this time 82F mult med hx (inc afib on coumadin [last took 2d ago]) adm med for leukocytosis a/w thrombocytopenia/anemia. CTH showing thin L convexity SDH. Exam: AOx3, FC, PERRL, EOMI, no facial, 5/5 throughout, no drift  -No acute nsgy intervention  -Plt 64, INR 1.53. Rec plt goal >100. Please transfuse ASAP  -4h repeat CTH at 8:40pm  -Hold AC/AP  -No need for keppra at this time

## 2023-01-15 NOTE — PROGRESS NOTE ADULT - ATTENDING COMMENTS
83 YO F with a PMhx of A.fib, s/p Saint Darian mechanical AVR, (1992–[facility]) for rheumatic aortic valve stenosis, ascending aortic aneurysm, HTN,  HLD, depression, GERD transferred from [facility] for further work-up and management of leukocytosis associated with thrombocytopenia and anemia. She was also noted to have a supratherapeutic INR. CT angio A/P which showed no evidence of active GIB, but showing focal diminished cortical enhancement involving the upper left kidney concerning for focal pyelonephritis or possibly infarct. EGD without any acute findings. Colonoscopy showed showed grade I internal hemorrhoid and polyps in transverse colon and hepatic flexure. 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 She is now admitted to [facility] on 1/14/23 for further work up.    Leukocytosis  -infectious work up done at [facility] unremarkable  -CBC shows 65.3 with mostly lymphocytes  -will send flow cytometry  -will need BMBx on Tuesday  -will obtain baseline echo  -will get PICC line .  -Monitor LDH, Uric Acid and CMP daily   -keep Hb >7, plt >10 83 YO F with a PMhx of A.fib, s/p Saint Darian mechanical AVR, (1992–[facility]) for rheumatic aortic valve stenosis, ascending aortic aneurysm, HTN,  HLD, depression, GERD transferred from [facility] for further work-up and management of leukocytosis associated with thrombocytopenia and anemia. She was also noted to have a supratherapeutic INR. CT angio A/P which showed no evidence of active GIB, but showing focal diminished cortical enhancement involving the upper left kidney concerning for focal pyelonephritis or possibly infarct. EGD without any acute findings. Colonoscopy showed grade I internal hemorrhoid and polyps in transverse colon and hepatic flexure. 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 She is now admitted to [facility] on 1/14/23 for further work up.    Leukocytosis  -infectious work up done at [facility] unremarkable  -CBC shows 65.3 with mostly lymphocytes  -flow cytometry (1/14/23) consistent with acute leukemia  -will need BMBx on Tuesday  -will obtain baseline echo  -will get PICC line .  -Monitor LDH, Uric Acid and CMP daily   -keep Hb >7, plt >10

## 2023-01-15 NOTE — CONSULT NOTE ADULT - SUBJECTIVE AND OBJECTIVE BOX
HPI:    Primary Service HPI:  82 year old female with atrial fibrillation, s/p [employer] [facility] mechanical AVR, (–[facility]) for rheumatic aortic valve stenosis, ascending aortic aneurysm, HTN,  HLD, depression, GERD transferred from [facility] for further work-up and management of leukocytosis associated with thrombocytopenia and anemia. Patient initially presented to [facility] on 23 with complaints of black tarry stool and epigastric pain, and was subsequently found to be with anemia, thrombocytopenia and leukocytosis (lymphocytic predominant). She was also noted to have a supratherapeutic INR. She had CT angio A/P which showed no evidence of active GIB, but showing focal diminished cortical enhancement involving the upper left kidney concerning for focal pyelonephritis or possibly infarct. EGD without any acute findings. Colonoscopy showed showed grade I internal hemorrhoid and polyps in transverse colon and hepatic flexure. 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 (2023 11:56)    PMH:   Hypertension    Hypercholesteremia      PSH:   H/O heart valve replacement with mechanical valve    History of 2  sections      Medications:   acetaminophen     Tablet .. 650 milliGRAM(s) Oral every 6 hours PRN  allopurinol 300 milliGRAM(s) Oral daily  aluminum hydroxide/magnesium hydroxide/simethicone Suspension 30 milliLiter(s) Oral every 4 hours PRN  ascorbic acid 500 milliGRAM(s) Oral daily  atorvastatin 80 milliGRAM(s) Oral at bedtime  cyanocobalamin 1000 MICROGram(s) Oral daily  dextrose 5%. 1000 milliLiter(s) IV Continuous <Continuous>  dextrose 5%. 1000 milliLiter(s) IV Continuous <Continuous>  dextrose 50% Injectable 25 Gram(s) IV Push once  dextrose 50% Injectable 12.5 Gram(s) IV Push once  dextrose Oral Gel 15 Gram(s) Oral once PRN  diltiazem    milliGRAM(s) Oral daily  glucagon  Injectable 1 milliGRAM(s) IntraMuscular once  hydrochlorothiazide 25 milliGRAM(s) Oral daily  insulin lispro (ADMELOG) corrective regimen sliding scale   SubCutaneous three times a day before meals  melatonin 5 milliGRAM(s) Oral at bedtime  pantoprazole    Tablet 40 milliGRAM(s) Oral before breakfast  phytonadione   Solution 5 milliGRAM(s) Oral daily  valsartan 320 milliGRAM(s) Oral daily    Allergies:  No Known Allergies    FAMILY HISTORY:    Social History:  Smoking:  Alcohol:  Drugs:    Review of Systems:  Constitutional: [ ] Fever [ ] Chills [ ] Fatigue [ ] Weight change   HEENT: [ ] Blurred vision [ ] Eye Pain [ ] Headache [ ] Runny nose [ ] Sore Throat   Respiratory: [ ] Cough [ ] Wheezing [ ] Shortness of breath  Cardiovascular: [ ] Chest Pain [ ] Palpitations [ ] JAMES [ ] PND [ ] Orthopnea  Gastrointestinal: [ ] Abdominal Pain [ ] Diarrhea [ ] Constipation [ ] Hemorrhoids [ ] Nausea [ ] Vomiting  Genitourinary: [ ] Nocturia [ ] Dysuria [ ] Incontinence  Extremities: [ ] Swelling [ ] Joint Pain  Neurologic: [ ] Focal deficit [ ] Paresthesias [ ] Syncope  Lymphatic: [ ] Swelling [ ] Lymphadenopathy   Skin: [ ] Rash [ ] Ecchymoses [ ] Wounds [ ] Lesions  Psychiatry: [ ] Depression [ ] Suicidal/Homicidal Ideation [ ] Anxiety [ ] Sleep Disturbances  [ ] 10 point review of systems is otherwise negative except as mentioned above            [ ]Unable to obtain    Physical Exam:  T(C): 36.8 (01-15-23 @ 22:45), Max: 36.9 (01-15-23 @ 18:19)  HR: 76 (01-15-23 @ 22:45) (72 - 84)  BP: 125/77 (01-15-23 @ 22:45) (111/66 - 132/73)  RR: 18 (01-15-23 @ 22:45) (16 - 18)  SpO2: 97% (01-15-23 @ 22:45) (93% - 97%)  Wt(kg): --    01-15 @ 07:  -  01-15 @ 23:15  --------------------------------------------------------  IN: 860 mL / OUT: 0 mL / NET: 860 mL      Daily     Daily     Appearance: NAD  Eyes: PERRL, EOMI  HENT: Normal oral mucosa, NC/AT  Cardiovascular: normal S1 and S2, RRR, no m/r/g, no edema, normal JVP  Procedural Access Site:  No hematoma, non-tender to palpation, 2+ pulses distally, no bruit, no ecchymosis  Respiratory: Clear to auscultation bilaterally  Gastrointestinal: Soft, non-tender, non-distended, BS+  Musculoskeletal: No clubbing, no joint deformity   Neurologic: Non-focal  Lymphatic: No lymphadenopathy  Psychiatry: AAOx3, mood & affect appropriate  Skin: No rashes, no ecchymoses, no cyanosis    Cardiovascular Diagnostic Testing:  ECG:    Echo:    Stress Testing:    Cath:    Interpretation of Telemetry:    Imaging:    Labs:                        10.1   77.52 )-----------( 64       ( 15 Bill 2023 09:38 )             31.1     01-15    140  |  100  |  18  ----------------------------<  112<H>  3.9   |  28  |  0.92    Ca    10.3      15 Bill 2023 09:38  Phos  4.2     01-15  Mg     1.6     01-15    TPro  7.0  /  Alb  4.2  /  TBili  0.5  /  DBili  x   /  AST  47<H>  /  ALT  28  /  AlkPhos  98  01-15    PT/INR - ( 15 Bill 2023 09:38 )   PT: 17.8 sec;   INR: 1.53 ratio                          HPI:    Primary Service HPI:  82 year old female with atrial fibrillation, s/p [facility] mechanical AVR, (–[facility]) for rheumatic aortic valve stenosis, ascending aortic aneurysm, HTN,  HLD, depression, GERD transferred from [facility] for further work-up and management of leukocytosis associated with thrombocytopenia and anemia. Patient initially presented to [facility] on 23 with complaints of black tarry stool and epigastric pain, and was subsequently found to be with anemia, thrombocytopenia and leukocytosis (lymphocytic predominant). She was also noted to have a supratherapeutic INR. She had CT angio A/P which showed no evidence of active GIB, but showing focal diminished cortical enhancement involving the upper left kidney concerning for focal pyelonephritis or possibly infarct. EGD without any acute findings. Colonoscopy showed showed grade I internal hemorrhoid and polyps in transverse colon and hepatic flexure. 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 (2023 11:56)    PMH:   Hypertension    Hypercholesteremia      PSH:   H/O heart valve replacement with mechanical valve    History of 2  sections      Medications:   acetaminophen     Tablet .. 650 milliGRAM(s) Oral every 6 hours PRN  allopurinol 300 milliGRAM(s) Oral daily  aluminum hydroxide/magnesium hydroxide/simethicone Suspension 30 milliLiter(s) Oral every 4 hours PRN  ascorbic acid 500 milliGRAM(s) Oral daily  atorvastatin 80 milliGRAM(s) Oral at bedtime  cyanocobalamin 1000 MICROGram(s) Oral daily  dextrose 5%. 1000 milliLiter(s) IV Continuous <Continuous>  dextrose 5%. 1000 milliLiter(s) IV Continuous <Continuous>  dextrose 50% Injectable 25 Gram(s) IV Push once  dextrose 50% Injectable 12.5 Gram(s) IV Push once  dextrose Oral Gel 15 Gram(s) Oral once PRN  diltiazem    milliGRAM(s) Oral daily  glucagon  Injectable 1 milliGRAM(s) IntraMuscular once  hydrochlorothiazide 25 milliGRAM(s) Oral daily  insulin lispro (ADMELOG) corrective regimen sliding scale   SubCutaneous three times a day before meals  melatonin 5 milliGRAM(s) Oral at bedtime  pantoprazole    Tablet 40 milliGRAM(s) Oral before breakfast  phytonadione   Solution 5 milliGRAM(s) Oral daily  valsartan 320 milliGRAM(s) Oral daily    Allergies:  No Known Allergies    FAMILY HISTORY:    Social History:  Smoking:  Alcohol:  Drugs:    Review of Systems:  Constitutional: [ ] Fever [ ] Chills [ ] Fatigue [ ] Weight change   HEENT: [ ] Blurred vision [ ] Eye Pain [ ] Headache [ ] Runny nose [ ] Sore Throat   Respiratory: [ ] Cough [ ] Wheezing [ ] Shortness of breath  Cardiovascular: [ ] Chest Pain [ ] Palpitations [ ] JAMES [ ] PND [ ] Orthopnea  Gastrointestinal: [ ] Abdominal Pain [ ] Diarrhea [ ] Constipation [ ] Hemorrhoids [ ] Nausea [ ] Vomiting  Genitourinary: [ ] Nocturia [ ] Dysuria [ ] Incontinence  Extremities: [ ] Swelling [ ] Joint Pain  Neurologic: [ ] Focal deficit [ ] Paresthesias [ ] Syncope  Lymphatic: [ ] Swelling [ ] Lymphadenopathy   Skin: [ ] Rash [ ] Ecchymoses [ ] Wounds [ ] Lesions  Psychiatry: [ ] Depression [ ] Suicidal/Homicidal Ideation [ ] Anxiety [ ] Sleep Disturbances  [X ] 10 point review of systems is otherwise negative except as mentioned above            [ ]Unable to obtain    Physical Exam:  T(C): 36.8 (01-15-23 @ 22:45), Max: 36.9 (01-15-23 @ 18:19)  HR: 76 (01-15-23 @ 22:45) (72 - 84)  BP: 125/77 (01-15-23 @ 22:45) (111/66 - 132/73)  RR: 18 (01-15-23 @ 22:45) (16 - 18)  SpO2: 97% (01-15-23 @ 22:45) (93% - 97%)  Wt(kg): --    01-15 @ 07:01  -  01-15 @ 23:15  --------------------------------------------------------  IN: 860 mL / OUT: 0 mL / NET: 860 mL      Daily     Daily     Appearance: NAD  Eyes: PERRL, EOMI  HENT: Normal oral mucosa, NC/AT  Cardiovascular: irregular rhythm, mechanical valve click RUSB no LE edema   Respiratory: Clear to auscultation bilaterally  Gastrointestinal: Soft, non-tender, non-distended, BS+  Musculoskeletal: No clubbing, no joint deformity   Neurologic: Non-focal  Lymphatic: No lymphadenopathy  Psychiatry: AAOx3, mood & affect appropriate  Skin: No rashes, no ecchymoses, no cyanosis    Cardiovascular Diagnostic Testing:    PHYSICIAN INTERPRETATION:  Left Ventricle: The left ventricular internal cavity size is normal. Left   ventricular wall thickness is mildly increased.  Global LV systolic function was normal. Left ventricular ejection   fraction, by visual estimation, is 55 to 60%. Abnormal septal motion   likely due to conduction delay.  Right Ventricle: The right ventricle is not well visualized, appears top   normal in size with normal systolic function.  Left Atrium: The left atrium appears top normal in size.  Right Atrium: The right atrium is not well visualized, appears dilated.  Pericardium: There is no evidence of pericardial effusion. There is a   significant pericardial fat pad present.  Mitral Valve: Mitral leaflet mobility is normal. Mild to moderate mitral   valve regurgitation is seen.  Tricuspid Valve: The tricuspid valve is not well visualized.   Mild-moderate tricuspid regurgitation is visualized.  Aortic Valve: Trivial aortic valve regurgitation is seen. There is a   mechanical valve in the aortic position, not well visualized, peak   velocity is within normal limits.  Pulmonic Valve: The pulmonic valve was not well visualized.  Pulmonary Artery: There is at least borderline pulmonary hypertension,   estimated RVSP   34 mmHg.  Venous: The inferior vena cava is not well visualized.      Summary:   1. Technically difficult study with poor endocardial visualization.   2. Normal global left ventricular systolic function.   3. Left ventricular ejection fraction, by visual estimation, is 55 to   60%.   4. Mildly increased LV wall thickness.   5. Normal left ventricular internal cavity size.   6. Abnormal septal motion likely due to conduction delay.   7. The right ventricle is not well visualized, appears top normal in   size with normal systolic function.   8. The left atrium appears top normal in size.   9. The right atrium is not well visualized, appears dilated.  10. Mild to moderate mitral valve regurgitation.  11. Mild-moderate tricuspid regurgitation.  12. There is a mechanical valve in the aortic position, not well   visualized, peak velocity is within normal limits.  13. There is at least borderline pulmonary hypertension, estimated RVSP   34 mmHg.  14. There is no evidence of pericardial effusion.  15. There appears to be a large fluid-filled collection near the liver,   recommend clinical correlation.    [id number] [facility] Kiana MA Electronically signed on   1/10/2023 at 6:22:00 PM            Labs:                        10.1   77.52 )-----------( 64       ( 15 Bill 2023 09:38 )             31.1     -15    140  |  100  |  18  ----------------------------<  112<H>  3.9   |  28  |  0.92    Ca    10.3      15 Bill 2023 09:38  Phos  4.2     -15  Mg     1.6     01-15    TPro  7.0  /  Alb  4.2  /  TBili  0.5  /  DBili  x   /  AST  47<H>  /  ALT  28  /  AlkPhos  98  -15    PT/INR - ( 15 Bill 2023 09:38 )   PT: 17.8 sec;   INR: 1.53 ratio                          HPI:    Primary Service HPI:  82 year old female with atrial fibrillation, s/p [facility] mechanical AVR, (–[facility]) for rheumatic aortic valve stenosis, ascending aortic aneurysm, HTN,  HLD, depression, GERD transferred from [facility] for further work-up and management of leukocytosis associated with thrombocytopenia and anemia. Patient initially presented to [facility] on 23 with complaints of black tarry stool and epigastric pain, and was subsequently found to be with anemia, thrombocytopenia and leukocytosis (lymphocytic predominant). She was also noted to have a supratherapeutic INR. She had CT angio A/P which showed no evidence of active GIB, but showing focal diminished cortical enhancement involving the upper left kidney concerning for focal pyelonephritis or possibly infarct. EGD without any acute findings. Colonoscopy showed showed grade I internal hemorrhoid and polyps in transverse colon and hepatic flexure. 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 (2023 11:56)    PMH:   Hypertension    Hypercholesteremia      PSH:   H/O heart valve replacement with mechanical valve    History of 2  sections      MEDICATIONS  (STANDING):  allopurinol 300 milliGRAM(s) Oral daily  ascorbic acid 500 milliGRAM(s) Oral daily  atorvastatin 80 milliGRAM(s) Oral at bedtime  chlorhexidine 4% Liquid 1 Application(s) Topical <User Schedule>  cyanocobalamin 1000 MICROGram(s) Oral daily  dextrose 5%. 1000 milliLiter(s) (50 mL/Hr) IV Continuous <Continuous>  dextrose 5%. 1000 milliLiter(s) (100 mL/Hr) IV Continuous <Continuous>  dextrose 50% Injectable 25 Gram(s) IV Push once  dextrose 50% Injectable 12.5 Gram(s) IV Push once  diltiazem    milliGRAM(s) Oral daily  glucagon  Injectable 1 milliGRAM(s) IntraMuscular once  hydrochlorothiazide 25 milliGRAM(s) Oral daily  insulin lispro (ADMELOG) corrective regimen sliding scale   SubCutaneous three times a day before meals  melatonin 5 milliGRAM(s) Oral at bedtime  pantoprazole    Tablet 40 milliGRAM(s) Oral before breakfast  phytonadione   Solution 5 milliGRAM(s) Oral daily  potassium chloride    Tablet ER 20 milliEquivalent(s) Oral every 2 hours  potassium chloride  20 mEq/100 mL IVPB 20 milliEquivalent(s) IV Intermittent every 2 hours  valsartan 320 milliGRAM(s) Oral daily      Allergies:  No Known Allergies    FAMILY HISTORY:    Social History:  Smoking:  Alcohol:  Drugs:    Review of Systems:  Constitutional: [ ] Fever [ ] Chills [ ] Fatigue [ ] Weight change   HEENT: [ ] Blurred vision [ ] Eye Pain [ ] Headache [ ] Runny nose [ ] Sore Throat   Respiratory: [ ] Cough [ ] Wheezing [ ] Shortness of breath  Cardiovascular: [ ] Chest Pain [ ] Palpitations [ ] JAMES [ ] PND [ ] Orthopnea  Gastrointestinal: [ ] Abdominal Pain [ ] Diarrhea [ ] Constipation [ ] Hemorrhoids [ ] Nausea [ ] Vomiting  Genitourinary: [ ] Nocturia [ ] Dysuria [ ] Incontinence  Extremities: [ ] Swelling [ ] Joint Pain  Neurologic: [ ] Focal deficit [ ] Paresthesias [ ] Syncope  Lymphatic: [ ] Swelling [ ] Lymphadenopathy   Skin: [ ] Rash [ ] Ecchymoses [ ] Wounds [ ] Lesions  Psychiatry: [ ] Depression [ ] Suicidal/Homicidal Ideation [ ] Anxiety [ ] Sleep Disturbances  [X ] 10 point review of systems is otherwise negative except as mentioned above            [ ]Unable to obtain    Physical Exam:  T(C): 36.8 (01-15-23 @ 22:45), Max: 36.9 (01-15-23 @ 18:19)  HR: 76 (01-15-23 @ 22:45) (72 - 84)  BP: 125/77 (01-15-23 @ 22:45) (111/66 - 132/73)  RR: 18 (01-15-23 @ 22:45) (16 - 18)  SpO2: 97% (01-15-23 @ 22:45) (93% - 97%)  Wt(kg): --    01-15 @ 07:  -  01-15 @ 23:15  --------------------------------------------------------  IN: 860 mL / OUT: 0 mL / NET: 860 mL      Daily     Daily     Appearance: NAD  Eyes: PERRL, EOMI  HENT: Normal oral mucosa, NC/AT  Cardiovascular: irregular rhythm, mechanical valve click RUSB no LE edema   Respiratory: Clear to auscultation bilaterally  Gastrointestinal: Soft, non-tender, non-distended, BS+  Musculoskeletal: No clubbing, no joint deformity   Neurologic: Non-focal  Lymphatic: No lymphadenopathy  Psychiatry: AAOx3, mood & affect appropriate  Skin: No rashes, no ecchymoses, no cyanosis    Cardiovascular Diagnostic Testing:    PHYSICIAN INTERPRETATION:  Left Ventricle: The left ventricular internal cavity size is normal. Left   ventricular wall thickness is mildly increased.  Global LV systolic function was normal. Left ventricular ejection   fraction, by visual estimation, is 55 to 60%. Abnormal septal motion   likely due to conduction delay.  Right Ventricle: The right ventricle is not well visualized, appears top   normal in size with normal systolic function.  Left Atrium: The left atrium appears top normal in size.  Right Atrium: The right atrium is not well visualized, appears dilated.  Pericardium: There is no evidence of pericardial effusion. There is a   significant pericardial fat pad present.  Mitral Valve: Mitral leaflet mobility is normal. Mild to moderate mitral   valve regurgitation is seen.  Tricuspid Valve: The tricuspid valve is not well visualized.   Mild-moderate tricuspid regurgitation is visualized.  Aortic Valve: Trivial aortic valve regurgitation is seen. There is a   mechanical valve in the aortic position, not well visualized, peak   velocity is within normal limits.  Pulmonic Valve: The pulmonic valve was not well visualized.  Pulmonary Artery: There is at least borderline pulmonary hypertension,   estimated RVSP   34 mmHg.  Venous: The inferior vena cava is not well visualized.      Summary:   1. Technically difficult study with poor endocardial visualization.   2. Normal global left ventricular systolic function.   3. Left ventricular ejection fraction, by visual estimation, is 55 to   60%.   4. Mildly increased LV wall thickness.   5. Normal left ventricular internal cavity size.   6. Abnormal septal motion likely due to conduction delay.   7. The right ventricle is not well visualized, appears top normal in   size with normal systolic function.   8. The left atrium appears top normal in size.   9. The right atrium is not well visualized, appears dilated.  10. Mild to moderate mitral valve regurgitation.  11. Mild-moderate tricuspid regurgitation.  12. There is a mechanical valve in the aortic position, not well   visualized, peak velocity is within normal limits.  13. There is at least borderline pulmonary hypertension, estimated RVSP   34 mmHg.  14. There is no evidence of pericardial effusion.  15. There appears to be a large fluid-filled collection near the liver,   recommend clinical correlation.    [id number] Fahad Bruno MD Electronically signed on   1/10/2023 at 6:22:00 PM            Labs:                        10.1   77.52 )-----------( 64       ( 15 Ibll 2023 09:38 )             31.1     -15    140  |  100  |  18  ----------------------------<  112<H>  3.9   |  28  |  0.92    Ca    10.3      15 Bill 2023 09:38  Phos  4.2     -15  Mg     1.6     01-15    TPro  7.0  /  Alb  4.2  /  TBili  0.5  /  DBili  x   /  AST  47<H>  /  ALT  28  /  AlkPhos  98  -15    PT/INR - ( 15 Bill 2023 09:38 )   PT: 17.8 sec;   INR: 1.53 ratio

## 2023-01-15 NOTE — CONSULT NOTE ADULT - ATTENDING COMMENTS
2 indications for AC with mechanical AV and pAF. The potential for thromboembolic complications (without AC) must be weighted against the risk of increased intracranial hemorrhage (with AC).    Recommend:  Repeat TTE to assess for a change in transaortic gradients  Consider d/c vitamin K  Consider AC with heparin gtt (without bolus) after further discussion with neurosurgery regarding risk    70 minutes spent on total patient encounter. More than 50% of the encounter was spent counseling and/or coordination care. The necessity of the time spent on this encounter was due to: time spent evaluating the patient as well as time spent reviewing labs, imaging, and discussing the case with a multidisciplinary team (including neurosurgery and hematology).

## 2023-01-15 NOTE — PROGRESS NOTE ADULT - PROBLEM SELECTOR PLAN 1
-Patient with rising leukocytosis; initially 39 on presentation to  hospital, now increased to 65.3. Infectious work-up done at outpatient hospital showed negative UA, UCX, BCX, CTA without obvious evidence of infection. GI PCR did reveal EPEC however per ID at , not clinically significant and patient afebrile without other evidence of overt infection. 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  -Monitor LDH, Uric Acid and CMP daily   -keep Hb >7, plt >100   CT angio A/P which showed no evidence of active GIB, but showing focal diminished cortical enhancement involving the upper left kidney concerning for focal pyelonephritis or possibly infarct. EGD without any acute findings. Colonoscopy showed showed grade I internal hemorrhoid and polyps in transverse colon and hepatic flexure.   -Hapto initially 29 but on repeat normalized to the 70s; LDH elevated, Bili not elevated; overall picture not suggestive of hemolysis

## 2023-01-15 NOTE — PROGRESS NOTE ADULT - PROBLEM SELECTOR PLAN 3
in setting of thrombocytopenia and on warfarin for mechanical AVR ([address] [facility]), atrial fibrillation  last doses of warfarin 1/12 and 1/13 at [facility] - 5mg daily  neurosurgery, and cardiology consulted  will keep PLTs > 100k, vitamin K ordered  repeat CT Head in 12 hours  bedrest

## 2023-01-16 LAB
ALBUMIN SERPL ELPH-MCNC: 3.8 G/DL — SIGNIFICANT CHANGE UP (ref 3.3–5)
ALP SERPL-CCNC: 91 U/L — SIGNIFICANT CHANGE UP (ref 40–120)
ALT FLD-CCNC: 23 U/L — SIGNIFICANT CHANGE UP (ref 10–45)
ANION GAP SERPL CALC-SCNC: 10 MMOL/L — SIGNIFICANT CHANGE UP (ref 5–17)
APTT BLD: 26.4 SEC — LOW (ref 27.5–35.5)
AST SERPL-CCNC: 44 U/L — HIGH (ref 10–40)
BASOPHILS # BLD AUTO: 0 K/UL — SIGNIFICANT CHANGE UP (ref 0–0.2)
BASOPHILS NFR BLD AUTO: 0 % — SIGNIFICANT CHANGE UP (ref 0–2)
BILIRUB SERPL-MCNC: 0.5 MG/DL — SIGNIFICANT CHANGE UP (ref 0.2–1.2)
BUN SERPL-MCNC: 22 MG/DL — SIGNIFICANT CHANGE UP (ref 7–23)
CALCIUM SERPL-MCNC: 9.8 MG/DL — SIGNIFICANT CHANGE UP (ref 8.4–10.5)
CHLORIDE SERPL-SCNC: 101 MMOL/L — SIGNIFICANT CHANGE UP (ref 96–108)
CO2 SERPL-SCNC: 28 MMOL/L — SIGNIFICANT CHANGE UP (ref 22–31)
CREAT SERPL-MCNC: 0.9 MG/DL — SIGNIFICANT CHANGE UP (ref 0.5–1.3)
EGFR: 65 ML/MIN/1.73M2 — SIGNIFICANT CHANGE UP
EOSINOPHIL # BLD AUTO: 0 K/UL — SIGNIFICANT CHANGE UP (ref 0–0.5)
EOSINOPHIL NFR BLD AUTO: 0 % — SIGNIFICANT CHANGE UP (ref 0–6)
GLUCOSE BLDC GLUCOMTR-MCNC: 100 MG/DL — HIGH (ref 70–99)
GLUCOSE BLDC GLUCOMTR-MCNC: 106 MG/DL — HIGH (ref 70–99)
GLUCOSE BLDC GLUCOMTR-MCNC: 109 MG/DL — HIGH (ref 70–99)
GLUCOSE BLDC GLUCOMTR-MCNC: 93 MG/DL — SIGNIFICANT CHANGE UP (ref 70–99)
GLUCOSE SERPL-MCNC: 100 MG/DL — HIGH (ref 70–99)
HCT VFR BLD CALC: 23.9 % — LOW (ref 34.5–45)
HGB BLD-MCNC: 7.7 G/DL — LOW (ref 11.5–15.5)
INR BLD: 1.27 RATIO — HIGH (ref 0.88–1.16)
LDH SERPL L TO P-CCNC: 844 U/L — HIGH (ref 50–242)
LYMPHOCYTES # BLD AUTO: 12 % — LOW (ref 13–44)
LYMPHOCYTES # BLD AUTO: 8.93 K/UL — HIGH (ref 1–3.3)
MAGNESIUM SERPL-MCNC: 1.6 MG/DL — SIGNIFICANT CHANGE UP (ref 1.6–2.6)
MCHC RBC-ENTMCNC: 28.2 PG — SIGNIFICANT CHANGE UP (ref 27–34)
MCHC RBC-ENTMCNC: 32.2 GM/DL — SIGNIFICANT CHANGE UP (ref 32–36)
MCV RBC AUTO: 87.5 FL — SIGNIFICANT CHANGE UP (ref 80–100)
MONOCYTES # BLD AUTO: 0.74 K/UL — SIGNIFICANT CHANGE UP (ref 0–0.9)
MONOCYTES NFR BLD AUTO: 1 % — LOW (ref 2–14)
NEUTROPHILS # BLD AUTO: 1.49 K/UL — LOW (ref 1.8–7.4)
NEUTROPHILS NFR BLD AUTO: 1 % — LOW (ref 43–77)
PHOSPHATE SERPL-MCNC: 4.4 MG/DL — SIGNIFICANT CHANGE UP (ref 2.5–4.5)
PLATELET # BLD AUTO: 134 K/UL — LOW (ref 150–400)
PLATELET # BLD AUTO: 94 K/UL — LOW (ref 150–400)
POTASSIUM SERPL-MCNC: 3 MMOL/L — LOW (ref 3.5–5.3)
POTASSIUM SERPL-MCNC: 4.7 MMOL/L — SIGNIFICANT CHANGE UP (ref 3.5–5.3)
POTASSIUM SERPL-SCNC: 3 MMOL/L — LOW (ref 3.5–5.3)
POTASSIUM SERPL-SCNC: 4.7 MMOL/L — SIGNIFICANT CHANGE UP (ref 3.5–5.3)
PROT SERPL-MCNC: 6.5 G/DL — SIGNIFICANT CHANGE UP (ref 6–8.3)
PROTHROM AB SERPL-ACNC: 14.6 SEC — HIGH (ref 10.5–13.4)
RBC # BLD: 2.73 M/UL — LOW (ref 3.8–5.2)
RBC # FLD: 15.5 % — HIGH (ref 10.3–14.5)
SODIUM SERPL-SCNC: 139 MMOL/L — SIGNIFICANT CHANGE UP (ref 135–145)
URATE SERPL-MCNC: 5.6 MG/DL — SIGNIFICANT CHANGE UP (ref 2.5–7)
WBC # BLD: 74.41 K/UL — CRITICAL HIGH (ref 3.8–10.5)
WBC # FLD AUTO: 74.41 K/UL — CRITICAL HIGH (ref 3.8–10.5)

## 2023-01-16 PROCEDURE — 76376 3D RENDER W/INTRP POSTPROCES: CPT | Mod: 26

## 2023-01-16 PROCEDURE — 93356 MYOCRD STRAIN IMG SPCKL TRCK: CPT

## 2023-01-16 PROCEDURE — 99223 1ST HOSP IP/OBS HIGH 75: CPT | Mod: GC

## 2023-01-16 PROCEDURE — 70450 CT HEAD/BRAIN W/O DYE: CPT | Mod: 26

## 2023-01-16 PROCEDURE — 93306 TTE W/DOPPLER COMPLETE: CPT | Mod: 26

## 2023-01-16 PROCEDURE — 71045 X-RAY EXAM CHEST 1 VIEW: CPT | Mod: 26

## 2023-01-16 RX ORDER — CHLORHEXIDINE GLUCONATE 213 G/1000ML
1 SOLUTION TOPICAL
Refills: 0 | Status: DISCONTINUED | OUTPATIENT
Start: 2023-01-16 | End: 2023-02-10

## 2023-01-16 RX ORDER — POTASSIUM CHLORIDE 20 MEQ
20 PACKET (EA) ORAL
Refills: 0 | Status: COMPLETED | OUTPATIENT
Start: 2023-01-16 | End: 2023-01-16

## 2023-01-16 RX ORDER — SODIUM CHLORIDE 9 MG/ML
10 INJECTION INTRAMUSCULAR; INTRAVENOUS; SUBCUTANEOUS
Refills: 0 | Status: DISCONTINUED | OUTPATIENT
Start: 2023-01-16 | End: 2023-02-10

## 2023-01-16 RX ADMIN — Medication 20 MILLIEQUIVALENT(S): at 15:24

## 2023-01-16 RX ADMIN — Medication 50 MILLIEQUIVALENT(S): at 12:13

## 2023-01-16 RX ADMIN — Medication 50 MILLIEQUIVALENT(S): at 17:32

## 2023-01-16 RX ADMIN — Medication 5 MILLIGRAM(S): at 21:04

## 2023-01-16 RX ADMIN — Medication 20 MILLIEQUIVALENT(S): at 08:36

## 2023-01-16 RX ADMIN — ATORVASTATIN CALCIUM 80 MILLIGRAM(S): 80 TABLET, FILM COATED ORAL at 21:01

## 2023-01-16 RX ADMIN — Medication 300 MILLIGRAM(S): at 12:13

## 2023-01-16 RX ADMIN — Medication 5 MILLIGRAM(S): at 12:13

## 2023-01-16 RX ADMIN — Medication 240 MILLIGRAM(S): at 05:23

## 2023-01-16 RX ADMIN — PANTOPRAZOLE SODIUM 40 MILLIGRAM(S): 20 TABLET, DELAYED RELEASE ORAL at 08:36

## 2023-01-16 RX ADMIN — Medication 500 MILLIGRAM(S): at 12:14

## 2023-01-16 RX ADMIN — VALSARTAN 320 MILLIGRAM(S): 80 TABLET ORAL at 05:23

## 2023-01-16 RX ADMIN — Medication 20 MILLIEQUIVALENT(S): at 12:14

## 2023-01-16 RX ADMIN — Medication 50 MILLIEQUIVALENT(S): at 08:36

## 2023-01-16 RX ADMIN — PREGABALIN 1000 MICROGRAM(S): 225 CAPSULE ORAL at 12:14

## 2023-01-16 NOTE — PROGRESS NOTE ADULT - PROBLEM SELECTOR PLAN 4
and mechanical AVR on coumadin now on hold in setting of acute subdural hemorrhage  play on telemonitor  rate controlled at present mechanical AVR on coumadin now on hold in setting of acute subdural hemorrhage

## 2023-01-16 NOTE — CHART NOTE - NSCHARTNOTEFT_GEN_A_CORE
Repeat CT this AM is stable. Recommend to keep platelet count 100K. Should not start DVT prophylaxis if platelet count is below this level. Please continue to hold all AC/AP until patient is seen as outpatient in 1-2 weeks from [place] with Dr. De Souza.    Neurosurgery  [id number]

## 2023-01-16 NOTE — PROGRESS NOTE ADULT - PROBLEM SELECTOR PLAN 1
-Patient with rising leukocytosis; initially 39 on presentation to  hospital, now increased to 65.3. Infectious work-up done at outpatient hospital showed negative UA, UCX, BCX, CTA without obvious evidence of infection. GI PCR did reveal EPEC however per ID at , not clinically significant and patient afebrile without other evidence of overt infection. 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  -Monitor LDH, Uric Acid and CMP daily   -keep Hb >7, plt >100   CT angio A/P which showed no evidence of active GIB, but showing focal diminished cortical enhancement involving the upper left kidney concerning for focal pyelonephritis or possibly infarct. EGD without any acute findings. Colonoscopy showed showed grade I internal hemorrhoid and polyps in transverse colon and hepatic flexure.   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

## 2023-01-16 NOTE — PROGRESS NOTE ADULT - ASSESSMENT
82F mult med hx (inc afib on coumadin [last took 2d ago]) adm med for leukocytosis a/w thrombocytopenia/anemia. CTH showing thin L convexity SDH. Exam: AOx3, FC, PERRL, EOMI, no facial, 5/5 throughout, no drift    -No acute nsgy intervention  -CT head this AM  -Plt 64, INR 1.53. Rec plt goal >100  -Hold AC/AP  -No need for keppra at this time

## 2023-01-16 NOTE — PROGRESS NOTE ADULT - ASSESSMENT
82 year old female with atrial fibrillation, s/p Saint Darian mechanical AVR, (1994–[facility]) for rheumatic aortic valve stenosis, ascending aortic aneurysm, HTN,  HLD, depression, GERD transferred from [facility] for further work-up and management of leukocytosis associated with thrombocytopenia and anemia. She was also noted to have a supratherapeutic INR. CT angio A/P which showed no evidence of active GIB, but showing focal diminished cortical enhancement involving the upper left kidney concerning for focal pyelonephritis or possibly infarct. EGD without any acute findings. Colonoscopy showed showed grade I internal hemorrhoid and polyps in transverse colon and hepatic flexure. 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 Now admitted to [facility] for further assessment for possible underlying hematologic malignancy given worsening of her leukocytosis, thrombocytopenia and anemia. 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   82 year old female with atrial fibrillation, s/p Saint Darian mechanical AVR, (1994–[facility]) for rheumatic aortic valve stenosis, ascending aortic aneurysm, HTN, HLD, depression, GERD transferred from [facility] for further work-up and management of leukocytosis associated with thrombocytopenia and anemia. She was also noted to have a supratherapeutic INR. CT angio A/P which showed no evidence of active GIB, but showing focal diminished cortical enhancement involving the upper left kidney concerning for focal pyelonephritis or possibly infarct. EGD without any acute findings. Colonoscopy showed grade I internal hemorrhoid and polyps in transverse colon and hepatic flexure. 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 Transferred to [address] [place] [address] for further treatment. Peripheral flow performed on 1/14/23 consistent with acute leukemia   Now admitted to [facility] for further assessment for possible underlying hematologic malignancy given worsening of her leukocytosis, thrombocytopenia and. 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 Leukocytosis, anemia, thrombocytopenia secondary to disease.

## 2023-01-16 NOTE — PROGRESS NOTE ADULT - ATTENDING COMMENTS
83 YO F with a PMhx of A.fib, s/p Saint Darian mechanical AVR, (1994–[facility]) for rheumatic aortic valve stenosis, ascending aortic aneurysm, HTN,  HLD, depression, GERD transferred from [facility] for further work-up and management of leukocytosis associated with thrombocytopenia and anemia. She was also noted to have a supratherapeutic INR. CT angio A/P which showed no evidence of active GIB, but showing focal diminished cortical enhancement involving the upper left kidney concerning for focal pyelonephritis or possibly infarct. EGD without any acute findings. Colonoscopy showed grade I internal hemorrhoid and polyps in transverse colon and hepatic flexure. 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 She is now admitted to [facility] on 1/14/23 for further work up.    Leukocytosis  -infectious work up done at [facility] unremarkable  -CBC shows 65.3 with mostly lymphocytes  -flow cytometry (1/14/23) consistent with acute leukemia  -will need BMBx on Tuesday  -will obtain baseline echo  -will get PICC line .  -Monitor LDH, Uric Acid and CMP daily   -keep Hb >7, plt >10 83 YO F with a PMhx of A.fib, s/p Saint Darian mechanical AVR, (1994–[facility]) for rheumatic aortic valve stenosis, ascending aortic aneurysm, HTN,  HLD, depression, GERD transferred from [facility] for further work-up and management of leukocytosis associated with thrombocytopenia and anemia. She was also noted to have a supratherapeutic INR. CT angio A/P which showed no evidence of active GIB, but showing focal diminished cortical enhancement involving the upper left kidney concerning for focal pyelonephritis or possibly infarct. EGD without any acute findings. Colonoscopy showed grade I internal hemorrhoid and polyps in transverse colon and hepatic flexure. 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 She is now admitted to [facility] on 1/14/23 for further work up.    Leukocytosis  -infectious work up done at [facility] unremarkable  -CBC shows 65.3 with mostly lymphocytes  -PB flow cytometry (1/14/23) consistent with acute leukemia:    Blasts identified, 60% of total, with the following immunophenotype; CD45 (dim to negative), CD34, CD10+, CD19+, CD20 CD22+, CD11b (partial dim)+, CD38+ and HLADR+(heterogenous); negative for, CD2, CD3, CD4, CD5, CD7, CD8, , CD33, CD14, CD16, CD15, CD11b, CD56, CD4, CD64, kappa and lambda (surface). Immunophenotypic features are consistent with acute leukemia.      -will need BMBx on Tuesday  -will obtain baseline echo  -will get PICC line .  -Monitor LDH, Uric Acid and CMP daily   -keep Hb >7, plt >10  -start allopurinol    ICH  -headaches since 1/15/23, now improved  -1/15/23 CT head showed 8mm bleed, repeat CT is stable  -no intervention as per Neursx  -hold coumadin (confirmed with cardiology)  -keep plt >100  -s/p Vit K    Cardio  HTN - c/w home meds 81 YO F with a PMhx of A.fib, s/p Saint Darian mechanical AVR, (1994–[facility]) for rheumatic aortic valve stenosis, ascending aortic aneurysm, HTN,  HLD, depression, GERD transferred from [facility] for further work-up and management of leukocytosis associated with thrombocytopenia and anemia. She was also noted to have a supratherapeutic INR. CT angio A/P which showed no evidence of active GIB, but showing focal diminished cortical enhancement involving the upper left kidney concerning for focal pyelonephritis or possibly infarct. EGD without any acute findings. Colonoscopy showed grade I internal hemorrhoid and polyps in transverse colon and hepatic flexure. 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 She is now admitted to [facility] on 1/14/23 for further work up.    Leukocytosis  -infectious work up done at [facility] unremarkable  -CBC shows 65.3 with mostly lymphocytes  -PB flow cytometry (1/14/23) consistent with acute leukemia:    Blasts identified, 60% of total, with the following immunophenotype; CD45 (dim to negative), CD34, CD10+, CD19+, CD20 CD22+, CD11b (partial dim)+, CD38+ and HLADR+(heterogenous); negative for, CD2, CD3, CD4, CD5, CD7, CD8, , CD33, CD14, CD16, CD15, CD11b, CD56, CD4, CD64, kappa and lambda (surface). Immunophenotypic features are consistent with acute leukemia.      -will need BMBx on Tuesday  -will obtain baseline echo  -will get PICC line .  -Monitor LDH, Uric Acid and CMP daily   -keep Hb >7, plt >10  -start allopurinol    ICH  -headaches since 1/15/23, now improved  -1/15/23 CT head showed 8mm bleed, repeat CT is stable  -no intervention as per Neursx  -hold coumadin (confirmed with cardiology)  -keep plt >100  -completed Vit K    Cardio  HTN - c/w home meds  Mechanical valve - currently off AC, discussed with cardiology, will consider starting LMWH on 1/17/23 with a goal PTT of ~60 and repeat a CT head shortly after

## 2023-01-16 NOTE — PROGRESS NOTE ADULT - PROBLEM SELECTOR PLAN 5
-- Home meds: Tiadylt  daily and valsartan-HCTZ 320-25 daily  - Continue cardizem 240mg, HCTZ, and Valsartan with parameters while admitted Continue home medications

## 2023-01-16 NOTE — PROVIDER CONTACT NOTE (CRITICAL VALUE NOTIFICATION) - ASSESSMENT
pt stable, resting comfortably in bed, denies pain or discomfort, neuro intact, does not appear to be in distress at this time

## 2023-01-16 NOTE — PROGRESS NOTE ADULT - PROBLEM SELECTOR PLAN 3
in setting of thrombocytopenia and on warfarin for mechanical AVR (1994 [facility]), atrial fibrillation  last doses of warfarin 1/12 and 1/13 at [facility] - 5mg daily  neurosurgery, and cardiology consulted  will keep PLTs > 100k, vitamin K ordered  repeat CT Head in 12 hours  bedrest in setting of thrombocytopenia and on warfarin for mechanical AVR (1994 [facility]), atrial fibrillation  last doses of warfarin 1/12 and 1/13 at [facility] - 5mg daily  neurosurgery, and cardiology consulted  will keep PLTs > 100k

## 2023-01-16 NOTE — PROGRESS NOTE ADULT - SUBJECTIVE AND OBJECTIVE BOX
Diagnosis: newly diagnosed B-ALL    Protocol/Chemo Regimen: to be determined    Day: n/a     Pt endorsed: headache    Review of Systems: denies n/v/d; chest pain    Pain scale: 8/10               Location: head    Diet: regular    Allergies: No Known Allergies    STANDING MEDICATIONS  ascorbic acid 500 milliGRAM(s) Oral daily  atorvastatin 80 milliGRAM(s) Oral at bedtime  cyanocobalamin 1000 MICROGram(s) Oral daily  dextrose 5%. 1000 milliLiter(s) IV Continuous <Continuous>  dextrose 5%. 1000 milliLiter(s) IV Continuous <Continuous>  dextrose 50% Injectable 25 Gram(s) IV Push once  dextrose 50% Injectable 12.5 Gram(s) IV Push once  diltiazem    milliGRAM(s) Oral daily  glucagon  Injectable 1 milliGRAM(s) IntraMuscular once  hydrochlorothiazide 25 milliGRAM(s) Oral daily  insulin lispro (ADMELOG) corrective regimen sliding scale   SubCutaneous three times a day before meals  melatonin 5 milliGRAM(s) Oral at bedtime  pantoprazole    Tablet 40 milliGRAM(s) Oral before breakfast  valsartan 320 milliGRAM(s) Oral daily      PRN MEDICATIONS  acetaminophen     Tablet .. 650 milliGRAM(s) Oral every 6 hours PRN  aluminum hydroxide/magnesium hydroxide/simethicone Suspension 30 milliLiter(s) Oral every 4 hours PRN  dextrose Oral Gel 15 Gram(s) Oral once PRN      Vital Signs Last 24 Hrs  T(C): 36.7 (15 Bill 2023 05:20), Max: 36.8 (14 Jan 2023 14:25)  T(F): 98 (15 Bill 2023 05:20), Max: 98.2 (14 Jan 2023 14:25)  HR: 84 (15 Bill 2023 05:20) (72 - 88)  BP: 124/74 (15 Bill 2023 05:20) (113/92 - 149/80)  RR: 16 (15 Bill 2023 05:20) (16 - 18)  SpO2: 94% (15 Bill 2023 05:20) (94% - 98%)    Parameters below as of 15 Bill 2023 05:20  Patient On (Oxygen Delivery Method): room air    PHYSICAL EXAM  General: adult in NAD  HEENT: clear oropharynx, anicteric sclera, pink conjunctiva  Neck: supple  CV: normal S1/S2 RRR  Lungs: positive air movement b/l ant lungs,clear to auscultation, no wheezes, no rales  Abdomen: soft non-tender non-distended  Ext: no clubbing cyanosis or edema  Skin: no rashes and no petechiae  Neuro: alert and oriented X 4, no focal deficits  Central Line: normal    LABS:                      10.1   77.52 )-----------( 64       ( 15 Bill 2023 09:38 )             31.1     15 Bill 2023 09:38    140    |  100    |  18     ----------------------------<  112    3.9     |  28     |  0.92     Ca    10.3       15 Bill 2023 09:38  Phos  4.2       15 Bill 2023 09:38  Mg     1.6       15 Bill 2023 09:38    TPro  7.0    /  Alb  4.2    /  TBili  0.5    /  DBili  x      /  AST  47     /  ALT  28     /  AlkPhos  98     15 Bill 2023 09:38    PT/INR - ( 15 Bill 2023 09:38 )   PT: 17.8 sec;   INR: 1.53 ratio    PTT - ( 13 Jan 2023 21:40 )  PTT:20.8 sec    CAPILLARY BLOOD GLUCOSE  POCT Blood Glucose.: 107 mg/dL (15 Bill 2023 12:17)  POCT Blood Glucose.: 101 mg/dL (15 Bill 2023 08:11)  POCT Blood Glucose.: 123 mg/dL (14 Jan 2023 21:55)    LIVER FUNCTIONS - ( 15 Bill 2023 09:38 )  Alb: 4.2 g/dL / Pro: 7.0 g/dL / ALK PHOS: 98 U/L / ALT: 28 U/L / AST: 47 U/L / GGT: x           RADIOLOGY & ADDITIONAL STUDIES:      from: CT Head No Cont (01.15.23 @ 17:05)   INTERPRETATION:  Acute left parietal extraaxial hemorrhage, measuring up   to 8mm at greatest depth.  No midline shift.  There is left holohemispheric increased density in the subdural space.    Differential includes chronic subdural hematoma and hygroma.         Diagnosis: newly diagnosed B-ALL    Protocol/Chemo Regimen: to be determined    Day: n/a     Pt endorsed: no complaints at this time    Review of Systems: denies nausea, vomiting, dizziness, weakness, headaches, vision changes    Pain scale: denies    Diet: regular    Allergies: No Known Allergies    MEDICATIONS  (STANDING):  allopurinol 300 milliGRAM(s) Oral daily  ascorbic acid 500 milliGRAM(s) Oral daily  atorvastatin 80 milliGRAM(s) Oral at bedtime  chlorhexidine 4% Liquid 1 Application(s) Topical <User Schedule>  cyanocobalamin 1000 MICROGram(s) Oral daily  dextrose 5%. 1000 milliLiter(s) (50 mL/Hr) IV Continuous <Continuous>  dextrose 5%. 1000 milliLiter(s) (100 mL/Hr) IV Continuous <Continuous>  dextrose 50% Injectable 25 Gram(s) IV Push once  dextrose 50% Injectable 12.5 Gram(s) IV Push once  diltiazem    milliGRAM(s) Oral daily  glucagon  Injectable 1 milliGRAM(s) IntraMuscular once  hydrochlorothiazide 25 milliGRAM(s) Oral daily  insulin lispro (ADMELOG) corrective regimen sliding scale   SubCutaneous three times a day before meals  melatonin 5 milliGRAM(s) Oral at bedtime  pantoprazole    Tablet 40 milliGRAM(s) Oral before breakfast  phytonadione   Solution 5 milliGRAM(s) Oral daily  potassium chloride    Tablet ER 20 milliEquivalent(s) Oral every 2 hours  potassium chloride  20 mEq/100 mL IVPB 20 milliEquivalent(s) IV Intermittent every 2 hours  valsartan 320 milliGRAM(s) Oral daily    MEDICATIONS  (PRN):  acetaminophen     Tablet .. 650 milliGRAM(s) Oral every 6 hours PRN Temp greater or equal to 38C (100.4F), Mild Pain (1 - 3)  aluminum hydroxide/magnesium hydroxide/simethicone Suspension 30 milliLiter(s) Oral every 4 hours PRN Dyspepsia  dextrose Oral Gel 15 Gram(s) Oral once PRN Blood Glucose LESS THAN 70 milliGRAM(s)/deciliter  sodium chloride 0.9% lock flush 10 milliLiter(s) IV Push every 1 hour PRN Pre/post blood products, medications, blood draw, and to maintain line patency    Vital Signs Last 24 Hrs  T(C): 36.9 (16 Jan 2023 13:36), Max: 36.9 (15 Bill 2023 18:19)  T(F): 98.5 (16 Jan 2023 13:36), Max: 98.5 (16 Jan 2023 13:36)  HR: 79 (16 Jan 2023 13:36) (71 - 80)  BP: 155/79 (16 Jan 2023 13:36) (114/70 - 155/79)  BP(mean): --  RR: 18 (16 Jan 2023 13:36) (16 - 18)  SpO2: 98% (16 Jan 2023 13:36) (95% - 98%)    Parameters below as of 16 Jan 2023 13:36  Patient On (Oxygen Delivery Method): room air    PHYSICAL EXAM  General: NAD, laying in bed  HEENT: clear oropharynx  CV: normal S1/S2 RRR  Lungs: breath sounds clear and equal bilaterally  Abdomen: soft non-tender non-distended  Ext: no edema  Skin: no rashes   Neuro: alert and oriented X 4  Central Line: RUE PICC clean, dry, intact    LABS:                                   7.7    74.41 )-----------( 134      ( 16 Jan 2023 07:23 )             23.9     16 Jan 2023 07:23    139    |  101    |  22     ----------------------------<  100    3.0     |  28     |  0.90     Ca    9.8        16 Jan 2023 07:23  Phos  4.4       16 Jan 2023 07:23  Mg     1.6       16 Jan 2023 07:23    TPro  6.5    /  Alb  3.8    /  TBili  0.5    /  DBili  x      /  AST  44     /  ALT  23     /  AlkPhos  91     16 Jan 2023 07:23    PT/INR - ( 16 Jan 2023 07:23 )   PT: 14.6 sec;   INR: 1.27 ratio       PTT - ( 16 Jan 2023 07:23 )  PTT:26.4 sec    CAPILLARY BLOOD GLUCOSE  POCT Blood Glucose.: 100 mg/dL (16 Jan 2023 12:09)  POCT Blood Glucose.: 109 mg/dL (16 Jan 2023 08:35)  POCT Blood Glucose.: 117 mg/dL (15 Bill 2023 21:48)  POCT Blood Glucose.: 119 mg/dL (15 Bill 2023 18:13)    LIVER FUNCTIONS - ( 16 Jan 2023 07:23 )  Alb: 3.8 g/dL / Pro: 6.5 g/dL / ALK PHOS: 91 U/L / ALT: 23 U/L / AST: 44 U/L / GGT: x           RADIOLOGY & ADDITIONAL STUDIES:    from: CT Head No Cont (01.15.23 @ 17:05)   INTERPRETATION:  Acute left parietal extraaxial hemorrhage, measuring up   to 8mm at greatest depth.  No midline shift.  There is left holohemispheric increased density in the subdural space.    Differential includes chronic subdural hematoma and hygroma.

## 2023-01-16 NOTE — PROGRESS NOTE ADULT - SUBJECTIVE AND OBJECTIVE BOX
Patient seen and examined at bedside.    --Anticoagulation--    T(C): 36.7 (01-16-23 @ 09:15), Max: 36.9 (01-15-23 @ 18:19)  HR: 80 (01-16-23 @ 09:15) (71 - 80)  BP: 150/85 (01-16-23 @ 09:15) (111/66 - 150/85)  RR: 18 (01-16-23 @ 09:15) (16 - 18)  SpO2: 95% (01-16-23 @ 09:15) (94% - 97%)  Wt(kg): --    Exam: AOx3, FC, PERRL, EOMI, no facial, 5/5 throughout, no drift

## 2023-01-17 ENCOUNTER — RESULT REVIEW (OUTPATIENT)
Age: 83
End: 2023-01-17

## 2023-01-17 LAB
ALBUMIN SERPL ELPH-MCNC: 3.8 G/DL — SIGNIFICANT CHANGE UP (ref 3.3–5)
ALP SERPL-CCNC: 88 U/L — SIGNIFICANT CHANGE UP (ref 40–120)
ALT FLD-CCNC: 25 U/L — SIGNIFICANT CHANGE UP (ref 10–45)
ANION GAP SERPL CALC-SCNC: 14 MMOL/L — SIGNIFICANT CHANGE UP (ref 5–17)
APTT BLD: 25.5 SEC — LOW (ref 27.5–35.5)
AST SERPL-CCNC: 44 U/L — HIGH (ref 10–40)
BASOPHILS # BLD AUTO: 0 K/UL — SIGNIFICANT CHANGE UP (ref 0–0.2)
BASOPHILS NFR BLD AUTO: 0 % — SIGNIFICANT CHANGE UP (ref 0–2)
BILIRUB SERPL-MCNC: 0.4 MG/DL — SIGNIFICANT CHANGE UP (ref 0.2–1.2)
BUN SERPL-MCNC: 15 MG/DL — SIGNIFICANT CHANGE UP (ref 7–23)
CALCIUM SERPL-MCNC: 10.2 MG/DL — SIGNIFICANT CHANGE UP (ref 8.4–10.5)
CHLORIDE SERPL-SCNC: 103 MMOL/L — SIGNIFICANT CHANGE UP (ref 96–108)
CO2 SERPL-SCNC: 22 MMOL/L — SIGNIFICANT CHANGE UP (ref 22–31)
CREAT SERPL-MCNC: 0.91 MG/DL — SIGNIFICANT CHANGE UP (ref 0.5–1.3)
EGFR: 64 ML/MIN/1.73M2 — SIGNIFICANT CHANGE UP
EOSINOPHIL # BLD AUTO: 0 K/UL — SIGNIFICANT CHANGE UP (ref 0–0.5)
EOSINOPHIL NFR BLD AUTO: 0 % — SIGNIFICANT CHANGE UP (ref 0–6)
GLUCOSE BLDC GLUCOMTR-MCNC: 107 MG/DL — HIGH (ref 70–99)
GLUCOSE BLDC GLUCOMTR-MCNC: 115 MG/DL — HIGH (ref 70–99)
GLUCOSE BLDC GLUCOMTR-MCNC: 125 MG/DL — HIGH (ref 70–99)
GLUCOSE BLDC GLUCOMTR-MCNC: 87 MG/DL — SIGNIFICANT CHANGE UP (ref 70–99)
GLUCOSE SERPL-MCNC: 102 MG/DL — HIGH (ref 70–99)
HCT VFR BLD CALC: 25.4 % — LOW (ref 34.5–45)
HGB BLD-MCNC: 8 G/DL — LOW (ref 11.5–15.5)
INR BLD: 1.09 RATIO — SIGNIFICANT CHANGE UP (ref 0.88–1.16)
LDH SERPL L TO P-CCNC: 839 U/L — HIGH (ref 50–242)
LYMPHOCYTES # BLD AUTO: 12.06 K/UL — HIGH (ref 1–3.3)
LYMPHOCYTES # BLD AUTO: 18 % — SIGNIFICANT CHANGE UP (ref 13–44)
MAGNESIUM SERPL-MCNC: 1.6 MG/DL — SIGNIFICANT CHANGE UP (ref 1.6–2.6)
MCHC RBC-ENTMCNC: 28.5 PG — SIGNIFICANT CHANGE UP (ref 27–34)
MCHC RBC-ENTMCNC: 31.5 GM/DL — LOW (ref 32–36)
MCV RBC AUTO: 90.4 FL — SIGNIFICANT CHANGE UP (ref 80–100)
MONOCYTES # BLD AUTO: 0 K/UL — SIGNIFICANT CHANGE UP (ref 0–0.9)
MONOCYTES NFR BLD AUTO: 0 % — LOW (ref 2–14)
NEUTROPHILS # BLD AUTO: 6.7 K/UL — SIGNIFICANT CHANGE UP (ref 1.8–7.4)
NEUTROPHILS NFR BLD AUTO: 10 % — LOW (ref 43–77)
PHOSPHATE SERPL-MCNC: 3.3 MG/DL — SIGNIFICANT CHANGE UP (ref 2.5–4.5)
PLATELET # BLD AUTO: 97 K/UL — LOW (ref 150–400)
POTASSIUM SERPL-MCNC: 3.6 MMOL/L — SIGNIFICANT CHANGE UP (ref 3.5–5.3)
POTASSIUM SERPL-SCNC: 3.6 MMOL/L — SIGNIFICANT CHANGE UP (ref 3.5–5.3)
PROT SERPL-MCNC: 6.4 G/DL — SIGNIFICANT CHANGE UP (ref 6–8.3)
PROTHROM AB SERPL-ACNC: 12.5 SEC — SIGNIFICANT CHANGE UP (ref 10.5–13.4)
RBC # BLD: 2.81 M/UL — LOW (ref 3.8–5.2)
RBC # FLD: 15.7 % — HIGH (ref 10.3–14.5)
SODIUM SERPL-SCNC: 139 MMOL/L — SIGNIFICANT CHANGE UP (ref 135–145)
SURGICAL PATHOLOGY STUDY: SIGNIFICANT CHANGE UP
TM INTERPRETATION: SIGNIFICANT CHANGE UP
URATE SERPL-MCNC: 4.2 MG/DL — SIGNIFICANT CHANGE UP (ref 2.5–7)
WBC # BLD: 67.01 K/UL — CRITICAL HIGH (ref 3.8–10.5)
WBC # FLD AUTO: 67.01 K/UL — CRITICAL HIGH (ref 3.8–10.5)

## 2023-01-17 PROCEDURE — 88291 CYTO/MOLECULAR REPORT: CPT | Mod: 59

## 2023-01-17 PROCEDURE — 88342 IMHCHEM/IMCYTCHM 1ST ANTB: CPT | Mod: 26,59

## 2023-01-17 PROCEDURE — 99232 SBSQ HOSP IP/OBS MODERATE 35: CPT

## 2023-01-17 PROCEDURE — 88305 TISSUE EXAM BY PATHOLOGIST: CPT | Mod: 26

## 2023-01-17 PROCEDURE — 85097 BONE MARROW INTERPRETATION: CPT

## 2023-01-17 PROCEDURE — 88341 IMHCHEM/IMCYTCHM EA ADD ANTB: CPT | Mod: 26,59

## 2023-01-17 PROCEDURE — G0452: CPT | Mod: 26

## 2023-01-17 PROCEDURE — 88189 FLOWCYTOMETRY/READ 16 & >: CPT

## 2023-01-17 PROCEDURE — 88319 ENZYME HISTOCHEMISTRY: CPT | Mod: 26

## 2023-01-17 PROCEDURE — 99233 SBSQ HOSP IP/OBS HIGH 50: CPT

## 2023-01-17 PROCEDURE — 88313 SPECIAL STAINS GROUP 2: CPT | Mod: 26

## 2023-01-17 RX ORDER — MAGNESIUM SULFATE 500 MG/ML
2 VIAL (ML) INJECTION ONCE
Refills: 0 | Status: COMPLETED | OUTPATIENT
Start: 2023-01-17 | End: 2023-01-17

## 2023-01-17 RX ORDER — POTASSIUM CHLORIDE 20 MEQ
20 PACKET (EA) ORAL ONCE
Refills: 0 | Status: COMPLETED | OUTPATIENT
Start: 2023-01-17 | End: 2023-01-17

## 2023-01-17 RX ORDER — ACETAMINOPHEN 500 MG
1000 TABLET ORAL ONCE
Refills: 0 | Status: COMPLETED | OUTPATIENT
Start: 2023-01-17 | End: 2023-01-17

## 2023-01-17 RX ADMIN — Medication 25 GRAM(S): at 08:54

## 2023-01-17 RX ADMIN — Medication 20 MILLIEQUIVALENT(S): at 08:54

## 2023-01-17 RX ADMIN — VALSARTAN 320 MILLIGRAM(S): 80 TABLET ORAL at 06:37

## 2023-01-17 RX ADMIN — Medication 500 MILLIGRAM(S): at 11:22

## 2023-01-17 RX ADMIN — Medication 400 MILLIGRAM(S): at 21:40

## 2023-01-17 RX ADMIN — ATORVASTATIN CALCIUM 80 MILLIGRAM(S): 80 TABLET, FILM COATED ORAL at 21:40

## 2023-01-17 RX ADMIN — PREGABALIN 1000 MICROGRAM(S): 225 CAPSULE ORAL at 11:22

## 2023-01-17 RX ADMIN — CHLORHEXIDINE GLUCONATE 1 APPLICATION(S): 213 SOLUTION TOPICAL at 06:37

## 2023-01-17 RX ADMIN — PANTOPRAZOLE SODIUM 40 MILLIGRAM(S): 20 TABLET, DELAYED RELEASE ORAL at 06:37

## 2023-01-17 RX ADMIN — Medication 240 MILLIGRAM(S): at 06:38

## 2023-01-17 RX ADMIN — Medication 5 MILLIGRAM(S): at 21:40

## 2023-01-17 RX ADMIN — Medication 300 MILLIGRAM(S): at 11:23

## 2023-01-17 NOTE — PROGRESS NOTE ADULT - ASSESSMENT
82 year old female with atrial fibrillation, s/p Saint Darian mechanical AVR, (1994–[facility]) for rheumatic aortic valve stenosis, ascending aortic aneurysm, HTN,  HLD, depression, GERD transferred from [facility] for further work-up and management of leukocytosis associated with thrombocytopenia and anemia in the setting of ALL course complicated by L SDH.     1. ALL   2. L SDH  3. Mechanical AVR  4. Atrial Fibrillation       RECOMMENDATIONS:  - AC on hold as per Neurosurgery given L SDH patient has a mechanical AVR goal INR 2.5-3.5; Neurosurgery input noted, defer discussion of risk/benefit to primary team  - TTE shows aortic valve not well visualized but no gross abnormalities   - On Diltiazem for rate control goal HR < 110  - Continue Valsartan and HCTZ for hypertension     Cardiology will sign of. Please reach out with any questions or concern. 82 year old female with atrial fibrillation, s/p Saint Darian mechanical AVR, (1994–[facility]) for rheumatic aortic valve stenosis, ascending aortic aneurysm, HTN,  HLD, depression, GERD transferred from [facility] for further work-up and management of leukocytosis associated with thrombocytopenia and anemia in the setting of ALL course complicated by L SDH.     1. ALL   2. L SDH  3. Mechanical AVR  4. Atrial Fibrillation       RECOMMENDATIONS:  - AC on hold as per Neurosurgery given L SDH patient has a mechanical AVR goal INR 2.5-3.5; Neurosurgery input noted, defer discussion of risk/benefit of AC to primary team  - TTE shows aortic valve not well visualized but no gross abnormalities   - On Diltiazem for rate control goal HR < 110  - Continue Valsartan and HCTZ for hypertension     Cardiology will sign of. Please reach out with any questions or concern.

## 2023-01-17 NOTE — PROGRESS NOTE ADULT - ASSESSMENT
82 year old female with atrial fibrillation, s/p Saint Darian mechanical AVR, (1994–[facility]) for rheumatic aortic valve stenosis, ascending aortic aneurysm, HTN, HLD, depression, GERD transferred from [facility] for further work-up and management of leukocytosis associated with thrombocytopenia and anemia. She was also noted to have a supratherapeutic INR. CT angio A/P which showed no evidence of active GIB, but showing focal diminished cortical enhancement involving the upper left kidney concerning for focal pyelonephritis or possibly infarct. EGD without any acute findings. Colonoscopy showed grade I internal hemorrhoid and polyps in transverse colon and hepatic flexure. 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 Transferred to [address] [place] [address] for further treatment. Peripheral flow performed on 1/14/23 consistent with acute leukemia   Now admitted to [facility] for further assessment for possible underlying hematologic malignancy given worsening of her leukocytosis, thrombocytopenia and. 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 Leukocytosis, anemia, thrombocytopenia secondary to disease.   82 year old female with atrial fibrillation, s/p Saint Darian mechanical AVR, (1994–[facility]) for rheumatic aortic valve stenosis, ascending aortic aneurysm, HTN, HLD, depression, GERD transferred from [facility] for further work-up and management of leukocytosis associated with thrombocytopenia and anemia. She was also noted to have a supratherapeutic INR. CT angio A/P which showed no evidence of active GIB, but showing focal diminished cortical enhancement involving the upper left kidney concerning for focal pyelonephritis or possibly infarct. EGD without any acute findings. Colonoscopy showed grade I internal hemorrhoid and polyps in transverse colon and hepatic flexure. 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 Transferred to [address] [place] [address] for further treatment. 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 Leukocytosis, anemia, thrombocytopenia secondary to disease.

## 2023-01-17 NOTE — CHART NOTE - NSCHARTNOTEFT_GEN_A_CORE
While there is no absolute neurosurgical contraindication to systemic anti coagulation, there does exist an increased risk of intracranial hemorrhage which can result in significant morbidity including but not limited to headache, seizure, stroke, paralysis, and in severe cases even death.    The risks and benefits of starting systemic anticoagulation must be considered carefully in the setting of the patients medical history and current clinical condition.    If AC is started...  - Recommend NO bolus with low ptt goal of ~60-65  - Recommend obtaining follow up CTH after patient is therapeutic and to notify neurosurgery immediately with any changes in the patients neurologic exam

## 2023-01-17 NOTE — PROGRESS NOTE ADULT - ATTENDING COMMENTS
83 YO F with a PMhx of A.fib, s/p Saint Darian mechanical AVR, (1994–[facility]) for rheumatic aortic valve stenosis, ascending aortic aneurysm, HTN,  HLD, depression, GERD transferred from [facility] for further work-up and management of leukocytosis associated with thrombocytopenia and anemia. She was also noted to have a supratherapeutic INR. CT angio A/P which showed no evidence of active GIB, but showing focal diminished cortical enhancement involving the upper left kidney concerning for focal pyelonephritis or possibly infarct. EGD without any acute findings. Colonoscopy showed grade I internal hemorrhoid and polyps in transverse colon and hepatic flexure. 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 She is now admitted to [facility] on 1/14/23 for further work up.    Leukocytosis  -infectious work up done at [facility] unremarkable  -CBC shows 65.3 with mostly lymphocytes  -PB flow cytometry (1/14/23) consistent with acute leukemia:    Blasts identified, 60% of total, with the following immunophenotype; CD45 (dim to negative), CD34, CD10+, CD19+, CD20 CD22+, CD11b (partial dim)+, CD38+ and HLADR+(heterogenous); negative for, CD2, CD3, CD4, CD5, CD7, CD8, , CD33, CD14, CD16, CD15, CD11b, CD56, CD4, CD64, kappa and lambda (surface). Immunophenotypic features are consistent with acute leukemia.      -will need BMBx on Tuesday  -will obtain baseline echo  -will get PICC line .  -Monitor LDH, Uric Acid and CMP daily   -keep Hb >7, plt >10  -start allopurinol    ICH  -headaches since 1/15/23, now improved  -1/15/23 CT head showed 8mm bleed, repeat CT is stable  -no intervention as per Neursx  -hold coumadin (confirmed with cardiology)  -keep plt >100  -completed Vit K    Cardio  HTN - c/w home meds  Mechanical valve - currently off AC, discussed with cardiology, will consider starting LMWH on 1/17/23 with a goal PTT of ~60 and repeat a CT head shortly after

## 2023-01-17 NOTE — PROGRESS NOTE ADULT - SUBJECTIVE AND OBJECTIVE BOX
Cardiology Fellow Consult Progress Note    Subjective:    No acute events overnight. No significant tele events. ROS negative at the bedside.     REVIEW OF SYSTEMS:  CONSTITUTIONAL: No weakness, fevers or chills  EYES/ENT: No visual changes;  No dysphagia  NECK: No pain or stiffness  RESPIRATORY: No cough, wheezing, hemoptysis; No shortness of breath  CARDIOVASCULAR: No chest pain or palpitations; No lower extremity edema  GASTROINTESTINAL: No abdominal or epigastric pain. No nausea, vomiting, or hematemesis; No diarrhea or constipation. No melena or hematochezia.  BACK: No back pain  GENITOURINARY: No dysuria, frequency or hematuria  NEUROLOGICAL: No numbness or weakness  SKIN: No itching, burning, rashes, or lesions   All other review of systems is negative unless indicated above.    Physical Exam:  T(F): 98.1 (01-17), Max: 98.6 (01-17)  HR: 79 (01-17) (73 - 83)  BP: 154/79 (01-17) (126/71 - 157/78)  RR: 18 (01-17)  SpO2: 97% (01-17)  Appearance: NAD  Eyes: PERRL, EOMI  HENT: Normal oral mucosa, NC/AT  Cardiovascular: irregular rhythm, mechanical valve click RUSB no LE edema   Respiratory: Clear to auscultation bilaterally  Gastrointestinal: Soft, non-tender, non-distended, BS+  Musculoskeletal: No clubbing, no joint deformity   Neurologic: Non-focal  Lymphatic: No lymphadenopathy  Psychiatry: AAOx3, mood & affect appropriate  Skin: No rashes, no ecchymoses, no cyanosis    Cardiovascular diagnostic testings: personally reviewed    Imaging diagnostic testings: personally reviewed    Labs: Personally reviewed                        8.0    67.01 )-----------( 97       ( 17 Jan 2023 07:02 )             25.4     01-17    139  |  103  |  15  ----------------------------<  102<H>  3.6   |  22  |  0.91    Ca    10.2      17 Jan 2023 07:06  Phos  3.3     01-17  Mg     1.6     01-17    TPro  6.4  /  Alb  3.8  /  TBili  0.4  /  DBili  x   /  AST  44<H>  /  ALT  25  /  AlkPhos  88  01-17    PT/INR - ( 17 Jan 2023 07:01 )   PT: 12.5 sec;   INR: 1.09 ratio         PTT - ( 17 Jan 2023 07:01 )  PTT:25.5 sec

## 2023-01-17 NOTE — PROGRESS NOTE ADULT - PROBLEM SELECTOR PLAN 3
in setting of thrombocytopenia and on warfarin for mechanical AVR ([facility]), atrial fibrillation  last doses of warfarin 1/12 and 1/13 at [facility] - 5mg daily  neurosurgery, and cardiology consulted  will keep PLTs > 100k

## 2023-01-17 NOTE — PROGRESS NOTE ADULT - PROBLEM SELECTOR PLAN 2
patient is not neutropenic, afebrile  if spike panculture and cxr patient is not neutropenic, afebrile  if spike panculture and CXR

## 2023-01-17 NOTE — PROGRESS NOTE ADULT - PROBLEM SELECTOR PLAN 1
Flow cytometry (1/14/23) consistent with acute leukemia  Monitor daily CBC with Diff/CMP and transfuse/replete PRN  Keep PLT >100 due to SDH  daily TLS labs  1/14 pending echo  1/16 hypokalemia- repleted  1/17 HLA sent- follow up  Plan for BMbx 1/17 Flow cytometry (1/14/23) consistent with acute leukemia (B ALL)  Monitor daily CBC with Diff/CMP and transfuse/replete PRN  Keep PLT >100 due to SDH  daily TLS labs  1/14 TTE - EF 57%, 1/16 HLA sent  1/17  Plan for BMbx ([facility])

## 2023-01-17 NOTE — CHART NOTE - NSCHARTNOTEFT_GEN_A_CORE
Medicine PA Episodic Note    Patient is a 82y old  Female who presents with a chief complaint of Leukocytosis, Thrombocytopenia and Anemia (17 Jan 2023 13:14)    Notified by RN of pt. having headache. Pt. seen and examined at bedside resting, in NAD, AOx3. Upon questioning, pt. states that she had a headache because of her IV pump alarm going off. Now as the pump is not beeping pt. states that her headache is much better. Pt. states the headache was previously a 7/10 on pain scale and is currently a 4/10 on pain scale. Pt. denies any lightheadedness, dizziness, blurry vision, vision changes, or any other acute symptoms.     Vital Signs Last 24 Hrs  T(C): 36.9 (17 Jan 2023 21:16), Max: 37 (17 Jan 2023 09:07)  T(F): 98.4 (17 Jan 2023 21:16), Max: 98.6 (17 Jan 2023 09:07)  HR: 83 (17 Jan 2023 21:16) (77 - 83)  BP: 133/72 (17 Jan 2023 21:16) (126/70 - 157/78)  BP(mean): --  RR: 18 (17 Jan 2023 21:16) (16 - 18)  SpO2: 95% (17 Jan 2023 21:16) (95% - 97%)    Parameters below as of 17 Jan 2023 21:16  Patient On (Oxygen Delivery Method): room air          Labs:                          8.0    67.01 )-----------( 97       ( 17 Jan 2023 07:02 )             25.4     01-17    139  |  103  |  15  ----------------------------<  102<H>  3.6   |  22  |  0.91    Ca    10.2      17 Jan 2023 07:06  Phos  3.3     01-17  Mg     1.6     01-17    TPro  6.4  /  Alb  3.8  /  TBili  0.4  /  DBili  x   /  AST  44<H>  /  ALT  25  /  AlkPhos  88  01-17        Radiology:  < from: CT Head No Cont (01.16.23 @ 11:53) >      ACC: [id number] EXAM:  CT BRAIN                          PROCEDURE DATE:  01/16/2023          INTERPRETATION:  HISTORY: Subdural hemorrhage. Follow-up.    COMPARISON: CT head 1/15/2023 8:51 PM    TECHNIQUE: Axial noncontrast CT images of the head were obtained and   submitted for interpretation. Sagittal and coronal reformatted images   were provided. Bone and soft tissue windows were evaluated.    FINDINGS:    Redemonstrated acute left parietal subdural bleed leading up to 8 mm in   greatest transverse thickness, unchanged in size. No midline shift. No   new site of bleeding or progressive mass effect.    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 Ventriclesare   unchanged in size without hydrocephalus. Basal cisterns are patent.    Visualized paranasal sinuses  and mastoid air cells are clear. The   calvarium is intact.    IMPRESSION:    No interval change in left parietal subdural bleed.  No new site of bleeding or progressive mass effect.    --- End of Report ---           CLIFFORD ACEVEDO MD; Resident Radiology  This document has been electronically signed.  ELISA GOLDEN MD; Attending Radiologist  This document has been electronically signed. Jan 16 2023  1:42PM    Physical Exam:  General: WN/WD NAD  Neurology: A&Ox3, nonfocal, VALENTE x 4, PERRLA, EOMI, cranial nerves intact, (+)  strength b/l  Head:  Normocephalic, atraumatic  Respiratory: CTA B/L  CV: RRR, S1S2, no murmur  Abdominal: Soft, NT, ND no palpable mass  MSK: No edema, + peripheral pulses, FROM all 4 extremity    Assessment & Plan:  HPI:  82 year old female with atrial fibrillation, s/p [place] mechanical AVR, (1994–[facility]) for rheumatic aortic valve stenosis, ascending aortic aneurysm, HTN,  HLD, depression, GERD transferred from [facility] for further work-up and management of leukocytosis associated with thrombocytopenia and anemia. Patient initially presented to [facility] on 1/9/23 with complaints of black tarry stool and epigastric pain, and was subsequently found to be with anemia, thrombocytopenia and leukocytosis (lymphocytic predominant). She was also noted to have a supratherapeutic INR. She had CT angio A/P which showed no evidence of active GIB, but showing focal diminished cortical enhancement involving the upper left kidney concerning for focal pyelonephritis or possibly infarct. EGD without any acute findings. Colonoscopy showed showed grade I internal hemorrhoid and polyps in transverse colon and hepatic flexure. 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 (14 Jan 2023 11:56)    Now w/ headache that is resolving with the removal of noise disturbance from IV pump alarm going off.     Plan:  #Headache - resolving  - Hx of acute on chronic SDH  - C/W Tylenol PRN for headache  - Monitor VS  - Consider CTH if pt.'s symptoms persist or worsen  - Plan d/w RN  - Will endorse to day team in AM.      Follow up with Attending in AM.    Rachid Bill PA-C  Department of Medicine  Spectralink [phone]

## 2023-01-17 NOTE — PROGRESS NOTE ADULT - SUBJECTIVE AND OBJECTIVE BOX
Diagnosis: newly diagnosed B-ALL    Protocol/Chemo Regimen: to be determined    Day: n/a     Pt endorsed:     Review of Systems:     Pain scale: denies    Diet: regular    Allergies: No Known Allergies    MEDICATIONS  (STANDING):  allopurinol 300 milliGRAM(s) Oral daily  ascorbic acid 500 milliGRAM(s) Oral daily  atorvastatin 80 milliGRAM(s) Oral at bedtime  chlorhexidine 4% Liquid 1 Application(s) Topical <User Schedule>  cyanocobalamin 1000 MICROGram(s) Oral daily  dextrose 5%. 1000 milliLiter(s) (50 mL/Hr) IV Continuous <Continuous>  dextrose 5%. 1000 milliLiter(s) (100 mL/Hr) IV Continuous <Continuous>  dextrose 50% Injectable 25 Gram(s) IV Push once  dextrose 50% Injectable 12.5 Gram(s) IV Push once  diltiazem    milliGRAM(s) Oral daily  glucagon  Injectable 1 milliGRAM(s) IntraMuscular once  hydrochlorothiazide 25 milliGRAM(s) Oral daily  insulin lispro (ADMELOG) corrective regimen sliding scale   SubCutaneous three times a day before meals  magnesium sulfate  IVPB 2 Gram(s) IV Intermittent once  melatonin 5 milliGRAM(s) Oral at bedtime  pantoprazole    Tablet 40 milliGRAM(s) Oral before breakfast  potassium chloride    Tablet ER 20 milliEquivalent(s) Oral once  valsartan 320 milliGRAM(s) Oral daily    MEDICATIONS  (PRN):  acetaminophen     Tablet .. 650 milliGRAM(s) Oral every 6 hours PRN Temp greater or equal to 38C (100.4F), Mild Pain (1 - 3)  aluminum hydroxide/magnesium hydroxide/simethicone Suspension 30 milliLiter(s) Oral every 4 hours PRN Dyspepsia  dextrose Oral Gel 15 Gram(s) Oral once PRN Blood Glucose LESS THAN 70 milliGRAM(s)/deciliter  sodium chloride 0.9% lock flush 10 milliLiter(s) IV Push every 1 hour PRN Pre/post blood products, medications, blood draw, and to maintain line patency      Vital Signs Last 24 Hrs  T(C): 36.7 (17 Jan 2023 06:34), Max: 36.9 (16 Jan 2023 13:36)  T(F): 98 (17 Jan 2023 06:34), Max: 98.5 (16 Jan 2023 13:36)  HR: 83 (17 Jan 2023 06:34) (73 - 83)  BP: 147/82 (17 Jan 2023 06:34) (126/71 - 155/79)  BP(mean): --  RR: 16 (17 Jan 2023 06:34) (16 - 18)  SpO2: 96% (17 Jan 2023 06:34) (93% - 98%)    Parameters below as of 17 Jan 2023 06:34  Patient On (Oxygen Delivery Method): room air        PHYSICAL EXAM  General: NAD, laying in bed  HEENT: clear oropharynx  CV: normal S1/S2 RRR  Lungs: breath sounds clear and equal bilaterally  Abdomen: soft non-tender non-distended  Ext: no edema  Skin: no rashes   Neuro: alert and oriented X 4  Central Line: RUE PICC clean, dry, intact    LABS:                        -------      RADIOLOGY & ADDITIONAL STUDIES:    from: CT Head No Cont (01.15.23 @ 17:05)   INTERPRETATION:  Acute left parietal extraaxial hemorrhage, measuring up   to 8mm at greatest depth.  No midline shift.  There is left holohemispheric increased density in the subdural space.    Differential includes chronic subdural hematoma and hygroma.         Diagnosis: newly diagnosed B-ALL    Protocol/Chemo Regimen: to be determined    Day: n/a     Pt endorsed: No overnight events, afebrile, taking po's    Review of Systems: Denies n/v, HA or dizziness,     Pain scale: denies    Diet: regular    Allergies: No Known Allergies    MEDICATIONS  (STANDING):  allopurinol 300 milliGRAM(s) Oral daily  ascorbic acid 500 milliGRAM(s) Oral daily  atorvastatin 80 milliGRAM(s) Oral at bedtime  chlorhexidine 4% Liquid 1 Application(s) Topical <User Schedule>  cyanocobalamin 1000 MICROGram(s) Oral daily  dextrose 5%. 1000 milliLiter(s) (50 mL/Hr) IV Continuous <Continuous>  dextrose 5%. 1000 milliLiter(s) (100 mL/Hr) IV Continuous <Continuous>  dextrose 50% Injectable 25 Gram(s) IV Push once  dextrose 50% Injectable 12.5 Gram(s) IV Push once  diltiazem    milliGRAM(s) Oral daily  glucagon  Injectable 1 milliGRAM(s) IntraMuscular once  hydrochlorothiazide 25 milliGRAM(s) Oral daily  insulin lispro (ADMELOG) corrective regimen sliding scale   SubCutaneous three times a day before meals  magnesium sulfate  IVPB 2 Gram(s) IV Intermittent once  melatonin 5 milliGRAM(s) Oral at bedtime  pantoprazole    Tablet 40 milliGRAM(s) Oral before breakfast  potassium chloride    Tablet ER 20 milliEquivalent(s) Oral once  valsartan 320 milliGRAM(s) Oral daily    MEDICATIONS  (PRN):  acetaminophen     Tablet .. 650 milliGRAM(s) Oral every 6 hours PRN Temp greater or equal to 38C (100.4F), Mild Pain (1 - 3)  aluminum hydroxide/magnesium hydroxide/simethicone Suspension 30 milliLiter(s) Oral every 4 hours PRN Dyspepsia  dextrose Oral Gel 15 Gram(s) Oral once PRN Blood Glucose LESS THAN 70 milliGRAM(s)/deciliter  sodium chloride 0.9% lock flush 10 milliLiter(s) IV Push every 1 hour PRN Pre/post blood products, medications, blood draw, and to maintain line patency      Vital Signs Last 24 Hrs  T(C): 36.7 (17 Jan 2023 06:34), Max: 36.9 (16 Jan 2023 13:36)  T(F): 98 (17 Jan 2023 06:34), Max: 98.5 (16 Jan 2023 13:36)  HR: 83 (17 Jan 2023 06:34) (73 - 83)  BP: 147/82 (17 Jan 2023 06:34) (126/71 - 155/79)  BP(mean): --  RR: 16 (17 Jan 2023 06:34) (16 - 18)  SpO2: 96% (17 Jan 2023 06:34) (93% - 98%)    Parameters below as of 17 Jan 2023 06:34  Patient On (Oxygen Delivery Method): room air    PHYSICAL EXAM  General: NAD, laying in bed  HEENT: Sclerae anicteric, clear oropharynx, no oral lesions  CV: normal S1/S2 RRR  Lungs: breath sounds clear and equal bilaterally  Abdomen: soft non-tender non-distended  Ext: no edema  Skin: no rashes   Neuro: alert and oriented X 4  Central Line: RUE PICC clean, dry, intact    LABS:                                 8.0    67.01 )-----------( 97       ( 17 Jan 2023 07:02 )             25.4     17 Jan 2023 07:06    139    |  103    |  15     ----------------------------<  102    3.6     |  22     |  0.91     Ca    10.2       17 Jan 2023 07:06  Phos  3.3       17 Jan 2023 07:06  Mg     1.6       17 Jan 2023 07:06    TPro  6.4    /  Alb  3.8    /  TBili  0.4    /  DBili  x      /  AST  44     /  ALT  25     /  AlkPhos  88     17 Jan 2023 07:06    PT/INR - ( 17 Jan 2023 07:01 )   PT: 12.5 sec;   INR: 1.09 ratio    PTT - ( 17 Jan 2023 07:01 )  PTT:25.5 sec    POCT Blood Glucose.: 115 mg/dL (17 Jan 2023 11:53)  POCT Blood Glucose.: 87 mg/dL (17 Jan 2023 08:39)  POCT Blood Glucose.: 93 mg/dL (16 Jan 2023 21:21)  POCT Blood Glucose.: 106 mg/dL (16 Jan 2023 16:53)    LIVER FUNCTIONS - ( 17 Jan 2023 07:06 )  Alb: 3.8 g/dL / Pro: 6.4 g/dL / ALK PHOS: 88 U/L / ALT: 25 U/L / AST: 44 U/L / GGT: x             RADIOLOGY & ADDITIONAL STUDIES:    from: CT Head No Cont (01.15.23 @ 17:05)   INTERPRETATION:  Acute left parietal extraaxial hemorrhage, measuring up   to 8mm at greatest depth.  No midline shift.  There is left holohemispheric increased density in the subdural space.    Differential includes chronic subdural hematoma and hygroma.

## 2023-01-18 LAB
ALBUMIN SERPL ELPH-MCNC: 4 G/DL — SIGNIFICANT CHANGE UP (ref 3.3–5)
ALP SERPL-CCNC: 98 U/L — SIGNIFICANT CHANGE UP (ref 40–120)
ALT FLD-CCNC: 27 U/L — SIGNIFICANT CHANGE UP (ref 10–45)
ANION GAP SERPL CALC-SCNC: 11 MMOL/L — SIGNIFICANT CHANGE UP (ref 5–17)
APTT BLD: 25.7 SEC — LOW (ref 27.5–35.5)
AST SERPL-CCNC: 48 U/L — HIGH (ref 10–40)
BASOPHILS # BLD AUTO: 0 K/UL — SIGNIFICANT CHANGE UP (ref 0–0.2)
BASOPHILS NFR BLD AUTO: 0 % — SIGNIFICANT CHANGE UP (ref 0–2)
BILIRUB SERPL-MCNC: 0.5 MG/DL — SIGNIFICANT CHANGE UP (ref 0.2–1.2)
BUN SERPL-MCNC: 15 MG/DL — SIGNIFICANT CHANGE UP (ref 7–23)
CALCIUM SERPL-MCNC: 10 MG/DL — SIGNIFICANT CHANGE UP (ref 8.4–10.5)
CHLORIDE SERPL-SCNC: 102 MMOL/L — SIGNIFICANT CHANGE UP (ref 96–108)
CO2 SERPL-SCNC: 26 MMOL/L — SIGNIFICANT CHANGE UP (ref 22–31)
CREAT SERPL-MCNC: 0.87 MG/DL — SIGNIFICANT CHANGE UP (ref 0.5–1.3)
EGFR: 67 ML/MIN/1.73M2 — SIGNIFICANT CHANGE UP
EOSINOPHIL # BLD AUTO: 0.9 K/UL — HIGH (ref 0–0.5)
EOSINOPHIL NFR BLD AUTO: 1 % — SIGNIFICANT CHANGE UP (ref 0–6)
GLUCOSE BLDC GLUCOMTR-MCNC: 103 MG/DL — HIGH (ref 70–99)
GLUCOSE BLDC GLUCOMTR-MCNC: 113 MG/DL — HIGH (ref 70–99)
GLUCOSE BLDC GLUCOMTR-MCNC: 123 MG/DL — HIGH (ref 70–99)
GLUCOSE BLDC GLUCOMTR-MCNC: 135 MG/DL — HIGH (ref 70–99)
GLUCOSE BLDC GLUCOMTR-MCNC: 203 MG/DL — HIGH (ref 70–99)
GLUCOSE BLDC GLUCOMTR-MCNC: 96 MG/DL — SIGNIFICANT CHANGE UP (ref 70–99)
GLUCOSE SERPL-MCNC: 102 MG/DL — HIGH (ref 70–99)
HCT VFR BLD CALC: 25.5 % — LOW (ref 34.5–45)
HCT VFR BLD CALC: 28.1 % — LOW (ref 34.5–45)
HGB BLD-MCNC: 8 G/DL — LOW (ref 11.5–15.5)
HGB BLD-MCNC: 8.8 G/DL — LOW (ref 11.5–15.5)
HLX FLT3 FINAL REPORT: SIGNIFICANT CHANGE UP
INR BLD: 0.98 RATIO — SIGNIFICANT CHANGE UP (ref 0.88–1.16)
LDH SERPL L TO P-CCNC: 958 U/L — HIGH (ref 50–242)
LYMPHOCYTES # BLD AUTO: 6.33 K/UL — HIGH (ref 1–3.3)
LYMPHOCYTES # BLD AUTO: 7 % — LOW (ref 13–44)
MAGNESIUM SERPL-MCNC: 2 MG/DL — SIGNIFICANT CHANGE UP (ref 1.6–2.6)
MCHC RBC-ENTMCNC: 28 PG — SIGNIFICANT CHANGE UP (ref 27–34)
MCHC RBC-ENTMCNC: 28 PG — SIGNIFICANT CHANGE UP (ref 27–34)
MCHC RBC-ENTMCNC: 31.3 GM/DL — LOW (ref 32–36)
MCHC RBC-ENTMCNC: 31.4 GM/DL — LOW (ref 32–36)
MCV RBC AUTO: 89.2 FL — SIGNIFICANT CHANGE UP (ref 80–100)
MCV RBC AUTO: 89.5 FL — SIGNIFICANT CHANGE UP (ref 80–100)
MONOCYTES # BLD AUTO: 1.81 K/UL — HIGH (ref 0–0.9)
MONOCYTES NFR BLD AUTO: 2 % — SIGNIFICANT CHANGE UP (ref 2–14)
NEUTROPHILS # BLD AUTO: 8.14 K/UL — HIGH (ref 1.8–7.4)
NEUTROPHILS NFR BLD AUTO: 7 % — LOW (ref 43–77)
NRBC # BLD: 0 /100 WBCS — SIGNIFICANT CHANGE UP (ref 0–0)
PHOSPHATE SERPL-MCNC: 3.9 MG/DL — SIGNIFICANT CHANGE UP (ref 2.5–4.5)
PLATELET # BLD AUTO: 104 K/UL — LOW (ref 150–400)
PLATELET # BLD AUTO: 96 K/UL — LOW (ref 150–400)
POTASSIUM SERPL-MCNC: 4 MMOL/L — SIGNIFICANT CHANGE UP (ref 3.5–5.3)
POTASSIUM SERPL-SCNC: 4 MMOL/L — SIGNIFICANT CHANGE UP (ref 3.5–5.3)
PROT SERPL-MCNC: 6.7 G/DL — SIGNIFICANT CHANGE UP (ref 6–8.3)
PROTHROM AB SERPL-ACNC: 11.3 SEC — SIGNIFICANT CHANGE UP (ref 10.5–13.4)
RBC # BLD: 2.86 M/UL — LOW (ref 3.8–5.2)
RBC # BLD: 3.14 M/UL — LOW (ref 3.8–5.2)
RBC # FLD: 15.8 % — HIGH (ref 10.3–14.5)
RBC # FLD: 15.8 % — HIGH (ref 10.3–14.5)
SODIUM SERPL-SCNC: 139 MMOL/L — SIGNIFICANT CHANGE UP (ref 135–145)
TM INTERPRETATION: SIGNIFICANT CHANGE UP
URATE SERPL-MCNC: 3.6 MG/DL — SIGNIFICANT CHANGE UP (ref 2.5–7)
WBC # BLD: 90.44 K/UL — CRITICAL HIGH (ref 3.8–10.5)
WBC # BLD: 99.74 K/UL — CRITICAL HIGH (ref 3.8–10.5)
WBC # FLD AUTO: 90.44 K/UL — CRITICAL HIGH (ref 3.8–10.5)
WBC # FLD AUTO: 99.74 K/UL — CRITICAL HIGH (ref 3.8–10.5)

## 2023-01-18 PROCEDURE — 99233 SBSQ HOSP IP/OBS HIGH 50: CPT

## 2023-01-18 RX ORDER — DEXAMETHASONE 0.5 MG/5ML
10 ELIXIR ORAL EVERY 12 HOURS
Refills: 0 | Status: COMPLETED | OUTPATIENT
Start: 2023-01-19 | End: 2023-01-24

## 2023-01-18 RX ORDER — HYDROXYUREA 500 MG/1
3000 CAPSULE ORAL ONCE
Refills: 0 | Status: COMPLETED | OUTPATIENT
Start: 2023-01-18 | End: 2023-01-18

## 2023-01-18 RX ORDER — DEXAMETHASONE 0.5 MG/5ML
20 ELIXIR ORAL DAILY
Refills: 0 | Status: DISCONTINUED | OUTPATIENT
Start: 2023-01-18 | End: 2023-01-18

## 2023-01-18 RX ORDER — HYDROXYUREA 500 MG/1
2000 CAPSULE ORAL EVERY 12 HOURS
Refills: 0 | Status: DISCONTINUED | OUTPATIENT
Start: 2023-01-18 | End: 2023-01-20

## 2023-01-18 RX ORDER — DEXAMETHASONE 0.5 MG/5ML
20 ELIXIR ORAL ONCE
Refills: 0 | Status: COMPLETED | OUTPATIENT
Start: 2023-01-18 | End: 2023-01-18

## 2023-01-18 RX ADMIN — Medication 240 MILLIGRAM(S): at 05:50

## 2023-01-18 RX ADMIN — Medication 300 MILLIGRAM(S): at 12:01

## 2023-01-18 RX ADMIN — VALSARTAN 320 MILLIGRAM(S): 80 TABLET ORAL at 05:50

## 2023-01-18 RX ADMIN — PREGABALIN 1000 MICROGRAM(S): 225 CAPSULE ORAL at 12:01

## 2023-01-18 RX ADMIN — HYDROXYUREA 3000 MILLIGRAM(S): 500 CAPSULE ORAL at 15:22

## 2023-01-18 RX ADMIN — CHLORHEXIDINE GLUCONATE 1 APPLICATION(S): 213 SOLUTION TOPICAL at 07:05

## 2023-01-18 RX ADMIN — HYDROXYUREA 2000 MILLIGRAM(S): 500 CAPSULE ORAL at 22:20

## 2023-01-18 RX ADMIN — PANTOPRAZOLE SODIUM 40 MILLIGRAM(S): 20 TABLET, DELAYED RELEASE ORAL at 05:50

## 2023-01-18 RX ADMIN — Medication 110 MILLIGRAM(S): at 15:21

## 2023-01-18 RX ADMIN — ATORVASTATIN CALCIUM 80 MILLIGRAM(S): 80 TABLET, FILM COATED ORAL at 21:11

## 2023-01-18 RX ADMIN — Medication 500 MILLIGRAM(S): at 12:01

## 2023-01-18 RX ADMIN — Medication 5 MILLIGRAM(S): at 21:11

## 2023-01-18 NOTE — PROGRESS NOTE ADULT - SUBJECTIVE AND OBJECTIVE BOX
Diagnosis: newly diagnosed B-ALL    Protocol/Chemo Regimen: to be determined    Day: n/a     Pt endorsed: mild headache overnight, resolved     Review of Systems: Denies n/v, HA or dizziness,     Pain scale: denies    Diet: regular    Allergies: No Known Allergies    STANDING MEDICATIONS  allopurinol 300 milliGRAM(s) Oral daily  ascorbic acid 500 milliGRAM(s) Oral daily  atorvastatin 80 milliGRAM(s) Oral at bedtime  chlorhexidine 4% Liquid 1 Application(s) Topical <User Schedule>  cyanocobalamin 1000 MICROGram(s) Oral daily  dextrose 5%. 1000 milliLiter(s) IV Continuous <Continuous>  dextrose 5%. 1000 milliLiter(s) IV Continuous <Continuous>  dextrose 50% Injectable 25 Gram(s) IV Push once  dextrose 50% Injectable 12.5 Gram(s) IV Push once  diltiazem    milliGRAM(s) Oral daily  glucagon  Injectable 1 milliGRAM(s) IntraMuscular once  hydrochlorothiazide 25 milliGRAM(s) Oral daily  insulin lispro (ADMELOG) corrective regimen sliding scale   SubCutaneous three times a day before meals  melatonin 5 milliGRAM(s) Oral at bedtime  pantoprazole    Tablet 40 milliGRAM(s) Oral before breakfast  valsartan 320 milliGRAM(s) Oral daily      PRN MEDICATIONS  acetaminophen     Tablet .. 650 milliGRAM(s) Oral every 6 hours PRN  aluminum hydroxide/magnesium hydroxide/simethicone Suspension 30 milliLiter(s) Oral every 4 hours PRN  dextrose Oral Gel 15 Gram(s) Oral once PRN  sodium chloride 0.9% lock flush 10 milliLiter(s) IV Push every 1 hour PRN      Vital Signs Last 24 Hrs  T(C): 36.4 (18 Jan 2023 04:46), Max: 37 (17 Jan 2023 09:07)  T(F): 97.5 (18 Jan 2023 04:46), Max: 98.6 (17 Jan 2023 09:07)  HR: 66 (18 Jan 2023 04:46) (66 - 83)  BP: 123/70 (18 Jan 2023 04:46) (119/66 - 157/78)  RR: 18 (18 Jan 2023 04:46) (18 - 18)  SpO2: 95% (18 Jan 2023 04:46) (95% - 97%)    Parameters below as of 18 Jan 2023 04:46  Patient On (Oxygen Delivery Method): room air      PHYSICAL EXAM  General: NAD, laying in bed  HEENT: Sclerae anicteric, clear oropharynx, no oral lesions  CV: normal S1/S2 RRR  Lungs: breath sounds clear and equal bilaterally  Abdomen: soft non-tender non-distended  Ext: no edema  Skin: no rashes   Neuro: alert and oriented X 4  Central Line: RUE PICC clean, dry, intact    Cultures:  Culture - Stool (01.11.23 @ 10:36)    Specimen Source: .Stool Feces inna jv    Culture Results:   No enteric gram negative rods isolated  No enteric pathogens isolated.  (Stool culture examined for Salmonella,  Shigella, Campylobacter, Aeromonas, Plesiomonas,  Vibrio, E.coli O157 and Yersinia)      LABS:                            8.0    x     )-----------( 104      ( 18 Jan 2023 06:51 )             25.5         Mean Cell Volume : 89.2 fl  Mean Cell Hemoglobin : 28.0 pg  Mean Cell Hemoglobin Concentration : 31.4 gm/dL  Auto Neutrophil # : x  Auto Lymphocyte # : x  Auto Monocyte # : x  Auto Eosinophil # : x  Auto Basophil # : x  Auto Neutrophil % : x  Auto Lymphocyte % : x  Auto Monocyte % : x  Auto Eosinophil % : x  Auto Basophil % : x        RADIOLOGY & ADDITIONAL STUDIES:  from: Xray Chest 1 View- PORTABLE-Urgent (Xray Chest 1 View- PORTABLE-Urgent .) (01.16.23 @ 13:16)   FINDINGS:  Heart/Vascular: The cardiac silhouetteis normal in size. Sternotomy   wires. Right PICC terminating within the distal SVC.  Pulmonary: No focal consolidation, pleural effusion or pneumothorax.  Bones: The visualized osseous structures are unremarkable.  Impression:  Right PICC terminating within the distal SVC.      from: CT Head No Cont (01.16.23 @ 11:53)   FINDINGS:  Redemonstrated acute left parietal subdural bleed leading up to 8 mm in   greatest transverse thickness, unchanged in size. No midline shift. No   new site of bleeding or progressive mass effect.  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 Ventriclesare   unchanged in size without hydrocephalus. Basal cisterns are patent.  Visualized paranasal sinuses  and mastoid air cells are clear. The   calvarium is intact.  IMPRESSION:  No interval change in left parietal subdural bleed.  No new site of bleeding or progressive mass effect.                   Diagnosis: newly diagnosed B-ALL    Protocol/Chemo Regimen: to be determined    Day: n/a     Pt endorsed: mild headache overnight, resolved     Review of Systems: Denies n/v, HA or dizziness,     Pain scale: denies    Diet: regular    Allergies: No Known Allergies    STANDING MEDICATIONS  allopurinol 300 milliGRAM(s) Oral daily  ascorbic acid 500 milliGRAM(s) Oral daily  atorvastatin 80 milliGRAM(s) Oral at bedtime  chlorhexidine 4% Liquid 1 Application(s) Topical <User Schedule>  cyanocobalamin 1000 MICROGram(s) Oral daily  dextrose 5%. 1000 milliLiter(s) IV Continuous <Continuous>  dextrose 5%. 1000 milliLiter(s) IV Continuous <Continuous>  dextrose 50% Injectable 25 Gram(s) IV Push once  dextrose 50% Injectable 12.5 Gram(s) IV Push once  diltiazem    milliGRAM(s) Oral daily  glucagon  Injectable 1 milliGRAM(s) IntraMuscular once  hydrochlorothiazide 25 milliGRAM(s) Oral daily  insulin lispro (ADMELOG) corrective regimen sliding scale   SubCutaneous three times a day before meals  melatonin 5 milliGRAM(s) Oral at bedtime  pantoprazole    Tablet 40 milliGRAM(s) Oral before breakfast  valsartan 320 milliGRAM(s) Oral daily      PRN MEDICATIONS  acetaminophen     Tablet .. 650 milliGRAM(s) Oral every 6 hours PRN  aluminum hydroxide/magnesium hydroxide/simethicone Suspension 30 milliLiter(s) Oral every 4 hours PRN  dextrose Oral Gel 15 Gram(s) Oral once PRN  sodium chloride 0.9% lock flush 10 milliLiter(s) IV Push every 1 hour PRN      Vital Signs Last 24 Hrs  T(C): 36.4 (18 Jan 2023 04:46), Max: 37 (17 Jan 2023 09:07)  T(F): 97.5 (18 Jan 2023 04:46), Max: 98.6 (17 Jan 2023 09:07)  HR: 66 (18 Jan 2023 04:46) (66 - 83)  BP: 123/70 (18 Jan 2023 04:46) (119/66 - 157/78)  RR: 18 (18 Jan 2023 04:46) (18 - 18)  SpO2: 95% (18 Jan 2023 04:46) (95% - 97%)    Parameters below as of 18 Jan 2023 04:46  Patient On (Oxygen Delivery Method): room air      PHYSICAL EXAM  General: NAD, laying in bed  HEENT: Sclerae anicteric, clear oropharynx, no oral lesions  CV: normal S1/S2 RRR  Lungs: breath sounds clear and equal bilaterally  Abdomen: soft non-tender non-distended  Ext: no edema  Skin: no rashes   Neuro: alert and oriented X 4  Central Line: RUE PICC clean, dry, intact    Cultures:  Culture - Stool (01.11.23 @ 10:36)    Specimen Source: .Stool Feces inna jv    Culture Results:   No enteric gram negative rods isolated  No enteric pathogens isolated.  (Stool culture examined for Salmonella,  Shigella, Campylobacter, Aeromonas, Plesiomonas,  Vibrio, E.coli O157 and Yersinia)      LABS:                         8.0    90.44)-----------( 104      ( 18 Jan 2023 06:51 )             25.5     Mean Cell Volume : 89.2 fl  Mean Cell Hemoglobin : 28.0 pg  Mean Cell Hemoglobin Concentration : 31.4 gm/dL  Auto Neutrophil # : x  Auto Lymphocyte # : x  Auto Monocyte # : x  Auto Eosinophil # : x  Auto Basophil # : x  Auto Neutrophil % : x  Auto Lymphocyte % : x  Auto Monocyte % : x  Auto Eosinophil % : x  Auto Basophil % : x    18 Jan 2023 06:51    139    |  102    |  15     ----------------------------<  102    4.0     |  26     |  0.87     Ca    10.0       18 Jan 2023 06:51  Phos  3.9       18 Jan 2023 06:51  Mg     2.0       18 Jan 2023 06:51    TPro  6.7    /  Alb  4.0    /  TBili  0.5    /  DBili  x      /  AST  48     /  ALT  27     /  AlkPhos  98     18 Jan 2023 06:51    PT/INR - ( 18 Jan 2023 06:51 )   PT: 11.3 sec;   INR: 0.98 ratio    PTT - ( 18 Jan 2023 06:51 )  PTT:25.7 sec    CAPILLARY BLOOD GLUCOSE  POCT Blood Glucose.: 123 mg/dL (18 Jan 2023 07:53)  POCT Blood Glucose.: 103 mg/dL (18 Jan 2023 06:04)  POCT Blood Glucose.: 113 mg/dL (18 Jan 2023 00:10)  POCT Blood Glucose.: 125 mg/dL (17 Jan 2023 21:12)  POCT Blood Glucose.: 107 mg/dL (17 Jan 2023 17:12)  POCT Blood Glucose.: 115 mg/dL (17 Jan 2023 11:53)    LIVER FUNCTIONS - ( 18 Jan 2023 06:51 )  Alb: 4.0 g/dL / Pro: 6.7 g/dL / ALK PHOS: 98 U/L / ALT: 27 U/L / AST: 48 U/L / GGT: x           RADIOLOGY & ADDITIONAL STUDIES:  from: Xray Chest 1 View- PORTABLE-Urgent (Xray Chest 1 View- PORTABLE-Urgent .) (01.16.23 @ 13:16)   FINDINGS:  Heart/Vascular: The cardiac silhouetteis normal in size. Sternotomy   wires. Right PICC terminating within the distal SVC.  Pulmonary: No focal consolidation, pleural effusion or pneumothorax.  Bones: The visualized osseous structures are unremarkable.  Impression:  Right PICC terminating within the distal SVC.      from: CT Head No Cont (01.16.23 @ 11:53)   FINDINGS:  Redemonstrated acute left parietal subdural bleed leading up to 8 mm in   greatest transverse thickness, unchanged in size. No midline shift. No   new site of bleeding or progressive mass effect.  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 Ventriclesare   unchanged in size without hydrocephalus. Basal cisterns are patent.  Visualized paranasal sinuses  and mastoid air cells are clear. The   calvarium is intact.  IMPRESSION:  No interval change in left parietal subdural bleed.  No new site of bleeding or progressive mass effect.

## 2023-01-18 NOTE — PROGRESS NOTE ADULT - ATTENDING COMMENTS
81 YO F with a PMhx of A.fib, s/p Saint Darian mechanical AVR, (1994–[facility]) for rheumatic aortic valve stenosis, ascending aortic aneurysm, HTN,  HLD, depression, GERD transferred from [facility] for further work-up and management of leukocytosis associated with thrombocytopenia and anemia. She was also noted to have a supratherapeutic INR. CT angio A/P which showed no evidence of active GIB, but showing focal diminished cortical enhancement involving the upper left kidney concerning for focal pyelonephritis or possibly infarct. EGD without any acute findings. Colonoscopy showed grade I internal hemorrhoid and polyps in transverse colon and hepatic flexure. 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 She is now admitted to [facility] on 1/14/23 for further work up.    Leukocytosis  -infectious work up done at [facility] unremarkable  -CBC shows 65.3 with mostly lymphocytes  -PB flow cytometry (1/14/23) consistent with acute leukemia:    Blasts identified, 60% of total, with the following immunophenotype; CD45 (dim to negative), CD34, CD10+, CD19+, CD20 CD22+, CD11b (partial dim)+, CD38+ and HLADR+(heterogenous); negative for, CD2, CD3, CD4, CD5, CD7, CD8, , CD33, CD14, CD16, CD15, CD11b, CD56, CD4, CD64, kappa and lambda (surface). Immunophenotypic features are consistent with acute leukemia.      -will need BMBx on Tuesday  -will obtain baseline echo  -s/p PICC line .  -Monitor LDH, Uric Acid and CMP daily   -keep Hb >7, plt >10  -start allopurinol    ICH  -headaches since 1/15/23, now improved  -1/15/23 CT head showed 8mm bleed, repeat CT is stable  -no intervention as per Neursx  -hold coumadin (confirmed with cardiology)  -keep plt >100  -completed Vit K    Cardio  HTN - c/w home meds  Mechanical valve - currently off AC, discussed with cardiology, will consider starting LMWH on 1/17/23 with a goal PTT of ~60 and repeat a CT head shortly after 83 YO F with a PMhx of A.fib, s/p Saint Darian mechanical AVR, (1994–[facility]) for rheumatic aortic valve stenosis, ascending aortic aneurysm, HTN,  HLD, depression, GERD transferred from [facility] for further work-up and management of leukocytosis associated with thrombocytopenia and anemia. She was also noted to have a supratherapeutic INR. CT angio A/P which showed no evidence of active GIB, but showing focal diminished cortical enhancement involving the upper left kidney concerning for focal pyelonephritis or possibly infarct. EGD without any acute findings. Colonoscopy showed grade I internal hemorrhoid and polyps in transverse colon and hepatic flexure. 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 She is now admitted to [facility] on 1/14/23 for further work up.    Leukocytosis  -infectious work up done at [facility] unremarkable  -WBC now incr to 90.44K, 81% blasts  -PB flow cytometry (1/14/23) consistent with acute leukemia:    Blasts identified, 60% of total, with the following immunophenotype; CD45 (dim to negative), CD34, CD10+, CD19+, CD20 CD22+, CD11b (partial dim)+, CD38+ and HLADR+(heterogenous); negative for, CD2, CD3, CD4, CD5, CD7, CD8, , CD33, CD14, CD16, CD15, CD11b, CD56, CD4, CD64, kappa and lambda (surface). Immunophenotypic features are consistent with acute leukemia.      -had BMBx on 1/17  -will obtain baseline echo  -s/p PICC line   -Monitor LDH, Uric Acid and CMP daily   -keep Hb >7, plt >10  -start allopurinol  -WBC rising, will increase HU to 2 gm 2x/d (    ICH  -headaches since 1/15/23, now improved  -1/15/23 CT head showed 8mm bleed, repeat CT is stable  -no intervention as per Neursx  -hold coumadin (confirmed with cardiology)  -keep plt >100 for now post SDH  -completed Vit K    Cardio  HTN - c/w home meds  Mechanical valve - currently off AC, discussed with cardiology  will hold AC in light of risk/benefit, anticipated start of chemotx

## 2023-01-18 NOTE — PROGRESS NOTE ADULT - PROBLEM SELECTOR PLAN 3
in setting of thrombocytopenia and on warfarin for mechanical AVR (1994 [facility]), atrial fibrillation  last doses of warfarin 1/12 and 1/13 at [facility] - 5mg daily  neurosurgery, and cardiology consulted  will keep PLTs > 100k

## 2023-01-18 NOTE — PROGRESS NOTE ADULT - ASSESSMENT
82 year old female with atrial fibrillation, s/p Saint Darian mechanical AVR, (1994–[facility]) for rheumatic aortic valve stenosis, ascending aortic aneurysm, HTN, HLD, depression, GERD transferred from [facility] for further work-up and management of leukocytosis associated with thrombocytopenia and anemia. She was also noted to have a supratherapeutic INR. CT angio A/P which showed no evidence of active GIB, but showing focal diminished cortical enhancement involving the upper left kidney concerning for focal pyelonephritis or possibly infarct. EGD without any acute findings. Colonoscopy showed grade I internal hemorrhoid and polyps in transverse colon and hepatic flexure. 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 Transferred to [address] [place] [address] for further treatment. 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 Leukocytosis, anemia, thrombocytopenia secondary to disease.

## 2023-01-18 NOTE — PROGRESS NOTE ADULT - PROBLEM SELECTOR PLAN 1
Flow cytometry (1/14/23) consistent with acute leukemia (B ALL)  Monitor daily CBC with Diff/CMP and transfuse/replete PRN  Keep PLT >100 due to SDH  daily TLS labs  1/14 TTE - EF 57%, mild pulm HTN, Mechanical AVR likely normal function  1/16 HLA sent  1/17  s/p BMbx (with Clonoseq sampling) Flow cytometry (1/14/23) consistent with acute leukemia (B ALL)  Monitor daily CBC with Diff/CMP and transfuse/replete PRN  Keep PLT >100 due to SDH  daily TLS labs  1/14 TTE - EF 57%, mild pulm HTN, Mechanical AVR likely normal function  1/16 HLA sent  1/17  s/p BMbx (with Clonoseq sampling)  1/18 Wbc 90k, Blasts 81%. Hydrea 2g BID, starting Dex 20mg IV Daily. watch glucose levels. Awaiting BCR-ABL results and official BM bx results. CBC Q12.

## 2023-01-19 ENCOUNTER — NON-APPOINTMENT (OUTPATIENT)
Age: 83
End: 2023-01-19

## 2023-01-19 LAB
ALBUMIN SERPL ELPH-MCNC: 4 G/DL — SIGNIFICANT CHANGE UP (ref 3.3–5)
ALP SERPL-CCNC: 104 U/L — SIGNIFICANT CHANGE UP (ref 40–120)
ALT FLD-CCNC: 28 U/L — SIGNIFICANT CHANGE UP (ref 10–45)
ANION GAP SERPL CALC-SCNC: 13 MMOL/L — SIGNIFICANT CHANGE UP (ref 5–17)
ANION GAP SERPL CALC-SCNC: 16 MMOL/L — SIGNIFICANT CHANGE UP (ref 5–17)
APTT BLD: 24.9 SEC — LOW (ref 27.5–35.5)
AST SERPL-CCNC: 48 U/L — HIGH (ref 10–40)
BASOPHILS # BLD AUTO: 0 K/UL — SIGNIFICANT CHANGE UP (ref 0–0.2)
BASOPHILS NFR BLD AUTO: 0 % — SIGNIFICANT CHANGE UP (ref 0–2)
BCR/ABL BY RT - PCR QUANTITATIVE: SIGNIFICANT CHANGE UP
BCR/ABL BY RT - PCR QUANTITATIVE: SIGNIFICANT CHANGE UP
BILIRUB SERPL-MCNC: 0.4 MG/DL — SIGNIFICANT CHANGE UP (ref 0.2–1.2)
BUN SERPL-MCNC: 28 MG/DL — HIGH (ref 7–23)
BUN SERPL-MCNC: 31 MG/DL — HIGH (ref 7–23)
CALCIUM SERPL-MCNC: 9.4 MG/DL — SIGNIFICANT CHANGE UP (ref 8.4–10.5)
CALCIUM SERPL-MCNC: 9.4 MG/DL — SIGNIFICANT CHANGE UP (ref 8.4–10.5)
CHLORIDE SERPL-SCNC: 100 MMOL/L — SIGNIFICANT CHANGE UP (ref 96–108)
CHLORIDE SERPL-SCNC: 101 MMOL/L — SIGNIFICANT CHANGE UP (ref 96–108)
CHROM ANALY INTERPHASE BLD FISH-IMP: SIGNIFICANT CHANGE UP
CO2 SERPL-SCNC: 22 MMOL/L — SIGNIFICANT CHANGE UP (ref 22–31)
CO2 SERPL-SCNC: 23 MMOL/L — SIGNIFICANT CHANGE UP (ref 22–31)
CREAT SERPL-MCNC: 0.8 MG/DL — SIGNIFICANT CHANGE UP (ref 0.5–1.3)
CREAT SERPL-MCNC: 0.89 MG/DL — SIGNIFICANT CHANGE UP (ref 0.5–1.3)
DNA PLOIDY SPEC FC-IMP: SIGNIFICANT CHANGE UP
DNA PLOIDY SPEC FC-IMP: SIGNIFICANT CHANGE UP
EGFR: 66 ML/MIN/1.73M2 — SIGNIFICANT CHANGE UP
EGFR: 74 ML/MIN/1.73M2 — SIGNIFICANT CHANGE UP
EOSINOPHIL # BLD AUTO: 0 K/UL — SIGNIFICANT CHANGE UP (ref 0–0.5)
EOSINOPHIL NFR BLD AUTO: 0 % — SIGNIFICANT CHANGE UP (ref 0–6)
GLUCOSE BLDC GLUCOMTR-MCNC: 206 MG/DL — HIGH (ref 70–99)
GLUCOSE BLDC GLUCOMTR-MCNC: 221 MG/DL — HIGH (ref 70–99)
GLUCOSE BLDC GLUCOMTR-MCNC: 228 MG/DL — HIGH (ref 70–99)
GLUCOSE BLDC GLUCOMTR-MCNC: 253 MG/DL — HIGH (ref 70–99)
GLUCOSE BLDC GLUCOMTR-MCNC: 255 MG/DL — HIGH (ref 70–99)
GLUCOSE SERPL-MCNC: 182 MG/DL — HIGH (ref 70–99)
GLUCOSE SERPL-MCNC: 183 MG/DL — HIGH (ref 70–99)
HCT VFR BLD CALC: 23 % — LOW (ref 34.5–45)
HCT VFR BLD CALC: 25.2 % — LOW (ref 34.5–45)
HGB BLD-MCNC: 7.5 G/DL — LOW (ref 11.5–15.5)
HGB BLD-MCNC: 8 G/DL — LOW (ref 11.5–15.5)
INR BLD: 1.05 RATIO — SIGNIFICANT CHANGE UP (ref 0.88–1.16)
JAK2 P.V617F BLD/T QL: SIGNIFICANT CHANGE UP
LDH SERPL L TO P-CCNC: 1113 U/L — HIGH (ref 50–242)
LDH SERPL L TO P-CCNC: 988 U/L — HIGH (ref 50–242)
LYMPHOCYTES # BLD AUTO: 28.57 K/UL — HIGH (ref 1–3.3)
LYMPHOCYTES # BLD AUTO: 36.7 % — SIGNIFICANT CHANGE UP (ref 13–44)
MAGNESIUM SERPL-MCNC: 1.7 MG/DL — SIGNIFICANT CHANGE UP (ref 1.6–2.6)
MCHC RBC-ENTMCNC: 28.2 PG — SIGNIFICANT CHANGE UP (ref 27–34)
MCHC RBC-ENTMCNC: 29 PG — SIGNIFICANT CHANGE UP (ref 27–34)
MCHC RBC-ENTMCNC: 31.7 GM/DL — LOW (ref 32–36)
MCHC RBC-ENTMCNC: 32.6 GM/DL — SIGNIFICANT CHANGE UP (ref 32–36)
MCV RBC AUTO: 88.7 FL — SIGNIFICANT CHANGE UP (ref 80–100)
MCV RBC AUTO: 88.8 FL — SIGNIFICANT CHANGE UP (ref 80–100)
MONOCYTES # BLD AUTO: 0 K/UL — SIGNIFICANT CHANGE UP (ref 0–0.9)
MONOCYTES NFR BLD AUTO: 0 % — LOW (ref 2–14)
NEUTROPHILS # BLD AUTO: 2.57 K/UL — SIGNIFICANT CHANGE UP (ref 1.8–7.4)
NEUTROPHILS NFR BLD AUTO: 3.3 % — LOW (ref 43–77)
NRBC # BLD: 0 /100 WBCS — SIGNIFICANT CHANGE UP (ref 0–0)
PHOSPHATE SERPL-MCNC: 4.4 MG/DL — SIGNIFICANT CHANGE UP (ref 2.5–4.5)
PHOSPHATE SERPL-MCNC: 4.7 MG/DL — HIGH (ref 2.5–4.5)
PLATELET # BLD AUTO: 95 K/UL — LOW (ref 150–400)
PLATELET # BLD AUTO: 98 K/UL — LOW (ref 150–400)
POTASSIUM SERPL-MCNC: 3.9 MMOL/L — SIGNIFICANT CHANGE UP (ref 3.5–5.3)
POTASSIUM SERPL-MCNC: 4.8 MMOL/L — SIGNIFICANT CHANGE UP (ref 3.5–5.3)
POTASSIUM SERPL-SCNC: 3.9 MMOL/L — SIGNIFICANT CHANGE UP (ref 3.5–5.3)
POTASSIUM SERPL-SCNC: 4.8 MMOL/L — SIGNIFICANT CHANGE UP (ref 3.5–5.3)
PROT SERPL-MCNC: 6.8 G/DL — SIGNIFICANT CHANGE UP (ref 6–8.3)
PROTHROM AB SERPL-ACNC: 12.2 SEC — SIGNIFICANT CHANGE UP (ref 10.5–13.4)
RBC # BLD: 2.59 M/UL — LOW (ref 3.8–5.2)
RBC # BLD: 2.84 M/UL — LOW (ref 3.8–5.2)
RBC # FLD: 15.5 % — HIGH (ref 10.3–14.5)
RBC # FLD: 15.7 % — HIGH (ref 10.3–14.5)
SODIUM SERPL-SCNC: 136 MMOL/L — SIGNIFICANT CHANGE UP (ref 135–145)
SODIUM SERPL-SCNC: 139 MMOL/L — SIGNIFICANT CHANGE UP (ref 135–145)
TM INTERPRETATION: SIGNIFICANT CHANGE UP
URATE SERPL-MCNC: 3.5 MG/DL — SIGNIFICANT CHANGE UP (ref 2.5–7)
URATE SERPL-MCNC: 3.5 MG/DL — SIGNIFICANT CHANGE UP (ref 2.5–7)
WBC # BLD: 61.27 K/UL — CRITICAL HIGH (ref 3.8–10.5)
WBC # BLD: 77.84 K/UL — CRITICAL HIGH (ref 3.8–10.5)
WBC # FLD AUTO: 61.27 K/UL — CRITICAL HIGH (ref 3.8–10.5)
WBC # FLD AUTO: 77.84 K/UL — CRITICAL HIGH (ref 3.8–10.5)

## 2023-01-19 PROCEDURE — 99233 SBSQ HOSP IP/OBS HIGH 50: CPT

## 2023-01-19 RX ORDER — DASATINIB 80 MG/1
140 TABLET ORAL DAILY
Refills: 0 | Status: DISCONTINUED | OUTPATIENT
Start: 2023-01-19 | End: 2023-02-10

## 2023-01-19 RX ORDER — POTASSIUM CHLORIDE 20 MEQ
40 PACKET (EA) ORAL ONCE
Refills: 0 | Status: COMPLETED | OUTPATIENT
Start: 2023-01-19 | End: 2023-01-19

## 2023-01-19 RX ORDER — SODIUM CHLORIDE 9 MG/ML
1000 INJECTION INTRAMUSCULAR; INTRAVENOUS; SUBCUTANEOUS
Refills: 0 | Status: DISCONTINUED | OUTPATIENT
Start: 2023-01-19 | End: 2023-02-10

## 2023-01-19 RX ORDER — DASATINIB 80 MG/1
1 TABLET ORAL
Qty: 30 | Refills: 0
Start: 2023-01-19 | End: 2023-02-17

## 2023-01-19 RX ORDER — INSULIN GLARGINE 100 [IU]/ML
10 INJECTION, SOLUTION SUBCUTANEOUS AT BEDTIME
Refills: 0 | Status: DISCONTINUED | OUTPATIENT
Start: 2023-01-19 | End: 2023-01-29

## 2023-01-19 RX ORDER — MAGNESIUM SULFATE 500 MG/ML
2 VIAL (ML) INJECTION ONCE
Refills: 0 | Status: COMPLETED | OUTPATIENT
Start: 2023-01-19 | End: 2023-01-19

## 2023-01-19 RX ADMIN — INSULIN GLARGINE 10 UNIT(S): 100 INJECTION, SOLUTION SUBCUTANEOUS at 21:46

## 2023-01-19 RX ADMIN — ATORVASTATIN CALCIUM 80 MILLIGRAM(S): 80 TABLET, FILM COATED ORAL at 21:46

## 2023-01-19 RX ADMIN — PREGABALIN 1000 MICROGRAM(S): 225 CAPSULE ORAL at 11:46

## 2023-01-19 RX ADMIN — Medication 25 GRAM(S): at 11:46

## 2023-01-19 RX ADMIN — Medication 3: at 10:21

## 2023-01-19 RX ADMIN — HYDROXYUREA 2000 MILLIGRAM(S): 500 CAPSULE ORAL at 17:23

## 2023-01-19 RX ADMIN — DASATINIB 140 MILLIGRAM(S): 80 TABLET ORAL at 17:23

## 2023-01-19 RX ADMIN — Medication 40 MILLIEQUIVALENT(S): at 11:44

## 2023-01-19 RX ADMIN — Medication 500 MILLIGRAM(S): at 11:45

## 2023-01-19 RX ADMIN — Medication 5 MILLIGRAM(S): at 21:46

## 2023-01-19 RX ADMIN — PANTOPRAZOLE SODIUM 40 MILLIGRAM(S): 20 TABLET, DELAYED RELEASE ORAL at 05:06

## 2023-01-19 RX ADMIN — Medication 2: at 13:16

## 2023-01-19 RX ADMIN — Medication 102 MILLIGRAM(S): at 17:23

## 2023-01-19 RX ADMIN — VALSARTAN 320 MILLIGRAM(S): 80 TABLET ORAL at 05:06

## 2023-01-19 RX ADMIN — Medication 240 MILLIGRAM(S): at 05:06

## 2023-01-19 RX ADMIN — Medication 2: at 17:24

## 2023-01-19 RX ADMIN — CHLORHEXIDINE GLUCONATE 1 APPLICATION(S): 213 SOLUTION TOPICAL at 08:40

## 2023-01-19 RX ADMIN — Medication 102 MILLIGRAM(S): at 05:05

## 2023-01-19 RX ADMIN — Medication 300 MILLIGRAM(S): at 11:44

## 2023-01-19 RX ADMIN — HYDROXYUREA 2000 MILLIGRAM(S): 500 CAPSULE ORAL at 06:10

## 2023-01-19 NOTE — PROGRESS NOTE ADULT - PROBLEM SELECTOR PLAN 1
Flow cytometry (1/14/23) consistent with acute leukemia (B ALL)  Monitor daily CBC with Diff/CMP and transfuse/replete PRN  Keep PLT >100 due to SDH  daily TLS labs  1/14 TTE - EF 57%, mild pulm HTN, Mechanical AVR likely normal function  1/16 HLA sent  1/17  s/p BMbx (with Clonoseq sampling)  1/18 Wbc 90k, Blasts 81%. Hydrea 2g BID, starting Dex 20mg IV Daily. watch glucose levels. Awaiting BCR-ABL results and official BM bx results. CBC Q12. Flow cytometry (1/14/23) consistent with acute leukemia (B ALL), FISH detected BCR/ABL (1/19)  Monitor daily CBC with Diff/CMP and transfuse/replete PRN  Keep PLT >100 due to SDH  TLS labs bid, continue Allopurinol, IVF's  Strict I/O's, aily wghts  1/14 TTE - EF 57%, mild pulm HTN, Mechanical AVR likely normal function  1/16 HLA sent  1/17  s/p BMbx c/w ALL (98% blasts) (with Clonoseq sampling)  1/18 Wbc 90k, Blasts 81%. Hydrea 2g BID, starting Dex 20mg IV Daily. watch glucose levels.  1/19 Starting Dasatinib, LP with Cytarabine ordered for tomorrow 1/20 (Ok to perform LP per Neurosurgery)

## 2023-01-19 NOTE — PROGRESS NOTE ADULT - PROBLEM SELECTOR PLAN 3
in setting of thrombocytopenia and on warfarin for mechanical AVR (1994 [facility]), atrial fibrillation  last doses of warfarin 1/12 and 1/13 at [facility] - 5mg daily  neurosurgery, and cardiology consulted  will keep PLTs > 100k

## 2023-01-19 NOTE — PROGRESS NOTE ADULT - PROBLEM SELECTOR PLAN 7
Hold warfarin for now due to SDH and thrombocytopenia Hold warfarin for now due to SDH and thrombocytopenia  PPI

## 2023-01-19 NOTE — PROGRESS NOTE ADULT - PROBLEM SELECTOR PLAN 5
Continue home medications mechanical AVR on coumadin now on hold in setting of acute subdural hemorrhage

## 2023-01-19 NOTE — PROGRESS NOTE ADULT - ASSESSMENT
82 year old female with atrial fibrillation, s/p Saint Darian mechanical AVR, (1992–[facility]) for rheumatic aortic valve stenosis, ascending aortic aneurysm, HTN, HLD, depression, GERD transferred from [facility] for further work-up and management of leukocytosis associated with thrombocytopenia and anemia. She was also noted to have a supratherapeutic INR. CT angio A/P which showed no evidence of active GIB, but showing focal diminished cortical enhancement involving the upper left kidney concerning for focal pyelonephritis or possibly infarct. EGD without any acute findings. Colonoscopy showed grade I internal hemorrhoid and polyps in transverse colon and hepatic flexure. 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 Transferred to [address] [place] [address] for further treatment. 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 Leukocytosis, anemia, thrombocytopenia secondary to disease.   82 year old female with atrial fibrillation, s/p Saint Darian mechanical AVR, (1994–[facility]) for rheumatic aortic valve stenosis, ascending aortic aneurysm, HTN, HLD, depression, GERD transferred from [facility] for further work-up and management of leukocytosis associated with thrombocytopenia and anemia. She was also noted to have a supratherapeutic INR. CT angio A/P which showed no evidence of active GIB, but showing focal diminished cortical enhancement involving the upper left kidney concerning for focal pyelonephritis or possibly infarct. EGD without any acute findings. Colonoscopy showed grade I internal hemorrhoid and polyps in transverse colon and hepatic flexure. 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 Transferred to [address] [place] [address] for further treatment. 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 Leukocytosis, anemia, thrombocytopenia secondary to disease.

## 2023-01-19 NOTE — PROGRESS NOTE ADULT - PROBLEM SELECTOR PLAN 4
mechanical AVR on coumadin now on hold in setting of acute subdural hemorrhage Continue accuchecks/ISS  1/19 Started Lantus 10u while on steroids (last dose of decadron 1/24)

## 2023-01-19 NOTE — PROGRESS NOTE ADULT - SUBJECTIVE AND OBJECTIVE BOX
Subjective:      Diana Menjivar is a 49 y.o. female presents for postop care following Laparoscopic appendectomy on 5/13/2017. Appetite is good. Eating a regular diet without difficulty. Bowel movements are regular. The patient is voiding without difficulty. The patient is not having any pain. She feels much better after taking Ibuprofen. Patient has an advanced directive: NO      Ms. Hesham Pastor has a reminder for a \"due or due soon\" health maintenance. I have asked that she contact her primary care provider for follow-up on this health maintenance. Objective:     Visit Vitals    /80    Pulse 85    Temp 98.3 °F (36.8 °C) (Oral)    Resp 18    Ht 5' 5\" (1.651 m)    Wt 178 lb (80.7 kg)    SpO2 97%    BMI 29.62 kg/m2       General:  alert, cooperative, no distress   Abdomen: soft, non-tender   Incision:   healing well, no drainage, no erythema, no dehiscence, incision well approximated     Assessment:     Doing well postoperatively. Plan:     1.follow up as needed. 2. May increase activities as tolerated. 3. Take ibuprofen as needed. Pt verbalized understanding and questions were answered to the best of my knowledge and ability. Nahomy Dhillon Diagnosis: newly diagnosed B-ALL    Protocol/Chemo Regimen: to be determined    Day: n/a     Pt endorsed:     Review of Systems:     Pain scale: denies    Diet: regular    Allergies: No Known Allergies    MEDICATIONS  (STANDING):  allopurinol 300 milliGRAM(s) Oral daily  ascorbic acid 500 milliGRAM(s) Oral daily  atorvastatin 80 milliGRAM(s) Oral at bedtime  chlorhexidine 4% Liquid 1 Application(s) Topical <User Schedule>  cyanocobalamin 1000 MICROGram(s) Oral daily  dexAMETHasone  IVPB 10 milliGRAM(s) IV Intermittent every 12 hours  dextrose 5%. 1000 milliLiter(s) (50 mL/Hr) IV Continuous <Continuous>  dextrose 5%. 1000 milliLiter(s) (100 mL/Hr) IV Continuous <Continuous>  dextrose 50% Injectable 25 Gram(s) IV Push once  dextrose 50% Injectable 12.5 Gram(s) IV Push once  diltiazem    milliGRAM(s) Oral daily  glucagon  Injectable 1 milliGRAM(s) IntraMuscular once  hydrochlorothiazide 25 milliGRAM(s) Oral daily  hydroxyurea 2000 milliGRAM(s) Oral every 12 hours  insulin lispro (ADMELOG) corrective regimen sliding scale   SubCutaneous three times a day before meals  melatonin 5 milliGRAM(s) Oral at bedtime  pantoprazole    Tablet 40 milliGRAM(s) Oral before breakfast  valsartan 320 milliGRAM(s) Oral daily    MEDICATIONS  (PRN):  acetaminophen     Tablet .. 650 milliGRAM(s) Oral every 6 hours PRN Temp greater or equal to 38C (100.4F), Mild Pain (1 - 3)  aluminum hydroxide/magnesium hydroxide/simethicone Suspension 30 milliLiter(s) Oral every 4 hours PRN Dyspepsia  dextrose Oral Gel 15 Gram(s) Oral once PRN Blood Glucose LESS THAN 70 milliGRAM(s)/deciliter  sodium chloride 0.9% lock flush 10 milliLiter(s) IV Push every 1 hour PRN Pre/post blood products, medications, blood draw, and to maintain line patency    Vital Signs Last 24 Hrs  T(C): 36.7 (19 Jan 2023 05:00), Max: 36.8 (18 Jan 2023 20:56)  T(F): 98.1 (19 Jan 2023 05:00), Max: 98.2 (18 Jan 2023 20:56)  HR: 77 (19 Jan 2023 05:00) (73 - 89)  BP: 149/75 (19 Jan 2023 05:00) (123/73 - 149/75)  BP(mean): --  RR: 18 (19 Jan 2023 05:00) (18 - 18)  SpO2: 96% (19 Jan 2023 05:00) (95% - 98%)    Parameters below as of 19 Jan 2023 05:00  Patient On (Oxygen Delivery Method): room air      PHYSICAL EXAM  General: NAD, laying in bed  HEENT: Sclerae anicteric, clear oropharynx, no oral lesions  CV: normal S1/S2 RRR  Lungs: breath sounds clear and equal bilaterally  Abdomen: soft non-tender non-distended  Ext: no edema  Skin: no rashes   Neuro: alert and oriented X 4  Central Line: RUE PICC clean, dry, intact    Cultures:  Culture - Stool (01.11.23 @ 10:36)    Specimen Source: .Stool Feces inna jv    Culture Results:   No enteric gram negative rods isolated  No enteric pathogens isolated.  (Stool culture examined for Salmonella,  Shigella, Campylobacter, Aeromonas, Plesiomonas,  Vibrio, E.coli O157 and Yersinia)      LABS:             ------------        RADIOLOGY & ADDITIONAL STUDIES:  from: Xray Chest 1 View- PORTABLE-Urgent (Xray Chest 1 View- PORTABLE-Urgent .) (01.16.23 @ 13:16)   FINDINGS:  Heart/Vascular: The cardiac silhouetteis normal in size. Sternotomy   wires. Right PICC terminating within the distal SVC.  Pulmonary: No focal consolidation, pleural effusion or pneumothorax.  Bones: The visualized osseous structures are unremarkable.  Impression:  Right PICC terminating within the distal SVC.      from: CT Head No Cont (01.16.23 @ 11:53)   FINDINGS:  Redemonstrated acute left parietal subdural bleed leading up to 8 mm in   greatest transverse thickness, unchanged in size. No midline shift. No   new site of bleeding or progressive mass effect.  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 Ventriclesare   unchanged in size without hydrocephalus. Basal cisterns are patent.  Visualized paranasal sinuses  and mastoid air cells are clear. The   calvarium is intact.  IMPRESSION:  No interval change in left parietal subdural bleed.  No new site of bleeding or progressive mass effect.                   Diagnosis: newly diagnosed B-ALL, Ph (+)    Protocol/Chemo Regimen: Following 90968 (Decadron + Dasatinib), +/- weekly Vincristine    Day: 2     Pt endorsed: No overnight events, afebrile, taking po's    Review of Systems: Denies CP/palp's, SOB, HA or dizziness    Pain scale: denies    Diet: regular    Allergies: No Known Allergies    MEDICATIONS  (STANDING):  allopurinol 300 milliGRAM(s) Oral daily  ascorbic acid 500 milliGRAM(s) Oral daily  atorvastatin 80 milliGRAM(s) Oral at bedtime  chlorhexidine 4% Liquid 1 Application(s) Topical <User Schedule>  cyanocobalamin 1000 MICROGram(s) Oral daily  dexAMETHasone  IVPB 10 milliGRAM(s) IV Intermittent every 12 hours  dextrose 5%. 1000 milliLiter(s) (50 mL/Hr) IV Continuous <Continuous>  dextrose 5%. 1000 milliLiter(s) (100 mL/Hr) IV Continuous <Continuous>  dextrose 50% Injectable 25 Gram(s) IV Push once  dextrose 50% Injectable 12.5 Gram(s) IV Push once  diltiazem    milliGRAM(s) Oral daily  glucagon  Injectable 1 milliGRAM(s) IntraMuscular once  hydrochlorothiazide 25 milliGRAM(s) Oral daily  hydroxyurea 2000 milliGRAM(s) Oral every 12 hours  insulin lispro (ADMELOG) corrective regimen sliding scale   SubCutaneous three times a day before meals  melatonin 5 milliGRAM(s) Oral at bedtime  pantoprazole    Tablet 40 milliGRAM(s) Oral before breakfast  valsartan 320 milliGRAM(s) Oral daily    MEDICATIONS  (PRN):  acetaminophen     Tablet .. 650 milliGRAM(s) Oral every 6 hours PRN Temp greater or equal to 38C (100.4F), Mild Pain (1 - 3)  aluminum hydroxide/magnesium hydroxide/simethicone Suspension 30 milliLiter(s) Oral every 4 hours PRN Dyspepsia  dextrose Oral Gel 15 Gram(s) Oral once PRN Blood Glucose LESS THAN 70 milliGRAM(s)/deciliter  sodium chloride 0.9% lock flush 10 milliLiter(s) IV Push every 1 hour PRN Pre/post blood products, medications, blood draw, and to maintain line patency    Vital Signs Last 24 Hrs  T(C): 36.7 (19 Jan 2023 05:00), Max: 36.8 (18 Jan 2023 20:56)  T(F): 98.1 (19 Jan 2023 05:00), Max: 98.2 (18 Jan 2023 20:56)  HR: 77 (19 Jan 2023 05:00) (73 - 89)  BP: 149/75 (19 Jan 2023 05:00) (123/73 - 149/75)  BP(mean): --  RR: 18 (19 Jan 2023 05:00) (18 - 18)  SpO2: 96% (19 Jan 2023 05:00) (95% - 98%)    Parameters below as of 19 Jan 2023 05:00  Patient On (Oxygen Delivery Method): room air      PHYSICAL EXAM  General: NAD, sitting up in bed  HEENT: Sclerae anicteric, clear oropharynx, no oral lesions  CV: normal S1/S2, reg  Lungs: breath sounds clear and equal bilaterally  Abdomen: soft non-tender non-distended  Ext: no edema  Skin: no rashes   Neuro: alert and oriented X 4  Central Line: RUE PICC clean, dry, intact    Cultures:  Culture - Stool (01.11.23 @ 10:36)    Specimen Source: .Stool Feces inna jv    Culture Results:   No enteric gram negative rods isolated  No enteric pathogens isolated.  (Stool culture examined for Salmonella,  Shigella, Campylobacter, Aeromonas, Plesiomonas,  Vibrio, E.coli O157 and Yersinia)      LABS:                          8.0    77.84 )-----------( 95       ( 19 Jan 2023 06:56 )             25.2     19 Jan 2023 06:58    139    |  101    |  28     ----------------------------<  182    3.9     |  22     |  0.80     Ca    9.4        19 Jan 2023 06:58  Phos  4.7       19 Jan 2023 06:58  Mg     1.7       19 Jan 2023 06:58    TPro  6.8    /  Alb  4.0    /  TBili  0.4    /  DBili  x      /  AST  48     /  ALT  28     /  AlkPhos  104    19 Jan 2023 06:58    PT/INR - ( 19 Jan 2023 07:01 )   PT: 12.2 sec;   INR: 1.05 ratio    PTT - ( 19 Jan 2023 07:01 )  PTT:24.9 sec    POCT Blood Glucose.: 221 mg/dL (19 Jan 2023 12:40)  POCT Blood Glucose.: 255 mg/dL (19 Jan 2023 09:23)  POCT Blood Glucose.: 253 mg/dL (19 Jan 2023 07:55)  POCT Blood Glucose.: 203 mg/dL (18 Jan 2023 21:05)  POCT Blood Glucose.: 135 mg/dL (18 Jan 2023 16:30)    LIVER FUNCTIONS - ( 19 Jan 2023 06:58 )  Alb: 4.0 g/dL / Pro: 6.8 g/dL / ALK PHOS: 104 U/L / ALT: 28 U/L / AST: 48 U/L / GGT: x               RADIOLOGY & ADDITIONAL STUDIES:  from: Xray Chest 1 View- PORTABLE-Urgent (Xray Chest 1 View- PORTABLE-Urgent .) (01.16.23 @ 13:16)   FINDINGS:  Heart/Vascular: The cardiac silhouetteis normal in size. Sternotomy   wires. Right PICC terminating within the distal SVC.  Pulmonary: No focal consolidation, pleural effusion or pneumothorax.  Bones: The visualized osseous structures are unremarkable.  Impression:  Right PICC terminating within the distal SVC.      from: CT Head No Cont (01.16.23 @ 11:53)   FINDINGS:  Redemonstrated acute left parietal subdural bleed leading up to 8 mm in   greatest transverse thickness, unchanged in size. No midline shift. No   new site of bleeding or progressive mass effect.  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 Ventriclesare   unchanged in size without hydrocephalus. Basal cisterns are patent.  Visualized paranasal sinuses  and mastoid air cells are clear. The   calvarium is intact.  IMPRESSION:  No interval change in left parietal subdural bleed.  No new site of bleeding or progressive mass effect.      Path -   1, 2. Bone marrow biopsy and bone marrow aspirate   - B-lymphoblastic leukemia/lymphoma Blast 98% 0-3

## 2023-01-19 NOTE — PROVIDER CONTACT NOTE (CRITICAL VALUE NOTIFICATION) - ACTION/TREATMENT ORDERED:
As per RADHIKA Krause give Hydrea 2000 mg @ 2200 following loading dose of Hydrea 3000 mg that was given @ 1500

## 2023-01-19 NOTE — PROGRESS NOTE ADULT - ATTENDING COMMENTS
83 YO F with a PMhx of A.fib, s/p Saint Darian mechanical AVR, (1994–[facility]) for rheumatic aortic valve stenosis, ascending aortic aneurysm, HTN,  HLD, depression, GERD transferred from [facility] for further work-up and management of leukocytosis associated with thrombocytopenia and anemia. She was also noted to have a supratherapeutic INR. CT angio A/P which showed no evidence of active GIB, but showing focal diminished cortical enhancement involving the upper left kidney concerning for focal pyelonephritis or possibly infarct. EGD without any acute findings. Colonoscopy showed grade I internal hemorrhoid and polyps in transverse colon and hepatic flexure. 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 She is now admitted to [facility] on 1/14/23 for further work up.    Leukocytosis  -infectious work up done at [facility] unremarkable  -WBC now incr to 90.44K, 81% blasts  -PB flow cytometry (1/14/23) consistent with acute leukemia:    Blasts identified, 60% of total, with the following immunophenotype; CD45 (dim to negative), CD34, CD10+, CD19+, CD20 CD22+, CD11b (partial dim)+, CD38+ and HLADR+(heterogenous); negative for, CD2, CD3, CD4, CD5, CD7, CD8, , CD33, CD14, CD16, CD15, CD11b, CD56, CD4, CD64, kappa and lambda (surface). Immunophenotypic features are consistent with acute leukemia.      -had BMBx on 1/17  -will obtain baseline echo  -s/p PICC line   -Monitor LDH, Uric Acid and CMP daily   -keep Hb >7, plt >10  -start allopurinol  -WBC rising, will increase HU to 2 gm 2x/d (    ICH  -headaches since 1/15/23, now improved  -1/15/23 CT head showed 8mm bleed, repeat CT is stable  -no intervention as per Neursx  -hold coumadin (confirmed with cardiology)  -keep plt >100 for now post SDH  -completed Vit K    Cardio  HTN - c/w home meds  Mechanical valve - currently off AC, discussed with cardiology  will hold AC in light of risk/benefit, anticipated start of chemotx 83 YO F with a PMhx of A.fib, s/p Saint Darian mechanical AVR, (1994–[facility]) for rheumatic aortic valve stenosis, ascending aortic aneurysm, HTN,  HLD, depression, GERD transferred from [facility] for further work-up and management of leukocytosis associated with thrombocytopenia and anemia. She was also noted to have a supratherapeutic INR. CT angio A/P which showed no evidence of active GIB, but showing focal diminished cortical enhancement involving the upper left kidney concerning for focal pyelonephritis or possibly infarct. EGD without any acute findings. Colonoscopy showed grade I internal hemorrhoid and polyps in transverse colon and hepatic flexure. 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 She is now admitted to [facility] on 1/14/23 for further work up.    Leukocytosis  -infectious work up done at [facility] unremarkable  -WBC now incr to 90.44K, 81% blasts  -PB flow cytometry (1/14/23) consistent with acute leukemia:    Blasts identified, 60% of total, with the following immunophenotype; CD45 (dim to negative), CD34, CD10+, CD19+, CD20 CD22+, CD11b (partial dim)+, CD38+ and HLADR+(heterogenous); negative for, CD2, CD3, CD4, CD5, CD7, CD8, , CD33, CD14, CD16, CD15, CD11b, CD56, CD4, CD64, kappa and lambda (surface). Immunophenotypic features are consistent with acute leukemia.      -had BMBx on 1/17 >>> 99% B-lymphoblasts  -baseline echo EF 57%  -has PICC line   -Monitor LDH, Uric Acid and CMP daily   -keep Hb >7, plt >10  -start allopurinol  -WBC rising, will increase HU to 2 gm 2x/d   -start decadron 20 ng/d  TLS labs, I     ICH  -headaches since 1/15/23, now improved  -1/15/23 CT head showed 8mm bleed, repeat CT is stable  -no intervention as per Neursx  -hold coumadin (confirmed with cardiology)  -keep plt >100 for now post SDH  -completed Vit K    Cardio  HTN - c/w home meds  Mechanical valve - currently off AC, discussed with cardiology  will hold AC in light of risk/benefit, anticipated start of chemotx 83 YO F with a PMhx of A.fib, s/p Saint Darian mechanical AVR, (1994–[facility]) for rheumatic aortic valve stenosis, ascending aortic aneurysm, HTN,  HLD, depression, GERD transferred from [facility] for further work-up and management of leukocytosis associated with thrombocytopenia and anemia. She was also noted to have a supratherapeutic INR. CT angio A/P which showed no evidence of active GIB, but showing focal diminished cortical enhancement involving the upper left kidney concerning for focal pyelonephritis or possibly infarct. EGD without any acute findings. Colonoscopy showed grade I internal hemorrhoid and polyps in transverse colon and hepatic flexure. 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 She is now admitted to [facility] on 1/14/23 for further work up.    Leukocytosis  -infectious work up done at [facility] unremarkable     -PB flow cytometry (1/14/23) consistent with acute leukemia    Blasts identified, 60% of total, with the following immunophenotype; CD45 (dim to negative), CD34, CD10+, CD19+, CD20 CD22+, CD11b (partial dim)+, CD38+ and HLADR+(heterogenous); negative for, CD2, CD3, CD4, CD5, CD7, CD8, , CD33, CD14, CD16, CD15, CD11b, CD56, CD4, CD64, kappa and lambda (surface). Immunophenotypic features are consistent with acute leukemia.      -had BMBx on 1/17 >>> 99% B-lymphoblasts  -baseline echo EF 57%  -has PICC line   -Monitor LDH, Uric Acid and CMP daily   -keep Hb >7, plt >10  -start allopurinol  -WBC rising, will increase HU to 2 gm 2x/d   -start decadron 20 mg/d  -WBC today 77.8  TLS labs, I &O    ICH  -headaches since 1/15/23, now improved  -1/15/23 CT head showed 8mm bleed, repeat CT is stable  -no intervention as per Neursx  -hold coumadin (confirmed with cardiology)  -keep plt >100 for now post SDH  -completed Vit K    Cardio  HTN - c/w home meds  Mechanical valve - currently off AC, discussed with cardiology  will hold AC in light of risk/benefit, anticipated start of chemotx

## 2023-01-20 LAB
ALBUMIN SERPL ELPH-MCNC: 4 G/DL — SIGNIFICANT CHANGE UP (ref 3.3–5)
ALP SERPL-CCNC: 104 U/L — SIGNIFICANT CHANGE UP (ref 40–120)
ALT FLD-CCNC: 30 U/L — SIGNIFICANT CHANGE UP (ref 10–45)
ANION GAP SERPL CALC-SCNC: 11 MMOL/L — SIGNIFICANT CHANGE UP (ref 5–17)
ANION GAP SERPL CALC-SCNC: 12 MMOL/L — SIGNIFICANT CHANGE UP (ref 5–17)
APTT BLD: 22.6 SEC — LOW (ref 27.5–35.5)
APTT BLD: 24 SEC — LOW (ref 27.5–35.5)
APTT BLD: 25.3 SEC — LOW (ref 27.5–35.5)
AST SERPL-CCNC: 91 U/L — HIGH (ref 10–40)
BASOPHILS # BLD AUTO: 0 K/UL — SIGNIFICANT CHANGE UP (ref 0–0.2)
BASOPHILS NFR BLD AUTO: 0 % — SIGNIFICANT CHANGE UP (ref 0–2)
BILIRUB SERPL-MCNC: 0.2 MG/DL — SIGNIFICANT CHANGE UP (ref 0.2–1.2)
BUN SERPL-MCNC: 40 MG/DL — HIGH (ref 7–23)
BUN SERPL-MCNC: 42 MG/DL — HIGH (ref 7–23)
CALCIUM SERPL-MCNC: 8.4 MG/DL — SIGNIFICANT CHANGE UP (ref 8.4–10.5)
CALCIUM SERPL-MCNC: 8.7 MG/DL — SIGNIFICANT CHANGE UP (ref 8.4–10.5)
CHLORIDE SERPL-SCNC: 100 MMOL/L — SIGNIFICANT CHANGE UP (ref 96–108)
CHLORIDE SERPL-SCNC: 103 MMOL/L — SIGNIFICANT CHANGE UP (ref 96–108)
CO2 SERPL-SCNC: 21 MMOL/L — LOW (ref 22–31)
CO2 SERPL-SCNC: 24 MMOL/L — SIGNIFICANT CHANGE UP (ref 22–31)
CREAT SERPL-MCNC: 0.88 MG/DL — SIGNIFICANT CHANGE UP (ref 0.5–1.3)
CREAT SERPL-MCNC: 0.9 MG/DL — SIGNIFICANT CHANGE UP (ref 0.5–1.3)
D DIMER BLD IA.RAPID-MCNC: HIGH NG/ML DDU
D DIMER BLD IA.RAPID-MCNC: HIGH NG/ML DDU
EGFR: 65 ML/MIN/1.73M2 — SIGNIFICANT CHANGE UP
EGFR: 66 ML/MIN/1.73M2 — SIGNIFICANT CHANGE UP
EOSINOPHIL # BLD AUTO: 0 K/UL — SIGNIFICANT CHANGE UP (ref 0–0.5)
EOSINOPHIL NFR BLD AUTO: 0 % — SIGNIFICANT CHANGE UP (ref 0–6)
FIBRINOGEN PPP-MCNC: 105 MG/DL — LOW (ref 200–445)
FIBRINOGEN PPP-MCNC: 58 MG/DL — CRITICAL LOW (ref 200–445)
GLUCOSE BLDC GLUCOMTR-MCNC: 188 MG/DL — HIGH (ref 70–99)
GLUCOSE BLDC GLUCOMTR-MCNC: 210 MG/DL — HIGH (ref 70–99)
GLUCOSE BLDC GLUCOMTR-MCNC: 223 MG/DL — HIGH (ref 70–99)
GLUCOSE BLDC GLUCOMTR-MCNC: 229 MG/DL — HIGH (ref 70–99)
GLUCOSE BLDC GLUCOMTR-MCNC: 230 MG/DL — HIGH (ref 70–99)
GLUCOSE SERPL-MCNC: 172 MG/DL — HIGH (ref 70–99)
GLUCOSE SERPL-MCNC: 231 MG/DL — HIGH (ref 70–99)
HCT VFR BLD CALC: 23.5 % — LOW (ref 34.5–45)
HCT VFR BLD CALC: 24.5 % — LOW (ref 34.5–45)
HGB BLD-MCNC: 7.5 G/DL — LOW (ref 11.5–15.5)
HGB BLD-MCNC: 7.8 G/DL — LOW (ref 11.5–15.5)
INR BLD: 1.34 RATIO — HIGH (ref 0.88–1.16)
INR BLD: 1.39 RATIO — HIGH (ref 0.88–1.16)
INR BLD: 1.7 RATIO — HIGH (ref 0.88–1.16)
LDH SERPL L TO P-CCNC: 1342 U/L — HIGH (ref 50–242)
LDH SERPL L TO P-CCNC: 2059 U/L — HIGH (ref 50–242)
LYMPHOCYTES # BLD AUTO: 10 K/UL — HIGH (ref 1–3.3)
LYMPHOCYTES # BLD AUTO: 36 % — SIGNIFICANT CHANGE UP (ref 13–44)
MAGNESIUM SERPL-MCNC: 2.7 MG/DL — HIGH (ref 1.6–2.6)
MCHC RBC-ENTMCNC: 28 PG — SIGNIFICANT CHANGE UP (ref 27–34)
MCHC RBC-ENTMCNC: 28 PG — SIGNIFICANT CHANGE UP (ref 27–34)
MCHC RBC-ENTMCNC: 31.8 GM/DL — LOW (ref 32–36)
MCHC RBC-ENTMCNC: 31.9 GM/DL — LOW (ref 32–36)
MCV RBC AUTO: 87.7 FL — SIGNIFICANT CHANGE UP (ref 80–100)
MCV RBC AUTO: 87.8 FL — SIGNIFICANT CHANGE UP (ref 80–100)
MONOCYTES # BLD AUTO: 0 K/UL — SIGNIFICANT CHANGE UP (ref 0–0.9)
MONOCYTES NFR BLD AUTO: 0 % — LOW (ref 2–14)
NEUTROPHILS # BLD AUTO: 1.39 K/UL — LOW (ref 1.8–7.4)
NEUTROPHILS NFR BLD AUTO: 5 % — LOW (ref 43–77)
NRBC # BLD: 0 /100 WBCS — SIGNIFICANT CHANGE UP (ref 0–0)
PHOSPHATE SERPL-MCNC: 5.4 MG/DL — HIGH (ref 2.5–4.5)
PHOSPHATE SERPL-MCNC: 6.2 MG/DL — HIGH (ref 2.5–4.5)
PLATELET # BLD AUTO: 95 K/UL — LOW (ref 150–400)
PLATELET # BLD AUTO: 98 K/UL — LOW (ref 150–400)
POTASSIUM SERPL-MCNC: 4.2 MMOL/L — SIGNIFICANT CHANGE UP (ref 3.5–5.3)
POTASSIUM SERPL-MCNC: 4.7 MMOL/L — SIGNIFICANT CHANGE UP (ref 3.5–5.3)
POTASSIUM SERPL-SCNC: 4.2 MMOL/L — SIGNIFICANT CHANGE UP (ref 3.5–5.3)
POTASSIUM SERPL-SCNC: 4.7 MMOL/L — SIGNIFICANT CHANGE UP (ref 3.5–5.3)
PROT SERPL-MCNC: 6.4 G/DL — SIGNIFICANT CHANGE UP (ref 6–8.3)
PROTHROM AB SERPL-ACNC: 15.5 SEC — HIGH (ref 10.5–13.4)
PROTHROM AB SERPL-ACNC: 16 SEC — HIGH (ref 10.5–13.4)
PROTHROM AB SERPL-ACNC: 19.6 SEC — HIGH (ref 10.5–13.4)
RBC # BLD: 2.68 M/UL — LOW (ref 3.8–5.2)
RBC # BLD: 2.79 M/UL — LOW (ref 3.8–5.2)
RBC # FLD: 15.8 % — HIGH (ref 10.3–14.5)
RBC # FLD: 15.9 % — HIGH (ref 10.3–14.5)
SODIUM SERPL-SCNC: 133 MMOL/L — LOW (ref 135–145)
SODIUM SERPL-SCNC: 138 MMOL/L — SIGNIFICANT CHANGE UP (ref 135–145)
URATE SERPL-MCNC: 1.8 MG/DL — LOW (ref 2.5–7)
URATE SERPL-MCNC: 3.3 MG/DL — SIGNIFICANT CHANGE UP (ref 2.5–7)
WBC # BLD: 16.57 K/UL — HIGH (ref 3.8–10.5)
WBC # BLD: 27.78 K/UL — HIGH (ref 3.8–10.5)
WBC # FLD AUTO: 16.57 K/UL — HIGH (ref 3.8–10.5)
WBC # FLD AUTO: 27.78 K/UL — HIGH (ref 3.8–10.5)

## 2023-01-20 PROCEDURE — 99233 SBSQ HOSP IP/OBS HIGH 50: CPT

## 2023-01-20 RX ORDER — RASBURICASE 7.5 MG
3 KIT INTRAVENOUS ONCE
Refills: 0 | Status: COMPLETED | OUTPATIENT
Start: 2023-01-20 | End: 2023-01-20

## 2023-01-20 RX ORDER — PHYTONADIONE (VIT K1) 5 MG
10 TABLET ORAL ONCE
Refills: 0 | Status: COMPLETED | OUTPATIENT
Start: 2023-01-20 | End: 2023-01-20

## 2023-01-20 RX ORDER — CALCIUM ACETATE 667 MG
667 TABLET ORAL
Refills: 0 | Status: COMPLETED | OUTPATIENT
Start: 2023-01-20 | End: 2023-01-21

## 2023-01-20 RX ADMIN — Medication 667 MILLIGRAM(S): at 17:23

## 2023-01-20 RX ADMIN — INSULIN GLARGINE 10 UNIT(S): 100 INJECTION, SOLUTION SUBCUTANEOUS at 21:27

## 2023-01-20 RX ADMIN — Medication 667 MILLIGRAM(S): at 09:02

## 2023-01-20 RX ADMIN — DASATINIB 140 MILLIGRAM(S): 80 TABLET ORAL at 11:58

## 2023-01-20 RX ADMIN — Medication 102 MILLIGRAM(S): at 08:47

## 2023-01-20 RX ADMIN — CHLORHEXIDINE GLUCONATE 1 APPLICATION(S): 213 SOLUTION TOPICAL at 09:13

## 2023-01-20 RX ADMIN — Medication 300 MILLIGRAM(S): at 11:58

## 2023-01-20 RX ADMIN — Medication 667 MILLIGRAM(S): at 12:07

## 2023-01-20 RX ADMIN — Medication 102 MILLIGRAM(S): at 17:23

## 2023-01-20 RX ADMIN — Medication 240 MILLIGRAM(S): at 06:46

## 2023-01-20 RX ADMIN — Medication 500 MILLIGRAM(S): at 11:58

## 2023-01-20 RX ADMIN — HYDROXYUREA 2000 MILLIGRAM(S): 500 CAPSULE ORAL at 06:47

## 2023-01-20 RX ADMIN — Medication 2: at 17:27

## 2023-01-20 RX ADMIN — RASBURICASE 104 MILLIGRAM(S): KIT at 13:46

## 2023-01-20 RX ADMIN — VALSARTAN 320 MILLIGRAM(S): 80 TABLET ORAL at 06:46

## 2023-01-20 RX ADMIN — Medication 102 MILLIGRAM(S): at 06:49

## 2023-01-20 RX ADMIN — Medication 5 MILLIGRAM(S): at 22:55

## 2023-01-20 RX ADMIN — ATORVASTATIN CALCIUM 80 MILLIGRAM(S): 80 TABLET, FILM COATED ORAL at 21:27

## 2023-01-20 RX ADMIN — PREGABALIN 1000 MICROGRAM(S): 225 CAPSULE ORAL at 11:58

## 2023-01-20 RX ADMIN — Medication 2: at 12:07

## 2023-01-20 RX ADMIN — PANTOPRAZOLE SODIUM 40 MILLIGRAM(S): 20 TABLET, DELAYED RELEASE ORAL at 06:47

## 2023-01-20 RX ADMIN — Medication 1: at 08:17

## 2023-01-20 NOTE — PROGRESS NOTE ADULT - SUBJECTIVE AND OBJECTIVE BOX
Diagnosis: newly diagnosed B-ALL, Ph (+)    Protocol/Chemo Regimen: Following 91979 (Decadron + Dasatinib), +/- weekly Vincristine    Day: 3     Pt endorsed: No overnight events, afebrile, taking po's    Review of Systems: Denies CP/palp's, SOB, HA or dizziness    Pain scale: denies    Diet: regular    Allergies: No Known Allergies    HEME/ONC MEDICATIONS  dasatinib 140 milliGRAM(s) Oral daily  hydroxyurea 2000 milliGRAM(s) Oral every 12 hours      STANDING MEDICATIONS  allopurinol 300 milliGRAM(s) Oral daily  ascorbic acid 500 milliGRAM(s) Oral daily  atorvastatin 80 milliGRAM(s) Oral at bedtime  chlorhexidine 4% Liquid 1 Application(s) Topical <User Schedule>  cyanocobalamin 1000 MICROGram(s) Oral daily  dexAMETHasone  IVPB 10 milliGRAM(s) IV Intermittent every 12 hours  dextrose 5%. 1000 milliLiter(s) IV Continuous <Continuous>  dextrose 5%. 1000 milliLiter(s) IV Continuous <Continuous>  dextrose 50% Injectable 25 Gram(s) IV Push once  dextrose 50% Injectable 12.5 Gram(s) IV Push once  diltiazem    milliGRAM(s) Oral daily  glucagon  Injectable 1 milliGRAM(s) IntraMuscular once  hydrochlorothiazide 25 milliGRAM(s) Oral daily  insulin glargine Injectable (LANTUS) 10 Unit(s) SubCutaneous at bedtime  insulin lispro (ADMELOG) corrective regimen sliding scale   SubCutaneous three times a day before meals  melatonin 5 milliGRAM(s) Oral at bedtime  pantoprazole    Tablet 40 milliGRAM(s) Oral before breakfast  sodium chloride 0.9%. 1000 milliLiter(s) IV Continuous <Continuous>  valsartan 320 milliGRAM(s) Oral daily      PRN MEDICATIONS  acetaminophen     Tablet .. 650 milliGRAM(s) Oral every 6 hours PRN  aluminum hydroxide/magnesium hydroxide/simethicone Suspension 30 milliLiter(s) Oral every 4 hours PRN  dextrose Oral Gel 15 Gram(s) Oral once PRN  sodium chloride 0.9% lock flush 10 milliLiter(s) IV Push every 1 hour PRN      Vital Signs Last 24 Hrs  T(C): 36.7 (20 Jan 2023 05:34), Max: 36.9 (19 Jan 2023 17:40)  T(F): 98 (20 Jan 2023 05:34), Max: 98.5 (20 Jan 2023 01:35)  HR: 73 (20 Jan 2023 05:34) (66 - 88)  BP: 133/77 (20 Jan 2023 05:34) (125/73 - 184/88)  RR: 16 (20 Jan 2023 05:34) (16 - 18)  SpO2: 99% (20 Jan 2023 05:34) (95% - 99%)    Parameters below as of 20 Jan 2023 05:34  Patient On (Oxygen Delivery Method): room air      PHYSICAL EXAM  General: NAD, sitting up in bed  HEENT: Sclerae anicteric, clear oropharynx, no oral lesions  CV: normal S1/S2, reg  Lungs: breath sounds clear and equal bilaterally  Abdomen: soft non-tender non-distended  Ext: no edema  Skin: no rashes   Neuro: alert and oriented X 4  Central Line: RUE PICC clean, dry, intact    Cultures:  Culture - Stool (01.11.23 @ 10:36)    Specimen Source: .Stool Feces inna jv    Culture Results:   No enteric gram negative rods isolated  No enteric pathogens isolated.  (Stool culture examined for Salmonella,  Shigella, Campylobacter, Aeromonas, Plesiomonas,  Vibrio, E.coli O157 and Yersinia)      LABS:                         RADIOLOGY & ADDITIONAL STUDIES:         Diagnosis: newly diagnosed B-ALL, Ph (+)    Protocol/Chemo Regimen: Following 29309 (Decadron + Dasatinib), +/- weekly Vincristine    Day: 3     Pt endorsed: No overnight events, afebrile, taking po's    Review of Systems: Denies CP/palp's, SOB, HA or dizziness    Pain scale: denies    Diet: regular    Allergies: No Known Allergies    HEME/ONC MEDICATIONS  dasatinib 140 milliGRAM(s) Oral daily  hydroxyurea 2000 milliGRAM(s) Oral every 12 hours      STANDING MEDICATIONS  allopurinol 300 milliGRAM(s) Oral daily  ascorbic acid 500 milliGRAM(s) Oral daily  atorvastatin 80 milliGRAM(s) Oral at bedtime  chlorhexidine 4% Liquid 1 Application(s) Topical <User Schedule>  cyanocobalamin 1000 MICROGram(s) Oral daily  dexAMETHasone  IVPB 10 milliGRAM(s) IV Intermittent every 12 hours  dextrose 5%. 1000 milliLiter(s) IV Continuous <Continuous>  dextrose 5%. 1000 milliLiter(s) IV Continuous <Continuous>  dextrose 50% Injectable 25 Gram(s) IV Push once  dextrose 50% Injectable 12.5 Gram(s) IV Push once  diltiazem    milliGRAM(s) Oral daily  glucagon  Injectable 1 milliGRAM(s) IntraMuscular once  hydrochlorothiazide 25 milliGRAM(s) Oral daily  insulin glargine Injectable (LANTUS) 10 Unit(s) SubCutaneous at bedtime  insulin lispro (ADMELOG) corrective regimen sliding scale   SubCutaneous three times a day before meals  melatonin 5 milliGRAM(s) Oral at bedtime  pantoprazole    Tablet 40 milliGRAM(s) Oral before breakfast  sodium chloride 0.9%. 1000 milliLiter(s) IV Continuous <Continuous>  valsartan 320 milliGRAM(s) Oral daily      PRN MEDICATIONS  acetaminophen     Tablet .. 650 milliGRAM(s) Oral every 6 hours PRN  aluminum hydroxide/magnesium hydroxide/simethicone Suspension 30 milliLiter(s) Oral every 4 hours PRN  dextrose Oral Gel 15 Gram(s) Oral once PRN  sodium chloride 0.9% lock flush 10 milliLiter(s) IV Push every 1 hour PRN      Vital Signs Last 24 Hrs  T(C): 36.7 (20 Jan 2023 05:34), Max: 36.9 (19 Jan 2023 17:40)  T(F): 98 (20 Jan 2023 05:34), Max: 98.5 (20 Jan 2023 01:35)  HR: 73 (20 Jan 2023 05:34) (66 - 88)  BP: 133/77 (20 Jan 2023 05:34) (125/73 - 184/88)  RR: 16 (20 Jan 2023 05:34) (16 - 18)  SpO2: 99% (20 Jan 2023 05:34) (95% - 99%)    Parameters below as of 20 Jan 2023 05:34  Patient On (Oxygen Delivery Method): room air      PHYSICAL EXAM  General: NAD, sitting up in bed  HEENT: Sclerae anicteric, clear oropharynx, no oral lesions  CV: normal S1/S2, reg  Lungs: breath sounds clear and equal bilaterally  Abdomen: soft non-tender non-distended  Ext: no edema  Skin: no rashes   Neuro: alert and oriented X 4  Central Line: RUE PICC clean, dry, intact    Cultures:  Culture - Stool (01.11.23 @ 10:36)    Specimen Source: .Stool Feces inna jv    Culture Results:   No enteric gram negative rods isolated  No enteric pathogens isolated.  (Stool culture examined for Salmonella,  Shigella, Campylobacter, Aeromonas, Plesiomonas,  Vibrio, E.coli O157 and Yersinia)    Cultures:  Culture - Stool (01.11.23 @ 10:36)    Specimen Source: .Stool Feces inna jv    Culture Results:   No enteric gram negative rods isolated  No enteric pathogens isolated.  (Stool culture examined for Salmonella,  Shigella, Campylobacter, Aeromonas, Plesiomonas,  Vibrio, E.coli O157 and Yersinia)    LABS:                        7.8    27.78 )-----------( 95       ( 20 Jan 2023 07:16 )             24.5     20 Jan 2023 07:16    138    |  103    |  40     ----------------------------<  172    4.7     |  24     |  0.88     Ca    8.7        20 Jan 2023 07:16  Phos  6.2       20 Jan 2023 07:16  Mg     2.7       20 Jan 2023 07:16    TPro  6.4    /  Alb  4.0    /  TBili  0.2    /  DBili  x      /  AST  91     /  ALT  30     /  AlkPhos  104    20 Jan 2023 07:16    PT/INR - ( 20 Jan 2023 07:16 )   PT: 19.6 sec;   INR: 1.70 ratio    PTT - ( 20 Jan 2023 07:16 )  PTT:25.3 sec    POCT Blood Glucose.: 188 mg/dL (20 Jan 2023 07:47)  POCT Blood Glucose.: 228 mg/dL (19 Jan 2023 21:08)  POCT Blood Glucose.: 206 mg/dL (19 Jan 2023 17:16)  POCT Blood Glucose.: 221 mg/dL (19 Jan 2023 12:40)    LIVER FUNCTIONS - ( 20 Jan 2023 07:16 )  Alb: 4.0 g/dL / Pro: 6.4 g/dL / ALK PHOS: 104 U/L / ALT: 30 U/L / AST: 91 U/L / GGT: x               RADIOLOGY & ADDITIONAL STUDIES:  from: Xray Chest 1 View- PORTABLE-Urgent (Xray Chest 1 View- PORTABLE-Urgent .) (01.16.23 @ 13:16)   FINDINGS:  Heart/Vascular: The cardiac silhouetteis normal in size. Sternotomy   wires. Right PICC terminating within the distal SVC.  Pulmonary: No focal consolidation, pleural effusion or pneumothorax.  Bones: The visualized osseous structures are unremarkable.    Impression:  Right PICC terminating within the distal SVC.

## 2023-01-20 NOTE — PROGRESS NOTE ADULT - ATTENDING COMMENTS
81 YO F with a PMhx of A.fib, s/p Saint Darian mechanical AVR, (1994–[facility]) for rheumatic aortic valve stenosis, ascending aortic aneurysm, HTN,  HLD, depression, GERD transferred from [facility] for further work-up and management of leukocytosis associated with thrombocytopenia and anemia. She was also noted to have a supratherapeutic INR. CT angio A/P which showed no evidence of active GIB, but showing focal diminished cortical enhancement involving the upper left kidney concerning for focal pyelonephritis or possibly infarct. EGD without any acute findings. Colonoscopy showed grade I internal hemorrhoid and polyps in transverse colon and hepatic flexure. 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 She is now admitted to [facility] on 1/14/23 for further work up.    Leukocytosis  -infectious work up done at [facility] unremarkable     -PB flow cytometry (1/14/23) consistent with acute leukemia    Blasts identified, 60% of total, with the following immunophenotype; CD45 (dim to negative), CD34, CD10+, CD19+, CD20 CD22+, CD11b (partial dim)+, CD38+ and HLADR+(heterogenous); negative for, CD2, CD3, CD4, CD5, CD7, CD8, , CD33, CD14, CD16, CD15, CD11b, CD56, CD4, CD64, kappa and lambda (surface). Immunophenotypic features are consistent with acute leukemia.      -had BMBx on 1/17 >>> 99% B-lymphoblasts  -baseline echo EF 57%  -has PICC line   -Monitor LDH, Uric Acid and CMP daily   -keep Hb >7, plt >10  -start allopurinol  -WBC rising, will increase HU to 2 gm 2x/d   -start decadron 20 mg/d  -WBC today 77.8  TLS labs, I &O    ICH  -headaches since 1/15/23, now improved  -1/15/23 CT head showed 8mm bleed, repeat CT is stable  -no intervention as per Neursx  -hold coumadin (confirmed with cardiology)  -keep plt >100 for now post SDH  -completed Vit K    Cardio  HTN - c/w home meds  Mechanical valve - currently off AC, discussed with cardiology  will hold AC in light of risk/benefit, anticipated start of chemotx 81 YO F with a PMhx of [email or web address].tonya, s/p Saint Darian mechanical AVR, (1994–[facility]) for rheumatic aortic valve stenosis, ascending aortic aneurysm, HTN,  HLD, depression, GERD transferred from [facility]. Dx is Ph+ ALL.  She was also noted to have a supratherapeutic INR. CT angio A/P which showed no evidence of active GIB, but showing focal diminished cortical enhancement involving the upper left kidney concerning for focal pyelonephritis or possibly infarct. EGD without any acute findings. Colonoscopy showed grade I internal hemorrhoid and polyps in transverse colon and hepatic flexure. 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 Admitted to [facility] on 1/14/23 for further work up.    Leukocytosis       Blasts identified, 60% ; CD45 (dim to negative), CD34, CD10+, CD19+, CD20, CD22+, CD11b (partial dim)+, CD38+ and HLADR+(heterogenous); negative for, CD2, CD3, CD4, CD5, CD7, CD8, , CD33, CD14, CD16, CD15, CD11b, CD56, CD4, CD64, kappa and lambda (surface). Immunophenotypic features are consistent with acute leukemia.    -had BMBx on 1/17 >>> 99% B-lymphoblasts  -baseline echo EF 57%  -has PICC line   -Monitor LDH, Uric Acid and CMP daily   -keep Hb >7, plt >10  -allopurinol  -WBC rising,   increased HU to 2 gm 2x/d   -started decadron 20 mg/d 1/18/23  -started dasatinib 140 mg/d on 1/19/23  -WBC rapidly decr to 27.78  -phos, LDH up, urate 6.4, fibrinogen ? 35, PT incr  -given cryo 1/20/23  -repeat coags today post cryo, vit K  -cont incr IVFs, I and O  TLS labs incr to q8hr for now, I &O  d/c HU  LP d15 as per 27693    ICH  -headaches since 1/15/23, now improved  -1/15/23 CT head showed 8mm bleed, repeat CT is stable  -no intervention as per Neursx  -hold coumadin (confirmed with cardiology)  -keep plt >100 for now post SDH       Cardio  HTN - c/w home meds  Mechanical valve - currently off AC, discussed with cardiology  will hold AC in light of risk/benefit, anticipated start of chemotx

## 2023-01-20 NOTE — PROGRESS NOTE ADULT - ASSESSMENT
82 year old female with atrial fibrillation, s/p Saint Darian mechanical AVR, (1994–[facility]) for rheumatic aortic valve stenosis, ascending aortic aneurysm, HTN, HLD, depression, GERD transferred from [facility] for further work-up and management of leukocytosis associated with thrombocytopenia and anemia. She was also noted to have a supratherapeutic INR. CT angio A/P which showed no evidence of active GIB, but showing focal diminished cortical enhancement involving the upper left kidney concerning for focal pyelonephritis or possibly infarct. EGD without any acute findings. Colonoscopy showed grade I internal hemorrhoid and polyps in transverse colon and hepatic flexure. 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 Transferred to [address] [place] [address] for further treatment. 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 Leukocytosis, anemia, thrombocytopenia secondary to disease.

## 2023-01-20 NOTE — PROGRESS NOTE ADULT - PROBLEM SELECTOR PLAN 3
in setting of thrombocytopenia and on warfarin for mechanical AVR (1994 [facility]), atrial fibrillation  last doses of warfarin 1/12 and 1/13 at [facility] - 5mg daily  neurosurgery, and cardiology consulted  will keep PLTs > 100k

## 2023-01-20 NOTE — PROGRESS NOTE ADULT - PROBLEM SELECTOR PLAN 1
Flow cytometry (1/14/23) consistent with acute leukemia (B ALL), FISH detected BCR/ABL (1/19)  Monitor daily CBC with Diff/CMP and transfuse/replete PRN  Keep PLT >100 due to SDH  TLS labs bid, continue Allopurinol, IVF's  Strict I/O's, aily wghts  1/14 TTE - EF 57%, mild pulm HTN, Mechanical AVR likely normal function  1/16 HLA sent  1/17  s/p BMbx c/w ALL (98% blasts) (with Clonoseq sampling)  1/18 Wbc 90k, Blasts 81%. Hydrea 2g BID, starting Dex 20mg IV Daily. watch glucose levels.  1/19 Starting Dasatinib, LP with Cytarabine ordered for tomorrow 1/20 (Ok to perform LP per Neurosurgery) Flow cytometry (1/14/23) consistent with acute leukemia (B ALL), FISH detected BCR/ABL (1/19)  Monitor daily CBC with Diff/CMP and transfuse/replete PRN  Keep PLT >100 due to SDH  TLS labs bid, continue Allopurinol, IVF's  Strict I/O's, aily wghts  1/14 TTE - EF 57%, mild pulm HTN, Mechanical AVR likely normal function  1/16 HLA sent  1/17  s/p BMbx c/w ALL (98% blasts) (with Clonoseq sampling)  1/18 Wbc 90k, Blasts 81%. Hydrea 2g BID, starting Dex 20mg IV Daily. watch glucose levels.  1/19 Started Dasatinib  1/20 Cryoprecipitate, PLTs ordered. plan to perform LP on Day 15 on 2/1.

## 2023-01-21 ENCOUNTER — TRANSCRIPTION ENCOUNTER (OUTPATIENT)
Age: 83
End: 2023-01-21

## 2023-01-21 LAB
ALBUMIN SERPL ELPH-MCNC: 3.9 G/DL — SIGNIFICANT CHANGE UP (ref 3.3–5)
ALP SERPL-CCNC: 97 U/L — SIGNIFICANT CHANGE UP (ref 40–120)
ALT FLD-CCNC: 33 U/L — SIGNIFICANT CHANGE UP (ref 10–45)
ANION GAP SERPL CALC-SCNC: 15 MMOL/L — SIGNIFICANT CHANGE UP (ref 5–17)
ANION GAP SERPL CALC-SCNC: 16 MMOL/L — SIGNIFICANT CHANGE UP (ref 5–17)
APTT BLD: 21.5 SEC — LOW (ref 27.5–35.5)
APTT BLD: 22.3 SEC — LOW (ref 27.5–35.5)
AST SERPL-CCNC: 307 U/L — HIGH (ref 10–40)
BASOPHILS # BLD AUTO: 0 K/UL — SIGNIFICANT CHANGE UP (ref 0–0.2)
BASOPHILS NFR BLD AUTO: 0 % — SIGNIFICANT CHANGE UP (ref 0–2)
BILIRUB SERPL-MCNC: 0.4 MG/DL — SIGNIFICANT CHANGE UP (ref 0.2–1.2)
BUN SERPL-MCNC: 41 MG/DL — HIGH (ref 7–23)
BUN SERPL-MCNC: 43 MG/DL — HIGH (ref 7–23)
C DIFF GDH STL QL: NEGATIVE — SIGNIFICANT CHANGE UP
C DIFF GDH STL QL: SIGNIFICANT CHANGE UP
CALCIUM SERPL-MCNC: 7.9 MG/DL — LOW (ref 8.4–10.5)
CALCIUM SERPL-MCNC: 8 MG/DL — LOW (ref 8.4–10.5)
CHLORIDE SERPL-SCNC: 103 MMOL/L — SIGNIFICANT CHANGE UP (ref 96–108)
CHLORIDE SERPL-SCNC: 103 MMOL/L — SIGNIFICANT CHANGE UP (ref 96–108)
CO2 SERPL-SCNC: 18 MMOL/L — LOW (ref 22–31)
CO2 SERPL-SCNC: 20 MMOL/L — LOW (ref 22–31)
CREAT SERPL-MCNC: 0.81 MG/DL — SIGNIFICANT CHANGE UP (ref 0.5–1.3)
CREAT SERPL-MCNC: 0.82 MG/DL — SIGNIFICANT CHANGE UP (ref 0.5–1.3)
D DIMER BLD IA.RAPID-MCNC: HIGH NG/ML DDU
EGFR: 72 ML/MIN/1.73M2 — SIGNIFICANT CHANGE UP
EGFR: 73 ML/MIN/1.73M2 — SIGNIFICANT CHANGE UP
EOSINOPHIL # BLD AUTO: 0.07 K/UL — SIGNIFICANT CHANGE UP (ref 0–0.5)
EOSINOPHIL NFR BLD AUTO: 1 % — SIGNIFICANT CHANGE UP (ref 0–6)
FIBRINOGEN PPP-MCNC: 140 MG/DL — LOW (ref 200–445)
FIBRINOGEN PPP-MCNC: 175 MG/DL — LOW (ref 200–445)
G6PD RBC-CCNC: 14.5 U/G HGB — SIGNIFICANT CHANGE UP (ref 7–20.5)
GI PCR PANEL: SIGNIFICANT CHANGE UP
GLUCOSE BLDC GLUCOMTR-MCNC: 225 MG/DL — HIGH (ref 70–99)
GLUCOSE BLDC GLUCOMTR-MCNC: 241 MG/DL — HIGH (ref 70–99)
GLUCOSE BLDC GLUCOMTR-MCNC: 268 MG/DL — HIGH (ref 70–99)
GLUCOSE BLDC GLUCOMTR-MCNC: 281 MG/DL — HIGH (ref 70–99)
GLUCOSE SERPL-MCNC: 211 MG/DL — HIGH (ref 70–99)
GLUCOSE SERPL-MCNC: 274 MG/DL — HIGH (ref 70–99)
HCT VFR BLD CALC: 22.7 % — LOW (ref 34.5–45)
HCT VFR BLD CALC: 24.6 % — LOW (ref 34.5–45)
HGB BLD-MCNC: 7.5 G/DL — LOW (ref 11.5–15.5)
HGB BLD-MCNC: 8 G/DL — LOW (ref 11.5–15.5)
INR BLD: 1.32 RATIO — HIGH (ref 0.88–1.16)
INR BLD: 1.41 RATIO — HIGH (ref 0.88–1.16)
LDH SERPL L TO P-CCNC: 3456 U/L — HIGH (ref 50–242)
LDH SERPL L TO P-CCNC: 3477 U/L — HIGH (ref 50–242)
LYMPHOCYTES # BLD AUTO: 2.09 K/UL — SIGNIFICANT CHANGE UP (ref 1–3.3)
LYMPHOCYTES # BLD AUTO: 29 % — SIGNIFICANT CHANGE UP (ref 13–44)
MAGNESIUM SERPL-MCNC: 2.7 MG/DL — HIGH (ref 1.6–2.6)
MCHC RBC-ENTMCNC: 28.7 PG — SIGNIFICANT CHANGE UP (ref 27–34)
MCHC RBC-ENTMCNC: 28.7 PG — SIGNIFICANT CHANGE UP (ref 27–34)
MCHC RBC-ENTMCNC: 32.5 GM/DL — SIGNIFICANT CHANGE UP (ref 32–36)
MCHC RBC-ENTMCNC: 33 GM/DL — SIGNIFICANT CHANGE UP (ref 32–36)
MCV RBC AUTO: 87 FL — SIGNIFICANT CHANGE UP (ref 80–100)
MCV RBC AUTO: 88.2 FL — SIGNIFICANT CHANGE UP (ref 80–100)
MONOCYTES # BLD AUTO: 0.07 K/UL — SIGNIFICANT CHANGE UP (ref 0–0.9)
MONOCYTES NFR BLD AUTO: 1 % — LOW (ref 2–14)
NEUTROPHILS # BLD AUTO: 2.82 K/UL — SIGNIFICANT CHANGE UP (ref 1.8–7.4)
NEUTROPHILS NFR BLD AUTO: 34 % — LOW (ref 43–77)
NRBC # BLD: 0 /100 WBCS — SIGNIFICANT CHANGE UP (ref 0–0)
OB PNL STL: POSITIVE
PHOSPHATE SERPL-MCNC: 4.6 MG/DL — HIGH (ref 2.5–4.5)
PHOSPHATE SERPL-MCNC: 5.4 MG/DL — HIGH (ref 2.5–4.5)
PLATELET # BLD AUTO: 112 K/UL — LOW (ref 150–400)
PLATELET # BLD AUTO: 55 K/UL — LOW (ref 150–400)
POTASSIUM SERPL-MCNC: 3.8 MMOL/L — SIGNIFICANT CHANGE UP (ref 3.5–5.3)
POTASSIUM SERPL-MCNC: 3.9 MMOL/L — SIGNIFICANT CHANGE UP (ref 3.5–5.3)
POTASSIUM SERPL-SCNC: 3.8 MMOL/L — SIGNIFICANT CHANGE UP (ref 3.5–5.3)
POTASSIUM SERPL-SCNC: 3.9 MMOL/L — SIGNIFICANT CHANGE UP (ref 3.5–5.3)
PROT SERPL-MCNC: 6.3 G/DL — SIGNIFICANT CHANGE UP (ref 6–8.3)
PROTHROM AB SERPL-ACNC: 15.4 SEC — HIGH (ref 10.5–13.4)
PROTHROM AB SERPL-ACNC: 16.4 SEC — HIGH (ref 10.5–13.4)
RBC # BLD: 2.61 M/UL — LOW (ref 3.8–5.2)
RBC # BLD: 2.79 M/UL — LOW (ref 3.8–5.2)
RBC # FLD: 15.9 % — HIGH (ref 10.3–14.5)
RBC # FLD: 15.9 % — HIGH (ref 10.3–14.5)
SODIUM SERPL-SCNC: 137 MMOL/L — SIGNIFICANT CHANGE UP (ref 135–145)
SODIUM SERPL-SCNC: 138 MMOL/L — SIGNIFICANT CHANGE UP (ref 135–145)
URATE SERPL-MCNC: 0.8 MG/DL — LOW (ref 2.5–7)
URATE SERPL-MCNC: 1.3 MG/DL — LOW (ref 2.5–7)
WBC # BLD: 3.12 K/UL — LOW (ref 3.8–10.5)
WBC # BLD: 7.22 K/UL — SIGNIFICANT CHANGE UP (ref 3.8–10.5)
WBC # FLD AUTO: 3.12 K/UL — LOW (ref 3.8–10.5)
WBC # FLD AUTO: 7.22 K/UL — SIGNIFICANT CHANGE UP (ref 3.8–10.5)

## 2023-01-21 PROCEDURE — 99232 SBSQ HOSP IP/OBS MODERATE 35: CPT

## 2023-01-21 RX ORDER — PHYTONADIONE (VIT K1) 5 MG
10 TABLET ORAL ONCE
Refills: 0 | Status: COMPLETED | OUTPATIENT
Start: 2023-01-21 | End: 2023-01-21

## 2023-01-21 RX ORDER — ZINC OXIDE 200 MG/G
1 OINTMENT TOPICAL
Refills: 0 | Status: DISCONTINUED | OUTPATIENT
Start: 2023-01-21 | End: 2023-02-10

## 2023-01-21 RX ORDER — LOPERAMIDE HCL 2 MG
2 TABLET ORAL ONCE
Refills: 0 | Status: COMPLETED | OUTPATIENT
Start: 2023-01-21 | End: 2023-01-21

## 2023-01-21 RX ADMIN — Medication 500 MILLIGRAM(S): at 11:05

## 2023-01-21 RX ADMIN — ATORVASTATIN CALCIUM 80 MILLIGRAM(S): 80 TABLET, FILM COATED ORAL at 21:17

## 2023-01-21 RX ADMIN — PREGABALIN 1000 MICROGRAM(S): 225 CAPSULE ORAL at 11:05

## 2023-01-21 RX ADMIN — VALSARTAN 320 MILLIGRAM(S): 80 TABLET ORAL at 06:08

## 2023-01-21 RX ADMIN — DASATINIB 140 MILLIGRAM(S): 80 TABLET ORAL at 11:05

## 2023-01-21 RX ADMIN — INSULIN GLARGINE 10 UNIT(S): 100 INJECTION, SOLUTION SUBCUTANEOUS at 21:17

## 2023-01-21 RX ADMIN — Medication 102 MILLIGRAM(S): at 18:07

## 2023-01-21 RX ADMIN — CHLORHEXIDINE GLUCONATE 1 APPLICATION(S): 213 SOLUTION TOPICAL at 06:09

## 2023-01-21 RX ADMIN — ZINC OXIDE 1 APPLICATION(S): 200 OINTMENT TOPICAL at 18:07

## 2023-01-21 RX ADMIN — PANTOPRAZOLE SODIUM 40 MILLIGRAM(S): 20 TABLET, DELAYED RELEASE ORAL at 05:22

## 2023-01-21 RX ADMIN — Medication 650 MILLIGRAM(S): at 07:21

## 2023-01-21 RX ADMIN — Medication 2 MILLIGRAM(S): at 23:56

## 2023-01-21 RX ADMIN — Medication 30 MILLILITER(S): at 06:10

## 2023-01-21 RX ADMIN — Medication 2: at 12:18

## 2023-01-21 RX ADMIN — SODIUM CHLORIDE 50 MILLILITER(S): 9 INJECTION INTRAMUSCULAR; INTRAVENOUS; SUBCUTANEOUS at 05:26

## 2023-01-21 RX ADMIN — Medication 240 MILLIGRAM(S): at 06:08

## 2023-01-21 RX ADMIN — Medication 5 MILLIGRAM(S): at 21:18

## 2023-01-21 RX ADMIN — Medication 102 MILLIGRAM(S): at 06:08

## 2023-01-21 RX ADMIN — Medication 300 MILLIGRAM(S): at 11:05

## 2023-01-21 RX ADMIN — Medication 650 MILLIGRAM(S): at 07:51

## 2023-01-21 RX ADMIN — Medication 102 MILLIGRAM(S): at 09:42

## 2023-01-21 RX ADMIN — Medication 2: at 08:24

## 2023-01-21 RX ADMIN — Medication 667 MILLIGRAM(S): at 08:24

## 2023-01-21 RX ADMIN — Medication 667 MILLIGRAM(S): at 18:07

## 2023-01-21 RX ADMIN — Medication 3: at 18:40

## 2023-01-21 RX ADMIN — Medication 667 MILLIGRAM(S): at 11:05

## 2023-01-21 NOTE — DISCHARGE NOTE PROVIDER - HOSPITAL COURSE
Ms. Parada is a 82 year old female visitng from [place] with pmh  atrial fibrillation, s/p [employer] mechanical AVR, (1994–[facility]) for rheumatic aortic valve stenosis, ascending aortic aneurysm, HTN,  HLD, depression, GERD transferred from [facility] for further work-up and management of leukocytosis associated with thrombocytopenia and anemia. Patient initially presented to [facility] on 1/9/23 with complaints of black tarry stool and epigastric pain, and was subsequently found to be with anemia, thrombocytopenia and leukocytosis (lymphocytic predominant). She was also noted to have a supratherapeutic INR. She had CT angio A/P which showed no evidence of active GIB, but showing focal diminished cortical enhancement involving the upper left kidney concerning for focal pyelonephritis or possibly infarct. EGD without any acute findings. Colonoscopy showed showed grade I internal hemorrhoid and polyps in transverse colon and hepatic flexure. 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    Transferred to [address] [place] [address] for further treatment. Peripheral flow performed, BMBx consistent with B-cell ALL, FISH Ph+. Treatment started 1/18 following [id number] protocol of dexamethasone 10mg/m2 x 7 days + daily Dasatinib. Vincristine given weekly starting on Day 6. 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   Ms. Parada is a 82 year old female visitng from [place] with pmh  atrial fibrillation, s/p [employer] mechanical AVR, (1994–[facility] [place] [facility]) for rheumatic aortic valve stenosis, ascending aortic aneurysm, HTN,  HLD, depression, GERD transferred from [facility] for further work-up and management of leukocytosis associated with thrombocytopenia and anemia. Patient initially presented to [facility] on 1/9/23 with complaints of black tarry stool and epigastric pain, and was subsequently found to be with anemia, thrombocytopenia and leukocytosis (lymphocytic predominant). She was also noted to have a supratherapeutic INR. She had CT angio A/P which showed no evidence of active GIB, but showing focal diminished cortical enhancement involving the upper left kidney concerning for focal pyelonephritis or possibly infarct. EGD without any acute findings. Colonoscopy showed showed grade I internal hemorrhoid and polyps in transverse colon and hepatic flexure. 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    Transferred to [address] [place] [address] for further treatment. Peripheral flow performed, BMBx consistent with B-cell ALL, FISH Ph+. Treatment started 1/18 following [id number] protocol of dexamethasone 10mg/m2 x 7 days + daily Dasatinib. Vincristine given weekly starting on Day ______. 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   Ms. Parada is a 82 year old female visitng from [place] with pmh  atrial fibrillation, s/p [employer] mechanical AVR, (1994–[facility]) for rheumatic aortic valve stenosis, ascending aortic aneurysm, HTN,  HLD, depression, GERD transferred from [facility] for further work-up and management of leukocytosis associated with thrombocytopenia and anemia. Patient initially presented to [facility] on 1/9/23 with complaints of black tarry stool and epigastric pain, and was subsequently found to be with anemia, thrombocytopenia and leukocytosis (lymphocytic predominant). She was also noted to have a supratherapeutic INR. She had CT angio A/P which showed no evidence of active GIB, but showing focal diminished cortical enhancement involving the upper left kidney concerning for focal pyelonephritis or possibly infarct. EGD without any acute findings. Colonoscopy showed showed grade I internal hemorrhoid and polyps in transverse colon and hepatic flexure. 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    Transferred to [address] [place] [address] for further treatment. Peripheral flow performed, BMBx consistent with B-cell ALL, FISH Ph+. Treatment started 1/18 following [id number] protocol of dexamethasone 10mg/m2 x 7 days + daily Dasatinib. Vincristine given weekly starting on Day1/35. 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   Ms. Parada is a 82 year old female visitng from [place] with pmh  atrial fibrillation, s/p [employer] mechanical AVR, (1994–[facility]) for rheumatic aortic valve stenosis, ascending aortic aneurysm, HTN,  HLD, depression, GERD transferred from [facility] for further work-up and management of leukocytosis associated with thrombocytopenia and anemia. Patient initially presented to [facility] on 1/9/23 with complaints of black tarry stool and epigastric pain, and was subsequently found to be with anemia, thrombocytopenia and leukocytosis (lymphocytic predominant). She was also noted to have a supratherapeutic INR. She had CT angio A/P which showed no evidence of active GIB, but showing focal diminished cortical enhancement involving the upper left kidney concerning for focal pyelonephritis or possibly infarct. EGD without any acute findings. Colonoscopy showed showed grade I internal hemorrhoid and polyps in transverse colon and hepatic flexure. 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  Transferred to [address] [place] [address] for further treatment. Peripheral flow performed, BMBx consistent with B-cell ALL, FISH Ph+. Treatment started 1/18 following [id number] protocol of dexamethasone 10mg/m2 x 7 days + daily Dasatinib. 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 Neurosurgery was consulted and cardiology consulted for  mechanical AVR not a candidate for AC.   LP was performed on          Ms. Parada is a 82 year old female visitng from [place] with pmh  atrial fibrillation, s/p [employer] mechanical AVR, (1994–[facility]) for rheumatic aortic valve stenosis, ascending aortic aneurysm, HTN,  HLD, depression, GERD transferred from [facility] for further work-up and management of leukocytosis associated with thrombocytopenia and anemia. Patient initially presented to [facility] on 1/9/23 with complaints of black tarry stool and epigastric pain, and was subsequently found to be with anemia, thrombocytopenia and leukocytosis (lymphocytic predominant). She was also noted to have a supratherapeutic INR. She had CT angio A/P which showed no evidence of active GIB, but showing focal diminished cortical enhancement involving the upper left kidney concerning for focal pyelonephritis or possibly infarct. EGD without any acute findings. Colonoscopy showed showed grade I internal hemorrhoid and polyps in transverse colon and hepatic flexure. 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  Transferred to [address] [place] [address] for further treatment. Peripheral flow performed, BMBx consistent with B-cell ALL, FISH Ph+. Treatment started 1/18 following [id number] protocol of dexamethasone 10mg/m2 x 7 days + daily Dasatinib. 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 Neurosurgery was reconsulted to assess platelet goal given SDH with new goal >20. Cardiology consulted for  mechanical AVR not a candidate for AC. LP was performed on 2/1 and flow showed_____. Bmbc was done on 2/2 which showed____         Ms. Parada is a 82 year old female visitng from [place] with pmh  atrial fibrillation, s/p [employer] mechanical AVR, (1994–[facility] [place] [facility]) for rheumatic aortic valve stenosis, ascending aortic aneurysm, HTN,  HLD, depression, GERD transferred from [facility] for further work-up and management of leukocytosis associated with thrombocytopenia and anemia. Patient initially presented to [facility] on 1/9/23 with complaints of black tarry stool and epigastric pain, and was subsequently found to be with anemia, thrombocytopenia and leukocytosis (lymphocytic predominant). She was also noted to have a supratherapeutic INR. She had CT angio A/P which showed no evidence of active GIB, but showing focal diminished cortical enhancement involving the upper left kidney concerning for focal pyelonephritis or possibly infarct. EGD without any acute findings. Colonoscopy showed showed grade I internal hemorrhoid and polyps in transverse colon and hepatic flexure. 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  Transferred to [address] [place] [address] for further treatment. Peripheral flow performed, BMBx consistent with B-cell ALL, FISH Ph+. Treatment started 1/18 following [id number] protocol of dexamethasone 10mg/m2 x 7 days + daily Dasatinib. 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 Neurosurgery was reconsulted to assess platelet goal given SDH with new goal >20. Cardiology consulted for  mechanical AVR not a candidate for AC. LP was performed on 2/1 and flow showed negative. Bmbc was done on 2/2 which showed____  On 2/4 CT CAP showed 1.4 cm left kidney mass, IR was consulted for biopsy recommended MRI of A/P which revealed          Ms. Parada is a 82 year old female visitng from [place] with pmh  atrial fibrillation, s/p [employer] mechanical AVR, (1994–[facility]) for rheumatic aortic valve stenosis, ascending aortic aneurysm, HTN,  HLD, depression, GERD transferred from [facility] for further work-up and management of leukocytosis associated with thrombocytopenia and anemia. Patient initially presented to [facility] on 1/9/23 with complaints of black tarry stool and epigastric pain, and was subsequently found to be with anemia, thrombocytopenia and leukocytosis (lymphocytic predominant). She was also noted to have a supratherapeutic INR. She had CT angio A/P which showed no evidence of active GIB, but showing focal diminished cortical enhancement involving the upper left kidney concerning for focal pyelonephritis or possibly infarct. EGD without any acute findings. Colonoscopy showed showed grade I internal hemorrhoid and polyps in transverse colon and hepatic flexure. 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  Transferred to [address] [place] [address] for further treatment. Peripheral flow performed, BMBx consistent with B-cell ALL, FISH Ph+. Treatment started 1/18 following [id number] protocol of dexamethasone 10mg/m2 x 7 days + daily Dasatinib. 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 Neurosurgery was reconsulted to assess platelet goal given SDH with new goal >20. Cardiology consulted for  mechanical AVR not a candidate for AC. LP was performed on 2/1 and flow showed negative. Bmbc was done on 2/2 which showed  <5% blasts. Pt  started course II A on 2/8 with  Dexamethasone  16mg days 1-7 following [id number] and pt to continue with Dasatinib.  On 2/4 CT CAP showed 1.4 cm left kidney mass, IR was consulted for biopsy recommended MRI of A/P which revealed 1.4 cm heterogeneous region within the peripheral upper pole of the left   kidney that has increased in size since 12/6/2019 and is concerning for renal cell carcinoma. Pt is to make an appt for IR outpatient appt for biopsy and ablation.   Pt is stable for  discharge home with Hem Onc outpatient FU.

## 2023-01-21 NOTE — PROGRESS NOTE ADULT - PROBLEM SELECTOR PLAN 1
Flow cytometry (1/14/23) consistent with acute leukemia (B ALL), FISH detected BCR/ABL (1/19)  Monitor daily CBC with Diff/CMP and transfuse/replete PRN  Keep PLT >100 due to SDH  TLS labs bid, continue Allopurinol, IVF's  Strict I/O's, aily wghts  1/14 TTE - EF 57%, mild pulm HTN, Mechanical AVR likely normal function  1/16 HLA sent  1/17  s/p BMbx c/w ALL (98% blasts) (with Clonoseq sampling)  1/18 Wbc 90k, Blasts 81%. Hydrea 2g BID, starting Dex 20mg IV Daily. watch glucose levels.  1/19 Started Dasatinib  1/20 Cryoprecipitate, PLTs ordered. plan to perform LP on Day 15 on 2/1. Flow cytometry (1/14/23) consistent with acute leukemia (B ALL), FISH detected BCR/ABL (1/19)  Monitor daily CBC with Diff/CMP and transfuse/replete PRN  Keep PLT >100 due to SDH  TLS labs bid, continue Allopurinol, IVF's  Strict I/O's, aily wghts  1/14 TTE - EF 57%, mild pulm HTN, Mechanical AVR likely normal function  1/16 HLA sent  1/17  s/p BMbx c/w ALL (98% blasts) (with Clonoseq sampling)  1/18 Wbc 90k, Blasts 81%. Hydrea 2g BID, starting Dex 20mg IV Daily. watch glucose levels.  1/19 Started Dasatinib  1/20 Cryoprecipitate, PLTs ordered. plan to perform LP on Day 15 on 2/1.  1/21 cont cryo infusions. follow up GI PCR, stool CX

## 2023-01-21 NOTE — PROGRESS NOTE ADULT - SUBJECTIVE AND OBJECTIVE BOX
Diagnosis: newly diagnosed B-ALL, Ph (+)    Protocol/Chemo Regimen: Following [id number] (Decadron + Dasatinib), +/- weekly Vincristine    Day: 4     Pt endorsed: mild abdominal discomfort, + gas/BM    Review of Systems: Denies CP/palp's, SOB, HA or dizziness    Pain scale: denies    Diet: regular    Allergies: No Known Allergies      HEME/ONC MEDICATIONS  dasatinib 140 milliGRAM(s) Oral daily      STANDING MEDICATIONS  allopurinol 300 milliGRAM(s) Oral daily  ascorbic acid 500 milliGRAM(s) Oral daily  atorvastatin 80 milliGRAM(s) Oral at bedtime  calcium acetate 667 milliGRAM(s) Oral three times a day with meals  chlorhexidine 4% Liquid 1 Application(s) Topical <User Schedule>  cyanocobalamin 1000 MICROGram(s) Oral daily  dexAMETHasone  IVPB 10 milliGRAM(s) IV Intermittent every 12 hours  dextrose 5%. 1000 milliLiter(s) IV Continuous <Continuous>  dextrose 5%. 1000 milliLiter(s) IV Continuous <Continuous>  dextrose 50% Injectable 25 Gram(s) IV Push once  dextrose 50% Injectable 12.5 Gram(s) IV Push once  diltiazem    milliGRAM(s) Oral daily  glucagon  Injectable 1 milliGRAM(s) IntraMuscular once  hydrochlorothiazide 25 milliGRAM(s) Oral daily  insulin glargine Injectable (LANTUS) 10 Unit(s) SubCutaneous at bedtime  insulin lispro (ADMELOG) corrective regimen sliding scale   SubCutaneous three times a day before meals  melatonin 5 milliGRAM(s) Oral at bedtime  pantoprazole    Tablet 40 milliGRAM(s) Oral before breakfast  sodium chloride 0.9%. 1000 milliLiter(s) IV Continuous <Continuous>  valsartan 320 milliGRAM(s) Oral daily      PRN MEDICATIONS  acetaminophen     Tablet .. 650 milliGRAM(s) Oral every 6 hours PRN  aluminum hydroxide/magnesium hydroxide/simethicone Suspension 30 milliLiter(s) Oral every 4 hours PRN  dextrose Oral Gel 15 Gram(s) Oral once PRN  sodium chloride 0.9% lock flush 10 milliLiter(s) IV Push every 1 hour PRN      Vital Signs Last 24 Hrs  T(C): 36.6 (21 Jan 2023 05:22), Max: 36.9 (20 Jan 2023 18:02)  T(F): 97.9 (21 Jan 2023 05:22), Max: 98.4 (20 Jan 2023 18:02)  HR: 78 (21 Jan 2023 05:22) (70 - 94)  BP: 130/76 (21 Jan 2023 05:22) (113/66 - 148/78)  RR: 18 (21 Jan 2023 05:22) (16 - 18)  SpO2: 96% (21 Jan 2023 05:22) (95% - 98%)    Parameters below as of 21 Jan 2023 05:22  Patient On (Oxygen Delivery Method): room air    PHYSICAL EXAM  General: NAD, sitting up in bed  HEENT: Sclerae anicteric, clear oropharynx, no oral lesions  CV: normal S1/S2, reg  Lungs: breath sounds clear and equal bilaterally  Abdomen: soft non-tender non-distended  Ext: no edema  Skin: no rashes   Neuro: alert and oriented X 4  Central Line: RUE PICC clean, dry, intact    Cultures:  Culture - Stool (01.11.23 @ 10:36)    Specimen Source: .Stool Feces inna jv    Culture Results:   No enteric gram negative rods isolated  No enteric pathogens isolated.  (Stool culture examined for Salmonella,  Shigella, Campylobacter, Aeromonas, Plesiomonas,  Vibrio, E.coli O157 and Yersinia)    Cultures:  Culture - Stool (01.11.23 @ 10:36)    Specimen Source: .Stool Feces inna jv    Culture Results:   No enteric gram negative rods isolated  No enteric pathogens isolated.  (Stool culture examined for Salmonella,  Shigella, Campylobacter, Aeromonas, Plesiomonas,  Vibrio, E.coli O157 and Yersinia)    LABS:                         RADIOLOGY & ADDITIONAL STUDIES:  from: Xray Chest 1 View- PORTABLE-Urgent (Xray Chest 1 View- PORTABLE-Urgent .) (01.16.23 @ 13:16)   FINDINGS:  Heart/Vascular: The cardiac silhouetteis normal in size. Sternotomy   wires. Right PICC terminating within the distal SVC.  Pulmonary: No focal consolidation, pleural effusion or pneumothorax.  Bones: The visualized osseous structures are unremarkable.    Impression:  Right PICC terminating within the distal SVC.    from: CT Head No Cont (01.16.23 @ 11:53)   FINDINGS:    Redemonstrated acute left parietal subdural bleed leading up to 8 mm in   greatest transverse thickness, unchanged in size. No midline shift. No   new site of bleeding or progressive mass effect.    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 Ventriclesare   unchanged in size without hydrocephalus. Basal cisterns are patent.    Visualized paranasal sinuses  and mastoid air cells are clear. The   calvarium is intact.  IMPRESSION:  No interval change in left parietal subdural bleed.  No new site of bleeding or progressive mass effect     Diagnosis: newly diagnosed B-ALL, Ph (+)    Protocol/Chemo Regimen: Following 27416 (Decadron + Dasatinib), +/- weekly Vincristine    Day: 4     Pt endorsed: mild abdominal discomfort, + liquid diarrhea    Review of Systems: Denies CP/palp's, SOB, HA or dizziness    Pain scale: denies    Diet: regular    Allergies: No Known Allergies      HEME/ONC MEDICATIONS  dasatinib 140 milliGRAM(s) Oral daily      STANDING MEDICATIONS  allopurinol 300 milliGRAM(s) Oral daily  ascorbic acid 500 milliGRAM(s) Oral daily  atorvastatin 80 milliGRAM(s) Oral at bedtime  calcium acetate 667 milliGRAM(s) Oral three times a day with meals  chlorhexidine 4% Liquid 1 Application(s) Topical <User Schedule>  cyanocobalamin 1000 MICROGram(s) Oral daily  dexAMETHasone  IVPB 10 milliGRAM(s) IV Intermittent every 12 hours  dextrose 5%. 1000 milliLiter(s) IV Continuous <Continuous>  dextrose 5%. 1000 milliLiter(s) IV Continuous <Continuous>  dextrose 50% Injectable 25 Gram(s) IV Push once  dextrose 50% Injectable 12.5 Gram(s) IV Push once  diltiazem    milliGRAM(s) Oral daily  glucagon  Injectable 1 milliGRAM(s) IntraMuscular once  hydrochlorothiazide 25 milliGRAM(s) Oral daily  insulin glargine Injectable (LANTUS) 10 Unit(s) SubCutaneous at bedtime  insulin lispro (ADMELOG) corrective regimen sliding scale   SubCutaneous three times a day before meals  melatonin 5 milliGRAM(s) Oral at bedtime  pantoprazole    Tablet 40 milliGRAM(s) Oral before breakfast  sodium chloride 0.9%. 1000 milliLiter(s) IV Continuous <Continuous>  valsartan 320 milliGRAM(s) Oral daily      PRN MEDICATIONS  acetaminophen     Tablet .. 650 milliGRAM(s) Oral every 6 hours PRN  aluminum hydroxide/magnesium hydroxide/simethicone Suspension 30 milliLiter(s) Oral every 4 hours PRN  dextrose Oral Gel 15 Gram(s) Oral once PRN  sodium chloride 0.9% lock flush 10 milliLiter(s) IV Push every 1 hour PRN      Vital Signs Last 24 Hrs  T(C): 36.6 (21 Jan 2023 05:22), Max: 36.9 (20 Jan 2023 18:02)  T(F): 97.9 (21 Jan 2023 05:22), Max: 98.4 (20 Jan 2023 18:02)  HR: 78 (21 Jan 2023 05:22) (70 - 94)  BP: 130/76 (21 Jan 2023 05:22) (113/66 - 148/78)  RR: 18 (21 Jan 2023 05:22) (16 - 18)  SpO2: 96% (21 Jan 2023 05:22) (95% - 98%)    Parameters below as of 21 Jan 2023 05:22  Patient On (Oxygen Delivery Method): room air    PHYSICAL EXAM  General: NAD, sitting up in bed  HEENT: Sclerae anicteric, clear oropharynx, no oral lesions  CV: normal S1/S2, reg  Lungs: breath sounds clear and equal bilaterally  Abdomen: soft non-tender non-distended  Ext: no edema  Skin: no rashes   Neuro: alert and oriented X 4  Central Line: RUE PICC clean, dry, intact    Cultures:  Culture - Stool (01.11.23 @ 10:36)    Specimen Source: .Stool Feces inna jv    Culture Results:   No enteric gram negative rods isolated  No enteric pathogens isolated.  (Stool culture examined for Salmonella,  Shigella, Campylobacter, Aeromonas, Plesiomonas,  Vibrio, E.coli O157 and Yersinia)    Cultures:  Culture - Stool (01.11.23 @ 10:36)    Specimen Source: .Stool Feces inna jv    Culture Results:   No enteric gram negative rods isolated  No enteric pathogens isolated.  (Stool culture examined for Salmonella,  Shigella, Campylobacter, Aeromonas, Plesiomonas,  Vibrio, E.coli O157 and Yersinia)    C. difficile GDH &amp; toxins A/B by EIA (01.21.23 @ 09:32)    Clostridium difficile GDH Toxins A&amp;B, EIA:   Negative      LABS:                        8.0    7.22  )-----------( 112      ( 21 Jan 2023 07:19 )             24.6     21 Jan 2023 07:18    138    |  103    |  41     ----------------------------<  211    3.9     |  20     |  0.82     Ca    8.0        21 Jan 2023 07:18  Phos  5.4       21 Jan 2023 07:18  Mg     2.7       21 Jan 2023 07:18    TPro  6.3    /  Alb  3.9    /  TBili  0.4    /  DBili  x      /  AST  307    /  ALT  33     /  AlkPhos  97     21 Jan 2023 07:18    PT/INR - ( 21 Jan 2023 07:19 )   PT: 16.4 sec;   INR: 1.41 ratio    PTT - ( 21 Jan 2023 07:19 )  PTT:22.3 sec    CAPILLARY BLOOD GLUCOSE  POCT Blood Glucose.: 225 mg/dL (21 Jan 2023 08:14)  POCT Blood Glucose.: 223 mg/dL (20 Jan 2023 21:12)  POCT Blood Glucose.: 230 mg/dL (20 Jan 2023 17:21)  POCT Blood Glucose.: 229 mg/dL (20 Jan 2023 12:26)  POCT Blood Glucose.: 210 mg/dL (20 Jan 2023 12:03)    LIVER FUNCTIONS - ( 21 Jan 2023 07:18 )  Alb: 3.9 g/dL / Pro: 6.3 g/dL / ALK PHOS: 97 U/L / ALT: 33 U/L / AST: 307 U/L / GGT: x         RADIOLOGY & ADDITIONAL STUDIES:  from: Xray Chest 1 View- PORTABLE-Urgent (Xray Chest 1 View- PORTABLE-Urgent .) (01.16.23 @ 13:16)   FINDINGS:  Heart/Vascular: The cardiac silhouetteis normal in size. Sternotomy   wires. Right PICC terminating within the distal SVC.  Pulmonary: No focal consolidation, pleural effusion or pneumothorax.  Bones: The visualized osseous structures are unremarkable.    Impression:  Right PICC terminating within the distal SVC.    from: CT Head No Cont (01.16.23 @ 11:53)   FINDINGS:    Redemonstrated acute left parietal subdural bleed leading up to 8 mm in   greatest transverse thickness, unchanged in size. No midline shift. No   new site of bleeding or progressive mass effect.    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 Ventriclesare   unchanged in size without hydrocephalus. Basal cisterns are patent.    Visualized paranasal sinuses  and mastoid air cells are clear. The   calvarium is intact.  IMPRESSION:  No interval change in left parietal subdural bleed.  No new site of bleeding or progressive mass effect

## 2023-01-21 NOTE — DISCHARGE NOTE PROVIDER - NSDCMRMEDTOKEN_GEN_ALL_CORE_FT
biotin 1000 mcg oral tablet: 1 tab(s) orally once a day  calcium citrate 950 mg (200 mg elemental calcium) oral tablet: 1 tab(s) orally once a day  CoQ10 100 mg oral capsule: 1 cap(s) orally once a day  dasatinib 140 mg oral tablet: 1 tab(s) orally once a day   ferrous sulfate 325 mg (65 mg elemental iron) oral tablet: 1 tab(s) orally once a day  Melatonin 10 mg oral capsule: 1 cap(s) orally once a day (at bedtime)  metFORMIN 500 mg oral tablet, extended release: 1 tab(s) orally once a day  Omega-3 350 mg oral capsule: 1 cap(s) orally once a day  omeprazole 20 mg oral delayed release capsule: 1 cap(s) orally once a day  rosuvastatin 40 mg oral tablet: 1 tab(s) orally once a day  Tiadylt  mg/24 hours oral capsule, extended release: 1 cap(s) orally once a day  valsartan-hydrochlorothiazide 320 mg-25 mg oral tablet: 1 tab(s) orally once a day  Vitamin B1 250 mg oral tablet: 2 tab(s) orally once a day  Vitamin B12 1000 mcg oral tablet: 1 tab(s) orally once a day  Vitamin C 500 mg oral tablet: 1 tab(s) orally once a day  warfarin 2.5 mg oral tablet: 1 tab(s) orally once a day (sometimes 3mg depending on INR)   biotin 1000 mcg oral tablet: 1 tab(s) orally once a day  calcium citrate 950 mg (200 mg elemental calcium) oral tablet: 1 tab(s) orally once a day  CoQ10 100 mg oral capsule: 1 cap(s) orally once a day  dasatinib 140 mg oral tablet: 1 tab(s) orally once a day   ferrous sulfate 325 mg (65 mg elemental iron) oral tablet: 1 tab(s) orally once a day  metFORMIN 500 mg oral tablet, extended release: 1 tab(s) orally once a day  Omega-3 350 mg oral capsule: 1 cap(s) orally once a day  omeprazole 20 mg oral delayed release capsule: 1 cap(s) orally once a day  rosuvastatin 40 mg oral tablet: 1 tab(s) orally once a day  Tiadylt  mg/24 hours oral capsule, extended release: 1 cap(s) orally once a day  valsartan-hydrochlorothiazide 320 mg-25 mg oral tablet: 1 tab(s) orally once a day  Vitamin B1 250 mg oral tablet: 2 tab(s) orally once a day  Vitamin B12 1000 mcg oral tablet: 1 tab(s) orally once a day  Vitamin C 500 mg oral tablet: 1 tab(s) orally once a day  warfarin 2.5 mg oral tablet: 1 tab(s) orally once a day (sometimes 3mg depending on INR)   biotin 1000 mcg oral tablet: 1 tab(s) orally once a day  calcium citrate 950 mg (200 mg elemental calcium) oral tablet: 1 tab(s) orally once a day  CoQ10 100 mg oral capsule: 1 cap(s) orally once a day  dasatinib 140 mg oral tablet: 1 tab(s) orally once a day   ferrous sulfate 325 mg (65 mg elemental iron) oral tablet: 1 tab(s) orally once a day  metFORMIN 500 mg oral tablet, extended release: 1 tab(s) orally once a day  Omega-3 350 mg oral capsule: 1 cap(s) orally once a day  omeprazole 20 mg oral delayed release capsule: 1 cap(s) orally once a day  PICC care with NS 0.9% 10 ml to each lumen Q8h: every 8 hours   rosuvastatin 40 mg oral tablet: 1 tab(s) orally once a day  Tiadylt  mg/24 hours oral capsule, extended release: 1 cap(s) orally once a day  valsartan-hydrochlorothiazide 320 mg-25 mg oral tablet: 1 tab(s) orally once a day  Vitamin B1 250 mg oral tablet: 2 tab(s) orally once a day  Vitamin B12 1000 mcg oral tablet: 1 tab(s) orally once a day  Vitamin C 500 mg oral tablet: 1 tab(s) orally once a day  warfarin 2.5 mg oral tablet: 1 tab(s) orally once a day (sometimes 3mg depending on INR)   biotin 1000 mcg oral tablet: 1 tab(s) orally once a day  calcium citrate 950 mg (200 mg elemental calcium) oral tablet: 1 tab(s) orally once a day  CoQ10 100 mg oral capsule: 1 cap(s) orally once a day  dasatinib 140 mg oral tablet: 1 tab(s) orally once a day , only dispensed 1 month of medication, need more after that per outpatient hematology oncologist   dexamethasone 4 mg oral tablet: 4 tab(s) orally once a day start on 2/11 am after meal, stop after 2/14 dose   DilTIAZem (Eqv-Cardizem CD) 240 mg/24 hours oral capsule, extended release: 1 cap(s) orally once a day  famotidine 20 mg oral tablet: 1 tab(s) orally 2 times a day  metFORMIN 500 mg oral tablet, extended release: 1 tab(s) orally once a day  metoclopramide 10 mg oral tablet: 1 tab(s) orally every 6 hours, As Needed -for nausea   Omega-3 350 mg oral capsule: 1 cap(s) orally once a day  rosuvastatin 40 mg oral tablet: 1 tab(s) orally once a day  sucralfate 1 g oral tablet: 1 tab(s) orally 4 times a day (before meals and at bedtime)   sulfamethoxazole-trimethoprim 800 mg-160 mg oral tablet: 1 tab(s) orally 2 times a day  TIW on Mon Wed Friday  Tiadylt  mg/24 hours oral capsule, extended release: 1 cap(s) orally once a day  valsartan 320 mg oral tablet: 1 tab(s) orally once a day  valsartan-hydrochlorothiazide 320 mg-25 mg oral tablet: 1 tab(s) orally once a day  Vitamin B12 1000 mcg oral tablet: 1 tab(s) orally once a day  Vitamin C 500 mg oral tablet: 1 tab(s) orally once a day  warfarin 2.5 mg oral tablet: 1 tab(s) orally once a day (sometimes 3mg depending on INR) to be discussed with Dr Joe and cardiologist re  when to resume

## 2023-01-21 NOTE — PROGRESS NOTE ADULT - ASSESSMENT
82 year old female with atrial fibrillation, s/p Saint Darian mechanical AVR, (1994–[facility]) for rheumatic aortic valve stenosis, ascending aortic aneurysm, HTN, HLD, depression, GERD transferred from [facility] for further work-up and management of leukocytosis associated with thrombocytopenia and anemia. She was also noted to have a supratherapeutic INR. CT angio A/P which showed no evidence of active GIB, but showing focal diminished cortical enhancement involving the upper left kidney concerning for focal pyelonephritis or possibly infarct. EGD without any acute findings. Colonoscopy showed grade I internal hemorrhoid and polyps in transverse colon and hepatic flexure. 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 Transferred to [address] [place] [address] for further treatment. 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 Leukocytosis, anemia, thrombocytopenia secondary to disease.   82 year old female with atrial fibrillation, s/p Saint Darian mechanical AVR, (1994–[facility]) for rheumatic aortic valve stenosis, ascending aortic aneurysm, HTN, HLD, depression, GERD transferred from [facility] for further work-up and management of leukocytosis associated with thrombocytopenia and anemia. She was also noted to have a supratherapeutic INR. CT angio A/P which showed no evidence of active GIB, but showing focal diminished cortical enhancement involving the upper left kidney concerning for focal pyelonephritis or possibly infarct. EGD without any acute findings. Colonoscopy showed grade I internal hemorrhoid and polyps in transverse colon and hepatic flexure. 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 Transferred to [address] [place] [address] for further treatment. 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 Leukocytosis, anemia, thrombocytopenia secondary to disease.

## 2023-01-21 NOTE — PROGRESS NOTE ADULT - PROBLEM SELECTOR PLAN 3
in setting of thrombocytopenia and on warfarin for mechanical AVR (1994 [facility]), atrial fibrillation  [facility]in [facility]12 [facility]  neurosurgery, and cardiology consulted  will keep PLTs > 100k

## 2023-01-21 NOTE — DISCHARGE NOTE PROVIDER - CARE PROVIDERS DIRECT ADDRESSES
,[email or web address]_Unknown

## 2023-01-21 NOTE — DISCHARGE NOTE PROVIDER - DETAILS OF MALNUTRITION DIAGNOSIS/DIAGNOSES
This patient has been assessed with a concern for Malnutrition and was treated during this hospitalization for the following Nutrition diagnosis/diagnoses:     -  01/26/2023: Severe protein-calorie malnutrition

## 2023-01-21 NOTE — PROGRESS NOTE ADULT - ATTENDING COMMENTS
81 YO F with a PMhx of NICOLÁS.tonya, s/p Saint Darian mechanical AVR, (1994–[facility]) for rheumatic aortic valve stenosis, ascending aortic aneurysm, HTN,  HLD, depression, GERD transferred from [facility]. Dx is Ph+ ALL.  She was also noted to have a supratherapeutic INR. CT angio A/P which showed no evidence of active GIB, but showing focal diminished cortical enhancement involving the upper left kidney concerning for focal pyelonephritis or possibly infarct. EGD without any acute findings. Colonoscopy showed grade I internal hemorrhoid and polyps in transverse colon and hepatic flexure. 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 Admitted to [facility] on 1/14/23 for further work up.    Leukocytosis       Blasts identified, 60% ; CD45 (dim to negative), CD34, CD10+, CD19+, CD20, CD22+, CD11b (partial dim)+, CD38+ and HLADR+(heterogenous); negative for, CD2, CD3, CD4, CD5, CD7, CD8, , CD33, CD14, CD16, CD15, CD11b, CD56, CD4, CD64, kappa and lambda (surface). Immunophenotypic features are consistent with acute leukemia.    -had BMBx on 1/17 >>> 99% B-lymphoblasts  -baseline echo EF 57%  -has PICC line   -Monitor LDH, Uric Acid and CMP daily   -keep Hb >7, plt >10  -allopurinol  -WBC rising,   increased HU to 2 gm 2x/d   -started decadron 20 mg/d 1/18/23  -started dasatinib 140 mg/d on 1/19/23  -WBC rapidly decr to 27.78  -phos, LDH up, urate 6.4, fibrinogen ? 35, PT incr  -given cryo 1/20/23  -repeat coags today post cryo, vit K  -cont incr IVFs, I and O  TLS labs incr to q8hr for now, I &O  d/c HU  LP d15 as per 71203    ICH  -headaches since 1/15/23, now improved  -1/15/23 CT head showed 8mm bleed, repeat CT is stable  -no intervention as per Neursx  -hold coumadin (confirmed with cardiology)  -keep plt >100 for now post SDH       Cardio  HTN - c/w home meds  Mechanical valve - currently off AC, discussed with cardiology  will hold AC in light of risk/benefit, anticipated start of chemotx 83 YO F with a PMhx of Nicolasa, s/p Saint Darian mechanical AVR, (1994–[facility]) for rheumatic aortic valve stenosis, ascending aortic aneurysm, HTN,  HLD, depression, GERD transferred from [facility]. Dx is Ph+ ALL.  She was also noted to have a supratherapeutic INR. CT angio A/P which showed no evidence of active GIB, but showing focal diminished cortical enhancement involving the upper left kidney concerning for focal pyelonephritis or possibly infarct. EGD without any acute findings. Colonoscopy showed grade I internal hemorrhoid and polyps in transverse colon and hepatic flexure. 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     Blasts identified, 60% ; CD45 (dim to negative), CD34, CD10+, CD19+, CD20, CD22+, CD11b (partial dim)+, CD38+ and HLADR+(heterogenous); negative for, CD2, CD3, CD4, CD5, CD7, CD8, , CD33, CD14, CD16, CD15, CD11b, CD56, CD4, CD64, kappa and lambda (surface). Immunophenotypic features are consistent with acute leukemia.    Day 4 of treatment    -had BMBx on 1/17 >>> 99% B-lymphoblasts  -baseline echo EF 57%  -has PICC line   -Monitor LDH, Uric Acid and CMP daily   -keep Hb >7, plt >10  -allopurinol  -WBC rising,   increased HU to 2 gm 2x/d   -started decadron 20 mg/d 1/18/23  -started dasatinib 140 mg/d on 1/19/23  -WBC rapidly decr to 27.78  -phos, LDH up, urate 6.4, fibrinogen ? 35, PT incr  -given cryo 1/20/23  -repeat coags today post cryo, vit K  -cont incr IVFs, I and O  TLS labs incr to q8hr for now, I &O  d/c HU  LP d15 as per 00616    ICH  -headaches since 1/15/23, now improved  -1/15/23 CT head showed 8mm bleed, repeat CT is stable  -no intervention as per Neursx  -hold coumadin (confirmed with cardiology)  -keep plt >100 for now post SDH       Cardio  HTN - c/w home meds  Mechanical valve - currently off AC, discussed with cardiology  will hold AC in light of risk/benefit, anticipated start of chemotx

## 2023-01-21 NOTE — DISCHARGE NOTE PROVIDER - NSDCFUADDAPPT_GEN_ALL_CORE_FT
To [facility] on Monday 2/13 at 2:10 pm  for  blood work and transfusion     To [facility] on  Thursday 2/16 at 2pm to see Dr Joe    Please call Dr Carlos Cope's office at [phone] to schedule biopsy and ablation of renal mass     To [facility] on Monday 2/13 at 1pm for blood work at satellite lab  1:20pm appointment in the treatment room, HFU with BISHOP Sharma and possible transfusion     To [facility] on  Thursday 2/16 at 2pm to see Dr Joe    Please call Dr Carlos Cope's office at [phone] to schedule biopsy and ablation of renal mass

## 2023-01-21 NOTE — DISCHARGE NOTE PROVIDER - NSDCFUSCHEDAPPT_GEN_ALL_CORE_FT
[facility]  [facility]  Scheduled Appointment: 02/16/2023    [facility]  [facility]  Scheduled Appointment: 02/16/2023     [facility]  Scheduled Appointment: 02/13/2023    Radha Roach  [facility]  Scheduled Appointment: 02/13/2023    [facility]  Scheduled Appointment: 02/13/2023    [facility]  [facility]  Scheduled Appointment: 02/16/2023    [facility]  [facility]  Scheduled Appointment: 02/16/2023

## 2023-01-21 NOTE — PROGRESS NOTE ADULT - PROBLEM SELECTOR PLAN 2
patient is not neutropenic, afebrile  if spike panculture and CXR patient is not neutropenic, afebrile  if spike panculture and CXR  1/21 c.diff toxin negative   follow up GI PCR, stool CX

## 2023-01-21 NOTE — DISCHARGE NOTE PROVIDER - CARE PROVIDER_API CALL
Александр Joe)  Hematology; Internal Medicine; Medical Oncology  [address] [place] [address]  Phone: [phone]  Fax: [phone]  Follow Up Time:    Александр Joe)  Hematology; Internal Medicine; Medical Oncology  [address] [place] [address]  Phone: [phone]  Fax: [phone]  Follow Up Time:     Carlos Cope)  Radiology General  [address] [place] [id number]  Phone: [phone]  Fax: [phone]  Follow Up Time:

## 2023-01-21 NOTE — DISCHARGE NOTE PROVIDER - PROVIDER TOKENS
PROVIDER:[TOKEN:[[id number]]] PROVIDER:[TOKEN:[[id number]]],PROVIDER:[TOKEN:[[id number]]]

## 2023-01-21 NOTE — DISCHARGE NOTE PROVIDER - NSDCCPCAREPLAN_GEN_ALL_CORE_FT
PRINCIPAL DISCHARGE DIAGNOSIS  Diagnosis: B-cell acute lymphoblastic leukemia (ALL)  Assessment and Plan of Treatment: maintain counts , remain free from infection; Notify MD and report to ER for any temperature greater than or equal to 100.4 degrees, intractable nausea, vomiting, diarrhea, or uncontrolled bleeding.

## 2023-01-22 LAB
ALBUMIN SERPL ELPH-MCNC: 3.3 G/DL — SIGNIFICANT CHANGE UP (ref 3.3–5)
ALP SERPL-CCNC: 72 U/L — SIGNIFICANT CHANGE UP (ref 40–120)
ALT FLD-CCNC: 27 U/L — SIGNIFICANT CHANGE UP (ref 10–45)
ANION GAP SERPL CALC-SCNC: 13 MMOL/L — SIGNIFICANT CHANGE UP (ref 5–17)
ANION GAP SERPL CALC-SCNC: 15 MMOL/L — SIGNIFICANT CHANGE UP (ref 5–17)
APTT BLD: 21.2 SEC — LOW (ref 27.5–35.5)
APTT BLD: 22.5 SEC — LOW (ref 27.5–35.5)
AST SERPL-CCNC: 200 U/L — HIGH (ref 10–40)
BASOPHILS # BLD AUTO: 0 K/UL — SIGNIFICANT CHANGE UP (ref 0–0.2)
BASOPHILS NFR BLD AUTO: 0 % — SIGNIFICANT CHANGE UP (ref 0–2)
BILIRUB SERPL-MCNC: 0.4 MG/DL — SIGNIFICANT CHANGE UP (ref 0.2–1.2)
BUN SERPL-MCNC: 36 MG/DL — HIGH (ref 7–23)
BUN SERPL-MCNC: 40 MG/DL — HIGH (ref 7–23)
CALCIUM SERPL-MCNC: 7.8 MG/DL — LOW (ref 8.4–10.5)
CALCIUM SERPL-MCNC: 7.9 MG/DL — LOW (ref 8.4–10.5)
CHLORIDE SERPL-SCNC: 102 MMOL/L — SIGNIFICANT CHANGE UP (ref 96–108)
CHLORIDE SERPL-SCNC: 104 MMOL/L — SIGNIFICANT CHANGE UP (ref 96–108)
CO2 SERPL-SCNC: 19 MMOL/L — LOW (ref 22–31)
CO2 SERPL-SCNC: 20 MMOL/L — LOW (ref 22–31)
CREAT SERPL-MCNC: 0.77 MG/DL — SIGNIFICANT CHANGE UP (ref 0.5–1.3)
CREAT SERPL-MCNC: 0.77 MG/DL — SIGNIFICANT CHANGE UP (ref 0.5–1.3)
D DIMER BLD IA.RAPID-MCNC: ABNORMAL NG/ML DDU
EGFR: 78 ML/MIN/1.73M2 — SIGNIFICANT CHANGE UP
EGFR: 78 ML/MIN/1.73M2 — SIGNIFICANT CHANGE UP
EOSINOPHIL # BLD AUTO: 0 K/UL — SIGNIFICANT CHANGE UP (ref 0–0.5)
EOSINOPHIL NFR BLD AUTO: 0 % — SIGNIFICANT CHANGE UP (ref 0–6)
FIBRINOGEN PPP-MCNC: 109 MG/DL — LOW (ref 200–445)
GLUCOSE BLDC GLUCOMTR-MCNC: 198 MG/DL — HIGH (ref 70–99)
GLUCOSE BLDC GLUCOMTR-MCNC: 199 MG/DL — HIGH (ref 70–99)
GLUCOSE BLDC GLUCOMTR-MCNC: 237 MG/DL — HIGH (ref 70–99)
GLUCOSE BLDC GLUCOMTR-MCNC: 251 MG/DL — HIGH (ref 70–99)
GLUCOSE SERPL-MCNC: 194 MG/DL — HIGH (ref 70–99)
GLUCOSE SERPL-MCNC: 212 MG/DL — HIGH (ref 70–99)
HCT VFR BLD CALC: 29 % — LOW (ref 34.5–45)
HCT VFR BLD CALC: 29.5 % — LOW (ref 34.5–45)
HGB BLD-MCNC: 9.4 G/DL — LOW (ref 11.5–15.5)
HGB BLD-MCNC: 9.6 G/DL — LOW (ref 11.5–15.5)
INR BLD: 1.19 RATIO — HIGH (ref 0.88–1.16)
INR BLD: 1.28 RATIO — HIGH (ref 0.88–1.16)
LDH SERPL L TO P-CCNC: 2819 U/L — HIGH (ref 50–242)
LDH SERPL L TO P-CCNC: 3292 U/L — HIGH (ref 50–242)
LYMPHOCYTES # BLD AUTO: 0.73 K/UL — LOW (ref 1–3.3)
LYMPHOCYTES # BLD AUTO: 37 % — SIGNIFICANT CHANGE UP (ref 13–44)
MAGNESIUM SERPL-MCNC: 2.9 MG/DL — HIGH (ref 1.6–2.6)
MCHC RBC-ENTMCNC: 27.1 PG — SIGNIFICANT CHANGE UP (ref 27–34)
MCHC RBC-ENTMCNC: 27.4 PG — SIGNIFICANT CHANGE UP (ref 27–34)
MCHC RBC-ENTMCNC: 32.4 GM/DL — SIGNIFICANT CHANGE UP (ref 32–36)
MCHC RBC-ENTMCNC: 32.5 GM/DL — SIGNIFICANT CHANGE UP (ref 32–36)
MCV RBC AUTO: 83.6 FL — SIGNIFICANT CHANGE UP (ref 80–100)
MCV RBC AUTO: 84 FL — SIGNIFICANT CHANGE UP (ref 80–100)
MONOCYTES # BLD AUTO: 0.04 K/UL — SIGNIFICANT CHANGE UP (ref 0–0.9)
MONOCYTES NFR BLD AUTO: 2 % — SIGNIFICANT CHANGE UP (ref 2–14)
NEUTROPHILS # BLD AUTO: 1.05 K/UL — LOW (ref 1.8–7.4)
NEUTROPHILS NFR BLD AUTO: 52 % — SIGNIFICANT CHANGE UP (ref 43–77)
NRBC # BLD: 2 /100 WBCS — HIGH (ref 0–0)
PHOSPHATE SERPL-MCNC: 3.3 MG/DL — SIGNIFICANT CHANGE UP (ref 2.5–4.5)
PHOSPHATE SERPL-MCNC: 3.8 MG/DL — SIGNIFICANT CHANGE UP (ref 2.5–4.5)
PLATELET # BLD AUTO: 147 K/UL — LOW (ref 150–400)
PLATELET # BLD AUTO: 49 K/UL — LOW (ref 150–400)
PLATELET # BLD AUTO: 96 K/UL — LOW (ref 150–400)
POTASSIUM SERPL-MCNC: 3.5 MMOL/L — SIGNIFICANT CHANGE UP (ref 3.5–5.3)
POTASSIUM SERPL-MCNC: 3.5 MMOL/L — SIGNIFICANT CHANGE UP (ref 3.5–5.3)
POTASSIUM SERPL-SCNC: 3.5 MMOL/L — SIGNIFICANT CHANGE UP (ref 3.5–5.3)
POTASSIUM SERPL-SCNC: 3.5 MMOL/L — SIGNIFICANT CHANGE UP (ref 3.5–5.3)
PROT SERPL-MCNC: 5.7 G/DL — LOW (ref 6–8.3)
PROTHROM AB SERPL-ACNC: 13.8 SEC — HIGH (ref 10.5–13.4)
PROTHROM AB SERPL-ACNC: 14.8 SEC — HIGH (ref 10.5–13.4)
RBC # BLD: 3.47 M/UL — LOW (ref 3.8–5.2)
RBC # BLD: 3.51 M/UL — LOW (ref 3.8–5.2)
RBC # FLD: 18.4 % — HIGH (ref 10.3–14.5)
RBC # FLD: 18.7 % — HIGH (ref 10.3–14.5)
SODIUM SERPL-SCNC: 136 MMOL/L — SIGNIFICANT CHANGE UP (ref 135–145)
SODIUM SERPL-SCNC: 137 MMOL/L — SIGNIFICANT CHANGE UP (ref 135–145)
URATE SERPL-MCNC: 1.4 MG/DL — LOW (ref 2.5–7)
URATE SERPL-MCNC: 1.5 MG/DL — LOW (ref 2.5–7)
WBC # BLD: 1.98 K/UL — LOW (ref 3.8–10.5)
WBC # BLD: 2.63 K/UL — LOW (ref 3.8–10.5)
WBC # FLD AUTO: 1.98 K/UL — LOW (ref 3.8–10.5)
WBC # FLD AUTO: 2.63 K/UL — LOW (ref 3.8–10.5)

## 2023-01-22 PROCEDURE — 99232 SBSQ HOSP IP/OBS MODERATE 35: CPT

## 2023-01-22 RX ORDER — LOPERAMIDE HCL 2 MG
2 TABLET ORAL THREE TIMES A DAY
Refills: 0 | Status: DISCONTINUED | OUTPATIENT
Start: 2023-01-22 | End: 2023-02-10

## 2023-01-22 RX ADMIN — DASATINIB 140 MILLIGRAM(S): 80 TABLET ORAL at 12:33

## 2023-01-22 RX ADMIN — PANTOPRAZOLE SODIUM 40 MILLIGRAM(S): 20 TABLET, DELAYED RELEASE ORAL at 05:50

## 2023-01-22 RX ADMIN — Medication 2 MILLIGRAM(S): at 09:06

## 2023-01-22 RX ADMIN — Medication 240 MILLIGRAM(S): at 06:47

## 2023-01-22 RX ADMIN — Medication 1: at 17:54

## 2023-01-22 RX ADMIN — SODIUM CHLORIDE 50 MILLILITER(S): 9 INJECTION INTRAMUSCULAR; INTRAVENOUS; SUBCUTANEOUS at 22:00

## 2023-01-22 RX ADMIN — Medication 300 MILLIGRAM(S): at 12:31

## 2023-01-22 RX ADMIN — Medication 5 MILLIGRAM(S): at 22:00

## 2023-01-22 RX ADMIN — Medication 500 MILLIGRAM(S): at 12:31

## 2023-01-22 RX ADMIN — ATORVASTATIN CALCIUM 80 MILLIGRAM(S): 80 TABLET, FILM COATED ORAL at 22:01

## 2023-01-22 RX ADMIN — SODIUM CHLORIDE 50 MILLILITER(S): 9 INJECTION INTRAMUSCULAR; INTRAVENOUS; SUBCUTANEOUS at 09:06

## 2023-01-22 RX ADMIN — SODIUM CHLORIDE 50 MILLILITER(S): 9 INJECTION INTRAMUSCULAR; INTRAVENOUS; SUBCUTANEOUS at 05:51

## 2023-01-22 RX ADMIN — Medication 2: at 09:06

## 2023-01-22 RX ADMIN — INSULIN GLARGINE 10 UNIT(S): 100 INJECTION, SOLUTION SUBCUTANEOUS at 22:00

## 2023-01-22 RX ADMIN — ZINC OXIDE 1 APPLICATION(S): 200 OINTMENT TOPICAL at 17:55

## 2023-01-22 RX ADMIN — ZINC OXIDE 1 APPLICATION(S): 200 OINTMENT TOPICAL at 05:51

## 2023-01-22 RX ADMIN — PREGABALIN 1000 MICROGRAM(S): 225 CAPSULE ORAL at 12:31

## 2023-01-22 RX ADMIN — CHLORHEXIDINE GLUCONATE 1 APPLICATION(S): 213 SOLUTION TOPICAL at 08:04

## 2023-01-22 RX ADMIN — Medication 102 MILLIGRAM(S): at 17:55

## 2023-01-22 RX ADMIN — Medication 102 MILLIGRAM(S): at 05:50

## 2023-01-22 RX ADMIN — VALSARTAN 320 MILLIGRAM(S): 80 TABLET ORAL at 05:51

## 2023-01-22 RX ADMIN — Medication 30 MILLILITER(S): at 17:54

## 2023-01-22 RX ADMIN — Medication 3: at 12:32

## 2023-01-22 NOTE — PROGRESS NOTE ADULT - PROBLEM SELECTOR PLAN 3
in setting of thrombocytopenia and on warfarin for mechanical AVR ([address] [facility]), atrial fibrillation  last doses of warfarin 1/12 and 1/13 at [facility] - 5mg daily  neurosurgery, and cardiology consulted  will keep PLTs > 100k

## 2023-01-22 NOTE — PROGRESS NOTE ADULT - PROBLEM SELECTOR PLAN 1
Flow cytometry (1/14/23) consistent with acute leukemia (B ALL), FISH detected BCR/ABL (1/19)  Monitor daily CBC with Diff/CMP and transfuse/replete PRN  Keep PLT >100 due to SDH  TLS labs bid, continue Allopurinol, IVF's  Strict I/O's, aily wghts  1/14 TTE - EF 57%, mild pulm HTN, Mechanical AVR likely normal function  1/16 HLA sent  1/17  s/p BMbx c/w ALL (98% blasts) (with Clonoseq sampling)  1/18 Wbc 90k, Blasts 81%. Hydrea 2g BID, starting Dex 20mg IV Daily. watch glucose levels.  1/19 Started Dasatinib  Day 15 LP on 2/1 Flow cytometry (1/14/23) consistent with acute leukemia (B ALL), FISH detected BCR/ABL (1/19)  Monitor daily CBC with Diff/CMP and transfuse/replete PRN  Keep PLT >100 due to SDH  TLS labs bid, continue Allopurinol, IVF's  Strict I/O's, aily wghts  1/14 TTE - EF 57%, mild pulm HTN, Mechanical AVR likely normal function  1/16 HLA sent  1/17  s/p BMbx c/w ALL (98% blasts) (with Clonoseq sampling)  1/18 Wbc 90k, Blasts 81%. Hydrea 2g BID, starting Dex 20mg IV Daily. watch glucose levels.  1/19 Started Dasatinib  1/22 thrombocytopenia- 1 unit platelets; Fibrinogen- 82, 10 units Cryoprecipitate;   Day 15 LP on 2/1

## 2023-01-22 NOTE — PROVIDER CONTACT NOTE (CRITICAL VALUE NOTIFICATION) - ASSESSMENT
pt stable, resting comfortably in bed, denies pain or discomfort, does not appear to be in distress at this time

## 2023-01-22 NOTE — PROGRESS NOTE ADULT - PROBLEM SELECTOR PLAN 2
patient is not neutropenic, afebrile  if spike panculture and CXR  1/21 c.diff toxin negative   follow up GI PCR, stool CX patient is not neutropenic, afebrile  if spike panculture and CXR  1/21 c.diff toxin negative, GI PCR (-)

## 2023-01-22 NOTE — PROGRESS NOTE ADULT - ATTENDING COMMENTS
83 YO F with a PMhx of Nicolasa, s/p Saint Darian mechanical AVR, (1994–[facility]) for rheumatic aortic valve stenosis, ascending aortic aneurysm, HTN,  HLD, depression, GERD transferred from [facility]. Dx is Ph+ ALL.  She was also noted to have a supratherapeutic INR. CT angio A/P which showed no evidence of active GIB, but showing focal diminished cortical enhancement involving the upper left kidney concerning for focal pyelonephritis or possibly infarct. EGD without any acute findings. Colonoscopy showed grade I internal hemorrhoid and polyps in transverse colon and hepatic flexure. 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     Blasts identified, 60% ; CD45 (dim to negative), CD34, CD10+, CD19+, CD20, CD22+, CD11b (partial dim)+, CD38+ and HLADR+(heterogenous); negative for, CD2, CD3, CD4, CD5, CD7, CD8, , CD33, CD14, CD16, CD15, CD11b, CD56, CD4, CD64, kappa and lambda (surface). Immunophenotypic features are consistent with acute leukemia.    Day 4 of treatment    -had BMBx on 1/17 >>> 99% B-lymphoblasts  -baseline echo EF 57%  -has PICC line   -Monitor LDH, Uric Acid and CMP daily   -keep Hb >7, plt >10  -allopurinol  -WBC rising,   increased HU to 2 gm 2x/d   -started decadron 20 mg/d 1/18/23  -started dasatinib 140 mg/d on 1/19/23  -WBC rapidly decr to 27.78  -phos, LDH up, urate 6.4, fibrinogen ? 35, PT incr  -given cryo 1/20/23  -repeat coags today post cryo, vit K  -cont incr IVFs, I and O  TLS labs incr to q8hr for now, I &O  d/c HU  LP d15 as per [id number]    ICH  -headaches since 1/15/23, now improved  -1/15/23 CT head showed 8mm bleed, repeat CT is stable  -no intervention as per Neursx  -hold coumadin (confirmed with cardiology)  -keep plt >100 for now post SDH       Cardio  HTN - c/w home meds  Mechanical valve - currently off AC, discussed with cardiology  will hold AC in light of risk/benefit, anticipated start of chemotx 81 YO F with a PMhx of Nicolasa, s/p Saint Darian mechanical AVR, (1994–[facility]) for rheumatic aortic valve stenosis, ascending aortic aneurysm, HTN,  HLD, depression, GERD transferred from [facility]. Dx is Ph+ ALL.  She was also noted to have a supratherapeutic INR. CT angio A/P which showed no evidence of active GIB, but showing focal diminished cortical enhancement involving the upper left kidney concerning for focal pyelonephritis or possibly infarct. EGD without any acute findings. Colonoscopy showed grade I internal hemorrhoid and polyps in transverse colon and hepatic flexure. 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     Blasts identified, 60% ; CD45 (dim to negative), CD34, CD10+, CD19+, CD20, CD22+, CD11b (partial dim)+, CD38+ and HLADR+(heterogenous); negative for, CD2, CD3, CD4, CD5, CD7, CD8, , CD33, CD14, CD16, CD15, CD11b, CD56, CD4, CD64, kappa and lambda (surface). Immunophenotypic features are consistent with acute leukemia.    Day 5 of treatment...diarrhea today    -had BMBx on 1/17 >>> 99% B-lymphoblasts  -baseline echo EF 57%  -has PICC line   -Monitor LDH, Uric Acid and CMP daily   -keep Hb >7, plt >10  -allopurinol  -WBC rising,   increased HU to 2 gm 2x/d   -started decadron 20 mg/d 1/18/23  -started dasatinib 140 mg/d on 1/19/23  -WBC rapidly decr to 27.78  -phos, LDH up, urate 6.4, fibrinogen ? 35, PT incr  -given cryo 1/20/23  -repeat coags today post cryo, vit K  -cont incr IVFs, I and O  TLS labs incr to q8hr for now, I &O  d/c HU  LP d15 as per [id number]    ICH  -headaches since 1/15/23, now improved  -1/15/23 CT head showed 8mm bleed, repeat CT is stable  -no intervention as per Neursx  -hold coumadin (confirmed with cardiology)  -keep plt >100 for now post SDH       Cardio  HTN - c/w home meds  Mechanical valve - currently off AC, discussed with cardiology  will hold AC in light of risk/benefit, anticipated start of chemotx 81 YO F with a PMhx of Nicolasa, s/p Saint Darian mechanical AVR, (1994[facility]) for rheumatic aortic valve stenosis, ascending aortic aneurysm, HTN,  HLD, depression, GERD transferred from [facility]. Dx is Ph+ ALL.  She was also noted to have a supratherapeutic INR. CT angio A/P which showed no evidence of active GIB, but showing focal diminished cortical enhancement involving the upper left kidney concerning for focal pyelonephritis or possibly infarct. EGD without any acute findings. Colonoscopy showed grade I internal hemorrhoid and polyps in transverse colon and hepatic flexure. 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     Blasts identified, 60% ; CD45 (dim to negative), CD34, CD10+, CD19+, CD20, CD22+, CD11b (partial dim)+, CD38+ and HLADR+(heterogenous); negative for, CD2, CD3, CD4, CD5, CD7, CD8, , CD33, CD14, CD16, CD15, CD11b, CD56, CD4, CD64, kappa and lambda (surface). Immunophenotypic features are consistent with acute leukemia.    Day 6 of treatment  diarrhea,  C. Diff neg, on imodium now    -had BMBx on 1/17 >>> 99% B-lymphoblasts  -baseline echo EF 57%  -has PICC line   -Monitor LDH, Uric Acid and CMP daily   -received rasburicase  -keep Hb >7, plt >10  -allopurinol     -started decadron 20 mg/d 1/18/23  -started dasatinib 140 mg/d on 1/19/23  -WBC rapidly decreasing  -PB blasts have cleared  -signif TLS, DIC  -cont cryoprecipitate, check coags 2x/d  phos now nl after phoslo     -repeat coags today post cryo, vit K  -cont incr IVFs, I and O  TLS labs incr to q8hr for now, I &O     LP d15 as per [id number]    ICH  -headaches since 1/15/23, now improved  -1/15/23 CT head showed 8mm bleed, repeat CT is stable  -no intervention as per Neursx    -keep plt >100 for now post SDH     Cardio  HTN - c/w home meds  Mechanical valve - currently off AC, discussed with cardiology  will hold AC in light of risk/benefit, anticipated start of chemotx  hold coumadin (confirmed with cardiology)

## 2023-01-22 NOTE — PROGRESS NOTE ADULT - SUBJECTIVE AND OBJECTIVE BOX
Diagnosis: newly diagnosed B-ALL, Ph (+)    Protocol/Chemo Regimen: Following 56965 (Decadron + Dasatinib), +/- weekly Vincristine    Day: 5     Pt endorsed: mild abdominal discomfort, + liquid diarrhea    Review of Systems: Denies CP/palp's, SOB, HA or dizziness    Pain scale: denies    Diet: regular    Allergies: No Known Allergies      HEME/ONC MEDICATIONS  dasatinib 140 milliGRAM(s) Oral daily      STANDING MEDICATIONS  allopurinol 300 milliGRAM(s) Oral daily  ascorbic acid 500 milliGRAM(s) Oral daily  atorvastatin 80 milliGRAM(s) Oral at bedtime  calcium acetate 667 milliGRAM(s) Oral three times a day with meals  chlorhexidine 4% Liquid 1 Application(s) Topical <User Schedule>  cyanocobalamin 1000 MICROGram(s) Oral daily  dexAMETHasone  IVPB 10 milliGRAM(s) IV Intermittent every 12 hours  dextrose 5%. 1000 milliLiter(s) IV Continuous <Continuous>  dextrose 5%. 1000 milliLiter(s) IV Continuous <Continuous>  dextrose 50% Injectable 25 Gram(s) IV Push once  dextrose 50% Injectable 12.5 Gram(s) IV Push once  diltiazem    milliGRAM(s) Oral daily  glucagon  Injectable 1 milliGRAM(s) IntraMuscular once  hydrochlorothiazide 25 milliGRAM(s) Oral daily  insulin glargine Injectable (LANTUS) 10 Unit(s) SubCutaneous at bedtime  insulin lispro (ADMELOG) corrective regimen sliding scale   SubCutaneous three times a day before meals  melatonin 5 milliGRAM(s) Oral at bedtime  pantoprazole    Tablet 40 milliGRAM(s) Oral before breakfast  sodium chloride 0.9%. 1000 milliLiter(s) IV Continuous <Continuous>  valsartan 320 milliGRAM(s) Oral daily      PRN MEDICATIONS  acetaminophen     Tablet .. 650 milliGRAM(s) Oral every 6 hours PRN  aluminum hydroxide/magnesium hydroxide/simethicone Suspension 30 milliLiter(s) Oral every 4 hours PRN  dextrose Oral Gel 15 Gram(s) Oral once PRN  sodium chloride 0.9% lock flush 10 milliLiter(s) IV Push every 1 hour PRN      Vital Signs Last 24 Hrs  T(C): 36.6 (21 Jan 2023 05:22), Max: 36.9 (20 Jan 2023 18:02)  T(F): 97.9 (21 Jan 2023 05:22), Max: 98.4 (20 Jan 2023 18:02)  HR: 78 (21 Jan 2023 05:22) (70 - 94)  BP: 130/76 (21 Jan 2023 05:22) (113/66 - 148/78)  RR: 18 (21 Jan 2023 05:22) (16 - 18)  SpO2: 96% (21 Jan 2023 05:22) (95% - 98%)    Parameters below as of 21 Jan 2023 05:22  Patient On (Oxygen Delivery Method): room air    PHYSICAL EXAM  General: NAD, sitting up in bed  HEENT: Sclerae anicteric, clear oropharynx, no oral lesions  CV: normal S1/S2, reg  Lungs: breath sounds clear and equal bilaterally  Abdomen: soft non-tender non-distended  Ext: no edema  Skin: no rashes   Neuro: alert and oriented X 4  Central Line: RUE PICC clean, dry, intact    Cultures:  Culture - Stool (01.11.23 @ 10:36)    Specimen Source: .Stool Feces inna jv    Culture Results:   No enteric gram negative rods isolated  No enteric pathogens isolated.  (Stool culture examined for Salmonella,  Shigella, Campylobacter, Aeromonas, Plesiomonas,  Vibrio, E.coli O157 and Yersinia)    Cultures:  Culture - Stool (01.11.23 @ 10:36)    Specimen Source: .Stool Feces inna jv    Culture Results:   No enteric gram negative rods isolated  No enteric pathogens isolated.  (Stool culture examined for Salmonella,  Shigella, Campylobacter, Aeromonas, Plesiomonas,  Vibrio, E.coli O157 and Yersinia)    C. difficile GDH &amp; toxins A/B by EIA (01.21.23 @ 09:32)    Clostridium difficile GDH Toxins A&amp;B, EIA:   Negative      LABS:                        8.0    7.22  )-----------( 112      ( 21 Jan 2023 07:19 )             24.6     21 Jan 2023 07:18    138    |  103    |  41     ----------------------------<  211    3.9     |  20     |  0.82     Ca    8.0        21 Jan 2023 07:18  Phos  5.4       21 Jan 2023 07:18  Mg     2.7       21 Jan 2023 07:18    TPro  6.3    /  Alb  3.9    /  TBili  0.4    /  DBili  x      /  AST  307    /  ALT  33     /  AlkPhos  97     21 Jan 2023 07:18    PT/INR - ( 21 Jan 2023 07:19 )   PT: 16.4 sec;   INR: 1.41 ratio    PTT - ( 21 Jan 2023 07:19 )  PTT:22.3 sec    CAPILLARY BLOOD GLUCOSE  POCT Blood Glucose.: 225 mg/dL (21 Jan 2023 08:14)  POCT Blood Glucose.: 223 mg/dL (20 Jan 2023 21:12)  POCT Blood Glucose.: 230 mg/dL (20 Jan 2023 17:21)  POCT Blood Glucose.: 229 mg/dL (20 Jan 2023 12:26)  POCT Blood Glucose.: 210 mg/dL (20 Jan 2023 12:03)    LIVER FUNCTIONS - ( 21 Jan 2023 07:18 )  Alb: 3.9 g/dL / Pro: 6.3 g/dL / ALK PHOS: 97 U/L / ALT: 33 U/L / AST: 307 U/L / GGT: x         RADIOLOGY & ADDITIONAL STUDIES:  from: Xray Chest 1 View- PORTABLE-Urgent (Xray Chest 1 View- PORTABLE-Urgent .) (01.16.23 @ 13:16)   FINDINGS:  Heart/Vascular: The cardiac silhouetteis normal in size. Sternotomy   wires. Right PICC terminating within the distal SVC.  Pulmonary: No focal consolidation, pleural effusion or pneumothorax.  Bones: The visualized osseous structures are unremarkable.    Impression:  Right PICC terminating within the distal SVC.    from: CT Head No Cont (01.16.23 @ 11:53)   FINDINGS:    Redemonstrated acute left parietal subdural bleed leading up to 8 mm in   greatest transverse thickness, unchanged in size. No midline shift. No   new site of bleeding or progressive mass effect.    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 Ventriclesare   unchanged in size without hydrocephalus. Basal cisterns are patent.    Visualized paranasal sinuses  and mastoid air cells are clear. The   calvarium is intact.  IMPRESSION:  No interval change in left parietal subdural bleed.  No new site of bleeding or progressive mass effect     Diagnosis: newly diagnosed B-ALL, Ph (+)    Protocol/Chemo Regimen: Following 00750 (Decadron + Dasatinib), +/- weekly Vincristine    Day: 5     Pt endorsed: liquid diarrhea    Review of Systems: Denies nausea, vomiting, dizziness, sob, cough    Pain scale: denies    Diet: regular    Allergies: No Known Allergies      HEME/ONC MEDICATIONS  dasatinib 140 milliGRAM(s) Oral daily    ANTIMICROBIALS  levoFLOXacin  Tablet 500 milliGRAM(s) Oral every 24 hours    MEDICATIONS  (STANDING):  allopurinol 300 milliGRAM(s) Oral daily  ascorbic acid 500 milliGRAM(s) Oral daily  atorvastatin 80 milliGRAM(s) Oral at bedtime  chlorhexidine 4% Liquid 1 Application(s) Topical <User Schedule>  cyanocobalamin 1000 MICROGram(s) Oral daily  dasatinib 140 milliGRAM(s) Oral daily  dexAMETHasone  IVPB 10 milliGRAM(s) IV Intermittent every 12 hours  dextrose 5%. 1000 milliLiter(s) (50 mL/Hr) IV Continuous <Continuous>  dextrose 5%. 1000 milliLiter(s) (100 mL/Hr) IV Continuous <Continuous>  dextrose 50% Injectable 25 Gram(s) IV Push once  dextrose 50% Injectable 12.5 Gram(s) IV Push once  diltiazem    milliGRAM(s) Oral daily  glucagon  Injectable 1 milliGRAM(s) IntraMuscular once  hydrochlorothiazide 25 milliGRAM(s) Oral daily  insulin glargine Injectable (LANTUS) 10 Unit(s) SubCutaneous at bedtime  insulin lispro (ADMELOG) corrective regimen sliding scale   SubCutaneous three times a day before meals  levoFLOXacin  Tablet 500 milliGRAM(s) Oral every 24 hours  melatonin 5 milliGRAM(s) Oral at bedtime  pantoprazole    Tablet 40 milliGRAM(s) Oral before breakfast  sodium chloride 0.9%. 1000 milliLiter(s) (50 mL/Hr) IV Continuous <Continuous>  valsartan 320 milliGRAM(s) Oral daily  zinc oxide 40% Paste 1 Application(s) Topical two times a day    MEDICATIONS  (PRN):  acetaminophen     Tablet .. 650 milliGRAM(s) Oral every 6 hours PRN Temp greater or equal to 38C (100.4F), Mild Pain (1 - 3)  aluminum hydroxide/magnesium hydroxide/simethicone Suspension 30 milliLiter(s) Oral every 4 hours PRN Dyspepsia  dextrose Oral Gel 15 Gram(s) Oral once PRN Blood Glucose LESS THAN 70 milliGRAM(s)/deciliter  loperamide 2 milliGRAM(s) Oral three times a day PRN Diarrhea  sodium chloride 0.9% lock flush 10 milliLiter(s) IV Push every 1 hour PRN Pre/post blood products, medications, blood draw, and to maintain line patency    Vital Signs Last 24 Hrs  T(C): 36.3 (22 Jan 2023 11:50), Max: 36.8 (21 Jan 2023 13:00)  T(F): 97.3 (22 Jan 2023 11:50), Max: 98.2 (21 Jan 2023 13:00)  HR: 76 (22 Jan 2023 11:50) (57 - 85)  BP: 147/78 (22 Jan 2023 11:50) (108/74 - 147/78)  BP(mean): --  RR: 20 (22 Jan 2023 11:50) (16 - 20)  SpO2: 97% (22 Jan 2023 11:50) (95% - 98%)    Parameters below as of 22 Jan 2023 11:50  Patient On (Oxygen Delivery Method): room air    PHYSICAL EXAM  General: NAD, sitting up in bed  HEENT: Sclerae anicteric, clear oropharynx, no oral lesions  CV: normal S1/S2, reg  Lungs: breath sounds clear and equal bilaterally  Abdomen: soft non-tender non-distended  Ext: no edema  Skin: no rashes   Neuro: alert and oriented X 4  Central Line: RUE PICC clean, dry, intact    Cultures:  Culture - Stool (01.11.23 @ 10:36)    Specimen Source: .Stool Feces inna jv    Culture Results:   No enteric gram negative rods isolated  No enteric pathogens isolated.  (Stool culture examined for Salmonella,  Shigella, Campylobacter, Aeromonas, Plesiomonas,  Vibrio, E.coli O157 and Yersinia)    Cultures:  Culture - Stool (01.11.23 @ 10:36)    Specimen Source: .Stool Feces inna jv    Culture Results:   No enteric gram negative rods isolated  No enteric pathogens isolated.  (Stool culture examined for Salmonella,  Shigella, Campylobacter, Aeromonas, Plesiomonas,  Vibrio, E.coli O157 and Yersinia)    C. difficile GDH &amp; toxins A/B by EIA (01.21.23 @ 09:32)    Clostridium difficile GDH Toxins A&amp;B, EIA:   Negative      LABS:                          9.6    1.98  )-----------( 49       ( 22 Jan 2023 06:48 )             29.5     22 Jan 2023 06:48    137    |  104    |  40     ----------------------------<  212    3.5     |  20     |  0.77     Ca    7.8        22 Jan 2023 06:48  Phos  3.8       22 Jan 2023 06:48  Mg     2.9       22 Jan 2023 06:48    TPro  5.7    /  Alb  3.3    /  TBili  0.4    /  DBili  x      /  AST  200    /  ALT  27     /  AlkPhos  72     22 Jan 2023 06:48    PT/INR - ( 22 Jan 2023 06:47 )   PT: 14.8 sec;   INR: 1.28 ratio      PTT - ( 22 Jan 2023 06:47 )  PTT:21.2 sec    CAPILLARY BLOOD GLUCOSE  POCT Blood Glucose.: 251 mg/dL (22 Jan 2023 12:25)  POCT Blood Glucose.: 237 mg/dL (22 Jan 2023 08:52)  POCT Blood Glucose.: 281 mg/dL (21 Jan 2023 21:16)  POCT Blood Glucose.: 268 mg/dL (21 Jan 2023 18:16)    LIVER FUNCTIONS - ( 22 Jan 2023 06:48 )  Alb: 3.3 g/dL / Pro: 5.7 g/dL / ALK PHOS: 72 U/L / ALT: 27 U/L / AST: 200 U/L / GGT: x             RADIOLOGY & ADDITIONAL STUDIES:    from: Xray Chest 1 View- PORTABLE-Urgent (Xray Chest 1 View- PORTABLE-Urgent .) (01.16.23 @ 13:16)   Impression:  Right PICC terminating within the distal SVC.    from: CT Head No Cont (01.16.23 @ 11:53)   FINDINGS:  IMPRESSION:  No interval change in left parietal subdural bleed.  No new site of bleeding or progressive mass effect

## 2023-01-22 NOTE — PROGRESS NOTE ADULT - ASSESSMENT
82 year old female with atrial fibrillation, s/p [facility] mechanical AVR, (1994–[facility]) for rheumatic aortic valve stenosis, ascending aortic aneurysm, HTN, HLD, depression, GERD transferred from [facility] for further work-up and management of leukocytosis associated with thrombocytopenia and anemia. She was also noted to have a supratherapeutic INR. CT angio A/P which showed no evidence of active GIB, but showing focal diminished cortical enhancement involving the upper left kidney concerning for focal pyelonephritis or possibly infarct. EGD without any acute findings. Colonoscopy showed grade I internal hemorrhoid and polyps in transverse colon and hepatic flexure. 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 Transferred to [address] [place] [address] for further treatment. 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 Leukocytosis, anemia, thrombocytopenia secondary to disease.

## 2023-01-23 LAB
ALBUMIN SERPL ELPH-MCNC: 3.4 G/DL — SIGNIFICANT CHANGE UP (ref 3.3–5)
ALP SERPL-CCNC: 59 U/L — SIGNIFICANT CHANGE UP (ref 40–120)
ALT FLD-CCNC: 25 U/L — SIGNIFICANT CHANGE UP (ref 10–45)
ANION GAP SERPL CALC-SCNC: 12 MMOL/L — SIGNIFICANT CHANGE UP (ref 5–17)
ANION GAP SERPL CALC-SCNC: 15 MMOL/L — SIGNIFICANT CHANGE UP (ref 5–17)
APTT BLD: 22.1 SEC — LOW (ref 27.5–35.5)
APTT BLD: 23.5 SEC — LOW (ref 27.5–35.5)
AST SERPL-CCNC: 106 U/L — HIGH (ref 10–40)
BASOPHILS # BLD AUTO: 0 K/UL — SIGNIFICANT CHANGE UP (ref 0–0.2)
BASOPHILS NFR BLD AUTO: 0 % — SIGNIFICANT CHANGE UP (ref 0–2)
BILIRUB SERPL-MCNC: 0.4 MG/DL — SIGNIFICANT CHANGE UP (ref 0.2–1.2)
BUN SERPL-MCNC: 31 MG/DL — HIGH (ref 7–23)
BUN SERPL-MCNC: 33 MG/DL — HIGH (ref 7–23)
CALCIUM SERPL-MCNC: 7.7 MG/DL — LOW (ref 8.4–10.5)
CALCIUM SERPL-MCNC: 7.9 MG/DL — LOW (ref 8.4–10.5)
CHLORIDE SERPL-SCNC: 100 MMOL/L — SIGNIFICANT CHANGE UP (ref 96–108)
CHLORIDE SERPL-SCNC: 106 MMOL/L — SIGNIFICANT CHANGE UP (ref 96–108)
CO2 SERPL-SCNC: 21 MMOL/L — LOW (ref 22–31)
CO2 SERPL-SCNC: 21 MMOL/L — LOW (ref 22–31)
CREAT SERPL-MCNC: 0.75 MG/DL — SIGNIFICANT CHANGE UP (ref 0.5–1.3)
CREAT SERPL-MCNC: 0.8 MG/DL — SIGNIFICANT CHANGE UP (ref 0.5–1.3)
CULTURE RESULTS: SIGNIFICANT CHANGE UP
D DIMER BLD IA.RAPID-MCNC: SIGNIFICANT CHANGE UP
EGFR: 74 ML/MIN/1.73M2 — SIGNIFICANT CHANGE UP
EGFR: 80 ML/MIN/1.73M2 — SIGNIFICANT CHANGE UP
EOSINOPHIL # BLD AUTO: 0 K/UL — SIGNIFICANT CHANGE UP (ref 0–0.5)
EOSINOPHIL NFR BLD AUTO: 0 % — SIGNIFICANT CHANGE UP (ref 0–6)
FIBRINOGEN PPP-MCNC: 133 MG/DL — LOW (ref 200–445)
GLUCOSE BLDC GLUCOMTR-MCNC: 171 MG/DL — HIGH (ref 70–99)
GLUCOSE BLDC GLUCOMTR-MCNC: 179 MG/DL — HIGH (ref 70–99)
GLUCOSE BLDC GLUCOMTR-MCNC: 182 MG/DL — HIGH (ref 70–99)
GLUCOSE BLDC GLUCOMTR-MCNC: 185 MG/DL — HIGH (ref 70–99)
GLUCOSE SERPL-MCNC: 161 MG/DL — HIGH (ref 70–99)
GLUCOSE SERPL-MCNC: 169 MG/DL — HIGH (ref 70–99)
HCT VFR BLD CALC: 23.4 % — LOW (ref 34.5–45)
HCT VFR BLD CALC: 27.1 % — LOW (ref 34.5–45)
HGB BLD-MCNC: 7.7 G/DL — LOW (ref 11.5–15.5)
HGB BLD-MCNC: 8.9 G/DL — LOW (ref 11.5–15.5)
INR BLD: 1.09 RATIO — SIGNIFICANT CHANGE UP (ref 0.88–1.16)
INR BLD: 1.33 RATIO — HIGH (ref 0.88–1.16)
LDH SERPL L TO P-CCNC: 1953 U/L — HIGH (ref 50–242)
LDH SERPL L TO P-CCNC: 2004 U/L — HIGH (ref 50–242)
LYMPHOCYTES # BLD AUTO: 0.12 K/UL — LOW (ref 1–3.3)
LYMPHOCYTES # BLD AUTO: 13.6 % — SIGNIFICANT CHANGE UP (ref 13–44)
MAGNESIUM SERPL-MCNC: 3 MG/DL — HIGH (ref 1.6–2.6)
MCHC RBC-ENTMCNC: 27.4 PG — SIGNIFICANT CHANGE UP (ref 27–34)
MCHC RBC-ENTMCNC: 27.7 PG — SIGNIFICANT CHANGE UP (ref 27–34)
MCHC RBC-ENTMCNC: 32.8 GM/DL — SIGNIFICANT CHANGE UP (ref 32–36)
MCHC RBC-ENTMCNC: 32.9 GM/DL — SIGNIFICANT CHANGE UP (ref 32–36)
MCV RBC AUTO: 83.4 FL — SIGNIFICANT CHANGE UP (ref 80–100)
MCV RBC AUTO: 84.2 FL — SIGNIFICANT CHANGE UP (ref 80–100)
MONOCYTES # BLD AUTO: 0 K/UL — SIGNIFICANT CHANGE UP (ref 0–0.9)
MONOCYTES NFR BLD AUTO: 0 % — LOW (ref 2–14)
NEUTROPHILS # BLD AUTO: 0.75 K/UL — LOW (ref 1.8–7.4)
NEUTROPHILS NFR BLD AUTO: 84.1 % — HIGH (ref 43–77)
NRBC # BLD: 2 /100 WBCS — HIGH (ref 0–0)
PHOSPHATE SERPL-MCNC: 2.9 MG/DL — SIGNIFICANT CHANGE UP (ref 2.5–4.5)
PHOSPHATE SERPL-MCNC: 3 MG/DL — SIGNIFICANT CHANGE UP (ref 2.5–4.5)
PLATELET # BLD AUTO: 168 K/UL — SIGNIFICANT CHANGE UP (ref 150–400)
PLATELET # BLD AUTO: 87 K/UL — LOW (ref 150–400)
POTASSIUM SERPL-MCNC: 3.3 MMOL/L — LOW (ref 3.5–5.3)
POTASSIUM SERPL-MCNC: 3.4 MMOL/L — LOW (ref 3.5–5.3)
POTASSIUM SERPL-SCNC: 3.3 MMOL/L — LOW (ref 3.5–5.3)
POTASSIUM SERPL-SCNC: 3.4 MMOL/L — LOW (ref 3.5–5.3)
PROT SERPL-MCNC: 5.5 G/DL — LOW (ref 6–8.3)
PROTHROM AB SERPL-ACNC: 12.7 SEC — SIGNIFICANT CHANGE UP (ref 10.5–13.4)
PROTHROM AB SERPL-ACNC: 15.5 SEC — HIGH (ref 10.5–13.4)
RBC # BLD: 2.78 M/UL — LOW (ref 3.8–5.2)
RBC # BLD: 3.25 M/UL — LOW (ref 3.8–5.2)
RBC # FLD: 18.1 % — HIGH (ref 10.3–14.5)
RBC # FLD: 18.2 % — HIGH (ref 10.3–14.5)
SODIUM SERPL-SCNC: 136 MMOL/L — SIGNIFICANT CHANGE UP (ref 135–145)
SODIUM SERPL-SCNC: 139 MMOL/L — SIGNIFICANT CHANGE UP (ref 135–145)
SPECIMEN SOURCE: SIGNIFICANT CHANGE UP
URATE SERPL-MCNC: 1.3 MG/DL — LOW (ref 2.5–7)
URATE SERPL-MCNC: 1.3 MG/DL — LOW (ref 2.5–7)
WBC # BLD: 0.89 K/UL — CRITICAL LOW (ref 3.8–10.5)
WBC # BLD: 1.68 K/UL — LOW (ref 3.8–10.5)
WBC # FLD AUTO: 0.89 K/UL — CRITICAL LOW (ref 3.8–10.5)
WBC # FLD AUTO: 1.68 K/UL — LOW (ref 3.8–10.5)

## 2023-01-23 PROCEDURE — 99233 SBSQ HOSP IP/OBS HIGH 50: CPT

## 2023-01-23 RX ORDER — POTASSIUM CHLORIDE 20 MEQ
40 PACKET (EA) ORAL EVERY 4 HOURS
Refills: 0 | Status: COMPLETED | OUTPATIENT
Start: 2023-01-23 | End: 2023-01-24

## 2023-01-23 RX ORDER — FUROSEMIDE 40 MG
20 TABLET ORAL ONCE
Refills: 0 | Status: COMPLETED | OUTPATIENT
Start: 2023-01-23 | End: 2023-01-23

## 2023-01-23 RX ADMIN — VALSARTAN 320 MILLIGRAM(S): 80 TABLET ORAL at 06:23

## 2023-01-23 RX ADMIN — CHLORHEXIDINE GLUCONATE 1 APPLICATION(S): 213 SOLUTION TOPICAL at 08:22

## 2023-01-23 RX ADMIN — PREGABALIN 1000 MICROGRAM(S): 225 CAPSULE ORAL at 11:12

## 2023-01-23 RX ADMIN — Medication 300 MILLIGRAM(S): at 11:12

## 2023-01-23 RX ADMIN — Medication 40 MILLIEQUIVALENT(S): at 22:37

## 2023-01-23 RX ADMIN — Medication 240 MILLIGRAM(S): at 06:23

## 2023-01-23 RX ADMIN — Medication 5 MILLIGRAM(S): at 22:20

## 2023-01-23 RX ADMIN — Medication 102 MILLIGRAM(S): at 06:25

## 2023-01-23 RX ADMIN — DASATINIB 140 MILLIGRAM(S): 80 TABLET ORAL at 11:13

## 2023-01-23 RX ADMIN — Medication 1: at 17:35

## 2023-01-23 RX ADMIN — PANTOPRAZOLE SODIUM 40 MILLIGRAM(S): 20 TABLET, DELAYED RELEASE ORAL at 06:24

## 2023-01-23 RX ADMIN — INSULIN GLARGINE 10 UNIT(S): 100 INJECTION, SOLUTION SUBCUTANEOUS at 22:19

## 2023-01-23 RX ADMIN — SODIUM CHLORIDE 50 MILLILITER(S): 9 INJECTION INTRAMUSCULAR; INTRAVENOUS; SUBCUTANEOUS at 22:19

## 2023-01-23 RX ADMIN — Medication 20 MILLIGRAM(S): at 16:00

## 2023-01-23 RX ADMIN — Medication 500 MILLIGRAM(S): at 11:12

## 2023-01-23 RX ADMIN — ZINC OXIDE 1 APPLICATION(S): 200 OINTMENT TOPICAL at 06:26

## 2023-01-23 RX ADMIN — Medication 102 MILLIGRAM(S): at 17:36

## 2023-01-23 RX ADMIN — Medication 1: at 12:45

## 2023-01-23 RX ADMIN — Medication 1: at 08:49

## 2023-01-23 RX ADMIN — Medication 2 MILLIGRAM(S): at 22:25

## 2023-01-23 RX ADMIN — ATORVASTATIN CALCIUM 80 MILLIGRAM(S): 80 TABLET, FILM COATED ORAL at 22:20

## 2023-01-23 NOTE — PROGRESS NOTE ADULT - PROBLEM SELECTOR PLAN 3
in setting of thrombocytopenia and on warfarin for mechanical AVR (1994 [facility]), atrial fibrillation  last doses of warfarin 1/12 and 1/13 at [facility] - 5mg daily  neurosurgery, and cardiology consulted  will keep PLTs > 100k

## 2023-01-23 NOTE — PROGRESS NOTE ADULT - SUBJECTIVE AND OBJECTIVE BOX
Diagnosis: newly diagnosed B-ALL, Ph (+)    Protocol/Chemo Regimen: Following 46556 (Decadron + Dasatinib), +/- weekly Vincristine    Day: 6     Pt endorsed:     Review of Systems: Denies nausea, vomiting, dizziness, sob, cough    Pain scale: denies    Diet: regular    Allergies: No Known Allergies    ANTIMICROBIALS  levoFLOXacin  Tablet 500 milliGRAM(s) Oral every 24 hours    HEME/ONC MEDICATIONS  dasatinib 140 milliGRAM(s) Oral daily      STANDING MEDICATIONS  allopurinol 300 milliGRAM(s) Oral daily  ascorbic acid 500 milliGRAM(s) Oral daily  atorvastatin 80 milliGRAM(s) Oral at bedtime  chlorhexidine 4% Liquid 1 Application(s) Topical <User Schedule>  cyanocobalamin 1000 MICROGram(s) Oral daily  dexAMETHasone  IVPB 10 milliGRAM(s) IV Intermittent every 12 hours  dextrose 5%. 1000 milliLiter(s) IV Continuous <Continuous>  dextrose 5%. 1000 milliLiter(s) IV Continuous <Continuous>  dextrose 50% Injectable 25 Gram(s) IV Push once  dextrose 50% Injectable 12.5 Gram(s) IV Push once  diltiazem    milliGRAM(s) Oral daily  glucagon  Injectable 1 milliGRAM(s) IntraMuscular once  hydrochlorothiazide 25 milliGRAM(s) Oral daily  insulin glargine Injectable (LANTUS) 10 Unit(s) SubCutaneous at bedtime  insulin lispro (ADMELOG) corrective regimen sliding scale   SubCutaneous three times a day before meals  melatonin 5 milliGRAM(s) Oral at bedtime  pantoprazole    Tablet 40 milliGRAM(s) Oral before breakfast  sodium chloride 0.9%. 1000 milliLiter(s) IV Continuous <Continuous>  valsartan 320 milliGRAM(s) Oral daily  zinc oxide 40% Paste 1 Application(s) Topical two times a day    PRN MEDICATIONS  acetaminophen     Tablet .. 650 milliGRAM(s) Oral every 6 hours PRN  aluminum hydroxide/magnesium hydroxide/simethicone Suspension 30 milliLiter(s) Oral every 4 hours PRN  dextrose Oral Gel 15 Gram(s) Oral once PRN  loperamide 2 milliGRAM(s) Oral three times a day PRN  sodium chloride 0.9% lock flush 10 milliLiter(s) IV Push every 1 hour PRN    Vital Signs Last 24 Hrs  T(C): 36.8 (23 Jan 2023 05:16), Max: 37.1 (22 Jan 2023 13:40)  T(F): 98.3 (23 Jan 2023 05:16), Max: 98.8 (22 Jan 2023 13:40)  HR: 75 (23 Jan 2023 05:16) (63 - 77)  BP: 111/62 (23 Jan 2023 05:16) (100/61 - 156/76)  RR: 18 (23 Jan 2023 05:16) (18 - 20)  SpO2: 95% (23 Jan 2023 05:16) (95% - 98%)    Parameters below as of 23 Jan 2023 05:16  Patient On (Oxygen Delivery Method): room air        PHYSICAL EXAM  General: NAD, sitting up in bed  HEENT: Sclerae anicteric, clear oropharynx, no oral lesions  CV: normal S1/S2, reg  Lungs: breath sounds clear and equal bilaterally  Abdomen: soft non-tender non-distended  Ext: no edema  Skin: no rashes   Neuro: alert and oriented X 4  Central Line: RUE PICC clean, dry, intact    Cultures:  GI PCR Panel Stool (01.21.23 @ 12:49)    GI PCR Panel: NotDetec: GI Panel PCR evaluates for:  Campylobacter, Plesiomonas shigelloides, Salmonella, Vibrio, Yersinia  enterocolitica, Enteroaggregative Escherichia (EAEC), Enteropathogenic E.  coli (EPEC), Enterotoxigenic E. coli (ETEC), Shiga-like toxin producing  E.coli (STEC), E. coli O157, Shigella/Enteroinvasive E. coli (EIEC),  Adenovirus, Astrovirus, Norovirus, Rotavirus, Sapovirus, Cryptosporidium,  Cyclospora cayetanensis, Entamoeba histolytica, Giardia lamblia.  For culture and susceptibility reports refer to “reflex stool culture”.    Culture - Stool (01.21.23 @ 12:49)    Specimen Source: .Stool Feces    Culture Results:   No enteric pathogens to date: Final culture pending    Culture - Stool (01.11.23 @ 10:36)    Specimen Source: .Stool Feces inna jv    Culture Results:   No enteric gram negative rods isolated  No enteric pathogens isolated.  (Stool culture examined for Salmonella,  Shigella, Campylobacter, Aeromonas, Plesiomonas,  Vibrio, E.coli O157 and Yersinia)    Cultures:  Culture - Stool (01.11.23 @ 10:36)    Specimen Source: .Stool Feces inna jv    Culture Results:   No enteric gram negative rods isolated  No enteric pathogens isolated.  (Stool culture examined for Salmonella,  Shigella, Campylobacter, Aeromonas, Plesiomonas,  Vibrio, E.coli O157 and Yersinia)    C. difficile GDH &amp; toxins A/B by EIA (01.21.23 @ 09:32)    Clostridium difficile GDH Toxins A&amp;B, EIA:   Negative      LABS:          RADIOLOGY & ADDITIONAL STUDIES:  from: Xray Chest 1 View- PORTABLE-Urgent (Xray Chest 1 View- PORTABLE-Urgent .) (01.16.23 @ 13:16)   FINDINGS:  Heart/Vascular: The cardiac silhouetteis normal in size. Sternotomy   wires. Right PICC terminating within the distal SVC.  Pulmonary: No focal consolidation, pleural effusion or pneumothorax.  Bones: The visualized osseous structures are unremarkable.    Impression:  Right PICC terminating within the distal SVC.         Diagnosis: newly diagnosed B-ALL, Ph (+)    Protocol/Chemo Regimen: Following 25624 (Decadron + Dasatinib), +/- weekly Vincristine    Day: 6     Pt endorsed: low appetite; otherwise no complaints, afebrile    Review of Systems: Denies nausea, vomiting, dizziness, sob, cough    Pain scale: denies    Diet: regular    Allergies: No Known Allergies    ANTIMICROBIALS  levoFLOXacin  Tablet 500 milliGRAM(s) Oral every 24 hours    HEME/ONC MEDICATIONS  dasatinib 140 milliGRAM(s) Oral daily      STANDING MEDICATIONS  allopurinol 300 milliGRAM(s) Oral daily  ascorbic acid 500 milliGRAM(s) Oral daily  atorvastatin 80 milliGRAM(s) Oral at bedtime  chlorhexidine 4% Liquid 1 Application(s) Topical <User Schedule>  cyanocobalamin 1000 MICROGram(s) Oral daily  dexAMETHasone  IVPB 10 milliGRAM(s) IV Intermittent every 12 hours  dextrose 5%. 1000 milliLiter(s) IV Continuous <Continuous>  dextrose 5%. 1000 milliLiter(s) IV Continuous <Continuous>  dextrose 50% Injectable 25 Gram(s) IV Push once  dextrose 50% Injectable 12.5 Gram(s) IV Push once  diltiazem    milliGRAM(s) Oral daily  glucagon  Injectable 1 milliGRAM(s) IntraMuscular once  hydrochlorothiazide 25 milliGRAM(s) Oral daily  insulin glargine Injectable (LANTUS) 10 Unit(s) SubCutaneous at bedtime  insulin lispro (ADMELOG) corrective regimen sliding scale   SubCutaneous three times a day before meals  melatonin 5 milliGRAM(s) Oral at bedtime  pantoprazole    Tablet 40 milliGRAM(s) Oral before breakfast  sodium chloride 0.9%. 1000 milliLiter(s) IV Continuous <Continuous>  valsartan 320 milliGRAM(s) Oral daily  zinc oxide 40% Paste 1 Application(s) Topical two times a day    PRN MEDICATIONS  acetaminophen     Tablet .. 650 milliGRAM(s) Oral every 6 hours PRN  aluminum hydroxide/magnesium hydroxide/simethicone Suspension 30 milliLiter(s) Oral every 4 hours PRN  dextrose Oral Gel 15 Gram(s) Oral once PRN  loperamide 2 milliGRAM(s) Oral three times a day PRN  sodium chloride 0.9% lock flush 10 milliLiter(s) IV Push every 1 hour PRN    Vital Signs Last 24 Hrs  T(C): 36.8 (23 Jan 2023 05:16), Max: 37.1 (22 Jan 2023 13:40)  T(F): 98.3 (23 Jan 2023 05:16), Max: 98.8 (22 Jan 2023 13:40)  HR: 75 (23 Jan 2023 05:16) (63 - 77)  BP: 111/62 (23 Jan 2023 05:16) (100/61 - 156/76)  RR: 18 (23 Jan 2023 05:16) (18 - 20)  SpO2: 95% (23 Jan 2023 05:16) (95% - 98%)    Parameters below as of 23 Jan 2023 05:16  Patient On (Oxygen Delivery Method): room air        PHYSICAL EXAM  General: NAD, sitting up in bed  HEENT: Sclerae anicteric, clear oropharynx, no oral lesions  CV: normal S1/S2, reg  Lungs: breath sounds clear and equal bilaterally  Abdomen: soft non-tender non-distended  Ext: no edema  Skin: no rashes   Neuro: alert and oriented X 4  Central Line: RUE PICC clean, dry, intact    Cultures:  GI PCR Panel Stool (01.21.23 @ 12:49)    GI PCR Panel: NotDetec: GI Panel PCR evaluates for:  Campylobacter, Plesiomonas shigelloides, Salmonella, Vibrio, Yersinia  enterocolitica, Enteroaggregative Escherichia (EAEC), Enteropathogenic E.  coli (EPEC), Enterotoxigenic E. coli (ETEC), Shiga-like toxin producing  E.coli (STEC), E. coli O157, Shigella/Enteroinvasive E. coli (EIEC),  Adenovirus, Astrovirus, Norovirus, Rotavirus, Sapovirus, Cryptosporidium,  Cyclospora cayetanensis, Entamoeba histolytica, Giardia lamblia.  For culture and susceptibility reports refer to “reflex stool culture”.    Culture - Stool (01.21.23 @ 12:49)    Specimen Source: .Stool Feces    Culture Results:   No enteric pathogens to date: Final culture pending    Culture - Stool (01.11.23 @ 10:36)    Specimen Source: .Stool Feces inna jv    Culture Results:   No enteric gram negative rods isolated  No enteric pathogens isolated.  (Stool culture examined for Salmonella,  Shigella, Campylobacter, Aeromonas, Plesiomonas,  Vibrio, E.coli O157 and Yersinia)    Cultures:  Culture - Stool (01.11.23 @ 10:36)    Specimen Source: .Stool Feces inna jv    Culture Results:   No enteric gram negative rods isolated  No enteric pathogens isolated.  (Stool culture examined for Salmonella,  Shigella, Campylobacter, Aeromonas, Plesiomonas,  Vibrio, E.coli O157 and Yersinia)    C. difficile GDH &amp; toxins A/B by EIA (01.21.23 @ 09:32)    Clostridium difficile GDH Toxins A&amp;B, EIA:   Negative      LABS:          RADIOLOGY & ADDITIONAL STUDIES:  from: Xray Chest 1 View- PORTABLE-Urgent (Xray Chest 1 View- PORTABLE-Urgent .) (01.16.23 @ 13:16)   FINDINGS:  Heart/Vascular: The cardiac silhouetteis normal in size. Sternotomy   wires. Right PICC terminating within the distal SVC.  Pulmonary: No focal consolidation, pleural effusion or pneumothorax.  Bones: The visualized osseous structures are unremarkable.    Impression:  Right PICC terminating within the distal SVC.

## 2023-01-23 NOTE — PROGRESS NOTE ADULT - PROBLEM SELECTOR PLAN 2
patient is not neutropenic, afebrile  if spike panculture and CXR  1/21 c.diff toxin negative, GI PCR (-), stool cx pending final result

## 2023-01-23 NOTE — PROGRESS NOTE ADULT - ATTENDING COMMENTS
81 YO F with a PMhx of Nicolasa, s/p [facility] mechanical AVR, (1994–[facility]) for rheumatic aortic valve stenosis, ascending aortic aneurysm, HTN,  HLD, depression, GERD transferred from [facility]. Dx is Ph+ ALL.  She was also noted to have a supratherapeutic INR. CT angio A/P which showed no evidence of active GIB, but showing focal diminished cortical enhancement involving the upper left kidney concerning for focal pyelonephritis or possibly infarct. EGD without any acute findings. Colonoscopy showed grade I internal hemorrhoid and polyps in transverse colon and hepatic flexure. 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     Blasts identified, 60% ; CD45 (dim to negative), CD34, CD10+, CD19+, CD20, CD22+, CD11b (partial dim)+, CD38+ and HLADR+(heterogenous); negative for, CD2, CD3, CD4, CD5, CD7, CD8, , CD33, CD14, CD16, CD15, CD11b, CD56, CD4, CD64, kappa and lambda (surface). Immunophenotypic features are consistent with acute leukemia.    Day 5 of treatment...diarrhea today    -had BMBx on 1/17 >>> 99% B-lymphoblasts  -baseline echo EF 57%  -has PICC line   -Monitor LDH, Uric Acid and CMP daily   -keep Hb >7, plt >10  -allopurinol  -WBC rising,   increased HU to 2 gm 2x/d   -started decadron 20 mg/d 1/18/23  -started dasatinib 140 mg/d on 1/19/23  -WBC rapidly decr to 27.78  -phos, LDH up, urate 6.4, fibrinogen ? 35, PT incr  -given cryo 1/20/23  -repeat coags today post cryo, vit K  -cont incr IVFs, I and O  TLS labs incr to q8hr for now, I &O  d/c HU  LP d15 as per 66990    ICH  -headaches since 1/15/23, now improved  -1/15/23 CT head showed 8mm bleed, repeat CT is stable  -no intervention as per Neursx  -hold coumadin (confirmed with cardiology)  -keep plt >100 for now post SDH       Cardio  HTN - c/w home meds  Mechanical valve - currently off AC, discussed with cardiology  will hold AC in light of risk/benefit, anticipated start of chemotx

## 2023-01-23 NOTE — PROGRESS NOTE ADULT - ASSESSMENT
82 year old female with atrial fibrillation, s/p Saint Darian mechanical AVR, (1994–[facility]) for rheumatic aortic valve stenosis, ascending aortic aneurysm, HTN, HLD, depression, GERD transferred from [facility] for further work-up and management of leukocytosis associated with thrombocytopenia and anemia. She was also noted to have a supratherapeutic INR. CT angio A/P which showed no evidence of active GIB, but showing focal diminished cortical enhancement involving the upper left kidney concerning for focal pyelonephritis or possibly infarct. EGD without any acute findings. Colonoscopy showed grade I internal hemorrhoid and polyps in transverse colon and hepatic flexure. 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 Transferred to [address] [place] [address] for further treatment. 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 Leukocytosis, anemia, thrombocytopenia secondary to disease.

## 2023-01-23 NOTE — PROGRESS NOTE ADULT - PROBLEM SELECTOR PLAN 1
Flow cytometry (1/14/23) consistent with acute leukemia (B ALL), FISH detected BCR/ABL (1/19)  Monitor daily CBC with Diff/CMP and transfuse/replete PRN  Keep PLT >100 due to SDH  TLS labs bid, continue Allopurinol, IVF's  Strict I/O's, aily wghts  1/14 TTE - EF 57%, mild pulm HTN, Mechanical AVR likely normal function  1/16 HLA sent  1/17  s/p BMbx c/w ALL (98% blasts) (with Clonoseq sampling)  1/18 Wbc 90k, Blasts 81%. Hydrea 2g BID, starting Dex 20mg IV Daily. watch glucose levels.  1/19 Started Dasatinib  1/23 plan for Vincristine today. follow up fibrinogen level. cont dex and dasatinib  Day 15 LP on 2/1 Flow cytometry (1/14/23) consistent with acute leukemia (B ALL), FISH detected BCR/ABL (1/19)  Monitor daily CBC with Diff/CMP and transfuse/replete PRN  Keep PLT >100 due to SDH  TLS labs bid, continue Allopurinol, IVF's  Strict I/O's, aily wghts  1/14 TTE - EF 57%, mild pulm HTN, Mechanical AVR likely normal function  1/16 HLA sent  1/17  s/p BMbx c/w ALL (98% blasts) (with Clonoseq sampling)  1/18 Wbc 90k, Blasts 81%. Hydrea 2g BID, starting Dex 20mg IV Daily. watch glucose levels.  1/19 Started Dasatinib  1/23 follow up fibrinogen level, cont PLT, Cryoprecipitate transfusions. cont dex and dasatinib. Vincristine possibly on Day 8 or so  Day 15 LP on 2/1

## 2023-01-24 DIAGNOSIS — Z51.5 ENCOUNTER FOR PALLIATIVE CARE: ICD-10-CM

## 2023-01-24 LAB
ALBUMIN SERPL ELPH-MCNC: 3.6 G/DL — SIGNIFICANT CHANGE UP (ref 3.3–5)
ALP SERPL-CCNC: 62 U/L — SIGNIFICANT CHANGE UP (ref 40–120)
ALT FLD-CCNC: 26 U/L — SIGNIFICANT CHANGE UP (ref 10–45)
ANION GAP SERPL CALC-SCNC: 12 MMOL/L — SIGNIFICANT CHANGE UP (ref 5–17)
ANION GAP SERPL CALC-SCNC: 12 MMOL/L — SIGNIFICANT CHANGE UP (ref 5–17)
APTT BLD: 21 SEC — LOW (ref 27.5–35.5)
APTT BLD: 21.5 SEC — LOW (ref 27.5–35.5)
AST SERPL-CCNC: 59 U/L — HIGH (ref 10–40)
BASOPHILS # BLD AUTO: 0 K/UL — SIGNIFICANT CHANGE UP (ref 0–0.2)
BASOPHILS NFR BLD AUTO: 0 % — SIGNIFICANT CHANGE UP (ref 0–2)
BILIRUB SERPL-MCNC: 0.6 MG/DL — SIGNIFICANT CHANGE UP (ref 0.2–1.2)
BUN SERPL-MCNC: 27 MG/DL — HIGH (ref 7–23)
BUN SERPL-MCNC: 33 MG/DL — HIGH (ref 7–23)
CALCIUM SERPL-MCNC: 7.5 MG/DL — LOW (ref 8.4–10.5)
CALCIUM SERPL-MCNC: 7.6 MG/DL — LOW (ref 8.4–10.5)
CHLORIDE SERPL-SCNC: 103 MMOL/L — SIGNIFICANT CHANGE UP (ref 96–108)
CHLORIDE SERPL-SCNC: 104 MMOL/L — SIGNIFICANT CHANGE UP (ref 96–108)
CHROM ANALY OVERALL INTERP SPEC-IMP: SIGNIFICANT CHANGE UP
CO2 SERPL-SCNC: 22 MMOL/L — SIGNIFICANT CHANGE UP (ref 22–31)
CO2 SERPL-SCNC: 22 MMOL/L — SIGNIFICANT CHANGE UP (ref 22–31)
CREAT SERPL-MCNC: 0.66 MG/DL — SIGNIFICANT CHANGE UP (ref 0.5–1.3)
CREAT SERPL-MCNC: 0.77 MG/DL — SIGNIFICANT CHANGE UP (ref 0.5–1.3)
D DIMER BLD IA.RAPID-MCNC: ABNORMAL NG/ML DDU
D DIMER BLD IA.RAPID-MCNC: SIGNIFICANT CHANGE UP NG/ML DDU
EGFR: 78 ML/MIN/1.73M2 — SIGNIFICANT CHANGE UP
EGFR: 89 ML/MIN/1.73M2 — SIGNIFICANT CHANGE UP
EOSINOPHIL # BLD AUTO: 0 K/UL — SIGNIFICANT CHANGE UP (ref 0–0.5)
EOSINOPHIL NFR BLD AUTO: 0 % — SIGNIFICANT CHANGE UP (ref 0–6)
FIBRINOGEN PPP-MCNC: 106 MG/DL — LOW (ref 200–445)
GLUCOSE BLDC GLUCOMTR-MCNC: 168 MG/DL — HIGH (ref 70–99)
GLUCOSE BLDC GLUCOMTR-MCNC: 208 MG/DL — HIGH (ref 70–99)
GLUCOSE BLDC GLUCOMTR-MCNC: 212 MG/DL — HIGH (ref 70–99)
GLUCOSE BLDC GLUCOMTR-MCNC: 264 MG/DL — HIGH (ref 70–99)
GLUCOSE SERPL-MCNC: 151 MG/DL — HIGH (ref 70–99)
GLUCOSE SERPL-MCNC: 189 MG/DL — HIGH (ref 70–99)
HCT VFR BLD CALC: 21.4 % — LOW (ref 34.5–45)
HCT VFR BLD CALC: 23.1 % — LOW (ref 34.5–45)
HGB BLD-MCNC: 7 G/DL — CRITICAL LOW (ref 11.5–15.5)
HGB BLD-MCNC: 7.4 G/DL — LOW (ref 11.5–15.5)
INR BLD: 1.06 RATIO — SIGNIFICANT CHANGE UP (ref 0.88–1.16)
INR BLD: 1.08 RATIO — SIGNIFICANT CHANGE UP (ref 0.88–1.16)
LDH SERPL L TO P-CCNC: 1323 U/L — HIGH (ref 50–242)
LDH SERPL L TO P-CCNC: 1462 U/L — HIGH (ref 50–242)
LYMPHOCYTES # BLD AUTO: 0.28 K/UL — LOW (ref 1–3.3)
LYMPHOCYTES # BLD AUTO: 28.6 % — SIGNIFICANT CHANGE UP (ref 13–44)
MAGNESIUM SERPL-MCNC: 2.8 MG/DL — HIGH (ref 1.6–2.6)
MCHC RBC-ENTMCNC: 26.8 PG — LOW (ref 27–34)
MCHC RBC-ENTMCNC: 27.5 PG — SIGNIFICANT CHANGE UP (ref 27–34)
MCHC RBC-ENTMCNC: 32 GM/DL — SIGNIFICANT CHANGE UP (ref 32–36)
MCHC RBC-ENTMCNC: 32.7 GM/DL — SIGNIFICANT CHANGE UP (ref 32–36)
MCV RBC AUTO: 83.7 FL — SIGNIFICANT CHANGE UP (ref 80–100)
MCV RBC AUTO: 83.9 FL — SIGNIFICANT CHANGE UP (ref 80–100)
MONOCYTES # BLD AUTO: 0 K/UL — SIGNIFICANT CHANGE UP (ref 0–0.9)
MONOCYTES NFR BLD AUTO: 0 % — LOW (ref 2–14)
NEUTROPHILS # BLD AUTO: 0.7 K/UL — LOW (ref 1.8–7.4)
NEUTROPHILS NFR BLD AUTO: 71.4 % — SIGNIFICANT CHANGE UP (ref 43–77)
NRBC # BLD: 2 /100 WBCS — HIGH (ref 0–0)
PHOSPHATE SERPL-MCNC: 2.2 MG/DL — LOW (ref 2.5–4.5)
PHOSPHATE SERPL-MCNC: 3 MG/DL — SIGNIFICANT CHANGE UP (ref 2.5–4.5)
PLATELET # BLD AUTO: 107 K/UL — LOW (ref 150–400)
PLATELET # BLD AUTO: 82 K/UL — LOW (ref 150–400)
POTASSIUM SERPL-MCNC: 3.8 MMOL/L — SIGNIFICANT CHANGE UP (ref 3.5–5.3)
POTASSIUM SERPL-MCNC: 4.3 MMOL/L — SIGNIFICANT CHANGE UP (ref 3.5–5.3)
POTASSIUM SERPL-SCNC: 3.8 MMOL/L — SIGNIFICANT CHANGE UP (ref 3.5–5.3)
POTASSIUM SERPL-SCNC: 4.3 MMOL/L — SIGNIFICANT CHANGE UP (ref 3.5–5.3)
PROT SERPL-MCNC: 5.9 G/DL — LOW (ref 6–8.3)
PROTHROM AB SERPL-ACNC: 12.2 SEC — SIGNIFICANT CHANGE UP (ref 10.5–13.4)
PROTHROM AB SERPL-ACNC: 12.4 SEC — SIGNIFICANT CHANGE UP (ref 10.5–13.4)
RBC # BLD: 2.55 M/UL — LOW (ref 3.8–5.2)
RBC # BLD: 2.76 M/UL — LOW (ref 3.8–5.2)
RBC # FLD: 17.4 % — HIGH (ref 10.3–14.5)
RBC # FLD: 17.8 % — HIGH (ref 10.3–14.5)
SODIUM SERPL-SCNC: 137 MMOL/L — SIGNIFICANT CHANGE UP (ref 135–145)
SODIUM SERPL-SCNC: 138 MMOL/L — SIGNIFICANT CHANGE UP (ref 135–145)
URATE SERPL-MCNC: 1.1 MG/DL — LOW (ref 2.5–7)
URATE SERPL-MCNC: 1.4 MG/DL — LOW (ref 2.5–7)
WBC # BLD: 0.98 K/UL — CRITICAL LOW (ref 3.8–10.5)
WBC # BLD: 1.17 K/UL — LOW (ref 3.8–10.5)
WBC # FLD AUTO: 0.98 K/UL — CRITICAL LOW (ref 3.8–10.5)
WBC # FLD AUTO: 1.17 K/UL — LOW (ref 3.8–10.5)

## 2023-01-24 PROCEDURE — 99233 SBSQ HOSP IP/OBS HIGH 50: CPT

## 2023-01-24 RX ADMIN — Medication 5 MILLIGRAM(S): at 21:27

## 2023-01-24 RX ADMIN — Medication 102 MILLIGRAM(S): at 17:09

## 2023-01-24 RX ADMIN — CHLORHEXIDINE GLUCONATE 1 APPLICATION(S): 213 SOLUTION TOPICAL at 08:41

## 2023-01-24 RX ADMIN — ZINC OXIDE 1 APPLICATION(S): 200 OINTMENT TOPICAL at 06:30

## 2023-01-24 RX ADMIN — Medication 1: at 17:09

## 2023-01-24 RX ADMIN — SODIUM CHLORIDE 50 MILLILITER(S): 9 INJECTION INTRAMUSCULAR; INTRAVENOUS; SUBCUTANEOUS at 21:31

## 2023-01-24 RX ADMIN — Medication 240 MILLIGRAM(S): at 06:29

## 2023-01-24 RX ADMIN — Medication 300 MILLIGRAM(S): at 11:39

## 2023-01-24 RX ADMIN — Medication 3: at 08:40

## 2023-01-24 RX ADMIN — INSULIN GLARGINE 10 UNIT(S): 100 INJECTION, SOLUTION SUBCUTANEOUS at 21:27

## 2023-01-24 RX ADMIN — ATORVASTATIN CALCIUM 80 MILLIGRAM(S): 80 TABLET, FILM COATED ORAL at 21:27

## 2023-01-24 RX ADMIN — Medication 2 MILLIGRAM(S): at 21:30

## 2023-01-24 RX ADMIN — ZINC OXIDE 1 APPLICATION(S): 200 OINTMENT TOPICAL at 17:20

## 2023-01-24 RX ADMIN — PREGABALIN 1000 MICROGRAM(S): 225 CAPSULE ORAL at 11:38

## 2023-01-24 RX ADMIN — VALSARTAN 320 MILLIGRAM(S): 80 TABLET ORAL at 06:29

## 2023-01-24 RX ADMIN — Medication 102 MILLIGRAM(S): at 06:29

## 2023-01-24 RX ADMIN — Medication 2: at 12:11

## 2023-01-24 RX ADMIN — PANTOPRAZOLE SODIUM 40 MILLIGRAM(S): 20 TABLET, DELAYED RELEASE ORAL at 06:29

## 2023-01-24 RX ADMIN — Medication 500 MILLIGRAM(S): at 11:38

## 2023-01-24 RX ADMIN — DASATINIB 140 MILLIGRAM(S): 80 TABLET ORAL at 12:34

## 2023-01-24 NOTE — PROGRESS NOTE ADULT - ASSESSMENT
82 year old female with atrial fibrillation, s/p Saint Darian mechanical AVR, (1994–[facility]) for rheumatic aortic valve stenosis, ascending aortic aneurysm, HTN, HLD, depression, GERD transferred from [facility] for further work-up and management of leukocytosis associated with thrombocytopenia and anemia. She was also noted to have a supratherapeutic INR. CT angio A/P which showed no evidence of active GIB, but showing focal diminished cortical enhancement involving the upper left kidney concerning for focal pyelonephritis or possibly infarct. EGD without any acute findings. Colonoscopy showed grade I internal hemorrhoid and polyps in transverse colon and hepatic flexure. 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 Transferred to [address] [place] [address] for further treatment. 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 Leukocytosis, anemia, thrombocytopenia secondary to disease.

## 2023-01-24 NOTE — PROGRESS NOTE ADULT - PROBLEM SELECTOR PLAN 1
Flow cytometry (1/14/23) consistent with acute leukemia (B ALL), FISH detected BCR/ABL (1/19)  Monitor daily CBC with Diff/CMP and transfuse/replete PRN  Keep PLT >100 due to SDH  TLS labs bid, continue Allopurinol, IVF's  Strict I/O's, aily wghts  1/14 TTE - EF 57%, mild pulm HTN, Mechanical AVR likely normal function  1/16 HLA sent  1/17  s/p BMbx c/w ALL (98% blasts) (with Clonoseq sampling)  1/18 Wbc 90k, Blasts 81%. Hydrea 2g BID, starting Dex 20mg IV Daily. watch glucose levels.  1/19 Started Dasatinib  1/23 follow up fibrinogen level, cont PLT, Cryoprecipitate transfusions. cont dex and dasatinib. Vincristine possibly on Day 8 or so  Day 15 LP on 2/1 Flow cytometry (1/14/23) consistent with acute leukemia (B ALL), FISH detected BCR/ABL (1/19)  Monitor daily CBC with Diff/CMP and transfuse/replete PRN  Patient was on warfarin-NOW HELD for mechanical AVR (1994 [facility]), atrial fibrillation  last dose of warfarin 1/13 at [facility] (5mg)-cardiology consulted.   TLS labs bid, continue Allopurinol, IVF's  Strict I/O's, daily wghts  1/14 TTE - EF 57%, mild pulm HTN, Mechanical AVR likely normal function  1/16 HLA sent  1/17  s/p BMbx c/w ALL (98% blasts) (with Clonoseq sampling)  1/19 Started Dasatinib  Vincristine possibly on Day 8 or so  Day 15 BMbx on 2/1 and LP to be determined but prior to discharge.   hypofibrinogenemia-cryoprecipitate given.   Social work consult to address family issues Flow cytometry (1/14/23) consistent with acute leukemia (B ALL), FISH detected BCR/ABL (1/19)  Monitor daily CBC with Diff/CMP and transfuse/replete PRN  Patient was on warfarin-NOW HELD for mechanical AVR (1994 [facility]), atrial fibrillation  last dose of warfarin 1/13 at [facility] (5mg)-cardiology consulted.   TLS labs bid, continue Allopurinol, IVF's  Strict I/O's, daily wghts  1/14 TTE - EF 57%, mild pulm HTN, Mechanical AVR likely normal function  1/16 HLA sent  1/17  s/p BMbx c/w ALL (98% blasts) (with Clonoseq sampling)  Decadron 1/18-1/24-monitor sugars now off steroids.   1/19 Started Dasatinib  Vincristine possibly on Day 8 or so  Day 15 BMbx on 2/1 and LP to be determined but prior to discharge.   hypofibrinogenemia-cryoprecipitate given.   Social work consult to address family issues Flow cytometry (1/14/23) consistent with acute leukemia (B ALL), FISH detected BCR/ABL (1/19)  Monitor daily CBC with Diff/CMP and transfuse/replete PRN  Patient was on warfarin-NOW HELD for mechanical AVR (1994 [facility]), atrial fibrillation  last dose of warfarin 1/13 at [facility] (5mg)-cardiology consulted.   TLS labs daily, continue Allopurinol, IVF's  Strict I/O's, daily wights  1/14 TTE - EF 57%, mild pulm HTN, Mechanical AVR likely normal function  1/16 HLA sent  1/17  s/p BMbx c/w ALL (98% blasts) (with Clonoseq sampling)  Decadron 1/18-1/24-monitor sugars now off steroids.   1/19 Started Dasatinib  Vincristine possibly on Day 8 or so  Day 15 BMbx on 2/1 and LP to be determined but prior to discharge.   hypofibrinogenemia-cryoprecipitate given. Goal >100 fibrinogen  Social work consult to address family issues

## 2023-01-24 NOTE — PROGRESS NOTE ADULT - PROBLEM SELECTOR PLAN 5
mechanical AVR on coumadin now on hold in setting of acute subdural hemorrhage mechanical AVR on coumadin now on hold in setting of acute subdural hemorrhage  Seen by cardiology

## 2023-01-24 NOTE — PROGRESS NOTE ADULT - PROBLEM SELECTOR PLAN 3
in setting of thrombocytopenia and on warfarin for mechanical AVR (1994 [facility]), atrial fibrillation  last doses of warfarin 1/12 and 1/13 at [facility] - 5mg daily  neurosurgery, and cardiology consulted  will keep PLTs > 100k neurosurgery consulted  1/24 Now keeping PLTs > 50

## 2023-01-24 NOTE — PROGRESS NOTE ADULT - PROBLEM SELECTOR PLAN 2
patient is not neutropenic, afebrile  if spike panculture and CXR  1/21 c.diff toxin negative, GI PCR (-), stool cx pending final result patient is not neutropenic, afebrile  c/w levaquin   if spike panculture and CXR  1/21 c.diff toxin negative, GI PCR (-), stool cx pending final result

## 2023-01-24 NOTE — PROGRESS NOTE ADULT - ATTENDING COMMENTS
83 YO F with a PMhx of Nicolasa, s/p Saint Darian mechanical AVR, (1994–[facility]) for rheumatic aortic valve stenosis, ascending aortic aneurysm, HTN,  HLD, depression, GERD transferred from [facility]. Dx is Ph+ ALL.  She was also noted to have a supratherapeutic INR. CT angio A/P which showed no evidence of active GIB, but showing focal diminished cortical enhancement involving the upper left kidney concerning for focal pyelonephritis or possibly infarct. EGD without any acute findings. Colonoscopy showed grade I internal hemorrhoid and polyps in transverse colon and hepatic flexure. 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     Blasts identified, 60% ; CD45 (dim to negative), CD34, CD10+, CD19+, CD20, CD22+, CD11b (partial dim)+, CD38+ and HLADR+(heterogenous); negative for, CD2, CD3, CD4, CD5, CD7, CD8, , CD33, CD14, CD16, CD15, CD11b, CD56, CD4, CD64, kappa and lambda (surface). Immunophenotypic features are consistent with acute leukemia.    Day 5 of treatment...diarrhea today    -had BMBx on 1/17 >>> 99% B-lymphoblasts  -baseline echo EF 57%  -has PICC line   -Monitor LDH, Uric Acid and CMP daily   -keep Hb >7, plt >10  -allopurinol  -WBC rising,   increased HU to 2 gm 2x/d   -started decadron 20 mg/d 1/18/23  -started dasatinib 140 mg/d on 1/19/23  -WBC rapidly decr to 27.78  -phos, LDH up, urate 6.4, fibrinogen ? 35, PT incr  -given cryo 1/20/23  -repeat coags today post cryo, vit K  -cont incr IVFs, I and O  TLS labs incr to q8hr for now, I &O  d/c HU  LP d15 as per [id number]    ICH  -headaches since 1/15/23, now improved  -1/15/23 CT head showed 8mm bleed, repeat CT is stable  -no intervention as per Neursx  -hold coumadin (confirmed with cardiology)  -keep plt >100 for now post SDH       Cardio  HTN - c/w home meds  Mechanical valve - currently off AC, discussed with cardiology  will hold AC in light of risk/benefit, anticipated start of chemotx 81 YO F with a PMhx of Nicolasa, s/p Saint Darian mechanical AVR, (1994–[facility]) for rheumatic aortic valve stenosis, ascending aortic aneurysm, HTN,  HLD, depression, GERD transferred from [facility]. Dx is Ph+ ALL.  She was also noted to have a supratherapeutic INR. CT angio A/P which showed no evidence of active GIB, but showing focal diminished cortical enhancement involving the upper left kidney concerning for focal pyelonephritis or possibly infarct. EGD without any acute findings. Colonoscopy showed grade I internal hemorrhoid and polyps in transverse colon and hepatic flexure. 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     Blasts identified, 60% ; CD45 (dim to negative), CD34, CD10+, CD19+, CD20, CD22+, CD11b (partial dim)+, CD38+ and HLADR+(heterogenous); negative for, CD2, CD3, CD4, CD5, CD7, CD8, , CD33, CD14, CD16, CD15, CD11b, CD56, CD4, CD64, kappa and lambda (surface). Immunophenotypic features are consistent with acute leukemia.    Day 7 of treatment...diarrhea today    -had BMBx on 1/17 >>> 99% B-lymphoblasts  -baseline echo EF 57%  -has PICC line   -Monitor LDH, Uric Acid and CMP daily   -keep Hb >7, plt >10  -allopurinol  -WBC rising,   increased HU to 2 gm 2x/d   -started decadron 20 mg/d 1/18/23  -started dasatinib 140 mg/d on 1/19/23  -WBC rapidly decreased, PB blasts have cleared  -phos, LDH up now coming down gradually, urate 6.4, fibrinogen ? 35, PT incr  -given cryo 1/20/23  -repeat coags today post cryo, vit K  -cont incr IVFs, I and O  TLS labs incr to q8hr for now, I &O  d/c HU  LP d15 as per [id number]    ICH  -headaches since 1/15/23, now improved  -1/15/23 CT head showed 8mm bleed, repeat CT is stable  -no intervention as per Neursx  -hold coumadin (confirmed with cardiology)  -keep plt >100 for now post SDH  d/w NS when plt threshhold can be reduced    Cardio  HTN - c/w home meds  Mechanical valve - currently off AC, discussed with cardiology  will hold AC in light of risk/benefit, anticipated start of chemotx

## 2023-01-24 NOTE — CHART NOTE - NSCHARTNOTEFT_GEN_A_CORE
Imaging was reviewed by neurosurgery. Okay to reduce platelet goal to 50K. Imaging was reviewed by neurosurgery, while there is a theoretical risk of the SDH expanding with potential associated morbidity/mortality there is no absolute contraindication to lowering the platelet goal to 50k in order to reduce the risks associated with continued platelet transfusions, ultimately the risks must be weighed against the benefits in this patient's clinical circumstance

## 2023-01-24 NOTE — PROGRESS NOTE ADULT - SUBJECTIVE AND OBJECTIVE BOX
Diagnosis: newly diagnosed B-ALL, Ph (+)    Protocol/Chemo Regimen: Following 96206 (Decadron + Dasatinib), +/- weekly Vincristine    Day: 7     Pt endorsed: low appetite; otherwise no complaints, afebrile    Review of Systems: Denies nausea, vomiting, dizziness, sob, cough    Pain scale: denies    Diet: regular    Allergies    No Known Allergies    Intolerances        ANTIMICROBIALS  levoFLOXacin  Tablet 500 milliGRAM(s) Oral every 24 hours      HEME/ONC MEDICATIONS  dasatinib 140 milliGRAM(s) Oral daily      STANDING MEDICATIONS  allopurinol 300 milliGRAM(s) Oral daily  ascorbic acid 500 milliGRAM(s) Oral daily  atorvastatin 80 milliGRAM(s) Oral at bedtime  chlorhexidine 4% Liquid 1 Application(s) Topical <User Schedule>  cyanocobalamin 1000 MICROGram(s) Oral daily  dexAMETHasone  IVPB 10 milliGRAM(s) IV Intermittent every 12 hours  dextrose 5%. 1000 milliLiter(s) IV Continuous <Continuous>  dextrose 5%. 1000 milliLiter(s) IV Continuous <Continuous>  dextrose 50% Injectable 25 Gram(s) IV Push once  dextrose 50% Injectable 12.5 Gram(s) IV Push once  diltiazem    milliGRAM(s) Oral daily  glucagon  Injectable 1 milliGRAM(s) IntraMuscular once  hydrochlorothiazide 25 milliGRAM(s) Oral daily  insulin glargine Injectable (LANTUS) 10 Unit(s) SubCutaneous at bedtime  insulin lispro (ADMELOG) corrective regimen sliding scale   SubCutaneous three times a day before meals  melatonin 5 milliGRAM(s) Oral at bedtime  pantoprazole    Tablet 40 milliGRAM(s) Oral before breakfast  sodium chloride 0.9%. 1000 milliLiter(s) IV Continuous <Continuous>  valsartan 320 milliGRAM(s) Oral daily  zinc oxide 40% Paste 1 Application(s) Topical two times a day      PRN MEDICATIONS  acetaminophen     Tablet .. 650 milliGRAM(s) Oral every 6 hours PRN  aluminum hydroxide/magnesium hydroxide/simethicone Suspension 30 milliLiter(s) Oral every 4 hours PRN  dextrose Oral Gel 15 Gram(s) Oral once PRN  loperamide 2 milliGRAM(s) Oral three times a day PRN  sodium chloride 0.9% lock flush 10 milliLiter(s) IV Push every 1 hour PRN        Vital Signs Last 24 Hrs  T(C): 36.7 (24 Jan 2023 05:08), Max: 36.8 (23 Jan 2023 23:07)  T(F): 98 (24 Jan 2023 05:08), Max: 98.3 (23 Jan 2023 23:07)  HR: 76 (24 Jan 2023 05:08) (59 - 81)  BP: 103/60 (24 Jan 2023 05:08) (103/60 - 147/86)  BP(mean): --  RR: 18 (24 Jan 2023 05:08) (18 - 19)  SpO2: 95% (24 Jan 2023 05:08) (95% - 98%)    Parameters below as of 24 Jan 2023 05:08  Patient On (Oxygen Delivery Method): room air      PHYSICAL EXAM  General: NAD, sitting up in bed  HEENT: Sclerae anicteric, clear oropharynx, no oral lesions  CV: normal S1/S2, reg  Lungs: breath sounds clear and equal bilaterally  Abdomen: soft non-tender non-distended  Ext: no edema  Skin: no rashes   Neuro: alert and oriented X 4  Central Line: RUE PICC clean, dry, intact    LABS: Diagnosis: newly diagnosed B-ALL, Ph (+)    Protocol/Chemo Regimen: Following 94023 (Decadron + Dasatinib),   Day: 7     Pt endorsed: low appetite; otherwise no complaints.  Review of Systems: Denies nausea, vomiting, dizziness, sob, cough    Pain scale: denies    Diet: regular    Allergies    No Known Allergies    ANTIMICROBIALS  levoFLOXacin  Tablet 500 milliGRAM(s) Oral every 24 hours    HEME/ONC MEDICATIONS  dasatinib 140 milliGRAM(s) Oral daily      STANDING MEDICATIONS  allopurinol 300 milliGRAM(s) Oral daily  ascorbic acid 500 milliGRAM(s) Oral daily  atorvastatin 80 milliGRAM(s) Oral at bedtime  chlorhexidine 4% Liquid 1 Application(s) Topical <User Schedule>  cyanocobalamin 1000 MICROGram(s) Oral daily  dexAMETHasone  IVPB 10 milliGRAM(s) IV Intermittent every 12 hours  dextrose 5%. 1000 milliLiter(s) IV Continuous <Continuous>  dextrose 5%. 1000 milliLiter(s) IV Continuous <Continuous>  dextrose 50% Injectable 25 Gram(s) IV Push once  dextrose 50% Injectable 12.5 Gram(s) IV Push once  diltiazem    milliGRAM(s) Oral daily  glucagon  Injectable 1 milliGRAM(s) IntraMuscular once  hydrochlorothiazide 25 milliGRAM(s) Oral daily  insulin glargine Injectable (LANTUS) 10 Unit(s) SubCutaneous at bedtime  insulin lispro (ADMELOG) corrective regimen sliding scale   SubCutaneous three times a day before meals  melatonin 5 milliGRAM(s) Oral at bedtime  pantoprazole    Tablet 40 milliGRAM(s) Oral before breakfast  sodium chloride 0.9%. 1000 milliLiter(s) IV Continuous <Continuous>  valsartan 320 milliGRAM(s) Oral daily  zinc oxide 40% Paste 1 Application(s) Topical two times a day      PRN MEDICATIONS  acetaminophen     Tablet .. 650 milliGRAM(s) Oral every 6 hours PRN  aluminum hydroxide/magnesium hydroxide/simethicone Suspension 30 milliLiter(s) Oral every 4 hours PRN  dextrose Oral Gel 15 Gram(s) Oral once PRN  loperamide 2 milliGRAM(s) Oral three times a day PRN  sodium chloride 0.9% lock flush 10 milliLiter(s) IV Push every 1 hour PRN        Vital Signs Last 24 Hrs  T(C): 36.7 (24 Jan 2023 05:08), Max: 36.8 (23 Jan 2023 23:07)  T(F): 98 (24 Jan 2023 05:08), Max: 98.3 (23 Jan 2023 23:07)  HR: 76 (24 Jan 2023 05:08) (59 - 81)  BP: 103/60 (24 Jan 2023 05:08) (103/60 - 147/86)  BP(mean): --  RR: 18 (24 Jan 2023 05:08) (18 - 19)  SpO2: 95% (24 Jan 2023 05:08) (95% - 98%)    Parameters below as of 24 Jan 2023 05:08  Patient On (Oxygen Delivery Method): room air      PHYSICAL EXAM  General: NAD, sitting up in bed  HEENT: Sclerae anicteric, clear oropharynx, no oral lesions  CV: normal S1/S2, reg  Lungs: breath sounds clear and equal bilaterally  Abdomen: soft non-tender non-distended  Ext: no edema  Skin: no rashes   Neuro: alert and oriented X 4  Central Line: RUE PICC clean, dry, intact    LABS:    Blood Cultures:                           7.4    0.98  )-----------( 107      ( 24 Jan 2023 07:02 )             23.1         Mean Cell Volume : 83.7 fl  Mean Cell Hemoglobin : 26.8 pg  Mean Cell Hemoglobin Concentration : 32.0 gm/dL  Auto Neutrophil # : 0.70 K/uL  Auto Lymphocyte # : 0.28 K/uL  Auto Monocyte # : 0.00 K/uL  Auto Eosinophil # : 0.00 K/uL  Auto Basophil # : 0.00 K/uL  Auto Neutrophil % : 71.4 %  Auto Lymphocyte % : 28.6 %  Auto Monocyte % : 0.0 %  Auto Eosinophil % : 0.0 %  Auto Basophil % : 0.0 %      01-24    137  |  103  |  33<H>  ----------------------------<  189<H>  3.8   |  22  |  0.77    Ca    7.5<L>      24 Jan 2023 07:02  Phos  3.0     01-24  Mg     2.8     01-24  TPro  5.9<L>  /  Alb  3.6  /  TBili  0.6  /  DBili  x   /  AST  59<H>  /  ALT  26  /  AlkPhos  62  01-24  PT/INR - ( 24 Jan 2023 07:02 )   PT: 12.4 sec;   INR: 1.08 ratio    PTT - ( 24 Jan 2023 07:02 )  PTT:21.5 sec  LDH 1953  Uric Acid 1.3

## 2023-01-25 LAB
ALBUMIN SERPL ELPH-MCNC: 3.5 G/DL — SIGNIFICANT CHANGE UP (ref 3.3–5)
ALP SERPL-CCNC: 60 U/L — SIGNIFICANT CHANGE UP (ref 40–120)
ALT FLD-CCNC: 28 U/L — SIGNIFICANT CHANGE UP (ref 10–45)
ANION GAP SERPL CALC-SCNC: 12 MMOL/L — SIGNIFICANT CHANGE UP (ref 5–17)
APTT BLD: 23.2 SEC — LOW (ref 27.5–35.5)
AST SERPL-CCNC: 48 U/L — HIGH (ref 10–40)
BASOPHILS # BLD AUTO: 0 K/UL — SIGNIFICANT CHANGE UP (ref 0–0.2)
BASOPHILS NFR BLD AUTO: 0 % — SIGNIFICANT CHANGE UP (ref 0–2)
BILIRUB SERPL-MCNC: 0.6 MG/DL — SIGNIFICANT CHANGE UP (ref 0.2–1.2)
BUN SERPL-MCNC: 26 MG/DL — HIGH (ref 7–23)
CALCIUM SERPL-MCNC: 7.7 MG/DL — LOW (ref 8.4–10.5)
CHLORIDE SERPL-SCNC: 104 MMOL/L — SIGNIFICANT CHANGE UP (ref 96–108)
CO2 SERPL-SCNC: 22 MMOL/L — SIGNIFICANT CHANGE UP (ref 22–31)
CREAT SERPL-MCNC: 0.65 MG/DL — SIGNIFICANT CHANGE UP (ref 0.5–1.3)
EGFR: 89 ML/MIN/1.73M2 — SIGNIFICANT CHANGE UP
EOSINOPHIL # BLD AUTO: 0 K/UL — SIGNIFICANT CHANGE UP (ref 0–0.5)
EOSINOPHIL NFR BLD AUTO: 0 % — SIGNIFICANT CHANGE UP (ref 0–6)
GLUCOSE BLDC GLUCOMTR-MCNC: 139 MG/DL — HIGH (ref 70–99)
GLUCOSE BLDC GLUCOMTR-MCNC: 159 MG/DL — HIGH (ref 70–99)
GLUCOSE BLDC GLUCOMTR-MCNC: 173 MG/DL — HIGH (ref 70–99)
GLUCOSE BLDC GLUCOMTR-MCNC: 186 MG/DL — HIGH (ref 70–99)
GLUCOSE SERPL-MCNC: 128 MG/DL — HIGH (ref 70–99)
HCT VFR BLD CALC: 22.3 % — LOW (ref 34.5–45)
HGB BLD-MCNC: 7.2 G/DL — LOW (ref 11.5–15.5)
INR BLD: 1.03 RATIO — SIGNIFICANT CHANGE UP (ref 0.88–1.16)
LDH SERPL L TO P-CCNC: 1299 U/L — HIGH (ref 50–242)
LYMPHOCYTES # BLD AUTO: 0.25 K/UL — LOW (ref 1–3.3)
LYMPHOCYTES # BLD AUTO: 21.6 % — SIGNIFICANT CHANGE UP (ref 13–44)
MAGNESIUM SERPL-MCNC: 2.7 MG/DL — HIGH (ref 1.6–2.6)
MCHC RBC-ENTMCNC: 27 PG — SIGNIFICANT CHANGE UP (ref 27–34)
MCHC RBC-ENTMCNC: 32.3 GM/DL — SIGNIFICANT CHANGE UP (ref 32–36)
MCV RBC AUTO: 83.5 FL — SIGNIFICANT CHANGE UP (ref 80–100)
MONOCYTES # BLD AUTO: 0 K/UL — SIGNIFICANT CHANGE UP (ref 0–0.9)
MONOCYTES NFR BLD AUTO: 0 % — LOW (ref 2–14)
NEUTROPHILS # BLD AUTO: 0.89 K/UL — LOW (ref 1.8–7.4)
NEUTROPHILS NFR BLD AUTO: 78.4 % — HIGH (ref 43–77)
PHOSPHATE SERPL-MCNC: 2.6 MG/DL — SIGNIFICANT CHANGE UP (ref 2.5–4.5)
PLATELET # BLD AUTO: 76 K/UL — LOW (ref 150–400)
POTASSIUM SERPL-MCNC: 4.1 MMOL/L — SIGNIFICANT CHANGE UP (ref 3.5–5.3)
POTASSIUM SERPL-SCNC: 4.1 MMOL/L — SIGNIFICANT CHANGE UP (ref 3.5–5.3)
PROT SERPL-MCNC: 5.6 G/DL — LOW (ref 6–8.3)
PROTHROM AB SERPL-ACNC: 11.9 SEC — SIGNIFICANT CHANGE UP (ref 10.5–13.4)
RBC # BLD: 2.67 M/UL — LOW (ref 3.8–5.2)
RBC # FLD: 17.4 % — HIGH (ref 10.3–14.5)
SODIUM SERPL-SCNC: 138 MMOL/L — SIGNIFICANT CHANGE UP (ref 135–145)
URATE SERPL-MCNC: 0.7 MG/DL — LOW (ref 2.5–7)
WBC # BLD: 1.14 K/UL — LOW (ref 3.8–10.5)
WBC # FLD AUTO: 1.14 K/UL — LOW (ref 3.8–10.5)

## 2023-01-25 PROCEDURE — 99233 SBSQ HOSP IP/OBS HIGH 50: CPT

## 2023-01-25 RX ORDER — RASBURICASE 7.5 MG
3 KIT INTRAVENOUS ONCE
Refills: 0 | Status: COMPLETED | OUTPATIENT
Start: 2023-01-25 | End: 2023-01-25

## 2023-01-25 RX ORDER — VINCRISTINE SULFATE 1 MG/ML
1.5 VIAL (ML) INTRAVENOUS ONCE
Refills: 0 | Status: COMPLETED | OUTPATIENT
Start: 2023-01-25 | End: 2023-01-25

## 2023-01-25 RX ADMIN — Medication 1.5 MILLIGRAM(S): at 15:47

## 2023-01-25 RX ADMIN — INSULIN GLARGINE 10 UNIT(S): 100 INJECTION, SOLUTION SUBCUTANEOUS at 22:42

## 2023-01-25 RX ADMIN — Medication 2 MILLIGRAM(S): at 22:27

## 2023-01-25 RX ADMIN — Medication 63.75 MILLIMOLE(S): at 05:08

## 2023-01-25 RX ADMIN — ATORVASTATIN CALCIUM 80 MILLIGRAM(S): 80 TABLET, FILM COATED ORAL at 22:27

## 2023-01-25 RX ADMIN — Medication 30 MILLILITER(S): at 15:22

## 2023-01-25 RX ADMIN — Medication 500 MILLIGRAM(S): at 12:34

## 2023-01-25 RX ADMIN — Medication 5 MILLIGRAM(S): at 23:26

## 2023-01-25 RX ADMIN — ZINC OXIDE 1 APPLICATION(S): 200 OINTMENT TOPICAL at 08:30

## 2023-01-25 RX ADMIN — PANTOPRAZOLE SODIUM 40 MILLIGRAM(S): 20 TABLET, DELAYED RELEASE ORAL at 05:06

## 2023-01-25 RX ADMIN — Medication 240 MILLIGRAM(S): at 05:06

## 2023-01-25 RX ADMIN — ZINC OXIDE 1 APPLICATION(S): 200 OINTMENT TOPICAL at 17:48

## 2023-01-25 RX ADMIN — Medication 1: at 12:34

## 2023-01-25 RX ADMIN — Medication 1: at 08:46

## 2023-01-25 RX ADMIN — Medication 2 MILLIGRAM(S): at 05:06

## 2023-01-25 RX ADMIN — RASBURICASE 104 MILLIGRAM(S): KIT at 13:30

## 2023-01-25 RX ADMIN — CHLORHEXIDINE GLUCONATE 1 APPLICATION(S): 213 SOLUTION TOPICAL at 08:13

## 2023-01-25 RX ADMIN — Medication 300 MILLIGRAM(S): at 12:34

## 2023-01-25 RX ADMIN — PREGABALIN 1000 MICROGRAM(S): 225 CAPSULE ORAL at 12:33

## 2023-01-25 RX ADMIN — VALSARTAN 320 MILLIGRAM(S): 80 TABLET ORAL at 05:06

## 2023-01-25 RX ADMIN — DASATINIB 140 MILLIGRAM(S): 80 TABLET ORAL at 12:33

## 2023-01-25 NOTE — ADVANCED PRACTICE NURSE CONSULT - ASSESSMENT
Pt. seen in bed a/ox4,denies any discomfort at this time. Chemotherapy teachings done.Pt. and son verbalized understanding. Pt. has right double lumen PICC  accessed with NS .Dsg dry and intact .Site with no s/s of redness ,swelling or pain. With positive blood return noted and flushing easily with 10 ML NS. Drug verification done by 2 RN.'s.  Lab. values reviewed by Dr. Joe prior to signing orders. Pt. had BM 1/24/23 . At 1547 , vinCRIStine IVPB (eMAR)  - Start Date: 25-Jan-2023  1.5 milliGRAM(s) in sodium chloride 0.9% 50 milliLiter(s), IV Intermittent, once, infuse over 5 Minute(s), infuse at 618 mL/Hr, to PICC as a secondary line via alaris pump. Pt. tolerated well. Primary RN aware of present treatment. Left pt. comfortable in bed.

## 2023-01-25 NOTE — PROGRESS NOTE ADULT - ATTENDING COMMENTS
83 YO F with a PMhx of Nicolasa, s/p Saint Darian mechanical AVR, (1994–[facility]) for rheumatic aortic valve stenosis, ascending aortic aneurysm, HTN,  HLD, depression, GERD transferred from [facility]. Dx is Ph+ ALL.  She was also noted to have a supratherapeutic INR. CT angio A/P which showed no evidence of active GIB, but showing focal diminished cortical enhancement involving the upper left kidney concerning for focal pyelonephritis or possibly infarct. EGD without any acute findings. Colonoscopy showed grade I internal hemorrhoid and polyps in transverse colon and hepatic flexure. 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     Blasts identified, 60% ; CD45 (dim to negative), CD34, CD10+, CD19+, CD20, CD22+, CD11b (partial dim)+, CD38+ and HLADR+(heterogenous); negative for, CD2, CD3, CD4, CD5, CD7, CD8, , CD33, CD14, CD16, CD15, CD11b, CD56, CD4, CD64, kappa and lambda (surface). Immunophenotypic features are consistent with acute leukemia.    Day 7 of treatment...diarrhea today    -had BMBx on 1/17 >>> 99% B-lymphoblasts  -baseline echo EF 57%  -has PICC line   -Monitor LDH, Uric Acid and CMP daily   -keep Hb >7, plt >10  -allopurinol  -WBC rising,   increased HU to 2 gm 2x/d   -started decadron 20 mg/d 1/18/23  -started dasatinib 140 mg/d on 1/19/23  -WBC rapidly decreased, PB blasts have cleared  -phos, LDH up now coming down gradually, urate 6.4, fibrinogen ? 35, PT incr  -given cryo 1/20/23  -repeat coags today post cryo, vit K  -cont incr IVFs, I and O  TLS labs incr to q8hr for now, I &O  d/c HU  LP d15 as per 71117    ICH  -headaches since 1/15/23, now improved  -1/15/23 CT head showed 8mm bleed, repeat CT is stable  -no intervention as per Neursx  -hold coumadin (confirmed with cardiology)  -keep plt >100 for now post SDH  d/w NS when plt threshhold can be reduced    Cardio  HTN - c/w home meds  Mechanical valve - currently off AC, discussed with cardiology  will hold AC in light of risk/benefit, anticipated start of chemotx 83 YO F with a PMhx of NICOLÁS.tonya, s/p Saint Darian mechanical AVR, (1994–[facility]) for rheumatic aortic valve stenosis, ascending aortic aneurysm, HTN,  HLD, depression, GERD transferred from [facility]. Dx is Ph+ ALL.  She was also noted to have a supratherapeutic INR. CT angio A/P which showed no evidence of active GIB, but showing focal diminished cortical enhancement involving the upper left kidney concerning for focal pyelonephritis or possibly infarct. EGD without any acute findings. Colonoscopy showed grade I internal hemorrhoid and polyps in transverse colon and hepatic flexure. 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     Blasts identified, 60% ; CD45 (dim to negative), CD34, CD10+, CD19+, CD20, CD22+, CD11b (partial dim)+, CD38+ and HLADR+(heterogenous); negative for, CD2, CD3, CD4, CD5, CD7, CD8, , CD33, CD14, CD16, CD15, CD11b, CD56, CD4, CD64, kappa and lambda (surface). Immunophenotypic features are consistent with acute leukemia.    Day 7 of treatment...diarrhea today    -had BMBx on 1/17 >>> 99% B-lymphoblasts  -baseline echo EF 57%  -has PICC line   -Monitor LDH, Uric Acid and CMP daily   -keep Hb >7, plt >10  -allopurinol  -WBC rising,    -started decadron 20 mg/d 1/18/23, d7 1/24/23  -started dasatinib 140 mg/d on 1/19/23  -WBC rapidly decreased, PB blasts have cleared  -phos, LDH up now coming down gradually, urate wnl  -LDH plateauing >>> off dex now, will give VCR 1.5 mg IV x 1  -rasburicase 3 mg iv, cont incr IVFs, I and O  -cont to follow TLS labs  -given cryo 1/20/23 and again 1/25/24  -has had active DIC; uncommon but reported in ALL  -repeat coags today post cryo, vit K   -LP d15 as per [id number]    ICH  -headaches since 1/15/23, now improved  -1/15/23 CT head showed 8mm bleed, repeat CT is stable  -no intervention as per Neursx  -hold coumadin (confirmed with cardiology)  -keep plt > 50 for now post SDH     Cardio  HTN - c/w home meds  Mechanical valve - currently off AC, discussed with cardiology  will hold AC in light of risk/benefit, anticipated start of chemotx 81 YO F with a PMhx of Nicolasa, s/p Saint Darian mechanical AVR, (1994–[facility]) for rheumatic aortic valve stenosis, ascending aortic aneurysm, HTN,  HLD, depression, GERD transferred from [facility]. Dx is Ph+ ALL.  She was also noted to have a supratherapeutic INR. CT angio A/P which showed no evidence of active GIB, but showing focal diminished cortical enhancement involving the upper left kidney concerning for focal pyelonephritis or possibly infarct. EGD without any acute findings. Colonoscopy showed grade I internal hemorrhoid and polyps in transverse colon and hepatic flexure. 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     -had BMBx on 1/17 >>> 99% B-lymphoblasts  -baseline echo EF 57%  -has PICC line   -started treatment with dexamethasone 20 mg d1-7, dasatinib 140 mg/d  -Day 8 of treatment  -Monitor LDH, Uric Acid and CMP daily   -keep Hb >7, plt >10  -given rasburicase  -allopurinol     -WBC rapidly decreased, PB blasts cleared  -phos, LDH up now coming down gradually  -LDH plateauing >>> off dex now, will give VCR 1.5 mg IV x 1    -cont to follow TLS labs  -given cryo 1/20/23 and again 1/25/24  -has had active DIC; uncommon but reported in ALL  -repeat coags today post cryo, vit K  -LP d15 as per [id number]    -ICH; had SDH  -headaches since 1/15/23, now improved  -1/15/23 CT head showed 8mm bleed, repeat CT is stable  -no intervention as per Neursx  -hold coumadin   -keep plt > 50 for now post SDH     Cardio  HTN - c/w home meds  Mechanical valve - currently off AC, discussed with cardiology  will hold AC in light of risk/benefit in present setting

## 2023-01-25 NOTE — PROGRESS NOTE ADULT - ASSESSMENT
82 year old female with atrial fibrillation, s/p Saint Darian mechanical AVR, (1994–[facility]) for rheumatic aortic valve stenosis, ascending aortic aneurysm, HTN, HLD, depression, GERD transferred from [facility] for further work-up and management of leukocytosis associated with thrombocytopenia and anemia. She was also noted to have a supratherapeutic INR. CT angio A/P which showed no evidence of active GIB, but showing focal diminished cortical enhancement involving the upper left kidney concerning for focal pyelonephritis or possibly infarct. EGD without any acute findings. Colonoscopy showed grade I internal hemorrhoid and polyps in transverse colon and hepatic flexure. 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 Transferred to [address] [place] [address] for further treatment. 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 Leukocytosis, anemia, thrombocytopenia secondary to disease.   82 year old female with atrial fibrillation, s/p Saint Darian mechanical AVR, (1994–[facility]) for rheumatic aortic valve stenosis, ascending aortic aneurysm, HTN, HLD, depression, GERD transferred from [facility] for management of newly diagnosed PH + B cell ALL. Peripheral flow performed, BMBx consistent with B-cell ALL, FISH Ph+ treated with Dex and Dasatinib following CALGB 31867. 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 Anemia and thrombocytopenia secondary to disease.

## 2023-01-25 NOTE — PROGRESS NOTE ADULT - SUBJECTIVE AND OBJECTIVE BOX
Diagnosis: newly diagnosed B-ALL, Ph (+)    Protocol/Chemo Regimen: Following 55803 (Decadron + Dasatinib),   Day: 8     Pt endorsed: low appetite; otherwise no complaints.  Review of Systems: Denies nausea, vomiting, dizziness, sob, cough    Pain scale: denies    Diet: regular    Allergies    No Known Allergies    Intolerances    ANTIMICROBIALS  levoFLOXacin  Tablet 500 milliGRAM(s) Oral every 24 hours      HEME/ONC MEDICATIONS  dasatinib 140 milliGRAM(s) Oral daily      STANDING MEDICATIONS  allopurinol 300 milliGRAM(s) Oral daily  ascorbic acid 500 milliGRAM(s) Oral daily  atorvastatin 80 milliGRAM(s) Oral at bedtime  chlorhexidine 4% Liquid 1 Application(s) Topical <User Schedule>  cyanocobalamin 1000 MICROGram(s) Oral daily  dextrose 5%. 1000 milliLiter(s) IV Continuous <Continuous>  dextrose 5%. 1000 milliLiter(s) IV Continuous <Continuous>  dextrose 50% Injectable 25 Gram(s) IV Push once  dextrose 50% Injectable 12.5 Gram(s) IV Push once  diltiazem    milliGRAM(s) Oral daily  glucagon  Injectable 1 milliGRAM(s) IntraMuscular once  hydrochlorothiazide 25 milliGRAM(s) Oral daily  insulin glargine Injectable (LANTUS) 10 Unit(s) SubCutaneous at bedtime  insulin lispro (ADMELOG) corrective regimen sliding scale   SubCutaneous three times a day before meals  melatonin 5 milliGRAM(s) Oral at bedtime  pantoprazole    Tablet 40 milliGRAM(s) Oral before breakfast  sodium chloride 0.9%. 1000 milliLiter(s) IV Continuous <Continuous>  valsartan 320 milliGRAM(s) Oral daily  zinc oxide 40% Paste 1 Application(s) Topical two times a day      PRN MEDICATIONS  acetaminophen     Tablet .. 650 milliGRAM(s) Oral every 6 hours PRN  aluminum hydroxide/magnesium hydroxide/simethicone Suspension 30 milliLiter(s) Oral every 4 hours PRN  dextrose Oral Gel 15 Gram(s) Oral once PRN  loperamide 2 milliGRAM(s) Oral three times a day PRN  sodium chloride 0.9% lock flush 10 milliLiter(s) IV Push every 1 hour PRN        Vital Signs Last 24 Hrs  T(C): 36.7 (25 Jan 2023 05:36), Max: 36.7 (24 Jan 2023 09:37)  T(F): 98.1 (25 Jan 2023 05:36), Max: 98.1 (24 Jan 2023 09:37)  HR: 75 (25 Jan 2023 05:36) (67 - 75)  BP: 130/77 (25 Jan 2023 05:36) (108/65 - 145/79)  BP(mean): --  RR: 18 (25 Jan 2023 05:36) (17 - 18)  SpO2: 97% (25 Jan 2023 05:36) (97% - 98%)    Parameters below as of 25 Jan 2023 05:36  Patient On (Oxygen Delivery Method): room air    PHYSICAL EXAM  General: NAD, sitting up in bed  HEENT: Sclerae anicteric, clear oropharynx, no oral lesions  CV: normal S1/S2, reg  Lungs: breath sounds clear and equal bilaterally  Abdomen: soft non-tender non-distended  Ext: no edema  Skin: no rashes   Neuro: alert and oriented X 4  Central Line: RUE PICC clean, dry, intact    LABS: Diagnosis: newly diagnosed B-ALL, Ph (+)    Protocol/Chemo Regimen: Following [id number] (Decadron days 1-7 + Dasatinib),   Day: 8     Pt endorsed: No complaints.  Review of Systems: Denies nausea, vomiting, dizziness, sob, cough    Pain scale: denies    Diet: regular    Allergies    No Known Allergies    Intolerances    ANTIMICROBIALS  levoFLOXacin  Tablet 500 milliGRAM(s) Oral every 24 hours      HEME/ONC MEDICATIONS  dasatinib 140 milliGRAM(s) Oral daily      STANDING MEDICATIONS  allopurinol 300 milliGRAM(s) Oral daily  ascorbic acid 500 milliGRAM(s) Oral daily  atorvastatin 80 milliGRAM(s) Oral at bedtime  chlorhexidine 4% Liquid 1 Application(s) Topical <User Schedule>  cyanocobalamin 1000 MICROGram(s) Oral daily  dextrose 5%. 1000 milliLiter(s) IV Continuous <Continuous>  dextrose 5%. 1000 milliLiter(s) IV Continuous <Continuous>  dextrose 50% Injectable 25 Gram(s) IV Push once  dextrose 50% Injectable 12.5 Gram(s) IV Push once  diltiazem    milliGRAM(s) Oral daily  glucagon  Injectable 1 milliGRAM(s) IntraMuscular once  hydrochlorothiazide 25 milliGRAM(s) Oral daily  insulin glargine Injectable (LANTUS) 10 Unit(s) SubCutaneous at bedtime  insulin lispro (ADMELOG) corrective regimen sliding scale   SubCutaneous three times a day before meals  melatonin 5 milliGRAM(s) Oral at bedtime  pantoprazole    Tablet 40 milliGRAM(s) Oral before breakfast  sodium chloride 0.9%. 1000 milliLiter(s) IV Continuous <Continuous>  valsartan 320 milliGRAM(s) Oral daily  zinc oxide 40% Paste 1 Application(s) Topical two times a day      PRN MEDICATIONS  acetaminophen     Tablet .. 650 milliGRAM(s) Oral every 6 hours PRN  aluminum hydroxide/magnesium hydroxide/simethicone Suspension 30 milliLiter(s) Oral every 4 hours PRN  dextrose Oral Gel 15 Gram(s) Oral once PRN  loperamide 2 milliGRAM(s) Oral three times a day PRN  sodium chloride 0.9% lock flush 10 milliLiter(s) IV Push every 1 hour PRN        Vital Signs Last 24 Hrs  T(C): 36.7 (25 Jan 2023 05:36), Max: 36.7 (24 Jan 2023 09:37)  T(F): 98.1 (25 Jan 2023 05:36), Max: 98.1 (24 Jan 2023 09:37)  HR: 75 (25 Jan 2023 05:36) (67 - 75)  BP: 130/77 (25 Jan 2023 05:36) (108/65 - 145/79)  BP(mean): --  RR: 18 (25 Jan 2023 05:36) (17 - 18)  SpO2: 97% (25 Jan 2023 05:36) (97% - 98%)    Parameters below as of 25 Jan 2023 05:36  Patient On (Oxygen Delivery Method): room air    PHYSICAL EXAM  General: NAD, sitting up in bed  HEENT: Sclerae anicteric, clear oropharynx, no oral lesions  CV: normal S1/S2, reg  Lungs: breath sounds clear and equal bilaterally  Abdomen: soft non-tender non-distended  Ext: no edema  Skin: no rashes   Neuro: alert and oriented X 4  Central Line: RUE PICC clean, dry, intact    LABS:    Blood Cultures:                           7.2    1.14  )-----------( 76       ( 25 Jan 2023 07:11 )             22.3         Mean Cell Volume : 83.5 fl  Mean Cell Hemoglobin : 27.0 pg  Mean Cell Hemoglobin Concentration : 32.3 gm/dL  Auto Neutrophil # : 0.89 K/uL  Auto Lymphocyte # : 0.25 K/uL  Auto Monocyte # : 0.00 K/uL  Auto Eosinophil # : 0.00 K/uL  Auto Basophil # : 0.00 K/uL  Auto Neutrophil % : 78.4 %  Auto Lymphocyte % : 21.6 %  Auto Monocyte % : 0.0 %  Auto Eosinophil % : 0.0 %  Auto Basophil % : 0.0 %      01-25    138  |  104  |  26<H>  ----------------------------<  128<H>  4.1   |  22  |  0.65    Ca    7.7<L>      25 Jan 2023 07:11  Phos  2.6     01-25  Mg     2.7     01-25    TPro  5.6<L>  /  Alb  3.5  /  TBili  0.6  /  DBili  x   /  AST  48<H>  /  ALT  28  /  AlkPhos  60  01-25  PT/INR - ( 25 Jan 2023 07:11 )   PT: 11.9 sec;   INR: 1.03 ratio    PTT - ( 25 Jan 2023 07:11 )  PTT:23.2 sec  LDH 1299  Uric Acid 0.7

## 2023-01-25 NOTE — PROGRESS NOTE ADULT - PROBLEM SELECTOR PLAN 5
mechanical AVR on coumadin now on hold in setting of acute subdural hemorrhage  Seen by cardiology mechanical AVR on coumadin now on hold in setting of acute leukemia and thombocytopenia  Seen by cardiology

## 2023-01-25 NOTE — PROGRESS NOTE ADULT - PROBLEM SELECTOR PLAN 1
Flow cytometry (1/14/23) consistent with acute leukemia (B ALL), FISH detected BCR/ABL (1/19)  Monitor daily CBC with Diff/CMP and transfuse/replete PRN  Patient was on warfarin-NOW HELD for mechanical AVR (1994 [facility]), atrial fibrillation  last dose of warfarin 1/13 at [facility] (5mg)-cardiology consulted.   TLS labs daily, continue Allopurinol, IVF's  Strict I/O's, daily wights  1/14 TTE - EF 57%, mild pulm HTN, Mechanical AVR likely normal function  1/16 HLA sent  1/17  s/p BMbx c/w ALL (98% blasts) (with Clonoseq sampling)  Decadron 1/18-1/24-monitor sugars now off steroids.   1/19 Started Dasatinib  Vincristine possibly on Day 8 or so  Day 15 BMbx on 2/1 and LP to be determined but prior to discharge.   hypofibrinogenemia-cryoprecipitate given. Goal >100 fibrinogen  Social work consult to address family issues Flow cytometry (1/14/23) consistent with acute leukemia (B ALL), FISH detected BCR/ABL (1/19)  Monitor daily CBC with Diff/CMP and transfuse/replete PRN  Patient was on warfarin-NOW HELD for mechanical AVR (1994 [facility]), atrial fibrillation  last dose of warfarin 1/13 at [facility] (5mg)-cardiology consulted.   TLS labs daily, continue Allopurinol, IVF's, Strict I/O's, daily wights  1/14 TTE - EF 57%, mild pulm HTN, Mechanical AVR likely normal function  1/16 HLA sent  1/17  s/p BMbx c/w ALL (98% blasts) (with Clonoseq sampling)  Decadron 1/18-1/24-monitor sugars now off steroids.   1/19 Started Dasatinib  Vincristine 1.5 mg on Day 8 1/25 with Elitek 3mf IV x1.   Day 15 BMbx on 2/1 and LP to be determined but prior to discharge.   hypofibrinogenemia-cryoprecipitate given. Goal >100 fibrinogen  Social work consult to address family issues

## 2023-01-25 NOTE — PROGRESS NOTE ADULT - PROBLEM SELECTOR PLAN 2
patient is not neutropenic, afebrile  c/w levaquin   if spike panculture and CXR  1/21 c.diff toxin negative, GI PCR (-), stool cx pending final result patient is neutropenic, afebrile  c/w levaquin   if spike panculture and CXR  1/21 c.diff toxin negative, GI PCR (-).

## 2023-01-26 LAB
ALBUMIN SERPL ELPH-MCNC: 3.3 G/DL — SIGNIFICANT CHANGE UP (ref 3.3–5)
ALP SERPL-CCNC: 63 U/L — SIGNIFICANT CHANGE UP (ref 40–120)
ALT FLD-CCNC: 30 U/L — SIGNIFICANT CHANGE UP (ref 10–45)
ANION GAP SERPL CALC-SCNC: 10 MMOL/L — SIGNIFICANT CHANGE UP (ref 5–17)
APTT BLD: 21 SEC — LOW (ref 27.5–35.5)
AST SERPL-CCNC: 45 U/L — HIGH (ref 10–40)
BASOPHILS # BLD AUTO: 0 K/UL — SIGNIFICANT CHANGE UP (ref 0–0.2)
BASOPHILS NFR BLD AUTO: 0 % — SIGNIFICANT CHANGE UP (ref 0–2)
BILIRUB SERPL-MCNC: 0.5 MG/DL — SIGNIFICANT CHANGE UP (ref 0.2–1.2)
BUN SERPL-MCNC: 24 MG/DL — HIGH (ref 7–23)
C DIFF GDH STL QL: NEGATIVE — SIGNIFICANT CHANGE UP
C DIFF GDH STL QL: SIGNIFICANT CHANGE UP
CALCIUM SERPL-MCNC: 7.1 MG/DL — LOW (ref 8.4–10.5)
CHLORIDE SERPL-SCNC: 103 MMOL/L — SIGNIFICANT CHANGE UP (ref 96–108)
CO2 SERPL-SCNC: 22 MMOL/L — SIGNIFICANT CHANGE UP (ref 22–31)
CREAT SERPL-MCNC: 0.62 MG/DL — SIGNIFICANT CHANGE UP (ref 0.5–1.3)
EGFR: 90 ML/MIN/1.73M2 — SIGNIFICANT CHANGE UP
EOSINOPHIL # BLD AUTO: 0 K/UL — SIGNIFICANT CHANGE UP (ref 0–0.5)
EOSINOPHIL NFR BLD AUTO: 0 % — SIGNIFICANT CHANGE UP (ref 0–6)
G6PD RBC-CCNC: 11.7 U/G HGB — SIGNIFICANT CHANGE UP (ref 7–20.5)
GLUCOSE BLDC GLUCOMTR-MCNC: 122 MG/DL — HIGH (ref 70–99)
GLUCOSE BLDC GLUCOMTR-MCNC: 123 MG/DL — HIGH (ref 70–99)
GLUCOSE BLDC GLUCOMTR-MCNC: 125 MG/DL — HIGH (ref 70–99)
GLUCOSE BLDC GLUCOMTR-MCNC: 136 MG/DL — HIGH (ref 70–99)
GLUCOSE SERPL-MCNC: 124 MG/DL — HIGH (ref 70–99)
HCT VFR BLD CALC: 23.2 % — LOW (ref 34.5–45)
HGB BLD-MCNC: 7.4 G/DL — LOW (ref 11.5–15.5)
INR BLD: 0.99 RATIO — SIGNIFICANT CHANGE UP (ref 0.88–1.16)
LDH SERPL L TO P-CCNC: 978 U/L — HIGH (ref 50–242)
LYMPHOCYTES # BLD AUTO: 1.17 K/UL — SIGNIFICANT CHANGE UP (ref 1–3.3)
LYMPHOCYTES # BLD AUTO: 49.1 % — HIGH (ref 13–44)
MAGNESIUM SERPL-MCNC: 2.6 MG/DL — SIGNIFICANT CHANGE UP (ref 1.6–2.6)
MCHC RBC-ENTMCNC: 26.8 PG — LOW (ref 27–34)
MCHC RBC-ENTMCNC: 31.9 GM/DL — LOW (ref 32–36)
MCV RBC AUTO: 84.1 FL — SIGNIFICANT CHANGE UP (ref 80–100)
MONOCYTES # BLD AUTO: 0 K/UL — SIGNIFICANT CHANGE UP (ref 0–0.9)
MONOCYTES NFR BLD AUTO: 0 % — LOW (ref 2–14)
NEUTROPHILS # BLD AUTO: 1.22 K/UL — LOW (ref 1.8–7.4)
NEUTROPHILS NFR BLD AUTO: 50.9 % — SIGNIFICANT CHANGE UP (ref 43–77)
PHOSPHATE SERPL-MCNC: 1.3 MG/DL — LOW (ref 2.5–4.5)
PHOSPHATE SERPL-MCNC: 3.8 MG/DL — SIGNIFICANT CHANGE UP (ref 2.5–4.5)
PLATELET # BLD AUTO: 53 K/UL — LOW (ref 150–400)
POTASSIUM SERPL-MCNC: 3.4 MMOL/L — LOW (ref 3.5–5.3)
POTASSIUM SERPL-SCNC: 3.4 MMOL/L — LOW (ref 3.5–5.3)
PROT SERPL-MCNC: 5.3 G/DL — LOW (ref 6–8.3)
PROTHROM AB SERPL-ACNC: 11.4 SEC — SIGNIFICANT CHANGE UP (ref 10.5–13.4)
RBC # BLD: 2.76 M/UL — LOW (ref 3.8–5.2)
RBC # FLD: 16.8 % — HIGH (ref 10.3–14.5)
SODIUM SERPL-SCNC: 135 MMOL/L — SIGNIFICANT CHANGE UP (ref 135–145)
URATE SERPL-MCNC: <0.5 MG/DL — LOW (ref 2.5–7)
WBC # BLD: 2.39 K/UL — LOW (ref 3.8–10.5)
WBC # FLD AUTO: 2.39 K/UL — LOW (ref 3.8–10.5)

## 2023-01-26 PROCEDURE — 99233 SBSQ HOSP IP/OBS HIGH 50: CPT

## 2023-01-26 RX ORDER — POTASSIUM CHLORIDE 20 MEQ
20 PACKET (EA) ORAL
Refills: 0 | Status: COMPLETED | OUTPATIENT
Start: 2023-01-26 | End: 2023-01-26

## 2023-01-26 RX ORDER — ONDANSETRON 8 MG/1
8 TABLET, FILM COATED ORAL EVERY 8 HOURS
Refills: 0 | Status: DISCONTINUED | OUTPATIENT
Start: 2023-01-26 | End: 2023-02-10

## 2023-01-26 RX ORDER — CALCIUM GLUCONATE 100 MG/ML
2 VIAL (ML) INTRAVENOUS ONCE
Refills: 0 | Status: COMPLETED | OUTPATIENT
Start: 2023-01-26 | End: 2023-01-26

## 2023-01-26 RX ORDER — ONDANSETRON 8 MG/1
4 TABLET, FILM COATED ORAL ONCE
Refills: 0 | Status: COMPLETED | OUTPATIENT
Start: 2023-01-26 | End: 2023-01-26

## 2023-01-26 RX ORDER — FUROSEMIDE 40 MG
20 TABLET ORAL ONCE
Refills: 0 | Status: COMPLETED | OUTPATIENT
Start: 2023-01-26 | End: 2023-01-26

## 2023-01-26 RX ORDER — SODIUM,POTASSIUM PHOSPHATES 278-250MG
1 POWDER IN PACKET (EA) ORAL
Refills: 0 | Status: COMPLETED | OUTPATIENT
Start: 2023-01-26 | End: 2023-01-27

## 2023-01-26 RX ORDER — PHYTONADIONE (VIT K1) 5 MG
10 TABLET ORAL DAILY
Refills: 0 | Status: DISCONTINUED | OUTPATIENT
Start: 2023-01-26 | End: 2023-01-26

## 2023-01-26 RX ORDER — POTASSIUM PHOSPHATE, MONOBASIC POTASSIUM PHOSPHATE, DIBASIC 236; 224 MG/ML; MG/ML
30 INJECTION, SOLUTION INTRAVENOUS ONCE
Refills: 0 | Status: COMPLETED | OUTPATIENT
Start: 2023-01-26 | End: 2023-01-26

## 2023-01-26 RX ADMIN — DASATINIB 140 MILLIGRAM(S): 80 TABLET ORAL at 11:17

## 2023-01-26 RX ADMIN — Medication 50 MILLIEQUIVALENT(S): at 11:16

## 2023-01-26 RX ADMIN — PANTOPRAZOLE SODIUM 40 MILLIGRAM(S): 20 TABLET, DELAYED RELEASE ORAL at 06:07

## 2023-01-26 RX ADMIN — ZINC OXIDE 1 APPLICATION(S): 200 OINTMENT TOPICAL at 17:30

## 2023-01-26 RX ADMIN — Medication 200 GRAM(S): at 10:33

## 2023-01-26 RX ADMIN — Medication 1 PACKET(S): at 17:29

## 2023-01-26 RX ADMIN — INSULIN GLARGINE 10 UNIT(S): 100 INJECTION, SOLUTION SUBCUTANEOUS at 22:00

## 2023-01-26 RX ADMIN — Medication 50 MILLIEQUIVALENT(S): at 09:14

## 2023-01-26 RX ADMIN — Medication 2 MILLIGRAM(S): at 04:44

## 2023-01-26 RX ADMIN — Medication 500 MILLIGRAM(S): at 11:16

## 2023-01-26 RX ADMIN — Medication 650 MILLIGRAM(S): at 09:14

## 2023-01-26 RX ADMIN — Medication 5 MILLIGRAM(S): at 21:56

## 2023-01-26 RX ADMIN — ZINC OXIDE 1 APPLICATION(S): 200 OINTMENT TOPICAL at 06:12

## 2023-01-26 RX ADMIN — SODIUM CHLORIDE 50 MILLILITER(S): 9 INJECTION INTRAMUSCULAR; INTRAVENOUS; SUBCUTANEOUS at 06:07

## 2023-01-26 RX ADMIN — Medication 2 MILLIGRAM(S): at 09:17

## 2023-01-26 RX ADMIN — Medication 20 MILLIGRAM(S): at 09:15

## 2023-01-26 RX ADMIN — Medication 300 MILLIGRAM(S): at 11:17

## 2023-01-26 RX ADMIN — PREGABALIN 1000 MICROGRAM(S): 225 CAPSULE ORAL at 11:16

## 2023-01-26 RX ADMIN — ONDANSETRON 4 MILLIGRAM(S): 8 TABLET, FILM COATED ORAL at 04:44

## 2023-01-26 RX ADMIN — ATORVASTATIN CALCIUM 80 MILLIGRAM(S): 80 TABLET, FILM COATED ORAL at 21:56

## 2023-01-26 RX ADMIN — VALSARTAN 320 MILLIGRAM(S): 80 TABLET ORAL at 06:07

## 2023-01-26 RX ADMIN — CHLORHEXIDINE GLUCONATE 1 APPLICATION(S): 213 SOLUTION TOPICAL at 11:17

## 2023-01-26 RX ADMIN — Medication 240 MILLIGRAM(S): at 06:07

## 2023-01-26 RX ADMIN — POTASSIUM PHOSPHATE, MONOBASIC POTASSIUM PHOSPHATE, DIBASIC 83.33 MILLIMOLE(S): 236; 224 INJECTION, SOLUTION INTRAVENOUS at 12:22

## 2023-01-26 NOTE — DIETITIAN NUTRITION RISK NOTIFICATION - TREATMENT: THE FOLLOWING DIET HAS BEEN RECOMMENDED
Diet, Consistent Carbohydrate w/Evening Snack:   Banatrol TF     Qty per Day:  2  Supplement Feeding Modality:  Oral  Ensure Clear Cans or Servings Per Day:  3       Frequency:  Daily (01-26-23 @ 10:38) [Pending Verification By Attending]  Diet, Consistent Carbohydrate w/Evening Snack:   Low Sodium (01-19-23 @ 21:05) [Active]

## 2023-01-26 NOTE — DIETITIAN INITIAL EVALUATION ADULT - PERTINENT LABORATORY DATA
01-26    135  |  103  |  24<H>  ----------------------------<  124<H>  3.4<L>   |  22  |  0.62    Ca    7.1<L>      26 Jan 2023 06:59  Phos  1.3     01-26  Mg     2.6     01-26    TPro  5.3<L>  /  Alb  3.3  /  TBili  0.5  /  DBili  x   /  AST  45<H>  /  ALT  30  /  AlkPhos  63  01-26  POCT Blood Glucose.: 136 mg/dL (01-26-23 @ 08:23)  A1C with Estimated Average Glucose Result: 6.1 % (01-10-23 @ 07:00)

## 2023-01-26 NOTE — DIETITIAN INITIAL EVALUATION ADULT - PERSON TAUGHT/METHOD
Discussed importance of adequate consumption of meals/supplements to optimize protein-energy intake. Encouraged small/frequent meals, nutrient dense snacks, prioritizing protein foods at meal time. Diarrhea nutrition therapy reviewed. RD gave pt "Be Well Bag" and discussed items in bag including nutrient dense snacks as needed; discussed packet on "Eating Tips: Before, During and After Cancer Treatment" to address symptom management;/verbal instruction/written material/individual instruction/patient instructed

## 2023-01-26 NOTE — PROGRESS NOTE ADULT - PROBLEM SELECTOR PLAN 1
Flow cytometry (1/14/23) consistent with acute leukemia (B ALL), FISH detected BCR/ABL (1/19)  Monitor daily CBC with Diff/CMP and transfuse/replete PRN  Patient was on warfarin-NOW HELD for mechanical AVR (1994 [facility]), atrial fibrillation  last dose of warfarin 1/13 at [facility] (5mg)-cardiology consulted.   TLS labs daily, continue Allopurinol, IVF's, Strict I/O's, daily wights  1/14 TTE - EF 57%, mild pulm HTN, Mechanical AVR likely normal function  1/16 HLA sent  1/17  s/p BMbx c/w ALL (98% blasts) (with Clonoseq sampling)  Decadron 1/18-1/24-monitor sugars now off steroids.   1/19 Started Dasatinib  Vincristine 1.5 mg on Day 8 1/25 with Elitek 3mf IV x1.   Day 15 BMbx on 2/1 and LP to be determined but prior to discharge.   hypofibrinogenemia-cryoprecipitate given. Goal >100 fibrinogen  Social work consult to address family issues Flow cytometry (1/14/23) consistent with acute leukemia (B ALL), FISH detected BCR/ABL (1/19)  Monitor daily CBC with Diff/CMP and transfuse/replete PRN  Patient was on warfarin-NOW HELD for mechanical AVR (1994 [facility]), atrial fibrillation  last dose of warfarin 1/13 at [facility] (5mg)-cardiology consulted.   TLS labs daily, continue Allopurinol, IVF's, Strict I/O's, daily wights  1/14 TTE - EF 57%, mild pulm HTN, Mechanical AVR likely normal function  1/16 HLA sent  1/17  s/p BMbx c/w ALL (98% blasts) (with Clonoseq sampling)  Decadron 1/18-1/24-monitor sugars now off steroids.   1/19 Started Dasatinib  Vincristine 1.5 mg on Day 8 1/25 with Elitek 3mf IV x1.   Day 15 BMbx on 2/1 and LP to be determined but prior to discharge.   Social work consult to address family issues  1/26: hypofibrinogenemia-cryoprecipitate given. Goal >100 fibrinogen. hypophosphatemia: phosphate replaced. Flow cytometry (1/14/23) consistent with acute leukemia (B ALL), FISH detected BCR/ABL (1/19)  Monitor daily CBC with Diff/CMP and transfuse/replete PRN  Patient was on warfarin-NOW HELD for mechanical AVR (1994 [facility]), atrial fibrillation  last dose of warfarin 1/13 at [facility] (5mg)-cardiology consulted.   TLS labs daily, continue Allopurinol, IVF's, Strict I/O's, daily wights  1/14 TTE - EF 57%, mild pulm HTN, Mechanical AVR likely normal function  1/16 HLA sent  1/17  s/p BMbx c/w ALL (98% blasts) (with Clonoseq sampling)  Decadron 1/18-1/24-monitor sugars now off steroids.   1/19 Started Dasatinib  Vincristine 1.5 mg on Day 8 1/25 with Elitek 3mf IV x1.   Day 15 BMbx on 2/1 and LP to be determined but prior to discharge.   Social work consult to address family issues  1/26: hypofibrinogenemia-cryoprecipitate given. Goal >100 fibrinogen. hypophosphatemia: phosphate replaced. hypocalcemia: calcium replaced: s/p Lasix

## 2023-01-26 NOTE — PROGRESS NOTE ADULT - ASSESSMENT
82 year old female with atrial fibrillation, s/p Saint Darian mechanical AVR, (1992–[facility]) for rheumatic aortic valve stenosis, ascending aortic aneurysm, HTN, HLD, depression, GERD transferred from [facility] for management of newly diagnosed PH + B cell ALL. Peripheral flow performed, BMBx consistent with B-cell ALL, FISH Ph+ treated with Dex and Dasatinib following CALGB 99494. 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 Anemia and thrombocytopenia secondary to disease.

## 2023-01-26 NOTE — DIETITIAN INITIAL EVALUATION ADULT - ADD RECOMMEND
2) Add oral nutrition supplement: Ensure Clear 3x/day. RD will provide High Protein Gelatin 2x/day.   3) Add Banatrol 2x/day in setting of persistent diarrhea.   4) Encourage PO intake, obtain food preferences, provide feeding assistance as needed.   5) Continue micronutrient supplementation: vitamin B12, vitamin C (if no contraindications).  6) RD provided Be Well Bag at bedside, will follow-up for survey.   7) Monitor nutritional intake/tolerance, weights, labs, hydration status, skin integrity, BM, GI symptoms.   8) New malnutrition notification sent.

## 2023-01-26 NOTE — DIETITIAN INITIAL EVALUATION ADULT - OTHER INFO
- BMBx confirmed new B-ALL 01/17, receiving in-patient chemo.   - Hx of elevated DM, managed with: Metformin. Hgba1c (6.1%): good control PTA . BG in-house: borderline, control improving (s/p Decadron 01/18-01/24), ordered for insulin regimen (Lantus, SSI Lispro) . Will continue to monitor BG trends as available/able.   - Renal: K+ and Phos low today 01/26, s/p repletion.  - Micronutrient/Other supplementation: vitamin C, vitamin B12, calcium gluconate   - IVF: NS @ 50 ml/hr

## 2023-01-26 NOTE — DIETITIAN INITIAL EVALUATION ADULT - REASON INDICATOR FOR ASSESSMENT
RD assessment warranted for: length of stay, new ALL  Source: electronic medical record, Pt interview

## 2023-01-26 NOTE — DIETITIAN INITIAL EVALUATION ADULT - PERTINENT MEDS FT
MEDICATIONS  (STANDING):  allopurinol 300 milliGRAM(s) Oral daily  ascorbic acid 500 milliGRAM(s) Oral daily  atorvastatin 80 milliGRAM(s) Oral at bedtime  calcium gluconate IVPB 2 Gram(s) IV Intermittent once  chlorhexidine 4% Liquid 1 Application(s) Topical <User Schedule>  cyanocobalamin 1000 MICROGram(s) Oral daily  dasatinib 140 milliGRAM(s) Oral daily  dextrose 5%. 1000 milliLiter(s) (50 mL/Hr) IV Continuous <Continuous>  dextrose 5%. 1000 milliLiter(s) (100 mL/Hr) IV Continuous <Continuous>  dextrose 50% Injectable 12.5 Gram(s) IV Push once  dextrose 50% Injectable 25 Gram(s) IV Push once  diltiazem    milliGRAM(s) Oral daily  glucagon  Injectable 1 milliGRAM(s) IntraMuscular once  hydrochlorothiazide 25 milliGRAM(s) Oral daily  insulin glargine Injectable (LANTUS) 10 Unit(s) SubCutaneous at bedtime  insulin lispro (ADMELOG) corrective regimen sliding scale   SubCutaneous three times a day before meals  levoFLOXacin  Tablet 500 milliGRAM(s) Oral every 24 hours  melatonin 5 milliGRAM(s) Oral at bedtime  pantoprazole    Tablet 40 milliGRAM(s) Oral before breakfast  potassium chloride  20 mEq/100 mL IVPB 20 milliEquivalent(s) IV Intermittent every 2 hours  potassium phosphate / sodium phosphate Powder (PHOS-NaK) 1 Packet(s) Oral two times a day  potassium phosphate IVPB 30 milliMole(s) IV Intermittent once  sodium chloride 0.9%. 1000 milliLiter(s) (50 mL/Hr) IV Continuous <Continuous>  valsartan 320 milliGRAM(s) Oral daily  zinc oxide 40% Paste 1 Application(s) Topical two times a day    MEDICATIONS  (PRN):  acetaminophen     Tablet .. 650 milliGRAM(s) Oral every 6 hours PRN Temp greater or equal to 38C (100.4F), Mild Pain (1 - 3)  aluminum hydroxide/magnesium hydroxide/simethicone Suspension 30 milliLiter(s) Oral every 4 hours PRN Dyspepsia  dextrose Oral Gel 15 Gram(s) Oral once PRN Blood Glucose LESS THAN 70 milliGRAM(s)/deciliter  loperamide 2 milliGRAM(s) Oral three times a day PRN Diarrhea  sodium chloride 0.9% lock flush 10 milliLiter(s) IV Push every 1 hour PRN Pre/post blood products, medications, blood draw, and to maintain line patency

## 2023-01-26 NOTE — DIETITIAN INITIAL EVALUATION ADULT - ENERGY INTAKE
Poor (<50%) Pt reports poor appetite/PO intake, <50% of meals consumed x >5 days  - Amenable to receiving oral nutritional supplement to optimize PO intake: Ensure Clear 3x/day (600 luís, 21 Gm protein) + High Protein Gelatin (114 luís, 18 Gm protein) 2x/day   - Food preferences obtained; RD to honor as able.

## 2023-01-26 NOTE — PROGRESS NOTE ADULT - PROBLEM SELECTOR PLAN 5
mechanical AVR on coumadin now on hold in setting of acute leukemia and thombocytopenia  Seen by cardiology

## 2023-01-26 NOTE — DIETITIAN INITIAL EVALUATION ADULT - REASON FOR ADMISSION
Per chart "82 year old female with atrial fibrillation, s/p Saint Darian mechanical AVR, (1992–[facility]) for rheumatic aortic valve stenosis, ascending aortic aneurysm, HTN, HLD, depression, GERD transferred from [facility] for management of newly diagnosed PH + B cell ALL. Peripheral flow performed, BMBx consistent with B-cell ALL, FISH Ph+ treated with Dex and Dasatinib following CALGB 93383. 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 Anemia and thrombocytopenia secondary to disease."

## 2023-01-26 NOTE — DIETITIAN INITIAL EVALUATION ADULT - ORAL INTAKE PTA/DIET HISTORY
Pt reports good appetite/PO intake PTA  - No therapeutic/Denominational restrictions.   - NKFA/intolerances reported.   - Micronutrient/Other supplementation: none reported  - Protein-energy supplementation: none reported

## 2023-01-26 NOTE — DIETITIAN INITIAL EVALUATION ADULT - PHYSCIAL ASSESSMENT
Weight Hx Per:  - Source: Pt  - UBW: 135 pounds   - Reported weight changes: none reported    Current Admission Weights:  - Dosing weight: 134.6 pounds (01/14)    Weight Change:  - Relatively weight stable PTA. In-house weight changes likely secondary to fluid shifts (IVF). Will continue to monitor weight trends as available/able.     IBW: 112.5	  %IBW: 120%

## 2023-01-26 NOTE — DIETITIAN INITIAL EVALUATION ADULT - NSFNSGIIOFT_GEN_A_CORE
01-25-23 @ 07:01  -  01-26-23 @ 07:00  --------------------------------------------------------  OUT:    Stool (mL): 3 mL  Total OUT: 3 mL    Total NET: -3 mL

## 2023-01-26 NOTE — PROGRESS NOTE ADULT - ATTENDING COMMENTS
81 YO F with a PMhx of Nicolasa, s/p Saint Darian mechanical AVR, (1992–[facility]) for rheumatic aortic valve stenosis, ascending aortic aneurysm, HTN,  HLD, depression, GERD transferred from [facility]. Dx is Ph+ ALL.  She was also noted to have a supratherapeutic INR. CT angio A/P which showed no evidence of active GIB, but showing focal diminished cortical enhancement involving the upper left kidney concerning for focal pyelonephritis or possibly infarct. EGD without any acute findings. Colonoscopy showed grade I internal hemorrhoid and polyps in transverse colon and hepatic flexure. 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     -had BMBx on 1/17 >>> 99% B-lymphoblasts  -baseline echo EF 57%  -has PICC line   -started treatment with dexamethasone 20 mg d1-7, dasatinib 140 mg/d  -Day 8 of treatment  -Monitor LDH, Uric Acid and CMP daily   -keep Hb >7, plt >10  -given rasburicase  -allopurinol     -WBC rapidly decreased, PB blasts cleared  -phos, LDH up now coming down gradually  -LDH plateauing >>> off dex now, will give VCR 1.5 mg IV x 1    -cont to follow TLS labs  -given cryo 1/20/23 and again 1/25/24  -has had active DIC; uncommon but reported in ALL  -repeat coags today post cryo, vit K  -LP d15 as per 08415    -ICH; had SDH  -headaches since 1/15/23, now improved  -1/15/23 CT head showed 8mm bleed, repeat CT is stable  -no intervention as per Neursx  -hold coumadin   -keep plt > 50 for now post SDH     Cardio  HTN - c/w home meds  Mechanical valve - currently off AC, discussed with cardiology  will hold AC in light of risk/benefit in present setting 83 YO F with a PMhx of Nicolasa, s/p Saint Darian mechanical AVR, (1992–[facility]) for rheumatic aortic valve stenosis, ascending aortic aneurysm, HTN,  HLD, depression, GERD transferred from [facility]. Dx is Ph+ ALL.  She was also noted to have a supratherapeutic INR. CT angio A/P which showed no evidence of active GIB, but showing focal diminished cortical enhancement involving the upper left kidney concerning for focal pyelonephritis or possibly infarct. EGD without any acute findings. Colonoscopy showed grade I internal hemorrhoid and polyps in transverse colon and hepatic flexure. 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     -had BMBx on 1/17 >>> 99% B-lymphoblasts  -baseline echo EF 57%  -has PICC line   -started treatment with dexamethasone 20 mg d1-7, dasatinib 140 mg/d  -Day 9 of treatment  -Monitor LDH, Uric Acid and CMP daily   -keep Hb >7, plt >10  -given rasburicase twice  -allopurinol can be d/c'd    -WBC rapidly decreased, PB blasts cleared  -phos, LDH up now coming down gradually  -LDH plateauing >>> off dex now, given VCR 1.5 mg IV x 1 on 1/25/23    -cont to follow TLS labs  -given cryo 1/20/23 and again 1/25/24  -has had active DIC; uncommon but reported in ALL    -LP d15 as per 50463    -ICH; had SDH  -headaches since 1/15/23, now improved  -1/15/23 CT head showed 8mm bleed, repeat CT is stable  -no intervention as per Neursx  -hold coumadin   -keep plt > 50 for now post SDH     Cardio  HTN - c/w home meds  Mechanical valve - currently off AC, discussed with cardiology  will hold AC in light of risk/benefit in present setting

## 2023-01-26 NOTE — PROGRESS NOTE ADULT - PROBLEM SELECTOR PLAN 2
patient is neutropenic, afebrile  c/w levaquin   if spike panculture and CXR  1/21 c.diff toxin negative, GI PCR (-). patient is not neutropenic, afebrile  c/w levaquin   if spike panculture and CXR  1/21 c.diff toxin negative, GI PCR (-).

## 2023-01-26 NOTE — PROGRESS NOTE ADULT - SUBJECTIVE AND OBJECTIVE BOX
Diagnosis: newly diagnosed B-ALL, Ph (+)    Protocol/Chemo Regimen: Following 53996 (Decadron days 1-7 + Dasatinib),   Day: 9     Pt endorsed: No complaints.  Review of Systems: Denies nausea, vomiting, dizziness, sob, cough    Pain scale: denies    Diet: regular    Allergies    No Known Allergies    Intolerances  --------------------------------------------------------------------------------                   Diagnosis: newly diagnosed B-ALL, Ph (+)    Protocol/Chemo Regimen: Following 94766 (Decadron days 1-7 + Dasatinib),   Day: 9     Pt endorsed: + loose stools     Review of Systems: Denies nausea, vomiting, dizziness, sob, cough    Pain scale: denies    Diet: regular    Allergies    No Known Allergies    Intolerances      ANTIMICROBIALS  levoFLOXacin  Tablet 500 milliGRAM(s) Oral every 24 hours      HEME/ONC MEDICATIONS  dasatinib 140 milliGRAM(s) Oral daily      STANDING MEDICATIONS  allopurinol 300 milliGRAM(s) Oral daily  ascorbic acid 500 milliGRAM(s) Oral daily  atorvastatin 80 milliGRAM(s) Oral at bedtime  chlorhexidine 4% Liquid 1 Application(s) Topical <User Schedule>  cyanocobalamin 1000 MICROGram(s) Oral daily  dextrose 5%. 1000 milliLiter(s) IV Continuous <Continuous>  dextrose 5%. 1000 milliLiter(s) IV Continuous <Continuous>  dextrose 50% Injectable 12.5 Gram(s) IV Push once  dextrose 50% Injectable 25 Gram(s) IV Push once  diltiazem    milliGRAM(s) Oral daily  glucagon  Injectable 1 milliGRAM(s) IntraMuscular once  hydrochlorothiazide 25 milliGRAM(s) Oral daily  insulin glargine Injectable (LANTUS) 10 Unit(s) SubCutaneous at bedtime  insulin lispro (ADMELOG) corrective regimen sliding scale   SubCutaneous three times a day before meals  melatonin 5 milliGRAM(s) Oral at bedtime  pantoprazole    Tablet 40 milliGRAM(s) Oral before breakfast  potassium phosphate / sodium phosphate Powder (PHOS-NaK) 1 Packet(s) Oral two times a day  sodium chloride 0.9%. 1000 milliLiter(s) IV Continuous <Continuous>  valsartan 320 milliGRAM(s) Oral daily  zinc oxide 40% Paste 1 Application(s) Topical two times a day      PRN MEDICATIONS  acetaminophen     Tablet .. 650 milliGRAM(s) Oral every 6 hours PRN  aluminum hydroxide/magnesium hydroxide/simethicone Suspension 30 milliLiter(s) Oral every 4 hours PRN  dextrose Oral Gel 15 Gram(s) Oral once PRN  loperamide 2 milliGRAM(s) Oral three times a day PRN  ondansetron Injectable 8 milliGRAM(s) IV Push every 8 hours PRN  sodium chloride 0.9% lock flush 10 milliLiter(s) IV Push every 1 hour PRN        Vital Signs Last 24 Hrs  T(C): 36.7 (26 Jan 2023 11:48), Max: 37.2 (26 Jan 2023 05:14)  T(F): 98.1 (26 Jan 2023 11:48), Max: 99 (26 Jan 2023 05:14)  HR: 81 (26 Jan 2023 11:48) (68 - 82)  BP: 131/79 (26 Jan 2023 11:48) (103/70 - 135/64)  BP(mean): --  RR: 17 (26 Jan 2023 11:48) (16 - 18)  SpO2: 97% (26 Jan 2023 11:48) (97% - 99%)    Parameters below as of 26 Jan 2023 11:48  Patient On (Oxygen Delivery Method): room air        PHYSICAL EXAM  General: NAD, sitting up in bed  HEENT: Sclerae anicteric, clear oropharynx, no oral lesions  CV: normal S1/S2, reg  Lungs: breath sounds clear and equal bilaterally  Abdomen: soft non-tender non-distended  Ext: no edema  Skin: no rashes   Neuro: alert and oriented X 4  Central Line: RUE PICC clean, dry, intact        LABS:                              7.4    2.39  )-----------( 53       ( 26 Jan 2023 07:03 )             23.2         Mean Cell Volume : 84.1 fl  Mean Cell Hemoglobin : 26.8 pg  Mean Cell Hemoglobin Concentration : 31.9 gm/dL  Auto Neutrophil # : 1.22 K/uL  Auto Lymphocyte # : 1.17 K/uL  Auto Monocyte # : 0.00 K/uL  Auto Eosinophil # : 0.00 K/uL  Auto Basophil # : 0.00 K/uL  Auto Neutrophil % : 50.9 %  Auto Lymphocyte % : 49.1 %  Auto Monocyte % : 0.0 %  Auto Eosinophil % : 0.0 %  Auto Basophil % : 0.0 %      01-26    135  |  103  |  24<H>  ----------------------------<  124<H>  3.4<L>   |  22  |  0.62    Ca    7.1<L>      26 Jan 2023 06:59  Phos  1.3     01-26  Mg     2.6     01-26    TPro  5.3<L>  /  Alb  3.3  /  TBili  0.5  /  DBili  x   /  AST  45<H>  /  ALT  30  /  AlkPhos  63  01-26    Mg 2.6  Phos 1.3  PT/INR - ( 26 Jan 2023 07:03 )   PT: 11.4 sec;   INR: 0.99 ratio    PTT - ( 26 Jan 2023 07:03 )  PTT:21.0 sec    Uric Acid <0.5

## 2023-01-27 LAB
ALBUMIN SERPL ELPH-MCNC: 2.9 G/DL — LOW (ref 3.3–5)
ALP SERPL-CCNC: 50 U/L — SIGNIFICANT CHANGE UP (ref 40–120)
ALT FLD-CCNC: 25 U/L — SIGNIFICANT CHANGE UP (ref 10–45)
ANION GAP SERPL CALC-SCNC: 11 MMOL/L — SIGNIFICANT CHANGE UP (ref 5–17)
APTT BLD: 21 SEC — LOW (ref 27.5–35.5)
AST SERPL-CCNC: 35 U/L — SIGNIFICANT CHANGE UP (ref 10–40)
BASOPHILS # BLD AUTO: 0 K/UL — SIGNIFICANT CHANGE UP (ref 0–0.2)
BASOPHILS NFR BLD AUTO: 0 % — SIGNIFICANT CHANGE UP (ref 0–2)
BILIRUB SERPL-MCNC: 0.5 MG/DL — SIGNIFICANT CHANGE UP (ref 0.2–1.2)
BUN SERPL-MCNC: 18 MG/DL — SIGNIFICANT CHANGE UP (ref 7–23)
CALCIUM SERPL-MCNC: 7.2 MG/DL — LOW (ref 8.4–10.5)
CHLORIDE SERPL-SCNC: 104 MMOL/L — SIGNIFICANT CHANGE UP (ref 96–108)
CO2 SERPL-SCNC: 21 MMOL/L — LOW (ref 22–31)
CREAT SERPL-MCNC: 0.72 MG/DL — SIGNIFICANT CHANGE UP (ref 0.5–1.3)
EGFR: 84 ML/MIN/1.73M2 — SIGNIFICANT CHANGE UP
EOSINOPHIL # BLD AUTO: 0 K/UL — SIGNIFICANT CHANGE UP (ref 0–0.5)
EOSINOPHIL NFR BLD AUTO: 0 % — SIGNIFICANT CHANGE UP (ref 0–6)
FIBRINOGEN PPP-MCNC: 193 MG/DL — LOW (ref 200–445)
GLUCOSE BLDC GLUCOMTR-MCNC: 101 MG/DL — HIGH (ref 70–99)
GLUCOSE BLDC GLUCOMTR-MCNC: 88 MG/DL — SIGNIFICANT CHANGE UP (ref 70–99)
GLUCOSE BLDC GLUCOMTR-MCNC: 93 MG/DL — SIGNIFICANT CHANGE UP (ref 70–99)
GLUCOSE BLDC GLUCOMTR-MCNC: 98 MG/DL — SIGNIFICANT CHANGE UP (ref 70–99)
GLUCOSE SERPL-MCNC: 103 MG/DL — HIGH (ref 70–99)
HCT VFR BLD CALC: 19.7 % — CRITICAL LOW (ref 34.5–45)
HGB BLD-MCNC: 6.5 G/DL — CRITICAL LOW (ref 11.5–15.5)
INR BLD: 1.02 RATIO — SIGNIFICANT CHANGE UP (ref 0.88–1.16)
LDH SERPL L TO P-CCNC: 724 U/L — HIGH (ref 50–242)
LYMPHOCYTES # BLD AUTO: 0.83 K/UL — LOW (ref 1–3.3)
LYMPHOCYTES # BLD AUTO: 78.6 % — HIGH (ref 13–44)
MAGNESIUM SERPL-MCNC: 2.1 MG/DL — SIGNIFICANT CHANGE UP (ref 1.6–2.6)
MCHC RBC-ENTMCNC: 27.8 PG — SIGNIFICANT CHANGE UP (ref 27–34)
MCHC RBC-ENTMCNC: 33 GM/DL — SIGNIFICANT CHANGE UP (ref 32–36)
MCV RBC AUTO: 84.2 FL — SIGNIFICANT CHANGE UP (ref 80–100)
MONOCYTES # BLD AUTO: 0 K/UL — SIGNIFICANT CHANGE UP (ref 0–0.9)
MONOCYTES NFR BLD AUTO: 0 % — LOW (ref 2–14)
NEUTROPHILS # BLD AUTO: 0.2 K/UL — LOW (ref 1.8–7.4)
NEUTROPHILS NFR BLD AUTO: 17.8 % — LOW (ref 43–77)
PHOSPHATE SERPL-MCNC: 2.3 MG/DL — LOW (ref 2.5–4.5)
PLATELET # BLD AUTO: 22 K/UL — LOW (ref 150–400)
PLATELET # BLD AUTO: 52 K/UL — LOW (ref 150–400)
POTASSIUM SERPL-MCNC: 3.5 MMOL/L — SIGNIFICANT CHANGE UP (ref 3.5–5.3)
POTASSIUM SERPL-SCNC: 3.5 MMOL/L — SIGNIFICANT CHANGE UP (ref 3.5–5.3)
PROT SERPL-MCNC: 4.6 G/DL — LOW (ref 6–8.3)
PROTHROM AB SERPL-ACNC: 11.8 SEC — SIGNIFICANT CHANGE UP (ref 10.5–13.4)
RBC # BLD: 2.34 M/UL — LOW (ref 3.8–5.2)
RBC # FLD: 16.6 % — HIGH (ref 10.3–14.5)
SODIUM SERPL-SCNC: 136 MMOL/L — SIGNIFICANT CHANGE UP (ref 135–145)
URATE SERPL-MCNC: <0.5 MG/DL — LOW (ref 2.5–7)
WBC # BLD: 1.06 K/UL — LOW (ref 3.8–10.5)
WBC # FLD AUTO: 1.06 K/UL — LOW (ref 3.8–10.5)

## 2023-01-27 PROCEDURE — 99233 SBSQ HOSP IP/OBS HIGH 50: CPT

## 2023-01-27 RX ORDER — POTASSIUM CHLORIDE 20 MEQ
20 PACKET (EA) ORAL ONCE
Refills: 0 | Status: COMPLETED | OUTPATIENT
Start: 2023-01-27 | End: 2023-01-27

## 2023-01-27 RX ORDER — FUROSEMIDE 40 MG
20 TABLET ORAL DAILY
Refills: 0 | Status: COMPLETED | OUTPATIENT
Start: 2023-01-28 | End: 2023-01-29

## 2023-01-27 RX ORDER — CASPOFUNGIN ACETATE 7 MG/ML
50 INJECTION, POWDER, LYOPHILIZED, FOR SOLUTION INTRAVENOUS EVERY 24 HOURS
Refills: 0 | Status: DISCONTINUED | OUTPATIENT
Start: 2023-01-27 | End: 2023-02-10

## 2023-01-27 RX ORDER — FUROSEMIDE 40 MG
40 TABLET ORAL ONCE
Refills: 0 | Status: COMPLETED | OUTPATIENT
Start: 2023-01-27 | End: 2023-01-27

## 2023-01-27 RX ORDER — CALCIUM GLUCONATE 100 MG/ML
2 VIAL (ML) INTRAVENOUS ONCE
Refills: 0 | Status: COMPLETED | OUTPATIENT
Start: 2023-01-27 | End: 2023-01-27

## 2023-01-27 RX ORDER — POTASSIUM PHOSPHATE, MONOBASIC POTASSIUM PHOSPHATE, DIBASIC 236; 224 MG/ML; MG/ML
15 INJECTION, SOLUTION INTRAVENOUS ONCE
Refills: 0 | Status: COMPLETED | OUTPATIENT
Start: 2023-01-27 | End: 2023-01-27

## 2023-01-27 RX ADMIN — ZINC OXIDE 1 APPLICATION(S): 200 OINTMENT TOPICAL at 18:57

## 2023-01-27 RX ADMIN — Medication 200 GRAM(S): at 11:30

## 2023-01-27 RX ADMIN — Medication 30 MILLILITER(S): at 00:06

## 2023-01-27 RX ADMIN — DASATINIB 140 MILLIGRAM(S): 80 TABLET ORAL at 12:53

## 2023-01-27 RX ADMIN — POTASSIUM PHOSPHATE, MONOBASIC POTASSIUM PHOSPHATE, DIBASIC 62.5 MILLIMOLE(S): 236; 224 INJECTION, SOLUTION INTRAVENOUS at 11:29

## 2023-01-27 RX ADMIN — Medication 40 MILLIGRAM(S): at 11:34

## 2023-01-27 RX ADMIN — Medication 20 MILLIEQUIVALENT(S): at 09:13

## 2023-01-27 RX ADMIN — Medication 2 MILLIGRAM(S): at 22:32

## 2023-01-27 RX ADMIN — Medication 5 MILLIGRAM(S): at 22:34

## 2023-01-27 RX ADMIN — Medication 240 MILLIGRAM(S): at 06:37

## 2023-01-27 RX ADMIN — INSULIN GLARGINE 10 UNIT(S): 100 INJECTION, SOLUTION SUBCUTANEOUS at 22:32

## 2023-01-27 RX ADMIN — ATORVASTATIN CALCIUM 80 MILLIGRAM(S): 80 TABLET, FILM COATED ORAL at 22:34

## 2023-01-27 RX ADMIN — Medication 500 MILLIGRAM(S): at 12:53

## 2023-01-27 RX ADMIN — VALSARTAN 320 MILLIGRAM(S): 80 TABLET ORAL at 09:12

## 2023-01-27 RX ADMIN — CASPOFUNGIN ACETATE 260 MILLIGRAM(S): 7 INJECTION, POWDER, LYOPHILIZED, FOR SOLUTION INTRAVENOUS at 15:45

## 2023-01-27 RX ADMIN — SODIUM CHLORIDE 50 MILLILITER(S): 9 INJECTION INTRAMUSCULAR; INTRAVENOUS; SUBCUTANEOUS at 06:42

## 2023-01-27 RX ADMIN — PREGABALIN 1000 MICROGRAM(S): 225 CAPSULE ORAL at 12:53

## 2023-01-27 RX ADMIN — CHLORHEXIDINE GLUCONATE 1 APPLICATION(S): 213 SOLUTION TOPICAL at 10:18

## 2023-01-27 RX ADMIN — Medication 1 PACKET(S): at 06:36

## 2023-01-27 RX ADMIN — ZINC OXIDE 1 APPLICATION(S): 200 OINTMENT TOPICAL at 06:41

## 2023-01-27 RX ADMIN — PANTOPRAZOLE SODIUM 40 MILLIGRAM(S): 20 TABLET, DELAYED RELEASE ORAL at 06:37

## 2023-01-27 RX ADMIN — Medication 2 MILLIGRAM(S): at 18:55

## 2023-01-27 NOTE — PROGRESS NOTE ADULT - PROBLEM SELECTOR PLAN 1
Flow cytometry (1/14/23) consistent with acute leukemia (B ALL), FISH detected BCR/ABL (1/19)  Monitor daily CBC with Diff/CMP and transfuse/replete PRN  Patient was on warfarin-NOW HELD for mechanical AVR (1994 [facility]), atrial fibrillation  last dose of warfarin 1/13 at [facility] (5mg)-cardiology consulted.   TLS labs daily, continue Allopurinol, IVF's, Strict I/O's, daily wights  1/14 TTE - EF 57%, mild pulm HTN, Mechanical AVR likely normal function  1/16 HLA sent  1/17  s/p BMbx c/w ALL (98% blasts) (with Clonoseq sampling)  Decadron 1/18-1/24-monitor sugars now off steroids.   1/19 Started Dasatinib  Vincristine 1.5 mg on Day 8 1/25 with Elitek 3mf IV x1.   Day 15 BMbx on 2/1 and LP to be determined but prior to discharge.   Social work consult to address family issues  1/26: hypofibrinogenemia-cryoprecipitate given. Goal >100 fibrinogen. hypophosphatemia: phosphate replaced. hypocalcemia: calcium replaced: s/p Lasix Flow cytometry (1/14/23) consistent with acute leukemia (B ALL), FISH detected BCR/ABL (1/19)  Monitor daily CBC with Diff/CMP and transfuse/replete PRN  Patient was on warfarin-NOW HELD for mechanical AVR ([address] [facility]), atrial fibrillation  last dose of warfarin 1/13 at [facility] (5mg)-cardiology consulted.   TLS labs raghav, IVF's, Strict I/O's, daily wights  1/14 TTE - EF 57%, mild pulm HTN, Mechanical AVR likely normal function  1/16 HLA sent  1/17  s/p BMbx c/w ALL (98% blasts) (with Clonoseq sampling)  Decadron 1/18-1/24-monitor sugars now off steroids.   1/19 Started Dasatinib  Vincristine 1.5 mg on Day 8 1/25 with Elitek 3mf IV x1.   Day 15 BMbx on 2/1 and LP to be determined but prior to discharge.   Social work consult to address family issues  1/26: hypofibrinogenemia-cryoprecipitate given. Goal >100 fibrinogen. hypophosphatemia: phosphate replaced. hypocalcemia: calcium replaced: s/p Lasix  1/27 PRBC x 1u, Platelets x 1bag, f/u post platelet ct, fibrinogen level, Ca gluconate x2gr, Lasix 40mg IV x 1, 20mg IV in am (for 4 kg weight gain)

## 2023-01-27 NOTE — PROGRESS NOTE ADULT - PROBLEM SELECTOR PLAN 2
patient is not neutropenic, afebrile  c/w levaquin   if spike panculture and CXR  1/21 c.diff toxin negative, GI PCR (-). patient is not neutropenic, afebrile  c/w levaquin, Caspofungin ppx   if spike panculture and CXR  GI PCR (-)  1/21, 1/26 c.diff toxin negative,

## 2023-01-27 NOTE — PROGRESS NOTE ADULT - ATTENDING COMMENTS
81 YO F with a PMhx of Nicolasa, s/p Saint Darian mechanical AVR, (1992–[facility]) for rheumatic aortic valve stenosis, ascending aortic aneurysm, HTN,  HLD, depression, GERD transferred from [facility]. Dx is Ph+ ALL.  She was also noted to have a supratherapeutic INR. CT angio A/P which showed no evidence of active GIB, but showing focal diminished cortical enhancement involving the upper left kidney concerning for focal pyelonephritis or possibly infarct. EGD without any acute findings. Colonoscopy showed grade I internal hemorrhoid and polyps in transverse colon and hepatic flexure. 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     -had BMBx on 1/17 >>> 99% B-lymphoblasts  -baseline echo EF 57%  -has PICC line   -started treatment with dexamethasone 20 mg d1-7, dasatinib 140 mg/d  -Day 9 of treatment  -Monitor LDH, Uric Acid and CMP daily   -keep Hb >7, plt >10  -given rasburicase twice  -allopurinol can be d/c'd    -WBC rapidly decreased, PB blasts cleared  -phos, LDH up now coming down gradually  -LDH plateauing >>> off dex now, given VCR 1.5 mg IV x 1 on 1/25/23    -cont to follow TLS labs  -given cryo 1/20/23 and again 1/25/24  -has had active DIC; uncommon but reported in ALL    -LP d15 as per 45572    -ICH; had SDH  -headaches since 1/15/23, now improved  -1/15/23 CT head showed 8mm bleed, repeat CT is stable  -no intervention as per Neursx  -hold coumadin   -keep plt > 50 for now post SDH     Cardio  HTN - c/w home meds  Mechanical valve - currently off AC, discussed with cardiology  will hold AC in light of risk/benefit in present setting 83 YO F with a PMhx of Nicolasa, s/p Saint Darian mechanical AVR, (1992–[facility]) for rheumatic aortic valve stenosis, ascending aortic aneurysm, HTN,  HLD, depression, GERD transferred from [facility]. Dx is Ph+ ALL.  She was also noted to have a supratherapeutic INR. CT angio A/P which showed no evidence of active GIB, but showing focal diminished cortical enhancement involving the upper left kidney concerning for focal pyelonephritis or possibly infarct. EGD without any acute findings. Colonoscopy showed grade I internal hemorrhoid and polyps in transverse colon and hepatic flexure. 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     -had BMBx on 1/17 >>> 99% B-lymphoblasts  -baseline echo EF 57%  -has PICC line   -started treatment with dexamethasone 20 mg d1-7, dasatinib 140 mg/d  -Day 9 of treatment  -Monitor LDH, Uric Acid and CMP daily   -keep Hb >7, plt >10  -given rasburicase twice  -allopurinol can be d/c'd    -WBC rapidly decreased, PB blasts cleared  -phos, LDH up now coming down gradually  -LDH began plateauing >>> off dex now, given VCR 1.5 mg IV x 1 on 1/25/23,  LDH now decreasing again    -cont to follow TLS labs  -given cryo 1/20/2, 1/25/24 and 1/7/23  -fibrinogen 148 1/27/23  -has had active DIC; uncommon but reported in ALL    -BMA/Bx d15 as per [id number]    -ICH; had SDH  -headaches since 1/15/23, now improved  -1/15/23 CT head showed 8mm bleed, repeat CT is stable  -no intervention as per Neursx  -hold coumadin   -keep plt > 50 for now post SDH     Cardio  HTN - c/w home meds  Mechanical valve - currently off AC, discussed with cardiology  will hold AC in light of risk/benefit in present setting

## 2023-01-27 NOTE — PROGRESS NOTE ADULT - SUBJECTIVE AND OBJECTIVE BOX
Diagnosis: newly diagnosed B-ALL, Ph (+)    Protocol/Chemo Regimen: Following 10701 (Decadron days 1-7 + Dasatinib),   Day: 10     Pt endorsed:     Review of Systems:     Pain scale: denies    Diet: regular    Allergies    No Known Allergies    Intolerances    MEDICATIONS  (STANDING):  allopurinol 300 milliGRAM(s) Oral daily  ascorbic acid 500 milliGRAM(s) Oral daily  atorvastatin 80 milliGRAM(s) Oral at bedtime  chlorhexidine 4% Liquid 1 Application(s) Topical <User Schedule>  cyanocobalamin 1000 MICROGram(s) Oral daily  dasatinib 140 milliGRAM(s) Oral daily  dextrose 5%. 1000 milliLiter(s) (50 mL/Hr) IV Continuous <Continuous>  dextrose 5%. 1000 milliLiter(s) (100 mL/Hr) IV Continuous <Continuous>  dextrose 50% Injectable 25 Gram(s) IV Push once  dextrose 50% Injectable 12.5 Gram(s) IV Push once  diltiazem    milliGRAM(s) Oral daily  glucagon  Injectable 1 milliGRAM(s) IntraMuscular once  hydrochlorothiazide 25 milliGRAM(s) Oral daily  insulin glargine Injectable (LANTUS) 10 Unit(s) SubCutaneous at bedtime  insulin lispro (ADMELOG) corrective regimen sliding scale   SubCutaneous three times a day before meals  levoFLOXacin  Tablet 500 milliGRAM(s) Oral every 24 hours  melatonin 5 milliGRAM(s) Oral at bedtime  pantoprazole    Tablet 40 milliGRAM(s) Oral before breakfast  potassium chloride    Tablet ER 20 milliEquivalent(s) Oral once  potassium phosphate IVPB 15 milliMole(s) IV Intermittent once  sodium chloride 0.9%. 1000 milliLiter(s) (50 mL/Hr) IV Continuous <Continuous>  valsartan 320 milliGRAM(s) Oral daily  zinc oxide 40% Paste 1 Application(s) Topical two times a day    MEDICATIONS  (PRN):  acetaminophen     Tablet .. 650 milliGRAM(s) Oral every 6 hours PRN Temp greater or equal to 38C (100.4F), Mild Pain (1 - 3)  aluminum hydroxide/magnesium hydroxide/simethicone Suspension 30 milliLiter(s) Oral every 4 hours PRN Dyspepsia  dextrose Oral Gel 15 Gram(s) Oral once PRN Blood Glucose LESS THAN 70 milliGRAM(s)/deciliter  loperamide 2 milliGRAM(s) Oral three times a day PRN Diarrhea  ondansetron Injectable 8 milliGRAM(s) IV Push every 8 hours PRN Nausea  sodium chloride 0.9% lock flush 10 milliLiter(s) IV Push every 1 hour PRN Pre/post blood products, medications, blood draw, and to maintain line patency      Vital Signs Last 24 Hrs  T(C): 36.9 (27 Jan 2023 05:03), Max: 37.1 (26 Jan 2023 19:56)  T(F): 98.4 (27 Jan 2023 05:03), Max: 98.7 (26 Jan 2023 19:56)  HR: 85 (27 Jan 2023 05:03) (68 - 100)  BP: 101/63 (27 Jan 2023 05:03) (95/60 - 131/79)  BP(mean): --  RR: 16 (27 Jan 2023 05:03) (16 - 18)  SpO2: 99% (27 Jan 2023 05:03) (97% - 99%)    Parameters below as of 27 Jan 2023 05:03  Patient On (Oxygen Delivery Method): room air    I&O's Summary    26 Jan 2023 07:01  -  27 Jan 2023 07:00  --------------------------------------------------------  IN: 1807 mL / OUT: 1302 mL / NET: 505 mL      PHYSICAL EXAM  General: NAD, sitting up in bed  HEENT: Sclerae anicteric, clear oropharynx, no oral lesions  CV: normal S1/S2, reg  Lungs: breath sounds clear and equal bilaterally  Abdomen: soft non-tender non-distended  Ext: no edema  Skin: no rashes   Neuro: alert and oriented X 4  Central Line: RUE PICC clean, dry, intact        LABS:                            6.5    1.06  )-----------( 22       ( 27 Jan 2023 06:57 )             19.7     27 Jan 2023 06:53    136    |  104    |  18     ----------------------------<  103    3.5     |  21     |  0.72     Ca    7.2        27 Jan 2023 06:53  Phos  2.3       27 Jan 2023 06:53  Mg     2.1       27 Jan 2023 06:53    TPro  4.6    /  Alb  2.9    /  TBili  0.5    /  DBili  x      /  AST  35     /  ALT  25     /  AlkPhos  50     27 Jan 2023 06:53    PT/INR - ( 27 Jan 2023 06:59 )   PT: 11.8 sec;   INR: 1.02 ratio         PTT - ( 27 Jan 2023 06:59 )  PTT:21.0 sec  CAPILLARY BLOOD GLUCOSE      POCT Blood Glucose.: 88 mg/dL (27 Jan 2023 08:31)  POCT Blood Glucose.: 125 mg/dL (26 Jan 2023 21:41)  POCT Blood Glucose.: 123 mg/dL (26 Jan 2023 17:08)  POCT Blood Glucose.: 122 mg/dL (26 Jan 2023 12:35)    LIVER FUNCTIONS - ( 27 Jan 2023 06:53 )  Alb: 2.9 g/dL / Pro: 4.6 g/dL / ALK PHOS: 50 U/L / ALT: 25 U/L / AST: 35 U/L / GGT: x                                           Diagnosis: newly diagnosed B-ALL, Ph (+)    Protocol/Chemo Regimen: Following 49820 (Decadron days 1-7 + Dasatinib), Vincristine 1.5mg (on D8)    Day: 10     Pt endorsed: No overnight events, afebrile, +OOB walked in hallway    Review of Systems: Denies n/v, abdominal pain, HA or dizziness, JAMES    Pain scale: denies    Diet: regular    Allergies  No Known Allergies    Intolerances    MEDICATIONS  (STANDING):  ascorbic acid 500 milliGRAM(s) Oral daily  atorvastatin 80 milliGRAM(s) Oral at bedtime  caspofungin IVPB 50 milliGRAM(s) IV Intermittent every 24 hours  chlorhexidine 4% Liquid 1 Application(s) Topical <User Schedule>  cyanocobalamin 1000 MICROGram(s) Oral daily  dasatinib 140 milliGRAM(s) Oral daily  dextrose 5%. 1000 milliLiter(s) (50 mL/Hr) IV Continuous <Continuous>  dextrose 5%. 1000 milliLiter(s) (100 mL/Hr) IV Continuous <Continuous>  dextrose 50% Injectable 25 Gram(s) IV Push once  dextrose 50% Injectable 12.5 Gram(s) IV Push once  diltiazem    milliGRAM(s) Oral daily  glucagon  Injectable 1 milliGRAM(s) IntraMuscular once  hydrochlorothiazide 25 milliGRAM(s) Oral daily  insulin glargine Injectable (LANTUS) 10 Unit(s) SubCutaneous at bedtime  insulin lispro (ADMELOG) corrective regimen sliding scale   SubCutaneous three times a day before meals  levoFLOXacin  Tablet 500 milliGRAM(s) Oral every 24 hours  melatonin 5 milliGRAM(s) Oral at bedtime  pantoprazole    Tablet 40 milliGRAM(s) Oral before breakfast  sodium chloride 0.9%. 1000 milliLiter(s) (50 mL/Hr) IV Continuous <Continuous>  valsartan 320 milliGRAM(s) Oral daily  zinc oxide 40% Paste 1 Application(s) Topical two times a day    MEDICATIONS  (PRN):  acetaminophen     Tablet .. 650 milliGRAM(s) Oral every 6 hours PRN Temp greater or equal to 38C (100.4F), Mild Pain (1 - 3)  aluminum hydroxide/magnesium hydroxide/simethicone Suspension 30 milliLiter(s) Oral every 4 hours PRN Dyspepsia  dextrose Oral Gel 15 Gram(s) Oral once PRN Blood Glucose LESS THAN 70 milliGRAM(s)/deciliter  loperamide 2 milliGRAM(s) Oral three times a day PRN Diarrhea  ondansetron Injectable 8 milliGRAM(s) IV Push every 8 hours PRN Nausea  sodium chloride 0.9% lock flush 10 milliLiter(s) IV Push every 1 hour PRN Pre/post blood products, medications, blood draw, and to maintain line patency      Vital Signs Last 24 Hrs  T(C): 36.9 (27 Jan 2023 05:03), Max: 37.1 (26 Jan 2023 19:56)  T(F): 98.4 (27 Jan 2023 05:03), Max: 98.7 (26 Jan 2023 19:56)  HR: 85 (27 Jan 2023 05:03) (68 - 100)  BP: 101/63 (27 Jan 2023 05:03) (95/60 - 131/79)  BP(mean): --  RR: 16 (27 Jan 2023 05:03) (16 - 18)  SpO2: 99% (27 Jan 2023 05:03) (97% - 99%)    Parameters below as of 27 Jan 2023 05:03  Patient On (Oxygen Delivery Method): room air    I&O's Summary    26 Jan 2023 07:01  -  27 Jan 2023 07:00  --------------------------------------------------------  IN: 1807 mL / OUT: 1302 mL / NET: 505 mL    wght- 65kg (60.9)    PHYSICAL EXAM  General: NAD, sitting up in bed  HEENT: Sclerae anicteric, clear oropharynx, no oral lesions  CV: normal S1/S2, reg  Lungs: breath sounds clear and equal bilaterally  Abdomen: soft non-tender non-distended  Ext: no edema  Skin: no rashes   Neuro: alert and oriented X 4  Central Line: RUE PICC clean, dry, intact        LABS:                        6.5    1.06  )-----------( 22       ( 27 Jan 2023 06:57 )             19.7     27 Jan 2023 06:53    136    |  104    |  18     ----------------------------<  103    3.5     |  21     |  0.72     Ca    7.2        27 Jan 2023 06:53  Phos  2.3       27 Jan 2023 06:53  Mg     2.1       27 Jan 2023 06:53    TPro  4.6    /  Alb  2.9    /  TBili  0.5    /  DBili  x      /  AST  35     /  ALT  25     /  AlkPhos  50     27 Jan 2023 06:53    PT/INR - ( 27 Jan 2023 06:59 )   PT: 11.8 sec;   INR: 1.02 ratio    PTT - ( 27 Jan 2023 06:59 )  PTT:21.0 sec      POCT Blood Glucose.: 88 mg/dL (27 Jan 2023 08:31)  POCT Blood Glucose.: 125 mg/dL (26 Jan 2023 21:41)  POCT Blood Glucose.: 123 mg/dL (26 Jan 2023 17:08)  POCT Blood Glucose.: 122 mg/dL (26 Jan 2023 12:35)    LIVER FUNCTIONS - ( 27 Jan 2023 06:53 )  Alb: 2.9 g/dL / Pro: 4.6 g/dL / ALK PHOS: 50 U/L / ALT: 25 U/L / AST: 35 U/L / GGT: x

## 2023-01-27 NOTE — PROGRESS NOTE ADULT - ASSESSMENT
82 year old female with atrial fibrillation, s/p [employer] mechanical AVR, (1994–[facility]) for rheumatic aortic valve stenosis, ascending aortic aneurysm, HTN, HLD, depression, GERD transferred from [facility] for management of newly diagnosed PH + B cell ALL. Peripheral flow performed, BMBx consistent with B-cell ALL, FISH Ph+ treated with Dex and Dasatinib following CALGB 24120. 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 Anemia and thrombocytopenia secondary to disease.

## 2023-01-28 LAB
ALBUMIN SERPL ELPH-MCNC: 2.8 G/DL — LOW (ref 3.3–5)
ALP SERPL-CCNC: 50 U/L — SIGNIFICANT CHANGE UP (ref 40–120)
ALT FLD-CCNC: 28 U/L — SIGNIFICANT CHANGE UP (ref 10–45)
ANION GAP SERPL CALC-SCNC: 9 MMOL/L — SIGNIFICANT CHANGE UP (ref 5–17)
APTT BLD: 23.2 SEC — LOW (ref 27.5–35.5)
AST SERPL-CCNC: 37 U/L — SIGNIFICANT CHANGE UP (ref 10–40)
BASOPHILS # BLD AUTO: 0.01 K/UL — SIGNIFICANT CHANGE UP (ref 0–0.2)
BASOPHILS NFR BLD AUTO: 1 % — SIGNIFICANT CHANGE UP (ref 0–2)
BILIRUB SERPL-MCNC: 0.6 MG/DL — SIGNIFICANT CHANGE UP (ref 0.2–1.2)
BUN SERPL-MCNC: 12 MG/DL — SIGNIFICANT CHANGE UP (ref 7–23)
CALCIUM SERPL-MCNC: 7.4 MG/DL — LOW (ref 8.4–10.5)
CHLORIDE SERPL-SCNC: 105 MMOL/L — SIGNIFICANT CHANGE UP (ref 96–108)
CO2 SERPL-SCNC: 24 MMOL/L — SIGNIFICANT CHANGE UP (ref 22–31)
CREAT SERPL-MCNC: 0.68 MG/DL — SIGNIFICANT CHANGE UP (ref 0.5–1.3)
EGFR: 88 ML/MIN/1.73M2 — SIGNIFICANT CHANGE UP
EOSINOPHIL # BLD AUTO: 0.01 K/UL — SIGNIFICANT CHANGE UP (ref 0–0.5)
EOSINOPHIL NFR BLD AUTO: 1 % — SIGNIFICANT CHANGE UP (ref 0–6)
FIBRINOGEN PPP-MCNC: 217 MG/DL — SIGNIFICANT CHANGE UP (ref 200–445)
GLUCOSE BLDC GLUCOMTR-MCNC: 111 MG/DL — HIGH (ref 70–99)
GLUCOSE BLDC GLUCOMTR-MCNC: 177 MG/DL — HIGH (ref 70–99)
GLUCOSE BLDC GLUCOMTR-MCNC: 72 MG/DL — SIGNIFICANT CHANGE UP (ref 70–99)
GLUCOSE BLDC GLUCOMTR-MCNC: 86 MG/DL — SIGNIFICANT CHANGE UP (ref 70–99)
GLUCOSE SERPL-MCNC: 63 MG/DL — LOW (ref 70–99)
HCT VFR BLD CALC: 20.6 % — CRITICAL LOW (ref 34.5–45)
HGB BLD-MCNC: 7.1 G/DL — LOW (ref 11.5–15.5)
INR BLD: 0.99 RATIO — SIGNIFICANT CHANGE UP (ref 0.88–1.16)
LDH SERPL L TO P-CCNC: 587 U/L — HIGH (ref 50–242)
LYMPHOCYTES # BLD AUTO: 0.67 K/UL — LOW (ref 1–3.3)
LYMPHOCYTES # BLD AUTO: 90.1 % — HIGH (ref 13–44)
MAGNESIUM SERPL-MCNC: 1.8 MG/DL — SIGNIFICANT CHANGE UP (ref 1.6–2.6)
MCHC RBC-ENTMCNC: 29 PG — SIGNIFICANT CHANGE UP (ref 27–34)
MCHC RBC-ENTMCNC: 34.5 GM/DL — SIGNIFICANT CHANGE UP (ref 32–36)
MCV RBC AUTO: 84.1 FL — SIGNIFICANT CHANGE UP (ref 80–100)
MONOCYTES # BLD AUTO: 0 K/UL — SIGNIFICANT CHANGE UP (ref 0–0.9)
MONOCYTES NFR BLD AUTO: 0 % — LOW (ref 2–14)
NEUTROPHILS # BLD AUTO: 0.06 K/UL — LOW (ref 1.8–7.4)
NEUTROPHILS NFR BLD AUTO: 7.9 % — LOW (ref 43–77)
PHOSPHATE SERPL-MCNC: 3 MG/DL — SIGNIFICANT CHANGE UP (ref 2.5–4.5)
PLATELET # BLD AUTO: 46 K/UL — LOW (ref 150–400)
PLATELET # BLD AUTO: 88 K/UL — LOW (ref 150–400)
POTASSIUM SERPL-MCNC: 3.3 MMOL/L — LOW (ref 3.5–5.3)
POTASSIUM SERPL-SCNC: 3.3 MMOL/L — LOW (ref 3.5–5.3)
PROT SERPL-MCNC: 4.5 G/DL — LOW (ref 6–8.3)
PROTHROM AB SERPL-ACNC: 11.4 SEC — SIGNIFICANT CHANGE UP (ref 10.5–13.4)
RBC # BLD: 2.45 M/UL — LOW (ref 3.8–5.2)
RBC # FLD: 15.5 % — HIGH (ref 10.3–14.5)
SODIUM SERPL-SCNC: 138 MMOL/L — SIGNIFICANT CHANGE UP (ref 135–145)
URATE SERPL-MCNC: <0.5 MG/DL — LOW (ref 2.5–7)
WBC # BLD: 0.74 K/UL — CRITICAL LOW (ref 3.8–10.5)
WBC # FLD AUTO: 0.74 K/UL — CRITICAL LOW (ref 3.8–10.5)

## 2023-01-28 PROCEDURE — 99232 SBSQ HOSP IP/OBS MODERATE 35: CPT

## 2023-01-28 RX ORDER — MAGNESIUM OXIDE 400 MG ORAL TABLET 241.3 MG
400 TABLET ORAL
Refills: 0 | Status: COMPLETED | OUTPATIENT
Start: 2023-01-28 | End: 2023-01-29

## 2023-01-28 RX ORDER — POTASSIUM CHLORIDE 20 MEQ
20 PACKET (EA) ORAL
Refills: 0 | Status: COMPLETED | OUTPATIENT
Start: 2023-01-28 | End: 2023-01-28

## 2023-01-28 RX ORDER — CALCIUM GLUCONATE 100 MG/ML
1 VIAL (ML) INTRAVENOUS ONCE
Refills: 0 | Status: COMPLETED | OUTPATIENT
Start: 2023-01-28 | End: 2023-01-28

## 2023-01-28 RX ADMIN — Medication 100 GRAM(S): at 12:43

## 2023-01-28 RX ADMIN — Medication 20 MILLIEQUIVALENT(S): at 13:14

## 2023-01-28 RX ADMIN — Medication 20 MILLIEQUIVALENT(S): at 16:30

## 2023-01-28 RX ADMIN — ZINC OXIDE 1 APPLICATION(S): 200 OINTMENT TOPICAL at 06:25

## 2023-01-28 RX ADMIN — SODIUM CHLORIDE 50 MILLILITER(S): 9 INJECTION INTRAMUSCULAR; INTRAVENOUS; SUBCUTANEOUS at 05:47

## 2023-01-28 RX ADMIN — VALSARTAN 320 MILLIGRAM(S): 80 TABLET ORAL at 09:49

## 2023-01-28 RX ADMIN — CASPOFUNGIN ACETATE 260 MILLIGRAM(S): 7 INJECTION, POWDER, LYOPHILIZED, FOR SOLUTION INTRAVENOUS at 16:31

## 2023-01-28 RX ADMIN — PREGABALIN 1000 MICROGRAM(S): 225 CAPSULE ORAL at 11:24

## 2023-01-28 RX ADMIN — Medication 5 MILLIGRAM(S): at 22:40

## 2023-01-28 RX ADMIN — INSULIN GLARGINE 10 UNIT(S): 100 INJECTION, SOLUTION SUBCUTANEOUS at 22:41

## 2023-01-28 RX ADMIN — ATORVASTATIN CALCIUM 80 MILLIGRAM(S): 80 TABLET, FILM COATED ORAL at 22:40

## 2023-01-28 RX ADMIN — Medication 2 MILLIGRAM(S): at 22:40

## 2023-01-28 RX ADMIN — DASATINIB 140 MILLIGRAM(S): 80 TABLET ORAL at 11:24

## 2023-01-28 RX ADMIN — Medication 650 MILLIGRAM(S): at 13:13

## 2023-01-28 RX ADMIN — Medication 20 MILLIEQUIVALENT(S): at 11:23

## 2023-01-28 RX ADMIN — Medication 20 MILLIGRAM(S): at 06:27

## 2023-01-28 RX ADMIN — MAGNESIUM OXIDE 400 MG ORAL TABLET 400 MILLIGRAM(S): 241.3 TABLET ORAL at 16:31

## 2023-01-28 RX ADMIN — Medication 650 MILLIGRAM(S): at 14:10

## 2023-01-28 RX ADMIN — ZINC OXIDE 1 APPLICATION(S): 200 OINTMENT TOPICAL at 17:35

## 2023-01-28 RX ADMIN — PANTOPRAZOLE SODIUM 40 MILLIGRAM(S): 20 TABLET, DELAYED RELEASE ORAL at 05:55

## 2023-01-28 RX ADMIN — CHLORHEXIDINE GLUCONATE 1 APPLICATION(S): 213 SOLUTION TOPICAL at 09:52

## 2023-01-28 RX ADMIN — Medication 500 MILLIGRAM(S): at 11:23

## 2023-01-28 RX ADMIN — Medication 1: at 12:44

## 2023-01-28 RX ADMIN — Medication 240 MILLIGRAM(S): at 05:52

## 2023-01-28 NOTE — PROGRESS NOTE ADULT - SUBJECTIVE AND OBJECTIVE BOX
Diagnosis: newly diagnosed B-ALL, Ph (+)    Protocol/Chemo Regimen: Following 01614 (Decadron days 1-7 + Dasatinib), Vincristine 1.5mg (on D8)    Day: 11     Pt endorsed: Generalized weakness     Review of Systems: Patient denies  nausea, vomiting, diarrhea, abdominal pain, SOB, chest pain and headache.      Pain scale: denies    Diet: regular    Allergies  No Known Allergies    Intolerances    MEDICATIONS  (STANDING):  ascorbic acid 500 milliGRAM(s) Oral daily  atorvastatin 80 milliGRAM(s) Oral at bedtime  caspofungin IVPB 50 milliGRAM(s) IV Intermittent every 24 hours  chlorhexidine 4% Liquid 1 Application(s) Topical <User Schedule>  cyanocobalamin 1000 MICROGram(s) Oral daily  dasatinib 140 milliGRAM(s) Oral daily  dextrose 5%. 1000 milliLiter(s) (50 mL/Hr) IV Continuous <Continuous>  dextrose 5%. 1000 milliLiter(s) (100 mL/Hr) IV Continuous <Continuous>  dextrose 50% Injectable 25 Gram(s) IV Push once  dextrose 50% Injectable 12.5 Gram(s) IV Push once  diltiazem    milliGRAM(s) Oral daily  glucagon  Injectable 1 milliGRAM(s) IntraMuscular once  hydrochlorothiazide 25 milliGRAM(s) Oral daily  insulin glargine Injectable (LANTUS) 10 Unit(s) SubCutaneous at bedtime  insulin lispro (ADMELOG) corrective regimen sliding scale   SubCutaneous three times a day before meals  levoFLOXacin  Tablet 500 milliGRAM(s) Oral every 24 hours  melatonin 5 milliGRAM(s) Oral at bedtime  pantoprazole    Tablet 40 milliGRAM(s) Oral before breakfast  sodium chloride 0.9%. 1000 milliLiter(s) (50 mL/Hr) IV Continuous <Continuous>  valsartan 320 milliGRAM(s) Oral daily  zinc oxide 40% Paste 1 Application(s) Topical two times a day    MEDICATIONS  (PRN):  acetaminophen     Tablet .. 650 milliGRAM(s) Oral every 6 hours PRN Temp greater or equal to 38C (100.4F), Mild Pain (1 - 3)  aluminum hydroxide/magnesium hydroxide/simethicone Suspension 30 milliLiter(s) Oral every 4 hours PRN Dyspepsia  dextrose Oral Gel 15 Gram(s) Oral once PRN Blood Glucose LESS THAN 70 milliGRAM(s)/deciliter  loperamide 2 milliGRAM(s) Oral three times a day PRN Diarrhea  ondansetron Injectable 8 milliGRAM(s) IV Push every 8 hours PRN Nausea  sodium chloride 0.9% lock flush 10 milliLiter(s) IV Push every 1 hour PRN Pre/post blood products, medications, blood draw, and to maintain line patency    Vital Signs Last 24 Hrs  T(C): 36.7 (28 Jan 2023 09:15), Max: 37.2 (27 Jan 2023 16:46)  T(F): 98.1 (28 Jan 2023 09:15), Max: 98.9 (27 Jan 2023 16:46)  HR: 71 (28 Jan 2023 09:15) (71 - 94)  BP: 107/63 (28 Jan 2023 09:15) (101/63 - 129/75)  BP(mean): --  RR: 17 (28 Jan 2023 09:15) (16 - 18)  SpO2: 99% (28 Jan 2023 09:15) (97% - 100%)    Parameters below as of 28 Jan 2023 09:15  Patient On (Oxygen Delivery Method): room air      PHYSICAL EXAM  General: NAD  HEENT: Sclerae anicteric, clear oropharynx, no oral lesions  CV: normal S1/S2, reg  Lungs: breath sounds clear and equal bilaterally  Abdomen: soft non-tender non-distended  Ext: no edema  Skin: no rash  Neuro: alert and oriented X 4  Central Line: RUE PICC clean, dry, intact  LABS:                          7.1    0.74  )-----------( 46       ( 28 Jan 2023 06:59 )             20.6         Mean Cell Volume : 84.1 fl  Mean Cell Hemoglobin : 29.0 pg  Mean Cell Hemoglobin Concentration : 34.5 gm/dL  Auto Neutrophil # : x  Auto Lymphocyte # : x  Auto Monocyte # : x  Auto Eosinophil # : x  Auto Basophil # : x  Auto Neutrophil % : x  Auto Lymphocyte % : x  Auto Monocyte % : x  Auto Eosinophil % : x  Auto Basophil % : x      01-28    138  |  105  |  12  ----------------------------<  63<L>  3.3<L>   |  24  |  0.68    Ca    7.4<L>      28 Jan 2023 07:00  Phos  3.0     01-28  Mg     1.8     01-28    TPro  4.5<L>  /  Alb  2.8<L>  /  TBili  0.6  /  DBili  x   /  AST  37  /  ALT  28  /  AlkPhos  50  01-28          PT/INR - ( 28 Jan 2023 07:00 )   PT: 11.4 sec;   INR: 0.99 ratio         PTT - ( 28 Jan 2023 07:00 )  PTT:23.2 sec      Uric Acid <0.5                                                      Diagnosis: newly diagnosed B-ALL, Ph (+)    Protocol/Chemo Regimen: Following 19732 (Decadron days 1-7 + Dasatinib), Vincristine 1.5mg (on D8)    Day: 11     Pt endorsed: Generalized weakness     Review of Systems: Patient denies  nausea, vomiting, diarrhea, abdominal pain, SOB, chest pain and headache.      Pain scale: denies    Diet: regular    Allergies  No Known Allergies    Intolerances    MEDICATIONS  (STANDING):  ascorbic acid 500 milliGRAM(s) Oral daily  atorvastatin 80 milliGRAM(s) Oral at bedtime  caspofungin IVPB 50 milliGRAM(s) IV Intermittent every 24 hours  chlorhexidine 4% Liquid 1 Application(s) Topical <User Schedule>  cyanocobalamin 1000 MICROGram(s) Oral daily  dasatinib 140 milliGRAM(s) Oral daily  dextrose 5%. 1000 milliLiter(s) (50 mL/Hr) IV Continuous <Continuous>  dextrose 5%. 1000 milliLiter(s) (100 mL/Hr) IV Continuous <Continuous>  dextrose 50% Injectable 25 Gram(s) IV Push once  dextrose 50% Injectable 12.5 Gram(s) IV Push once  diltiazem    milliGRAM(s) Oral daily  glucagon  Injectable 1 milliGRAM(s) IntraMuscular once  hydrochlorothiazide 25 milliGRAM(s) Oral daily  insulin glargine Injectable (LANTUS) 10 Unit(s) SubCutaneous at bedtime  insulin lispro (ADMELOG) corrective regimen sliding scale   SubCutaneous three times a day before meals  levoFLOXacin  Tablet 500 milliGRAM(s) Oral every 24 hours  melatonin 5 milliGRAM(s) Oral at bedtime  pantoprazole    Tablet 40 milliGRAM(s) Oral before breakfast  sodium chloride 0.9%. 1000 milliLiter(s) (50 mL/Hr) IV Continuous <Continuous>  valsartan 320 milliGRAM(s) Oral daily  zinc oxide 40% Paste 1 Application(s) Topical two times a day    MEDICATIONS  (PRN):  acetaminophen     Tablet .. 650 milliGRAM(s) Oral every 6 hours PRN Temp greater or equal to 38C (100.4F), Mild Pain (1 - 3)  aluminum hydroxide/magnesium hydroxide/simethicone Suspension 30 milliLiter(s) Oral every 4 hours PRN Dyspepsia  dextrose Oral Gel 15 Gram(s) Oral once PRN Blood Glucose LESS THAN 70 milliGRAM(s)/deciliter  loperamide 2 milliGRAM(s) Oral three times a day PRN Diarrhea  ondansetron Injectable 8 milliGRAM(s) IV Push every 8 hours PRN Nausea  sodium chloride 0.9% lock flush 10 milliLiter(s) IV Push every 1 hour PRN Pre/post blood products, medications, blood draw, and to maintain line patency    Vital Signs Last 24 Hrs  T(C): 36.7 (28 Jan 2023 09:15), Max: 37.2 (27 Jan 2023 16:46)  T(F): 98.1 (28 Jan 2023 09:15), Max: 98.9 (27 Jan 2023 16:46)  HR: 71 (28 Jan 2023 09:15) (71 - 94)  BP: 107/63 (28 Jan 2023 09:15) (101/63 - 129/75)  BP(mean): --  RR: 17 (28 Jan 2023 09:15) (16 - 18)  SpO2: 99% (28 Jan 2023 09:15) (97% - 100%)    Parameters below as of 28 Jan 2023 09:15  Patient On (Oxygen Delivery Method): room air      PHYSICAL EXAM  General: NAD  HEENT: Sclerae anicteric, clear oropharynx, no oral lesions  CV: normal S1/S2, reg  Lungs: breath sounds clear and equal bilaterally  Abdomen: soft non-tender non-distended  Ext: no edema  Skin: no rash  Neuro: alert and oriented X 4  Central Line: RUE PICC clean, dry, intact  LABS:                          7.1    0.74  )-----------( 46       ( 28 Jan 2023 06:59 )             20.6         Mean Cell Volume : 84.1 fl  Mean Cell Hemoglobin : 29.0 pg  Mean Cell Hemoglobin Concentration : 34.5 gm/dL  Auto Neutrophil # : x  Auto Lymphocyte # : x  Auto Monocyte # : x  Auto Eosinophil # : x  Auto Basophil # : x  Auto Neutrophil % : x  Auto Lymphocyte % : x  Auto Monocyte % : x  Auto Eosinophil % : x  Auto Basophil % : x      01-28    138  |  105  |  12  ----------------------------<  63<L>  3.3<L>   |  24  |  0.68    Ca    7.4<L>      28 Jan 2023 07:00  Phos  3.0     01-28  Mg     1.8     01-28    TPro  4.5<L>  /  Alb  2.8<L>  /  TBili  0.6  /  DBili  x   /  AST  37  /  ALT  28  /  AlkPhos  50  01-28          PT/INR - ( 28 Jan 2023 07:00 )   PT: 11.4 sec;   INR: 0.99 ratio         PTT - ( 28 Jan 2023 07:00 )  PTT:23.2 sec      Uric Acid <0.5    Radiology     CT Head No Cont (01.16.23 @ 11:53) >  IMPRESSION:    No interval change in left parietal subdural bleed.  No new site of bleeding or progressive mass effect.

## 2023-01-28 NOTE — PROGRESS NOTE ADULT - PROBLEM SELECTOR PLAN 2
patient is neutropenic, afebrile  c/w levaquin, Caspofungin ppx   if spike panculture and CXR  GI PCR (-)  1/21, 1/26 c.diff toxin negative,

## 2023-01-28 NOTE — PROGRESS NOTE ADULT - TIME BILLING
The time was used discussed with patient, family, primary team, consultants, and acute management.

## 2023-01-28 NOTE — PROGRESS NOTE ADULT - ATTENDING COMMENTS
83 YO F with a PMhx of Nicolasa, s/p [employer] mechanical AVR, (1994–[facility]) for rheumatic aortic valve stenosis, ascending aortic aneurysm, HTN,  HLD, depression, GERD transferred from [facility]. Dx is Ph+ ALL.  She was also noted to have a supratherapeutic INR. CT angio A/P which showed no evidence of active GIB, but showing focal diminished cortical enhancement involving the upper left kidney concerning for focal pyelonephritis or possibly infarct. EGD without any acute findings. Colonoscopy showed grade I internal hemorrhoid and polyps in transverse colon and hepatic flexure. 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     -had BMBx on 1/17 >>> 99% B-lymphoblasts  -baseline echo EF 57%  -has PICC line   -started treatment with dexamethasone 20 mg d1-7, dasatinib 140 mg/d  -Day 9 of treatment  -Monitor LDH, Uric Acid and CMP daily   -keep Hb >7, plt >10  -given rasburicase twice  -allopurinol can be d/c'd    -WBC rapidly decreased, PB blasts cleared  -phos, LDH up now coming down gradually  -LDH began plateauing >>> off dex now, given VCR 1.5 mg IV x 1 on 1/25/23,  LDH now decreasing again    -cont to follow TLS labs  -given cryo 1/20/2, 1/25/24 and 1/7/23  -fibrinogen 148 1/27/23  -has had active DIC; uncommon but reported in ALL    -BMA/Bx d15 as per 61242    -ICH; had SDH  -headaches since 1/15/23, now improved  -1/15/23 CT head showed 8mm bleed, repeat CT is stable  -no intervention as per Neursx  -hold coumadin   -keep plt > 50 for now post SDH     Cardio  HTN - c/w home meds  Mechanical valve - currently off AC, discussed with cardiology  will hold AC in light of risk/benefit in present setting 83 YO F with a PMhx of Nicolasa, s/p [employer] mechanical AVR, (1992–[facility]) for rheumatic aortic valve stenosis, ascending aortic aneurysm, HTN,  HLD, depression, GERD transferred from [facility]. Dx is Ph+ ALL.  She was also noted to have a supratherapeutic INR. CT angio A/P which showed no evidence of active GIB, but showing focal diminished cortical enhancement involving the upper left kidney concerning for focal pyelonephritis or possibly infarct. EGD without any acute findings. Colonoscopy showed grade I internal hemorrhoid and polyps in transverse colon and hepatic flexure. 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     -had BMBx on 1/17 >>> 99% B-lymphoblasts  -baseline echo EF 57%  -has PICC line   -started treatment with dexamethasone 20 mg d1-7, dasatinib 140 mg/d  -Day 10 of treatment  -Monitor LDH, Uric Acid and CMP daily   -keep Hb >7, plt >10  -given rasburicase twice  -allopurinol can be d/c'd    -WBC rapidly decreased, PB blasts cleared  -phos, LDH up now coming down gradually  -LDH began plateauing >>> off dex now, given VCR 1.5 mg IV x 1 on 1/25/23,  LDH now decreasing again    -cont to follow TLS labs  -given cryo 1/20/2, 1/25/24 and 1/7/23  -fibrinogen 148 1/27/23  -has had active DIC; uncommon but 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

## 2023-01-28 NOTE — PROGRESS NOTE ADULT - ASSESSMENT
82 year old female with atrial fibrillation, s/p [employer] mechanical AVR, (1992–[facility]) for rheumatic aortic valve stenosis, ascending aortic aneurysm, HTN, HLD, depression, GERD transferred from [facility] for management of newly diagnosed PH + B cell ALL. Peripheral flow performed, BMBx consistent with B-cell ALL, FISH Ph+ treated with Dex and Dasatinib following CALGB 09671. 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 Anemia and thrombocytopenia secondary to disease.

## 2023-01-28 NOTE — PROGRESS NOTE ADULT - PROBLEM SELECTOR PLAN 1
Flow cytometry (1/14/23) consistent with acute leukemia (B ALL), FISH detected BCR/ABL (1/19)  Monitor daily CBC with Diff/CMP and transfuse/replete PRN  Patient was on warfarin-NOW HELD for mechanical AVR (1994 [facility]), atrial fibrillation  last dose of warfarin 1/13 at [facility] (5mg)-cardiology consulted.   TLS labs raghav, IVF's, Strict I/O's, daily wights  1/14 TTE - EF 57%, mild pulm HTN, Mechanical AVR likely normal function  1/16 HLA sent  1/17  s/p BMbx c/w ALL (98% blasts) (with Clonoseq sampling)  Decadron 1/18-1/24-monitor sugars now off steroids.   1/19 Started Dasatinib  Vincristine 1.5 mg on Day 8 1/25 with Elitek 3mf IV x1.   Day 15 BMbx on 2/1 and LP to be determined but prior to discharge.   Social work consult to address family issues  Platelets x 1 unit to keep platelet count > 50 Flow cytometry (1/14/23) consistent with acute leukemia (B ALL), FISH detected BCR/ABL (1/19)  Monitor daily CBC with Diff/CMP and transfuse/replete PRN  Patient was on warfarin-NOW HELD for mechanical AVR ([facility]), atrial fibrillation  last dose of warfarin 1/13 at [facility] (5mg)-cardiology consulted.   TLS labs raghav, IVF's, Strict I/O's, daily wights  1/14 TTE - EF 57%, mild pulm HTN, Mechanical AVR likely normal function  1/16 HLA sent  1/17  s/p BMbx c/w ALL (98% blasts) (with Clonoseq sampling)  Decadron 1/18-1/24-monitor sugars now off steroids.   1/19 Started Dasatinib  Vincristine 1.5 mg on Day 8 1/25 with Elitek 3mf IV x1.   Day 15 BMbx on 2/1 and LP to be determined but prior to discharge.   Social work consult to address family issues  Platelets x 1 unit to keep platelet count > 50  1/28 Hypocalcemia - calcium gluconate 1 g x 1  1/28 Hypokalemia Potassium chloride 20 Meq PO x 2 Flow cytometry (1/14/23) consistent with acute leukemia (B ALL), FISH detected BCR/ABL (1/19)  Monitor daily CBC with Diff/CMP and transfuse/replete PRN  Patient was on warfarin-NOW HELD for mechanical AVR (1994 [facility]), atrial fibrillation  last dose of warfarin 1/13 at [facility] (5mg)-cardiology consulted.   TLS labs raghav, IVF's, Strict I/O's, daily wights  1/14 TTE - EF 57%, mild pulm HTN, Mechanical AVR likely normal function  1/16 HLA sent  1/17  s/p BMbx c/w ALL (98% blasts) (with Clonoseq sampling)  Decadron 1/18-1/24-monitor sugars now off steroids.   1/19 Started Dasatinib  Vincristine 1.5 mg on Day 8 1/25 with Elitek 3mf IV x1.   Day 15 BMbx on 2/1 and LP to be determined but prior to discharge.   Social work consult to address family issues  Platelets x 1 unit to keep platelet count > 50  1/28 Hypocalcemia - calcium gluconate 1 g x 1  1/28 Hypokalemia Potassium chloride 20 Meq PO x 2  Day 15 Bm bx due on 2/1/23

## 2023-01-29 ENCOUNTER — NON-APPOINTMENT (OUTPATIENT)
Age: 83
End: 2023-01-29

## 2023-01-29 LAB
ALBUMIN SERPL ELPH-MCNC: 3 G/DL — LOW (ref 3.3–5)
ALP SERPL-CCNC: 54 U/L — SIGNIFICANT CHANGE UP (ref 40–120)
ALT FLD-CCNC: 32 U/L — SIGNIFICANT CHANGE UP (ref 10–45)
ANION GAP SERPL CALC-SCNC: 8 MMOL/L — SIGNIFICANT CHANGE UP (ref 5–17)
APTT BLD: 24.4 SEC — LOW (ref 27.5–35.5)
AST SERPL-CCNC: 39 U/L — SIGNIFICANT CHANGE UP (ref 10–40)
BASOPHILS # BLD AUTO: 0 K/UL — SIGNIFICANT CHANGE UP (ref 0–0.2)
BASOPHILS NFR BLD AUTO: 0 % — SIGNIFICANT CHANGE UP (ref 0–2)
BILIRUB SERPL-MCNC: 0.6 MG/DL — SIGNIFICANT CHANGE UP (ref 0.2–1.2)
BUN SERPL-MCNC: 9 MG/DL — SIGNIFICANT CHANGE UP (ref 7–23)
CALCIUM SERPL-MCNC: 8 MG/DL — LOW (ref 8.4–10.5)
CHLORIDE SERPL-SCNC: 104 MMOL/L — SIGNIFICANT CHANGE UP (ref 96–108)
CO2 SERPL-SCNC: 26 MMOL/L — SIGNIFICANT CHANGE UP (ref 22–31)
CREAT SERPL-MCNC: 0.69 MG/DL — SIGNIFICANT CHANGE UP (ref 0.5–1.3)
EGFR: 88 ML/MIN/1.73M2 — SIGNIFICANT CHANGE UP
EOSINOPHIL # BLD AUTO: 0.01 K/UL — SIGNIFICANT CHANGE UP (ref 0–0.5)
EOSINOPHIL NFR BLD AUTO: 1.6 % — SIGNIFICANT CHANGE UP (ref 0–6)
FIBRINOGEN PPP-MCNC: 275 MG/DL — SIGNIFICANT CHANGE UP (ref 200–445)
GLUCOSE BLDC GLUCOMTR-MCNC: 116 MG/DL — HIGH (ref 70–99)
GLUCOSE BLDC GLUCOMTR-MCNC: 117 MG/DL — HIGH (ref 70–99)
GLUCOSE BLDC GLUCOMTR-MCNC: 123 MG/DL — HIGH (ref 70–99)
GLUCOSE BLDC GLUCOMTR-MCNC: 134 MG/DL — HIGH (ref 70–99)
GLUCOSE SERPL-MCNC: 60 MG/DL — LOW (ref 70–99)
HCT VFR BLD CALC: 20.6 % — CRITICAL LOW (ref 34.5–45)
HGB BLD-MCNC: 6.8 G/DL — CRITICAL LOW (ref 11.5–15.5)
INR BLD: 0.99 RATIO — SIGNIFICANT CHANGE UP (ref 0.88–1.16)
LDH SERPL L TO P-CCNC: 540 U/L — HIGH (ref 50–242)
LYMPHOCYTES # BLD AUTO: 0.81 K/UL — LOW (ref 1–3.3)
LYMPHOCYTES # BLD AUTO: 92.2 % — HIGH (ref 13–44)
MAGNESIUM SERPL-MCNC: 1.7 MG/DL — SIGNIFICANT CHANGE UP (ref 1.6–2.6)
MCHC RBC-ENTMCNC: 27.9 PG — SIGNIFICANT CHANGE UP (ref 27–34)
MCHC RBC-ENTMCNC: 33 GM/DL — SIGNIFICANT CHANGE UP (ref 32–36)
MCV RBC AUTO: 84.4 FL — SIGNIFICANT CHANGE UP (ref 80–100)
MONOCYTES # BLD AUTO: 0 K/UL — SIGNIFICANT CHANGE UP (ref 0–0.9)
MONOCYTES NFR BLD AUTO: 0 % — LOW (ref 2–14)
NEUTROPHILS # BLD AUTO: 0.05 K/UL — LOW (ref 1.8–7.4)
NEUTROPHILS NFR BLD AUTO: 6.2 % — LOW (ref 43–77)
PHOSPHATE SERPL-MCNC: 2.5 MG/DL — SIGNIFICANT CHANGE UP (ref 2.5–4.5)
PLATELET # BLD AUTO: 71 K/UL — LOW (ref 150–400)
POTASSIUM SERPL-MCNC: 3.5 MMOL/L — SIGNIFICANT CHANGE UP (ref 3.5–5.3)
POTASSIUM SERPL-SCNC: 3.5 MMOL/L — SIGNIFICANT CHANGE UP (ref 3.5–5.3)
PROT SERPL-MCNC: 4.9 G/DL — LOW (ref 6–8.3)
PROTHROM AB SERPL-ACNC: 11.5 SEC — SIGNIFICANT CHANGE UP (ref 10.5–13.4)
RBC # BLD: 2.44 M/UL — LOW (ref 3.8–5.2)
RBC # FLD: 15.8 % — HIGH (ref 10.3–14.5)
SODIUM SERPL-SCNC: 138 MMOL/L — SIGNIFICANT CHANGE UP (ref 135–145)
URATE SERPL-MCNC: <0.5 MG/DL — LOW (ref 2.5–7)
WBC # BLD: 0.88 K/UL — CRITICAL LOW (ref 3.8–10.5)
WBC # FLD AUTO: 0.88 K/UL — CRITICAL LOW (ref 3.8–10.5)

## 2023-01-29 PROCEDURE — 99232 SBSQ HOSP IP/OBS MODERATE 35: CPT

## 2023-01-29 RX ORDER — MAGNESIUM SULFATE 500 MG/ML
2 VIAL (ML) INJECTION ONCE
Refills: 0 | Status: COMPLETED | OUTPATIENT
Start: 2023-01-29 | End: 2023-01-29

## 2023-01-29 RX ORDER — FUROSEMIDE 40 MG
40 TABLET ORAL ONCE
Refills: 0 | Status: COMPLETED | OUTPATIENT
Start: 2023-01-29 | End: 2023-01-29

## 2023-01-29 RX ORDER — POTASSIUM CHLORIDE 20 MEQ
40 PACKET (EA) ORAL ONCE
Refills: 0 | Status: COMPLETED | OUTPATIENT
Start: 2023-01-29 | End: 2023-01-29

## 2023-01-29 RX ORDER — POTASSIUM CHLORIDE 20 MEQ
20 PACKET (EA) ORAL
Refills: 0 | Status: COMPLETED | OUTPATIENT
Start: 2023-01-29 | End: 2023-01-29

## 2023-01-29 RX ADMIN — CASPOFUNGIN ACETATE 260 MILLIGRAM(S): 7 INJECTION, POWDER, LYOPHILIZED, FOR SOLUTION INTRAVENOUS at 17:37

## 2023-01-29 RX ADMIN — ZINC OXIDE 1 APPLICATION(S): 200 OINTMENT TOPICAL at 06:37

## 2023-01-29 RX ADMIN — VALSARTAN 320 MILLIGRAM(S): 80 TABLET ORAL at 06:35

## 2023-01-29 RX ADMIN — Medication 20 MILLIGRAM(S): at 06:35

## 2023-01-29 RX ADMIN — Medication 500 MILLIGRAM(S): at 11:28

## 2023-01-29 RX ADMIN — Medication 240 MILLIGRAM(S): at 06:34

## 2023-01-29 RX ADMIN — Medication 650 MILLIGRAM(S): at 21:40

## 2023-01-29 RX ADMIN — Medication 50 MILLIEQUIVALENT(S): at 13:24

## 2023-01-29 RX ADMIN — Medication 5 MILLIGRAM(S): at 21:40

## 2023-01-29 RX ADMIN — PREGABALIN 1000 MICROGRAM(S): 225 CAPSULE ORAL at 11:27

## 2023-01-29 RX ADMIN — MAGNESIUM OXIDE 400 MG ORAL TABLET 400 MILLIGRAM(S): 241.3 TABLET ORAL at 08:54

## 2023-01-29 RX ADMIN — Medication 40 MILLIEQUIVALENT(S): at 08:55

## 2023-01-29 RX ADMIN — CHLORHEXIDINE GLUCONATE 1 APPLICATION(S): 213 SOLUTION TOPICAL at 08:52

## 2023-01-29 RX ADMIN — Medication 50 MILLIEQUIVALENT(S): at 10:39

## 2023-01-29 RX ADMIN — ATORVASTATIN CALCIUM 80 MILLIGRAM(S): 80 TABLET, FILM COATED ORAL at 21:39

## 2023-01-29 RX ADMIN — ZINC OXIDE 1 APPLICATION(S): 200 OINTMENT TOPICAL at 18:19

## 2023-01-29 RX ADMIN — Medication 25 GRAM(S): at 08:51

## 2023-01-29 RX ADMIN — PANTOPRAZOLE SODIUM 40 MILLIGRAM(S): 20 TABLET, DELAYED RELEASE ORAL at 06:34

## 2023-01-29 RX ADMIN — Medication 40 MILLIGRAM(S): at 11:28

## 2023-01-29 RX ADMIN — DASATINIB 140 MILLIGRAM(S): 80 TABLET ORAL at 11:28

## 2023-01-29 RX ADMIN — SODIUM CHLORIDE 50 MILLILITER(S): 9 INJECTION INTRAMUSCULAR; INTRAVENOUS; SUBCUTANEOUS at 01:22

## 2023-01-29 NOTE — PROGRESS NOTE ADULT - PROBLEM SELECTOR PLAN 4
Continue accuchecks/ISS  1/19 Started Lantus 10u while on steroids (last dose of decadron 1/24) Continue accuchecks/ISS  1/19 Started Lantus 10u while on steroids (last dose of decadron 1/24)  1/29 D/C'd Lantus

## 2023-01-29 NOTE — PROGRESS NOTE ADULT - ATTENDING COMMENTS
81 YO F with a PMhx of Nicolasa, s/p Saint Darian mechanical AVR, (1994–[facility]) for rheumatic aortic valve stenosis, ascending aortic aneurysm, HTN,  HLD, depression, GERD transferred from [facility]. Dx is Ph+ ALL.  She was also noted to have a supratherapeutic INR. CT angio A/P which showed no evidence of active GIB, but showing focal diminished cortical enhancement involving the upper left kidney concerning for focal pyelonephritis or possibly infarct. EGD without any acute findings. Colonoscopy showed grade I internal hemorrhoid and polyps in transverse colon and hepatic flexure. 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     -had BMBx on 1/17 >>> 99% B-lymphoblasts  -baseline echo EF 57%  -has PICC line   -started treatment with dexamethasone 20 mg d1-7, dasatinib 140 mg/d  -Day 10 of treatment  -Monitor LDH, Uric Acid and CMP daily   -keep Hb >7, plt >10  -given rasburicase twice  -allopurinol can be d/c'd    -WBC rapidly decreased, PB blasts cleared  -phos, LDH up now coming down gradually  -LDH began plateauing >>> off dex now, given VCR 1.5 mg IV x 1 on 1/25/23,  LDH now decreasing again    -cont to follow TLS labs  -given cryo 1/20/2, 1/25/24 and 1/7/23  -fibrinogen 148 1/27/23  -has had active DIC; uncommon but reported in ALL    -BMA/Bx d15 as per [id number]    -ICH; had SDH  -headaches since 1/15/23, now improved  -1/15/23 CT head showed 8mm bleed, repeat CT is stable  -no intervention as per Neursx  -hold coumadin   -keep plt > 50 for now post SDH     Cardio  HTN - c/w home meds  Mechanical valve - currently off AC, discussed with cardiology  will hold AC in light of risk/benefit in present setting 81 YO F with a PMhx of Nicolasa, s/p Saint Darian mechanical AVR, (1992–[facility]) for rheumatic aortic valve stenosis, ascending aortic aneurysm, HTN,  HLD, depression, GERD transferred from [facility]. Dx is Ph+ ALL.  She was also noted to have a supratherapeutic INR. CT angio A/P which showed no evidence of active GIB, but showing focal diminished cortical enhancement involving the upper left kidney concerning for focal pyelonephritis or possibly infarct. EGD without any acute findings. Colonoscopy showed grade I internal hemorrhoid and polyps in transverse colon and hepatic flexure. 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     -had BMBx on 1/17 >>> 99% B-lymphoblasts  -baseline echo EF 57%  -has PICC line   -started treatment with dexamethasone 20 mg d1-7, dasatinib 140 mg/d  -Day 12 of treatment  -Monitor LDH, Uric Acid and CMP daily   -keep Hb >7, plt >10  -given rasburicase twice  -allopurinol can be d/c'd    -WBC rapidly decreased, PB blasts cleared  -phos, LDH up now coming down gradually  -LDH began plateauing >>> off dex now, given VCR 1.5 mg IV x 1 on 1/25/23,  LDH now decreasing again    -cont to follow TLS labs  -given cryo 1/20/2, 1/25/24 and 1/7/23  -fibrinogen 148 1/27/23, now normal   -has had active DIC; uncommon but reported in ALL    -BMA/Bx d15 as per [id number]    -ICH; had SDH  -headaches since 1/15/23, now improved  -1/15/23 CT head showed 8mm bleed, repeat CT is stable  -no intervention as per Neursx  -hold coumadin   -keep plt > 50 for now post SDH     Cardio  HTN - c/w home meds  Mechanical valve - currently off AC, discussed with cardiology  will hold AC in light of risk/benefit in present setting

## 2023-01-29 NOTE — PROGRESS NOTE ADULT - SUBJECTIVE AND OBJECTIVE BOX
Diagnosis: newly diagnosed B-ALL, Ph (+)    Protocol/Chemo Regimen: Following 38523 (Decadron days 1-7 + Dasatinib), Vincristine 1.5mg (on D8)    Day: 12     Pt endorsed:     Review of Systems:    Pain scale: denies    Diet: regular    Allergies  No Known Allergies    Intolerances      MEDICATIONS  (STANDING):  ascorbic acid 500 milliGRAM(s) Oral daily  atorvastatin 80 milliGRAM(s) Oral at bedtime  caspofungin IVPB 50 milliGRAM(s) IV Intermittent every 24 hours  chlorhexidine 4% Liquid 1 Application(s) Topical <User Schedule>  cyanocobalamin 1000 MICROGram(s) Oral daily  dasatinib 140 milliGRAM(s) Oral daily  dextrose 5%. 1000 milliLiter(s) (100 mL/Hr) IV Continuous <Continuous>  dextrose 5%. 1000 milliLiter(s) (50 mL/Hr) IV Continuous <Continuous>  dextrose 50% Injectable 25 Gram(s) IV Push once  dextrose 50% Injectable 12.5 Gram(s) IV Push once  diltiazem    milliGRAM(s) Oral daily  furosemide   Injectable 40 milliGRAM(s) IV Push once  glucagon  Injectable 1 milliGRAM(s) IntraMuscular once  hydrochlorothiazide 25 milliGRAM(s) Oral daily  insulin glargine Injectable (LANTUS) 10 Unit(s) SubCutaneous at bedtime  insulin lispro (ADMELOG) corrective regimen sliding scale   SubCutaneous three times a day before meals  levoFLOXacin  Tablet 500 milliGRAM(s) Oral every 24 hours  melatonin 5 milliGRAM(s) Oral at bedtime  pantoprazole    Tablet 40 milliGRAM(s) Oral before breakfast  potassium chloride  20 mEq/100 mL IVPB 20 milliEquivalent(s) IV Intermittent every 2 hours  sodium chloride 0.9%. 1000 milliLiter(s) (50 mL/Hr) IV Continuous <Continuous>  valsartan 320 milliGRAM(s) Oral daily  zinc oxide 40% Paste 1 Application(s) Topical two times a day    MEDICATIONS  (PRN):  acetaminophen     Tablet .. 650 milliGRAM(s) Oral every 6 hours PRN Temp greater or equal to 38C (100.4F), Mild Pain (1 - 3)  aluminum hydroxide/magnesium hydroxide/simethicone Suspension 30 milliLiter(s) Oral every 4 hours PRN Dyspepsia  dextrose Oral Gel 15 Gram(s) Oral once PRN Blood Glucose LESS THAN 70 milliGRAM(s)/deciliter  loperamide 2 milliGRAM(s) Oral three times a day PRN Diarrhea  ondansetron Injectable 8 milliGRAM(s) IV Push every 8 hours PRN Nausea  sodium chloride 0.9% lock flush 10 milliLiter(s) IV Push every 1 hour PRN Pre/post blood products, medications, blood draw, and to maintain line patency      Vital Signs Last 24 Hrs  T(C): 36.8 (29 Jan 2023 09:05), Max: 37.2 (28 Jan 2023 12:35)  T(F): 98.2 (29 Jan 2023 09:05), Max: 99 (28 Jan 2023 12:35)  HR: 80 (29 Jan 2023 09:05) (69 - 95)  BP: 117/69 (29 Jan 2023 09:05) (112/70 - 138/75)  BP(mean): --  RR: 18 (29 Jan 2023 09:05) (16 - 18)  SpO2: 99% (29 Jan 2023 09:05) (95% - 100%)    Parameters below as of 29 Jan 2023 09:05  Patient On (Oxygen Delivery Method): room air    I&O's Summary    28 Jan 2023 07:01  -  29 Jan 2023 07:00  --------------------------------------------------------  IN: 2227 mL / OUT: 2050 mL / NET: 177 mL    29 Jan 2023 07:01  -  29 Jan 2023 10:31  --------------------------------------------------------  IN: 180 mL / OUT: 1100 mL / NET: -920 mL      PHYSICAL EXAM  General: NAD  HEENT: Sclerae anicteric, clear oropharynx, no oral lesions  CV: normal S1/S2, reg  Lungs: breath sounds clear and equal bilaterally  Abdomen: soft non-tender non-distended  Ext: no edema  Skin: no rash  Neuro: alert and oriented X 4  Central Line: RUE PICC clean, dry, intact    LABS:                        6.8    0.88  )-----------( 71       ( 29 Jan 2023 07:06 )             20.6     29 Jan 2023 07:22    138    |  104    |  9      ----------------------------<  60     3.5     |  26     |  0.69     Ca    8.0        29 Jan 2023 07:22  Phos  2.5       29 Jan 2023 07:22  Mg     1.7       29 Jan 2023 07:22    TPro  4.9    /  Alb  3.0    /  TBili  0.6    /  DBili  x      /  AST  39     /  ALT  32     /  AlkPhos  54     29 Jan 2023 07:22    PT/INR - ( 29 Jan 2023 07:06 )   PT: 11.5 sec;   INR: 0.99 ratio     PTT - ( 29 Jan 2023 07:06 )  PTT:24.4 sec      POCT Blood Glucose.: 123 mg/dL (29 Jan 2023 07:46)  POCT Blood Glucose.: 111 mg/dL (28 Jan 2023 21:51)  POCT Blood Glucose.: 86 mg/dL (28 Jan 2023 17:28)  POCT Blood Glucose.: 177 mg/dL (28 Jan 2023 12:40)    LIVER FUNCTIONS - ( 29 Jan 2023 07:22 )  Alb: 3.0 g/dL / Pro: 4.9 g/dL / ALK PHOS: 54 U/L / ALT: 32 U/L / AST: 39 U/L / GGT: x                         Radiology     CT Head No Cont (01.16.23 @ 11:53) >  IMPRESSION:    No interval change in left parietal subdural bleed.  No new site of bleeding or progressive mass effect.                                                            Diagnosis: newly diagnosed B-ALL, Ph (+)    Protocol/Chemo Regimen: Following 60641 (Decadron days 1-7 + Dasatinib), Vincristine 1.5mg (on D8)    Day: 12     Pt endorsed: No overnight events, taking po's, +OOB to chair    Review of Systems: Denies n/v, CP, SOB, HA or dizziness,     Pain scale: denies    Diet: regular    Allergies  No Known Allergies    Intolerances      MEDICATIONS  (STANDING):  ascorbic acid 500 milliGRAM(s) Oral daily  atorvastatin 80 milliGRAM(s) Oral at bedtime  caspofungin IVPB 50 milliGRAM(s) IV Intermittent every 24 hours  chlorhexidine 4% Liquid 1 Application(s) Topical <User Schedule>  cyanocobalamin 1000 MICROGram(s) Oral daily  dasatinib 140 milliGRAM(s) Oral daily  dextrose 5%. 1000 milliLiter(s) (100 mL/Hr) IV Continuous <Continuous>  dextrose 5%. 1000 milliLiter(s) (50 mL/Hr) IV Continuous <Continuous>  dextrose 50% Injectable 25 Gram(s) IV Push once  dextrose 50% Injectable 12.5 Gram(s) IV Push once  diltiazem    milliGRAM(s) Oral daily  furosemide   Injectable 40 milliGRAM(s) IV Push once  glucagon  Injectable 1 milliGRAM(s) IntraMuscular once  hydrochlorothiazide 25 milliGRAM(s) Oral daily  insulin glargine Injectable (LANTUS) 10 Unit(s) SubCutaneous at bedtime  insulin lispro (ADMELOG) corrective regimen sliding scale   SubCutaneous three times a day before meals  levoFLOXacin  Tablet 500 milliGRAM(s) Oral every 24 hours  melatonin 5 milliGRAM(s) Oral at bedtime  pantoprazole    Tablet 40 milliGRAM(s) Oral before breakfast  potassium chloride  20 mEq/100 mL IVPB 20 milliEquivalent(s) IV Intermittent every 2 hours  sodium chloride 0.9%. 1000 milliLiter(s) (50 mL/Hr) IV Continuous <Continuous>  valsartan 320 milliGRAM(s) Oral daily  zinc oxide 40% Paste 1 Application(s) Topical two times a day    MEDICATIONS  (PRN):  acetaminophen     Tablet .. 650 milliGRAM(s) Oral every 6 hours PRN Temp greater or equal to 38C (100.4F), Mild Pain (1 - 3)  aluminum hydroxide/magnesium hydroxide/simethicone Suspension 30 milliLiter(s) Oral every 4 hours PRN Dyspepsia  dextrose Oral Gel 15 Gram(s) Oral once PRN Blood Glucose LESS THAN 70 milliGRAM(s)/deciliter  loperamide 2 milliGRAM(s) Oral three times a day PRN Diarrhea  ondansetron Injectable 8 milliGRAM(s) IV Push every 8 hours PRN Nausea  sodium chloride 0.9% lock flush 10 milliLiter(s) IV Push every 1 hour PRN Pre/post blood products, medications, blood draw, and to maintain line patency      Vital Signs Last 24 Hrs  T(C): 36.8 (29 Jan 2023 09:05), Max: 37.2 (28 Jan 2023 12:35)  T(F): 98.2 (29 Jan 2023 09:05), Max: 99 (28 Jan 2023 12:35)  HR: 80 (29 Jan 2023 09:05) (69 - 95)  BP: 117/69 (29 Jan 2023 09:05) (112/70 - 138/75)  BP(mean): --  RR: 18 (29 Jan 2023 09:05) (16 - 18)  SpO2: 99% (29 Jan 2023 09:05) (95% - 100%)    Parameters below as of 29 Jan 2023 09:05  Patient On (Oxygen Delivery Method): room air    I&O's Summary    28 Jan 2023 07:01  -  29 Jan 2023 07:00  --------------------------------------------------------  IN: 2227 mL / OUT: 2050 mL / NET: 177 mL    29 Jan 2023 07:01  -  29 Jan 2023 10:31  --------------------------------------------------------  IN: 180 mL / OUT: 1100 mL / NET: -920 mL      PHYSICAL EXAM  General: NAD  HEENT: Sclerae anicteric, clear oropharynx, no oral lesions  CV: normal S1/S2, reg  Lungs: breath sounds clear and equal bilaterally  Abdomen: soft non-tender non-distended  Ext: no edema  Skin: no rash  Neuro: alert and oriented X 4  Central Line: RUE PICC clean, dry, intact    LABS:                        6.8    0.88  )-----------( 71       ( 29 Jan 2023 07:06 )             20.6     29 Jan 2023 07:22    138    |  104    |  9      ----------------------------<  60     3.5     |  26     |  0.69     Ca    8.0        29 Jan 2023 07:22  Phos  2.5       29 Jan 2023 07:22  Mg     1.7       29 Jan 2023 07:22    TPro  4.9    /  Alb  3.0    /  TBili  0.6    /  DBili  x      /  AST  39     /  ALT  32     /  AlkPhos  54     29 Jan 2023 07:22    PT/INR - ( 29 Jan 2023 07:06 )   PT: 11.5 sec;   INR: 0.99 ratio     PTT - ( 29 Jan 2023 07:06 )  PTT:24.4 sec      POCT Blood Glucose.: 123 mg/dL (29 Jan 2023 07:46)  POCT Blood Glucose.: 111 mg/dL (28 Jan 2023 21:51)  POCT Blood Glucose.: 86 mg/dL (28 Jan 2023 17:28)  POCT Blood Glucose.: 177 mg/dL (28 Jan 2023 12:40)    LIVER FUNCTIONS - ( 29 Jan 2023 07:22 )  Alb: 3.0 g/dL / Pro: 4.9 g/dL / ALK PHOS: 54 U/L / ALT: 32 U/L / AST: 39 U/L / GGT: x                         Radiology     CT Head No Cont (01.16.23 @ 11:53) >  IMPRESSION:    No interval change in left parietal subdural bleed.  No new site of bleeding or progressive mass effect.                                                            Diagnosis: newly diagnosed B-ALL, Ph (+)    Protocol/Chemo Regimen: Following 54709 (Decadron days 1-7 + Dasatinib), Vincristine 1.5mg (on D8)    Day: 12     Pt endorsed: No overnight events, taking po's, +OOB to chair    Review of Systems: Denies n/v, CP, SOB, HA or dizziness,     Pain scale: denies    Diet: regular    Allergies  No Known Allergies    Intolerances      MEDICATIONS  (STANDING):  ascorbic acid 500 milliGRAM(s) Oral daily  atorvastatin 80 milliGRAM(s) Oral at bedtime  caspofungin IVPB 50 milliGRAM(s) IV Intermittent every 24 hours  chlorhexidine 4% Liquid 1 Application(s) Topical <User Schedule>  cyanocobalamin 1000 MICROGram(s) Oral daily  dasatinib 140 milliGRAM(s) Oral daily  dextrose 5%. 1000 milliLiter(s) (100 mL/Hr) IV Continuous <Continuous>  dextrose 5%. 1000 milliLiter(s) (50 mL/Hr) IV Continuous <Continuous>  dextrose 50% Injectable 25 Gram(s) IV Push once  dextrose 50% Injectable 12.5 Gram(s) IV Push once  diltiazem    milliGRAM(s) Oral daily  furosemide   Injectable 40 milliGRAM(s) IV Push once  glucagon  Injectable 1 milliGRAM(s) IntraMuscular once  hydrochlorothiazide 25 milliGRAM(s) Oral daily  insulin glargine Injectable (LANTUS) 10 Unit(s) SubCutaneous at bedtime  insulin lispro (ADMELOG) corrective regimen sliding scale   SubCutaneous three times a day before meals  levoFLOXacin  Tablet 500 milliGRAM(s) Oral every 24 hours  melatonin 5 milliGRAM(s) Oral at bedtime  pantoprazole    Tablet 40 milliGRAM(s) Oral before breakfast  potassium chloride  20 mEq/100 mL IVPB 20 milliEquivalent(s) IV Intermittent every 2 hours  sodium chloride 0.9%. 1000 milliLiter(s) (50 mL/Hr) IV Continuous <Continuous>  valsartan 320 milliGRAM(s) Oral daily  zinc oxide 40% Paste 1 Application(s) Topical two times a day    MEDICATIONS  (PRN):  acetaminophen     Tablet .. 650 milliGRAM(s) Oral every 6 hours PRN Temp greater or equal to 38C (100.4F), Mild Pain (1 - 3)  aluminum hydroxide/magnesium hydroxide/simethicone Suspension 30 milliLiter(s) Oral every 4 hours PRN Dyspepsia  dextrose Oral Gel 15 Gram(s) Oral once PRN Blood Glucose LESS THAN 70 milliGRAM(s)/deciliter  loperamide 2 milliGRAM(s) Oral three times a day PRN Diarrhea  ondansetron Injectable 8 milliGRAM(s) IV Push every 8 hours PRN Nausea  sodium chloride 0.9% lock flush 10 milliLiter(s) IV Push every 1 hour PRN Pre/post blood products, medications, blood draw, and to maintain line patency      Vital Signs Last 24 Hrs  T(C): 36.8 (29 Jan 2023 09:05), Max: 37.2 (28 Jan 2023 12:35)  T(F): 98.2 (29 Jan 2023 09:05), Max: 99 (28 Jan 2023 12:35)  HR: 80 (29 Jan 2023 09:05) (69 - 95)  BP: 117/69 (29 Jan 2023 09:05) (112/70 - 138/75)  BP(mean): --  RR: 18 (29 Jan 2023 09:05) (16 - 18)  SpO2: 99% (29 Jan 2023 09:05) (95% - 100%)    Parameters below as of 29 Jan 2023 09:05  Patient On (Oxygen Delivery Method): room air    I&O's Summary    28 Jan 2023 07:01  -  29 Jan 2023 07:00  --------------------------------------------------------  IN: 2227 mL / OUT: 2050 mL / NET: 177 mL    29 Jan 2023 07:01  -  29 Jan 2023 10:31  --------------------------------------------------------  IN: 180 mL / OUT: 1100 mL / NET: -920 mL      PHYSICAL EXAM  General: NAD  HEENT: Sclerae anicteric, clear oropharynx, no oral lesions  CV: normal S1/S2, reg  Lungs: breath sounds clear and equal bilaterally  Abdomen: soft non-tender non-distended  Ext: no edema  Skin: no rash  Neuro: alert and oriented X 4  Central Line: RUE PICC clean, dry, intact    LABS:                        6.8    0.88  )-----------( 71       ( 29 Jan 2023 07:06 )             20.6     29 Jan 2023 07:22    138    |  104    |  9      ----------------------------<  60     3.5     |  26     |  0.69     Ca    8.0        29 Jan 2023 07:22  Phos  2.5       29 Jan 2023 07:22  Mg     1.7       29 Jan 2023 07:22    TPro  4.9    /  Alb  3.0    /  TBili  0.6    /  DBili  x      /  AST  39     /  ALT  32     /  AlkPhos  54     29 Jan 2023 07:22    PT/INR - ( 29 Jan 2023 07:06 )   PT: 11.5 sec;   INR: 0.99 ratio     PTT - ( 29 Jan 2023 07:06 )  PTT:24.4 sec      POCT Blood Glucose.: 123 mg/dL (29 Jan 2023 07:46)  POCT Blood Glucose.: 111 mg/dL (28 Jan 2023 21:51)  POCT Blood Glucose.: 86 mg/dL (28 Jan 2023 17:28)  POCT Blood Glucose.: 177 mg/dL (28 Jan 2023 12:40)    LIVER FUNCTIONS - ( 29 Jan 2023 07:22 )  Alb: 3.0 g/dL / Pro: 4.9 g/dL / ALK PHOS: 54 U/L / ALT: 32 U/L / AST: 39 U/L / GGT: x                     Radiology   CT Head No Cont (01.16.23 @ 11:53) >  IMPRESSION:    No interval change in left parietal subdural bleed.  No new site of bleeding or progressive mass effect.

## 2023-01-29 NOTE — PROGRESS NOTE ADULT - PROBLEM SELECTOR PLAN 1
Flow cytometry (1/14/23) consistent with acute leukemia (B ALL), FISH detected BCR/ABL (1/19)  Monitor daily CBC with Diff/CMP and transfuse/replete PRN  Patient was on warfarin-NOW HELD for mechanical AVR ([address] [facility]), atrial fibrillation  last dose of warfarin 1/13 at [facility] (5mg)-cardiology consulted.   TLS labs raghav, IVF's, Strict I/O's, daily wights  1/14 TTE - EF 57%, mild pulm HTN, Mechanical AVR likely normal function  1/16 HLA sent  1/17  s/p BMbx c/w ALL (98% blasts) (with Clonoseq sampling)  Decadron 1/18-1/24-monitor sugars now off steroids.   1/19 Started Dasatinib  Vincristine 1.5 mg on Day 8 1/25 with Elitek 3mf IV x1.   Day 15 BMbx on 2/1 and LP to be determined but prior to discharge.   Social work consult to address family issues  Platelets x 1 unit to keep platelet count > 50  1/28 Hypocalcemia - calcium gluconate 1 g x 1  1/28 Hypokalemia Potassium chloride 20 Meq PO x 2  Day 15 Bm bx due on 2/1/23 Flow cytometry (1/14/23) consistent with acute leukemia (B ALL), FISH detected BCR/ABL (1/19)  Monitor daily CBC with Diff/CMP and transfuse/replete PRN  Patient was on warfarin-NOW HELD for mechanical AVR (1994 [facility]), atrial fibrillation  last dose of warfarin 1/13 at [facility] (5mg)-cardiology consulted.   TLS labs raghav, IVF's, Strict I/O's, daily wights  1/14 TTE - EF 57%, mild pulm HTN, Mechanical AVR likely normal function  1/16 HLA sent  1/17  s/p BMbx c/w ALL (98% blasts) (with Clonoseq sampling)  Decadron 1/18-1/24-monitor sugars now off steroids.   1/19 Started Dasatinib  Vincristine 1.5 mg on Day 8 1/25 with Elitek 3mg IV x1.   Transfuse for Hgb <7.0, platelet count > 50 (+SDH)  1/29 Transfuse 1u PRBC, Lasix 40mg IV x 1, replete K/Mg PO/IV  Day 15 BMbx on 2/1 and LP to be determined but prior to discharge.   Social work consult to address family issues

## 2023-01-29 NOTE — PROGRESS NOTE ADULT - ASSESSMENT
82 year old female with atrial fibrillation, s/p Saint Darian mechanical AVR, (1992–[facility]) for rheumatic aortic valve stenosis, ascending aortic aneurysm, HTN, HLD, depression, GERD transferred from [facility] for management of newly diagnosed PH + B cell ALL. Peripheral flow performed, BMBx consistent with B-cell ALL, FISH Ph+ treated with Dex and Dasatinib following CALGB 09211. 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 Anemia and thrombocytopenia secondary to disease.

## 2023-01-29 NOTE — PROGRESS NOTE ADULT - PROBLEM SELECTOR PLAN 5
Cardiology Follow Up    Beckie Jacobs  1944  [id number]  [facility]  [address] [address]  [phone]    1  Mixed hyperlipidemia     2  S/P TAVR (transcatheter aortic valve replacement)  metoprolol tartrate (LOPRESSOR) 25 mg tablet       Interval History: Followup post tavr    Doing well  No chest pain, dyspnea or palpitations  Doing well in cardiac rehab  Medical Problems             Problem List     Cataract    Hyperlipidemia    Umbilical hernia without obstruction and without gangrene    Prediabetes    Severe aortic stenosis    Rectus diastasis    Obesity (BMI 30 0-34  9)    Laceration of right palm    Encounter for pre-operative laboratory testing    S/P TAVR (transcatheter aortic valve replacement)    Hyperchloremia    CAD (coronary artery disease)    CHF (congestive heart failure) ([facility])    Wt Readings from Last 3 Encounters:   04/25/22 91 7 kg (202 lb 3 2 oz)   04/01/22 92 5 kg (204 lb)   04/01/22 93 8 kg (206 lb 11 2 oz)                           Past Medical History:   Diagnosis Date    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, 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    CAD (coronary artery disease)     Cataract     CHF (congestive heart failure) (HCC)     Glaucoma     Hyperlipidemia     Moderate aortic insufficiency     Prediabetes     Severe aortic stenosis     Sinus bradycardia     Umbilical hernia      Social History     Socioeconomic History    Marital status: /Civil Union     Spouse name: Not on file    Number of children: Not on file    Years of education: Not on file    Highest education level: Not on file   Occupational History    Not on file   Tobacco Use    Smoking status: Never Smoker    Smokeless tobacco: Never Used   Vaping Use    Vaping Use: Never used   Substance and Sexual Activity    Alcohol use:  Yes     Alcohol/week: 2 0 standard drinks     Types: 2 Cans of beer per week     Comment: twice a wk     Drug use: No    Sexual activity: Not on file   Other Topics Concern    Not on file   Social History Narrative    Not on file     Social Determinants of Health     Financial Resource Strain: Not on file   Food Insecurity: Not on file   Transportation Needs: Not on file   Physical Activity: Not on file   Stress: Not on file   Social Connections: Not on file   Intimate Partner Violence: Not on file   Housing Stability: Not on file      Family History   Problem Relation Age of Onset    Colon cancer Mother     Other Father         heart problem    Parkinsonism Family      Past Surgical History:   Procedure Laterality Date    ARTHROSCOPY KNEE Right     BUNIONECTOMY Right     CARDIAC CATHETERIZATION Bilateral 2/14/2022    Procedure: Cardiac catheterization;  Surgeon: Veto Frost MD;  [facility]: [facility]; Service: Cardiology    CARDIAC CATHETERIZATION N/A 2/22/2022    Procedure: CARDIAC TAVR;  Surgeon: Janet Mcclendon MD;  Location: [facility] MAIN OR;  Service: Cardiology    HEMORRHOID SURGERY      MS ECHO TRANSESOPHAG R-T 2D W/PRB IMG ACQUISJ I&R N/A 2/22/2022    Procedure: TRANSESOPHAGEAL ECHOCARDIOGRAM (ELISSA); Surgeon: Gumaro Suárez MD;  Location: [facility] MA[facility];  Service: Cardiac Surgery    MS REPLACE AORTIC VALVE OPENFEMORAL ARTERY APPROACH N/A 2/22/2022    Procedure: REPLACEMENT AORTIC VALVE TRANSCATHETER (TAVR) TRANSFEMORAL W/ 29MM SARKAR PAULO S3 ULTRA VALVE(ACCESS ON LEFT);   Surgeon: Gumaro Suárez MD;  Location[facility];  Service: Cardiac Surgery    SHOULDER SURGERY  2012    TONSILLECTOMY      childhood       Current Outpatient Medications:     aspirin 81 mg chewable tablet, Chew 1 tablet (81 mg total) daily, Disp: 30 tablet, Rfl: 2    Lumigan 0 01 % ophthalmic drops, , Disp: , Rfl:     metoprolol tartrate (LOPRESSOR) 25 mg tablet, Take 0 5 tablets (12 5 mg total) by mouth every 12 (twelve) hours, Disp: 90 tablet, Rfl: 3   simvastatin (ZOCOR) 10 mg tablet, Take 1 tablet by mouth once daily, Disp: 90 tablet, Rfl: 0    acetaminophen (TYLENOL) 325 mg tablet, Take 2 tablets (650 mg total) by mouth every 6 (six) hours as needed for fever (temperature greater than 101 F) (Patient not taking: Reported on 4/25/2022 ), Disp: , Rfl: 0  No Known Allergies    Labs:     Chemistry        Component Value Date/Time     11/20/2014 0905    K 4 1 03/30/2022 0712    K 4 4 11/20/2014 0905     (H) 03/30/2022 0712     11/20/2014 0905    CO2 27 03/30/2022 0712    CO2 24 02/22/2022 1039    BUN 28 (H) 03/30/2022 0712    BUN 17 11/20/2014 0905    CREATININE 1 11 03/30/2022 0712    CREATININE 0 77 11/20/2014 0905        Component Value Date/Time    CALCIUM 9 2 03/30/2022 0712    CALCIUM 8 8 11/20/2014 0905    ALKPHOS 51 02/02/2022 1131    ALKPHOS 71 11/20/2014 0905    AST 25 02/02/2022 1131    AST 27 11/20/2014 0905    ALT 44 02/02/2022 1131    ALT 46 11/20/2014 0905    BILITOT 0 5 11/20/2014 0905            Lab Results   Component Value Date    CHOL 246 11/20/2014     Lab Results   Component Value Date    HDL 65 10/27/2021    HDL 55 05/22/2020    HDL 61 (H) 04/05/2019     Lab Results   Component Value Date    LDLCALC 100 10/27/2021    LDLCALC 93 05/22/2020    LDLCALC 109 (H) 04/05/2019     Lab Results   Component Value Date    TRIG 102 10/27/2021    TRIG 61 05/22/2020    TRIG 93 04/05/2019     No results found for: CHOLHDL    Imaging: Echo complete w/ contrast if indicated    Result Date: 4/1/2022  Narrative: Jacob Lopez  Left Ventricle: Left ventricular cavity size is normal  Wall thickness is mildly increased  The left ventricular ejection fraction is 60%  Systolic function is normal  Wall motion is normal  Diastolic function is mildly abnormal, consistent with grade I (abnormal) relaxation  There is mild concentric hypertrophy     Right Ventricle: Right ventricular cavity size is normal  Systolic function is normal    Left Atrium: The atrium is mildly dilated    Aortic Valve: There is an Pan PAULO 3 Ultra 29 mm TAVR bioprosthetic valve  The prosthetic valve appears well-seated and appears to be functioning normally  There is no evidence of paravalvular regurgitation  There is no evidence of transvalvular regurgitation  The aortic valve peak velocity is 2 55 m/s  The aortic valve peak gradient is 26 mmHg  The aortic valve mean gradient is 14 mmHg  The DVI is 0 31  The aortic valve area is 1 23 cm2    Mitral Valve: There is mild annular calcification  Review of Systems   Constitutional: Negative  HENT: Negative  Eyes: Negative  Cardiovascular: Negative  Respiratory: Negative  Endocrine: Negative  Hematologic/Lymphatic: Negative  Skin: Negative  Musculoskeletal: Negative  Gastrointestinal: Negative  Genitourinary: Negative  Neurological: Negative  Psychiatric/Behavioral: Negative  Allergic/Immunologic: Negative  Vitals:    04/25/22 0800   BP: 128/60   Pulse: 65           Physical Exam  Vitals reviewed  Constitutional:       Appearance: Normal appearance  HENT:      Head: Normocephalic  Nose: Nose normal       Mouth/Throat:      Mouth: Mucous membranes are moist       Pharynx: Oropharynx is clear  Eyes:      General: No scleral icterus  Conjunctiva/sclera: Conjunctivae normal    Cardiovascular:      Rate and Rhythm: Normal rate and regular rhythm  Heart sounds: No murmur heard  No friction rub  No gallop  Pulmonary:      Effort: Pulmonary effort is normal  No respiratory distress  Breath sounds: Normal breath sounds  No wheezing or rales  Abdominal:      General: Abdomen is flat  Bowel sounds are normal  There is no distension  Palpations: Abdomen is soft  Tenderness: There is no abdominal tenderness  There is no guarding  Musculoskeletal:      Cervical back: Normal range of motion and neck supple  Right lower leg: No edema        Left lower leg: No edema    Skin:     General: Skin is warm and dry  Neurological:      General: No focal deficit present  Mental Status: He is alert and oriented to person, place, and time  Psychiatric:         Mood and Affect: Mood normal          Behavior: Behavior normal          Discussion/Summary:    Severe Aortic Stenosis SP  TAVR: Doing very well  Participating in cardiac rehab  Continue with aspirin, metoprolol and simvastatin  The patient was counseled regarding diagnostic results, instructions for management, risk factor reductions, impressions  total time of encounter was 25 minutes and 15 minutes was spent counseling  mechanical AVR on coumadin now on hold in setting of acute leukemia and thombocytopenia  Seen by cardiology

## 2023-01-30 LAB
ALBUMIN SERPL ELPH-MCNC: 3.2 G/DL — LOW (ref 3.3–5)
ALP SERPL-CCNC: 71 U/L — SIGNIFICANT CHANGE UP (ref 40–120)
ALT FLD-CCNC: 43 U/L — SIGNIFICANT CHANGE UP (ref 10–45)
ANION GAP SERPL CALC-SCNC: 7 MMOL/L — SIGNIFICANT CHANGE UP (ref 5–17)
APTT BLD: 24.2 SEC — LOW (ref 27.5–35.5)
AST SERPL-CCNC: 48 U/L — HIGH (ref 10–40)
BILIRUB SERPL-MCNC: 0.6 MG/DL — SIGNIFICANT CHANGE UP (ref 0.2–1.2)
BUN SERPL-MCNC: 10 MG/DL — SIGNIFICANT CHANGE UP (ref 7–23)
CALCIUM SERPL-MCNC: 7.8 MG/DL — LOW (ref 8.4–10.5)
CHLORIDE SERPL-SCNC: 101 MMOL/L — SIGNIFICANT CHANGE UP (ref 96–108)
CO2 SERPL-SCNC: 28 MMOL/L — SIGNIFICANT CHANGE UP (ref 22–31)
CREAT SERPL-MCNC: 0.66 MG/DL — SIGNIFICANT CHANGE UP (ref 0.5–1.3)
EGFR: 89 ML/MIN/1.73M2 — SIGNIFICANT CHANGE UP
FIBRINOGEN PPP-MCNC: 281 MG/DL — SIGNIFICANT CHANGE UP (ref 200–445)
GLUCOSE BLDC GLUCOMTR-MCNC: 122 MG/DL — HIGH (ref 70–99)
GLUCOSE BLDC GLUCOMTR-MCNC: 138 MG/DL — HIGH (ref 70–99)
GLUCOSE BLDC GLUCOMTR-MCNC: 143 MG/DL — HIGH (ref 70–99)
GLUCOSE BLDC GLUCOMTR-MCNC: 99 MG/DL — SIGNIFICANT CHANGE UP (ref 70–99)
GLUCOSE SERPL-MCNC: 90 MG/DL — SIGNIFICANT CHANGE UP (ref 70–99)
HCT VFR BLD CALC: 25 % — LOW (ref 34.5–45)
HGB BLD-MCNC: 8.4 G/DL — LOW (ref 11.5–15.5)
INR BLD: 0.91 RATIO — SIGNIFICANT CHANGE UP (ref 0.88–1.16)
LDH SERPL L TO P-CCNC: 530 U/L — HIGH (ref 50–242)
MAGNESIUM SERPL-MCNC: 1.9 MG/DL — SIGNIFICANT CHANGE UP (ref 1.6–2.6)
MCHC RBC-ENTMCNC: 28.4 PG — SIGNIFICANT CHANGE UP (ref 27–34)
MCHC RBC-ENTMCNC: 33.6 GM/DL — SIGNIFICANT CHANGE UP (ref 32–36)
MCV RBC AUTO: 84.5 FL — SIGNIFICANT CHANGE UP (ref 80–100)
NRBC # BLD: 0 /100 WBCS — SIGNIFICANT CHANGE UP (ref 0–0)
PHOSPHATE SERPL-MCNC: 2.2 MG/DL — LOW (ref 2.5–4.5)
PLATELET # BLD AUTO: 35 K/UL — LOW (ref 150–400)
PLATELET # BLD AUTO: 51 K/UL — LOW (ref 150–400)
POTASSIUM SERPL-MCNC: 3.7 MMOL/L — SIGNIFICANT CHANGE UP (ref 3.5–5.3)
POTASSIUM SERPL-SCNC: 3.7 MMOL/L — SIGNIFICANT CHANGE UP (ref 3.5–5.3)
PROT SERPL-MCNC: 5.1 G/DL — LOW (ref 6–8.3)
PROTHROM AB SERPL-ACNC: 10.4 SEC — LOW (ref 10.5–13.4)
RBC # BLD: 2.96 M/UL — LOW (ref 3.8–5.2)
RBC # FLD: 15.1 % — HIGH (ref 10.3–14.5)
SODIUM SERPL-SCNC: 136 MMOL/L — SIGNIFICANT CHANGE UP (ref 135–145)
URATE SERPL-MCNC: 0.7 MG/DL — LOW (ref 2.5–7)
WBC # BLD: 0.43 K/UL — CRITICAL LOW (ref 3.8–10.5)
WBC # FLD AUTO: 0.43 K/UL — CRITICAL LOW (ref 3.8–10.5)

## 2023-01-30 PROCEDURE — 99233 SBSQ HOSP IP/OBS HIGH 50: CPT

## 2023-01-30 RX ORDER — ACYCLOVIR SODIUM 500 MG
400 VIAL (EA) INTRAVENOUS EVERY 8 HOURS
Refills: 0 | Status: DISCONTINUED | OUTPATIENT
Start: 2023-01-30 | End: 2023-02-10

## 2023-01-30 RX ORDER — HYDROCORTISONE 1 %
1 OINTMENT (GRAM) TOPICAL
Refills: 0 | Status: DISCONTINUED | OUTPATIENT
Start: 2023-01-30 | End: 2023-02-10

## 2023-01-30 RX ORDER — POTASSIUM PHOSPHATE, MONOBASIC POTASSIUM PHOSPHATE, DIBASIC 236; 224 MG/ML; MG/ML
15 INJECTION, SOLUTION INTRAVENOUS ONCE
Refills: 0 | Status: COMPLETED | OUTPATIENT
Start: 2023-01-30 | End: 2023-01-30

## 2023-01-30 RX ORDER — FUROSEMIDE 40 MG
40 TABLET ORAL ONCE
Refills: 0 | Status: COMPLETED | OUTPATIENT
Start: 2023-01-30 | End: 2023-01-30

## 2023-01-30 RX ADMIN — Medication 2 MILLIGRAM(S): at 20:00

## 2023-01-30 RX ADMIN — ZINC OXIDE 1 APPLICATION(S): 200 OINTMENT TOPICAL at 06:31

## 2023-01-30 RX ADMIN — Medication 5 MILLIGRAM(S): at 22:33

## 2023-01-30 RX ADMIN — SODIUM CHLORIDE 50 MILLILITER(S): 9 INJECTION INTRAMUSCULAR; INTRAVENOUS; SUBCUTANEOUS at 06:33

## 2023-01-30 RX ADMIN — Medication 1 APPLICATION(S): at 22:34

## 2023-01-30 RX ADMIN — CASPOFUNGIN ACETATE 260 MILLIGRAM(S): 7 INJECTION, POWDER, LYOPHILIZED, FOR SOLUTION INTRAVENOUS at 17:41

## 2023-01-30 RX ADMIN — POTASSIUM PHOSPHATE, MONOBASIC POTASSIUM PHOSPHATE, DIBASIC 62.5 MILLIMOLE(S): 236; 224 INJECTION, SOLUTION INTRAVENOUS at 09:34

## 2023-01-30 RX ADMIN — CHLORHEXIDINE GLUCONATE 1 APPLICATION(S): 213 SOLUTION TOPICAL at 09:37

## 2023-01-30 RX ADMIN — PANTOPRAZOLE SODIUM 40 MILLIGRAM(S): 20 TABLET, DELAYED RELEASE ORAL at 06:32

## 2023-01-30 RX ADMIN — Medication 400 MILLIGRAM(S): at 13:10

## 2023-01-30 RX ADMIN — Medication 240 MILLIGRAM(S): at 06:33

## 2023-01-30 RX ADMIN — Medication 400 MILLIGRAM(S): at 22:33

## 2023-01-30 RX ADMIN — ZINC OXIDE 1 APPLICATION(S): 200 OINTMENT TOPICAL at 18:41

## 2023-01-30 RX ADMIN — Medication 2 MILLIGRAM(S): at 17:46

## 2023-01-30 RX ADMIN — Medication 500 MILLIGRAM(S): at 13:10

## 2023-01-30 RX ADMIN — PREGABALIN 1000 MICROGRAM(S): 225 CAPSULE ORAL at 13:10

## 2023-01-30 RX ADMIN — Medication 650 MILLIGRAM(S): at 22:10

## 2023-01-30 RX ADMIN — ATORVASTATIN CALCIUM 80 MILLIGRAM(S): 80 TABLET, FILM COATED ORAL at 22:32

## 2023-01-30 RX ADMIN — DASATINIB 140 MILLIGRAM(S): 80 TABLET ORAL at 13:10

## 2023-01-30 RX ADMIN — Medication 40 MILLIGRAM(S): at 09:36

## 2023-01-30 RX ADMIN — ONDANSETRON 8 MILLIGRAM(S): 8 TABLET, FILM COATED ORAL at 15:32

## 2023-01-30 RX ADMIN — VALSARTAN 320 MILLIGRAM(S): 80 TABLET ORAL at 06:32

## 2023-01-30 NOTE — PROGRESS NOTE ADULT - PROBLEM SELECTOR PLAN 2
patient is neutropenic, afebrile  c/w levaquin, Caspofungin ppx   if spike panculture and CXR  GI PCR (-)  1/21, 1/26 c.diff toxin negative, patient is neutropenic, afebrile  c/w levaquin, Caspofungin ppx   if spike panculture and CXR  GI PCR (-)  1/21, 1/26 c.diff toxin negative.  1/30- Start Acyclovir for prophylaxis.

## 2023-01-30 NOTE — PROGRESS NOTE ADULT - ATTENDING COMMENTS
81 YO F with a PMhx of Nicolasa, s/p Saint Darian mechanical AVR, (1994–[facility]) for rheumatic aortic valve stenosis, ascending aortic aneurysm, HTN,  HLD, depression, GERD transferred from [facility]. Dx is Ph+ ALL.  She was also noted to have a supratherapeutic INR. CT angio A/P which showed no evidence of active GIB, but showing focal diminished cortical enhancement involving the upper left kidney concerning for focal pyelonephritis or possibly infarct. EGD without any acute findings. Colonoscopy showed grade I internal hemorrhoid and polyps in transverse colon and hepatic flexure. 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     -had BMBx on 1/17 >>> 99% B-lymphoblasts  -baseline echo EF 57%  -has PICC line   -started treatment with dexamethasone 20 mg d1-7, dasatinib 140 mg/d  -Day 12 of treatment  -Monitor LDH, Uric Acid and CMP daily   -keep Hb >7, plt >10  -given rasburicase twice  -allopurinol can be d/c'd    -WBC rapidly decreased, PB blasts cleared  -phos, LDH up now coming down gradually  -LDH began plateauing >>> off dex now, given VCR 1.5 mg IV x 1 on 1/25/23,  LDH now decreasing again    -cont to follow TLS labs  -given cryo 1/20/2, 1/25/24 and 1/7/23  -fibrinogen 148 1/27/23, now normal   -has had active DIC; uncommon but reported in ALL    -BMA/Bx d15 as per 62295    -ICH; had SDH  -headaches since 1/15/23, now improved  -1/15/23 CT head showed 8mm bleed, repeat CT is stable  -no intervention as per Neursx  -hold coumadin   -keep plt > 50 for now post SDH     Cardio  HTN - c/w home meds  Mechanical valve - currently off AC, discussed with cardiology  will hold AC in light of risk/benefit in present setting 82F with PMH of Khris (s/p Saint Darian mechanical AVR, 1994, [facility]) for rheumatic aortic valve stenosis, ascending aortic aneurysm, HTN,  HLD, depression, GERD, who presented with newly diagnosed Ph-positive B-ALL.     # Ph-positive B-ALL: Bone marrow biopsy on 1/17/23 showed 99% B-lymphoblasts. She started treatment per the CALGB 01397 protocol with steroids and dasatinib on 1/18/23. She received 1 dose of vincristine 1.5 mg on 1/25/23 for LDH plateau. She received rasburicase twice, but no current evidence of TLS. She also received cryoprecipitate several times for hypofibrinogenemia, now normal. She has therapy- and disease-associated pancytopenia.   - S/p dexamethasone 20 mg D1-7. Continue dasatinib 140 mg daily. Today is D13.  - Trend CBC with differential and coagulation studies daily. Supportive transfusions to maintain Hgb > 7, plt > 50, and fibrinogen > 100.  - Trend TLS labs daily. Replete electrolytes PRN.   - Antimicrobial prophylaxis: levaquin, caspofungin, acyclovir  - Due for repeat bone marrow biopsy and LP with IT chemotherapy on D15. Pending results, will determine next course of therapy.     # SDH: CTH on 1/15/23 showed an 8 mm bleed, which was stable on subsequent scans. No neurologic deficits. No surgical intervention.  - Hold Coumadin   - Maintain plt > 50     # Mechanical valve: Currently off AC, discussed with Cardiology  - Hold anticoagulation in setting of SDH and persistent thrombocytopenia    The information documented within the attending attestation was documented solely by Carri Jesus.

## 2023-01-30 NOTE — PROGRESS NOTE ADULT - SUBJECTIVE AND OBJECTIVE BOX
Diagnosis:    Protocol/Chemo Regimen:    Day:     Pt endorsed:    Review of Systems:     Pain scale:     Diet:     Allergies    No Known Allergies    Intolerances        ANTIMICROBIALS  caspofungin IVPB 50 milliGRAM(s) IV Intermittent every 24 hours  levoFLOXacin  Tablet 500 milliGRAM(s) Oral every 24 hours      HEME/ONC MEDICATIONS  dasatinib 140 milliGRAM(s) Oral daily      STANDING MEDICATIONS  ascorbic acid 500 milliGRAM(s) Oral daily  atorvastatin 80 milliGRAM(s) Oral at bedtime  chlorhexidine 4% Liquid 1 Application(s) Topical <User Schedule>  cyanocobalamin 1000 MICROGram(s) Oral daily  dextrose 5%. 1000 milliLiter(s) IV Continuous <Continuous>  dextrose 5%. 1000 milliLiter(s) IV Continuous <Continuous>  dextrose 50% Injectable 25 Gram(s) IV Push once  dextrose 50% Injectable 12.5 Gram(s) IV Push once  diltiazem    milliGRAM(s) Oral daily  glucagon  Injectable 1 milliGRAM(s) IntraMuscular once  hydrochlorothiazide 25 milliGRAM(s) Oral daily  insulin lispro (ADMELOG) corrective regimen sliding scale   SubCutaneous three times a day before meals  melatonin 5 milliGRAM(s) Oral at bedtime  pantoprazole    Tablet 40 milliGRAM(s) Oral before breakfast  sodium chloride 0.9%. 1000 milliLiter(s) IV Continuous <Continuous>  valsartan 320 milliGRAM(s) Oral daily  zinc oxide 40% Paste 1 Application(s) Topical two times a day      PRN MEDICATIONS  acetaminophen     Tablet .. 650 milliGRAM(s) Oral every 6 hours PRN  aluminum hydroxide/magnesium hydroxide/simethicone Suspension 30 milliLiter(s) Oral every 4 hours PRN  dextrose Oral Gel 15 Gram(s) Oral once PRN  loperamide 2 milliGRAM(s) Oral three times a day PRN  ondansetron Injectable 8 milliGRAM(s) IV Push every 8 hours PRN  sodium chloride 0.9% lock flush 10 milliLiter(s) IV Push every 1 hour PRN        Vital Signs Last 24 Hrs  T(C): 36.7 (30 Jan 2023 05:06), Max: 36.8 (29 Jan 2023 09:05)  T(F): 98.1 (30 Jan 2023 05:06), Max: 98.2 (29 Jan 2023 09:05)  HR: 75 (30 Jan 2023 05:06) (74 - 83)  BP: 119/72 (30 Jan 2023 05:06) (114/68 - 130/85)  BP(mean): --  RR: 18 (30 Jan 2023 05:06) (18 - 18)  SpO2: 96% (30 Jan 2023 05:06) (96% - 100%)    Parameters below as of 30 Jan 2023 05:06  Patient On (Oxygen Delivery Method): room air        PHYSICAL EXAM  General: NAD  HEENT: PERRLA, EOMOI, clear oropharynx, anicteric sclera, pink conjunctiva  Neck: supple  CV: (+) S1/S2 RRR  Lungs: clear to auscultation, no wheezes or rales  Abdomen: soft, non-tender, non-distended (+) BS  Ext: no clubbing, cyanosis or edema  Skin: no rashes and no petechiae  Neuro: alert and oriented X 3, no focal deficits  Central Line:     RECENT CULTURES:        LABS:                        6.8    0.88  )-----------( 71       ( 29 Jan 2023 07:06 )             20.6         Mean Cell Volume : 84.4 fl  Mean Cell Hemoglobin : 27.9 pg  Mean Cell Hemoglobin Concentration : 33.0 gm/dL  Auto Neutrophil # : 0.05 K/uL  Auto Lymphocyte # : 0.81 K/uL  Auto Monocyte # : 0.00 K/uL  Auto Eosinophil # : 0.01 K/uL  Auto Basophil # : 0.00 K/uL  Auto Neutrophil % : 6.2 %  Auto Lymphocyte % : 92.2 %  Auto Monocyte % : 0.0 %  Auto Eosinophil % : 1.6 %  Auto Basophil % : 0.0 %      01-29    138  |  104  |  9   ----------------------------<  60<L>  3.5   |  26  |  0.69    Ca    8.0<L>      29 Jan 2023 07:22  Phos  2.5     01-29  Mg     1.7     01-29    TPro  4.9<L>  /  Alb  3.0<L>  /  TBili  0.6  /  DBili  x   /  AST  39  /  ALT  32  /  AlkPhos  54  01-29      Mg 1.7  Phos 2.5      PT/INR - ( 29 Jan 2023 07:06 )   PT: 11.5 sec;   INR: 0.99 ratio         PTT - ( 29 Jan 2023 07:06 )  PTT:24.4 sec      Uric Acid <0.5        RADIOLOGY & ADDITIONAL STUDIES:           Diagnosis: newly diagnosed B-ALL, Ph (+)    Protocol/Chemo Regimen: Following 60350 (Decadron days 1-7 + Dasatinib), Vincristine 1.5mg (on D8)    Day: 13     Pt endorsed: +fatigue    Review of Systems: Denies any nausea, vomiting, diarrhea, chest pain, palpitation, SOB or abdominal pain.    Pain scale: denies    Diet: regular    Allergies  No Known Allergies    ANTIMICROBIALS  caspofungin IVPB 50 milliGRAM(s) IV Intermittent every 24 hours  levoFLOXacin  Tablet 500 milliGRAM(s) Oral every 24 hours    HEME/ONC MEDICATIONS  dasatinib 140 milliGRAM(s) Oral daily    STANDING MEDICATIONS  ascorbic acid 500 milliGRAM(s) Oral daily  atorvastatin 80 milliGRAM(s) Oral at bedtime  chlorhexidine 4% Liquid 1 Application(s) Topical <User Schedule>  cyanocobalamin 1000 MICROGram(s) Oral daily  dextrose 5%. 1000 milliLiter(s) IV Continuous <Continuous>  dextrose 5%. 1000 milliLiter(s) IV Continuous <Continuous>  dextrose 50% Injectable 25 Gram(s) IV Push once  dextrose 50% Injectable 12.5 Gram(s) IV Push once  diltiazem    milliGRAM(s) Oral daily  glucagon  Injectable 1 milliGRAM(s) IntraMuscular once  hydrochlorothiazide 25 milliGRAM(s) Oral daily  insulin lispro (ADMELOG) corrective regimen sliding scale   SubCutaneous three times a day before meals  melatonin 5 milliGRAM(s) Oral at bedtime  pantoprazole    Tablet 40 milliGRAM(s) Oral before breakfast  sodium chloride 0.9%. 1000 milliLiter(s) IV Continuous <Continuous>  valsartan 320 milliGRAM(s) Oral daily  zinc oxide 40% Paste 1 Application(s) Topical two times a day    PRN MEDICATIONS  acetaminophen     Tablet .. 650 milliGRAM(s) Oral every 6 hours PRN  aluminum hydroxide/magnesium hydroxide/simethicone Suspension 30 milliLiter(s) Oral every 4 hours PRN  dextrose Oral Gel 15 Gram(s) Oral once PRN  loperamide 2 milliGRAM(s) Oral three times a day PRN  ondansetron Injectable 8 milliGRAM(s) IV Push every 8 hours PRN  sodium chloride 0.9% lock flush 10 milliLiter(s) IV Push every 1 hour PRN    Vital Signs Last 24 Hrs  T(C): 36.7 (30 Jan 2023 05:06), Max: 36.8 (29 Jan 2023 09:05)  T(F): 98.1 (30 Jan 2023 05:06), Max: 98.2 (29 Jan 2023 09:05)  HR: 75 (30 Jan 2023 05:06) (74 - 83)  BP: 119/72 (30 Jan 2023 05:06) (114/68 - 130/85)  BP(mean): --  RR: 18 (30 Jan 2023 05:06) (18 - 18)  SpO2: 96% (30 Jan 2023 05:06) (96% - 100%)    Parameters below as of 30 Jan 2023 05:06  Patient On (Oxygen Delivery Method): room air    PHYSICAL EXAM  General: NAD  HEENT: Sclerae anicteric, clear oropharynx, no oral lesions  CV: normal S1/S2, reg  Lungs: breath sounds clear and equal bilaterally  Abdomen: soft non-tender non-distended  Ext: no edema  Skin: no rash  Neuro: alert and oriented X 4  Central Line: RUE PICC clean, dry, intact    RECENT CULTURES:  Culture - Stool (01.21.23 @ 12:49)    Specimen Source: .Stool Feces    Culture Results:   No enteric pathogens isolated.  (Stool culture examined for Salmonella,  Shigella, Campylobacter, Aeromonas, Plesiomonas,  Vibrio, E.coli O157 and Yersinia)    Culture - Urine (01.10.23 @ 10:42)    Specimen Source: Clean Catch Clean Catch (Midstream)    Culture Results:   >=3 organisms. Probable collection contamination.      LABS:                        8.4    0.43  )-----------( 35       ( 30 Jan 2023 06:54 )             25.0     Mean Cell Volume : 84.5 fl  Mean Cell Hemoglobin : 28.4 pg  Mean Cell Hemoglobin Concentration : 33.6 gm/dL  Auto Neutrophil # : x  Auto Lymphocyte # : x  Auto Monocyte # : x  Auto Eosinophil # : x  Auto Basophil # : x  Auto Neutrophil % : x  Auto Lymphocyte % : x  Auto Monocyte % : x  Auto Eosinophil % : x  Auto Basophil % : x      01-30    136  |  101  |  10  ----------------------------<  90  3.7   |  28  |  0.66    Ca    7.8<L>      30 Jan 2023 06:51  Phos  2.2     01-30  Mg     1.9     01-30    TPro  5.1<L>  /  Alb  3.2<L>  /  TBili  0.6  /  DBili  x   /  AST  48<H>  /  ALT  43  /  AlkPhos  71  01-30  Mg 1.9  Phos 2.2  PT/INR - ( 30 Jan 2023 06:52 )   PT: 10.4 sec;   INR: 0.91 ratio    PTT - ( 30 Jan 2023 06:52 )  PTT:24.2 sec    Uric Acid 0.7    RADIOLOGY & ADDITIONAL STUDIES:  CT Head No Cont (01.16.23 @ 11:53) >  No interval change in left parietal subdural bleed.  No new site of bleeding or progressive mass effect.

## 2023-01-30 NOTE — PROGRESS NOTE ADULT - ASSESSMENT
82 year old female with atrial fibrillation, s/p Saint Darian mechanical AVR, (1994–[facility]) for rheumatic aortic valve stenosis, ascending aortic aneurysm, HTN, HLD, depression, GERD transferred from [facility] for management of newly diagnosed PH + B cell ALL. Peripheral flow performed, BMBx consistent with B-cell ALL, FISH Ph+ treated with Dex and Dasatinib following CALGB 86840. 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 Anemia and thrombocytopenia secondary to disease.

## 2023-01-30 NOTE — PROGRESS NOTE ADULT - PROBLEM SELECTOR PLAN 4
Continue accuchecks/ISS  1/19 Started Lantus 10u while on steroids (last dose of decadron 1/24)  1/29 D/C'd Lantus

## 2023-01-31 DIAGNOSIS — Z51.5 ENCOUNTER FOR PALLIATIVE CARE: ICD-10-CM

## 2023-01-31 DIAGNOSIS — R53.81 OTHER MALAISE: ICD-10-CM

## 2023-01-31 LAB
ALBUMIN SERPL ELPH-MCNC: 3.1 G/DL — LOW (ref 3.3–5)
ALP SERPL-CCNC: 62 U/L — SIGNIFICANT CHANGE UP (ref 40–120)
ALT FLD-CCNC: 46 U/L — HIGH (ref 10–45)
ANION GAP SERPL CALC-SCNC: 8 MMOL/L — SIGNIFICANT CHANGE UP (ref 5–17)
APTT BLD: 24.7 SEC — LOW (ref 27.5–35.5)
AST SERPL-CCNC: 45 U/L — HIGH (ref 10–40)
BILIRUB SERPL-MCNC: 0.6 MG/DL — SIGNIFICANT CHANGE UP (ref 0.2–1.2)
BUN SERPL-MCNC: 7 MG/DL — SIGNIFICANT CHANGE UP (ref 7–23)
CALCIUM SERPL-MCNC: 7.4 MG/DL — LOW (ref 8.4–10.5)
CHLORIDE SERPL-SCNC: 105 MMOL/L — SIGNIFICANT CHANGE UP (ref 96–108)
CO2 SERPL-SCNC: 25 MMOL/L — SIGNIFICANT CHANGE UP (ref 22–31)
CREAT SERPL-MCNC: 0.64 MG/DL — SIGNIFICANT CHANGE UP (ref 0.5–1.3)
EGFR: 89 ML/MIN/1.73M2 — SIGNIFICANT CHANGE UP
FIBRINOGEN PPP-MCNC: 319 MG/DL — SIGNIFICANT CHANGE UP (ref 200–445)
GLUCOSE BLDC GLUCOMTR-MCNC: 100 MG/DL — HIGH (ref 70–99)
GLUCOSE BLDC GLUCOMTR-MCNC: 118 MG/DL — HIGH (ref 70–99)
GLUCOSE BLDC GLUCOMTR-MCNC: 118 MG/DL — HIGH (ref 70–99)
GLUCOSE BLDC GLUCOMTR-MCNC: 141 MG/DL — HIGH (ref 70–99)
GLUCOSE SERPL-MCNC: 108 MG/DL — HIGH (ref 70–99)
HCT VFR BLD CALC: 22.4 % — LOW (ref 34.5–45)
HGB BLD-MCNC: 7.6 G/DL — LOW (ref 11.5–15.5)
INR BLD: 0.97 RATIO — SIGNIFICANT CHANGE UP (ref 0.88–1.16)
LDH SERPL L TO P-CCNC: 453 U/L — HIGH (ref 50–242)
MAGNESIUM SERPL-MCNC: 1.7 MG/DL — SIGNIFICANT CHANGE UP (ref 1.6–2.6)
MCHC RBC-ENTMCNC: 28.7 PG — SIGNIFICANT CHANGE UP (ref 27–34)
MCHC RBC-ENTMCNC: 33.9 GM/DL — SIGNIFICANT CHANGE UP (ref 32–36)
MCV RBC AUTO: 84.5 FL — SIGNIFICANT CHANGE UP (ref 80–100)
NRBC # BLD: 0 /100 WBCS — SIGNIFICANT CHANGE UP (ref 0–0)
PHOSPHATE SERPL-MCNC: 2.1 MG/DL — LOW (ref 2.5–4.5)
PHOSPHATE SERPL-MCNC: 3.9 MG/DL — SIGNIFICANT CHANGE UP (ref 2.5–4.5)
PLATELET # BLD AUTO: 52 K/UL — LOW (ref 150–400)
POTASSIUM SERPL-MCNC: 3.1 MMOL/L — LOW (ref 3.5–5.3)
POTASSIUM SERPL-MCNC: 3.9 MMOL/L — SIGNIFICANT CHANGE UP (ref 3.5–5.3)
POTASSIUM SERPL-SCNC: 3.1 MMOL/L — LOW (ref 3.5–5.3)
POTASSIUM SERPL-SCNC: 3.9 MMOL/L — SIGNIFICANT CHANGE UP (ref 3.5–5.3)
PROT SERPL-MCNC: 4.9 G/DL — LOW (ref 6–8.3)
PROTHROM AB SERPL-ACNC: 11.2 SEC — SIGNIFICANT CHANGE UP (ref 10.5–13.4)
RBC # BLD: 2.65 M/UL — LOW (ref 3.8–5.2)
RBC # FLD: 15.4 % — HIGH (ref 10.3–14.5)
SODIUM SERPL-SCNC: 138 MMOL/L — SIGNIFICANT CHANGE UP (ref 135–145)
URATE SERPL-MCNC: 0.9 MG/DL — LOW (ref 2.5–7)
WBC # BLD: 0.47 K/UL — CRITICAL LOW (ref 3.8–10.5)
WBC # FLD AUTO: 0.47 K/UL — CRITICAL LOW (ref 3.8–10.5)

## 2023-01-31 PROCEDURE — 99233 SBSQ HOSP IP/OBS HIGH 50: CPT

## 2023-01-31 PROCEDURE — 99223 1ST HOSP IP/OBS HIGH 75: CPT

## 2023-01-31 RX ORDER — POTASSIUM CHLORIDE 20 MEQ
20 PACKET (EA) ORAL
Refills: 0 | Status: COMPLETED | OUTPATIENT
Start: 2023-01-31 | End: 2023-01-31

## 2023-01-31 RX ADMIN — CASPOFUNGIN ACETATE 260 MILLIGRAM(S): 7 INJECTION, POWDER, LYOPHILIZED, FOR SOLUTION INTRAVENOUS at 14:10

## 2023-01-31 RX ADMIN — ZINC OXIDE 1 APPLICATION(S): 200 OINTMENT TOPICAL at 17:09

## 2023-01-31 RX ADMIN — Medication 400 MILLIGRAM(S): at 22:49

## 2023-01-31 RX ADMIN — CHLORHEXIDINE GLUCONATE 1 APPLICATION(S): 213 SOLUTION TOPICAL at 09:41

## 2023-01-31 RX ADMIN — PANTOPRAZOLE SODIUM 40 MILLIGRAM(S): 20 TABLET, DELAYED RELEASE ORAL at 05:56

## 2023-01-31 RX ADMIN — Medication 255 MILLIMOLE(S): at 16:03

## 2023-01-31 RX ADMIN — PREGABALIN 1000 MICROGRAM(S): 225 CAPSULE ORAL at 12:35

## 2023-01-31 RX ADMIN — Medication 50 MILLIEQUIVALENT(S): at 14:09

## 2023-01-31 RX ADMIN — Medication 50 MILLIEQUIVALENT(S): at 09:41

## 2023-01-31 RX ADMIN — SODIUM CHLORIDE 50 MILLILITER(S): 9 INJECTION INTRAMUSCULAR; INTRAVENOUS; SUBCUTANEOUS at 11:45

## 2023-01-31 RX ADMIN — ZINC OXIDE 1 APPLICATION(S): 200 OINTMENT TOPICAL at 06:01

## 2023-01-31 RX ADMIN — Medication 240 MILLIGRAM(S): at 05:56

## 2023-01-31 RX ADMIN — Medication 400 MILLIGRAM(S): at 05:57

## 2023-01-31 RX ADMIN — Medication 400 MILLIGRAM(S): at 14:09

## 2023-01-31 RX ADMIN — Medication 500 MILLIGRAM(S): at 12:34

## 2023-01-31 RX ADMIN — Medication 50 MILLIEQUIVALENT(S): at 11:44

## 2023-01-31 RX ADMIN — Medication 2 MILLIGRAM(S): at 09:40

## 2023-01-31 RX ADMIN — ATORVASTATIN CALCIUM 80 MILLIGRAM(S): 80 TABLET, FILM COATED ORAL at 22:50

## 2023-01-31 RX ADMIN — Medication 1 APPLICATION(S): at 17:09

## 2023-01-31 RX ADMIN — DASATINIB 140 MILLIGRAM(S): 80 TABLET ORAL at 12:35

## 2023-01-31 RX ADMIN — Medication 1 APPLICATION(S): at 05:57

## 2023-01-31 RX ADMIN — SODIUM CHLORIDE 50 MILLILITER(S): 9 INJECTION INTRAMUSCULAR; INTRAVENOUS; SUBCUTANEOUS at 05:58

## 2023-01-31 RX ADMIN — VALSARTAN 320 MILLIGRAM(S): 80 TABLET ORAL at 05:56

## 2023-01-31 RX ADMIN — Medication 5 MILLIGRAM(S): at 22:49

## 2023-01-31 NOTE — PROGRESS NOTE ADULT - SUBJECTIVE AND OBJECTIVE BOX
Diagnosis: newly diagnosed B-ALL, Ph (+)    Protocol/Chemo Regimen: Following 01718 (Decadron days 1-7 + Dasatinib), Vincristine 1.5mg (on D8)    Day: 14     Pt endorsed: +fatigue    Review of Systems: Denies any nausea, vomiting, diarrhea, chest pain, palpitation, SOB or abdominal pain.    Pain scale: denies    Diet: regular    Allergies    No Known Allergies      ANTIMICROBIALS  acyclovir   Oral Tab/Cap 400 milliGRAM(s) Oral every 8 hours  caspofungin IVPB 50 milliGRAM(s) IV Intermittent every 24 hours  levoFLOXacin  Tablet 500 milliGRAM(s) Oral every 24 hours      HEME/ONC MEDICATIONS  dasatinib 140 milliGRAM(s) Oral daily      STANDING MEDICATIONS  ascorbic acid 500 milliGRAM(s) Oral daily  atorvastatin 80 milliGRAM(s) Oral at bedtime  chlorhexidine 4% Liquid 1 Application(s) Topical <User Schedule>  cyanocobalamin 1000 MICROGram(s) Oral daily  dextrose 5%. 1000 milliLiter(s) IV Continuous <Continuous>  dextrose 5%. 1000 milliLiter(s) IV Continuous <Continuous>  dextrose 50% Injectable 25 Gram(s) IV Push once  dextrose 50% Injectable 12.5 Gram(s) IV Push once  diltiazem    milliGRAM(s) Oral daily  glucagon  Injectable 1 milliGRAM(s) IntraMuscular once  hydrochlorothiazide 25 milliGRAM(s) Oral daily  hydrocortisone 2.5% Rectal Cream 1 Application(s) Rectal two times a day  insulin lispro (ADMELOG) corrective regimen sliding scale   SubCutaneous three times a day before meals  melatonin 5 milliGRAM(s) Oral at bedtime  pantoprazole    Tablet 40 milliGRAM(s) Oral before breakfast  sodium chloride 0.9%. 1000 milliLiter(s) IV Continuous <Continuous>  valsartan 320 milliGRAM(s) Oral daily  zinc oxide 40% Paste 1 Application(s) Topical two times a day      PRN MEDICATIONS  acetaminophen     Tablet .. 650 milliGRAM(s) Oral every 6 hours PRN  aluminum hydroxide/magnesium hydroxide/simethicone Suspension 30 milliLiter(s) Oral every 4 hours PRN  dextrose Oral Gel 15 Gram(s) Oral once PRN  loperamide 2 milliGRAM(s) Oral three times a day PRN  ondansetron Injectable 8 milliGRAM(s) IV Push every 8 hours PRN  sodium chloride 0.9% lock flush 10 milliLiter(s) IV Push every 1 hour PRN        Vital Signs Last 24 Hrs  T(C): 36.8 (31 Jan 2023 04:31), Max: 37.1 (30 Jan 2023 11:10)  T(F): 98.2 (31 Jan 2023 04:31), Max: 98.7 (30 Jan 2023 11:10)  HR: 75 (31 Jan 2023 04:31) (69 - 85)  BP: 103/64 (31 Jan 2023 04:31) (103/64 - 133/81)  BP(mean): --  RR: 18 (31 Jan 2023 04:31) (18 - 18)  SpO2: 98% (31 Jan 2023 04:31) (96% - 100%)    Parameters below as of 31 Jan 2023 04:31  Patient On (Oxygen Delivery Method): room air    PHYSICAL EXAM  General: NAD  HEENT: Sclerae anicteric, clear oropharynx, no oral lesions  CV: normal S1/S2, reg  Lungs: breath sounds clear and equal bilaterally  Abdomen: soft non-tender non-distended  Ext: no edema  Skin: no rash  Neuro: alert and oriented X 4  Central Line: RUE PICC clean, dry, intact        LABS:                         Diagnosis: newly diagnosed B-ALL, Ph (+)    Protocol/Chemo Regimen: Following 55712 (Decadron days 1-7 + Dasatinib), Vincristine 1.5mg (on D8)    Day: 14     Pt endorsed: +fatigue    Review of Systems: Denies any nausea, vomiting, diarrhea, chest pain, palpitation, SOB or abdominal pain.    Pain scale: denies    Diet: regular    Allergies    ANTIMICROBIALS  acyclovir   Oral Tab/Cap 400 milliGRAM(s) Oral every 8 hours  caspofungin IVPB 50 milliGRAM(s) IV Intermittent every 24 hours  levoFLOXacin  Tablet 500 milliGRAM(s) Oral every 24 hours      HEME/ONC MEDICATIONS  dasatinib 140 milliGRAM(s) Oral daily      STANDING MEDICATIONS  ascorbic acid 500 milliGRAM(s) Oral daily  atorvastatin 80 milliGRAM(s) Oral at bedtime  chlorhexidine 4% Liquid 1 Application(s) Topical <User Schedule>  cyanocobalamin 1000 MICROGram(s) Oral daily  dextrose 5%. 1000 milliLiter(s) IV Continuous <Continuous>  dextrose 5%. 1000 milliLiter(s) IV Continuous <Continuous>  dextrose 50% Injectable 25 Gram(s) IV Push once  dextrose 50% Injectable 12.5 Gram(s) IV Push once  diltiazem    milliGRAM(s) Oral daily  glucagon  Injectable 1 milliGRAM(s) IntraMuscular once  hydrochlorothiazide 25 milliGRAM(s) Oral daily  hydrocortisone 2.5% Rectal Cream 1 Application(s) Rectal two times a day  insulin lispro (ADMELOG) corrective regimen sliding scale   SubCutaneous three times a day before meals  melatonin 5 milliGRAM(s) Oral at bedtime  pantoprazole    Tablet 40 milliGRAM(s) Oral before breakfast  sodium chloride 0.9%. 1000 milliLiter(s) IV Continuous <Continuous>  valsartan 320 milliGRAM(s) Oral daily  zinc oxide 40% Paste 1 Application(s) Topical two times a day      PRN MEDICATIONS  acetaminophen     Tablet .. 650 milliGRAM(s) Oral every 6 hours PRN  aluminum hydroxide/magnesium hydroxide/simethicone Suspension 30 milliLiter(s) Oral every 4 hours PRN  dextrose Oral Gel 15 Gram(s) Oral once PRN  loperamide 2 milliGRAM(s) Oral three times a day PRN  ondansetron Injectable 8 milliGRAM(s) IV Push every 8 hours PRN  sodium chloride 0.9% lock flush 10 milliLiter(s) IV Push every 1 hour PRN        Vital Signs Last 24 Hrs  T(C): 36.8 (31 Jan 2023 04:31), Max: 37.1 (30 Jan 2023 11:10)  T(F): 98.2 (31 Jan 2023 04:31), Max: 98.7 (30 Jan 2023 11:10)  HR: 75 (31 Jan 2023 04:31) (69 - 85)  BP: 103/64 (31 Jan 2023 04:31) (103/64 - 133/81)  BP(mean): --  RR: 18 (31 Jan 2023 04:31) (18 - 18)  SpO2: 98% (31 Jan 2023 04:31) (96% - 100%)    Parameters below as of 31 Jan 2023 04:31  Patient On (Oxygen Delivery Method): room air    PHYSICAL EXAM  General: NAD  HEENT: Sclerae anicteric, clear oropharynx, no oral lesions  CV: normal S1/S2, reg  Lungs: breath sounds clear and equal bilaterally  Abdomen: soft non-tender non-distended  Ext: no edema  Skin: no rash  Neuro: alert and oriented X 4  Central Line: RUE PICC clean, dry, intact      LABS:    Blood Cultures:                           7.6    0.47  )-----------( 52       ( 31 Jan 2023 06:52 )             22.4         Mean Cell Volume : 84.5 fl  Mean Cell Hemoglobin : 28.7 pg  Mean Cell Hemoglobin Concentration : 33.9 gm/dL  Auto Neutrophil # : x  Auto Lymphocyte # : x  Auto Monocyte # : x  Auto Eosinophil # : x  Auto Basophil # : x  Auto Neutrophil % : x  Auto Lymphocyte % : x  Auto Monocyte % : x  Auto Eosinophil % : x  Auto Basophil % : x      01-31    138  |  105  |  7   ----------------------------<  108<H>  3.1<L>   |  25  |  0.64    Ca    7.4<L>      31 Jan 2023 06:52  Phos  2.1     01-31  Mg     1.7     01-31    TPro  4.9<L>  /  Alb  3.1<L>  /  TBili  0.6  /  DBili  x   /  AST  45<H>  /  ALT  46<H>  /  AlkPhos  62  01-31  PT/INR - ( 31 Jan 2023 06:52 )   PT: 11.2 sec;   INR: 0.97 ratio    PTT - ( 31 Jan 2023 06:52 )  PTT:24.7 sec    Uric Acid 0.9

## 2023-01-31 NOTE — CONSULT NOTE ADULT - PROBLEM SELECTOR RECOMMENDATION 2
PPSv2 prior to admission:  Current functional PPSv2:  Nursing care required:  A review of the paper chart has been conducted  HCP form present:               Yes and valid []               Yes but invalid []                No []   MOLST present:                   Yes and valid []               Yes but invalid []                No []  Incapacity form present:   Yes and valid []                Yes but invalid []                No []                 N/A []  Living Will:                            Yes and valid []                Yes but invalid []                No []      Family Health Care Decision Act (FHCDA) Surrogate Decision Maker Hierarchy in the absence of a health care agent  [facility] Article 81 Guardian-->Spouse or domestic partner--> Adult child-->Parent-->Sibling--> Close friend PPSv2 prior to admission: 80  Current functional PPSv2: 70  Nursing care required: Some assistance with ADLS         Family Health Care Decision Act (FHCDA) Surrogate Decision Maker Hierarchy in the absence of a health care agent  [facility] Article 81 Guardian-->Spouse or domestic partner--> Adult child-->Parent-->Sibling--> Close friend

## 2023-01-31 NOTE — PROGRESS NOTE ADULT - PROBLEM SELECTOR PLAN 1
Flow cytometry (1/14/23) consistent with acute leukemia (B ALL), FISH detected BCR/ABL (1/19)  Monitor daily CBC with Diff/CMP and transfuse/replete PRN  Patient was on warfarin-NOW HELD for mechanical AVR (1994 [facility] [place]), atrial fibrillation  last dose of warfarin 1/13 at [facility] (5mg)-cardiology consulted.   TLS labs raghav, IVF's, Strict I/O's, daily wights  1/14 TTE - EF 57%, mild pulm HTN, Mechanical AVR likely normal function  1/16 HLA sent  1/17  s/p BMbx c/w ALL (98% blasts) (with Clonoseq sampling)  Decadron 1/18-1/24-monitor sugars now off steroids.   1/19 Started Dasatinib  Vincristine 1.5 mg on Day 8 1/25 with Elitek 3mg IV x1.   Transfuse for Hgb <7.0, platelet count > 50 (+SDH)  Day 15 BMbx on 2/1 and LP to be determined but prior to discharge.   Social work consult to address family issues  1/30-Platelets - 35, transfuse one unit platelets with f/u platelet count.  1/30- Hypophosphatemia - Replete phos. Flow cytometry (1/14/23) consistent with acute leukemia (B ALL), FISH detected BCR/ABL (1/19)  Monitor daily CBC with Diff/CMP and transfuse/replete PRN  Patient was on warfarin-NOW HELD for mechanical AVR (1994 [facility]), atrial fibrillation  last dose of warfarin 1/13 at [facility] (5mg)-cardiology consulted.   TLS labs daily, IVF's, Strict I/O's, daily wights  1/14 TTE - EF 57%, mild pulm HTN, Mechanical AVR likely normal function  1/16 HLA sent  1/17  s/p BMbx c/w ALL (98% blasts) (with Clonoseq sampling)  Decadron 1/18-1/24-monitor sugars now off steroids.   1/19 Started Dasatinib  Vincristine 1.5 mg on Day 8 1/25 with Elitek 3mg IV x1.   Platelet count > 50 (+SDH)  Day 15 BMbx on 2/1 and LP 2/1 with plt coverage.   Social work consult to address family issues  Hypophosphatemia-replace phos, hypokalemia-replace k.

## 2023-01-31 NOTE — PROGRESS NOTE ADULT - ATTENDING COMMENTS
82F with PMH of Khris (s/p [place] mechanical AVR, 1994, [facility]) for rheumatic aortic valve stenosis, ascending aortic aneurysm, HTN,  HLD, depression, GERD, who presented with newly diagnosed Ph-positive B-ALL.     # Ph-positive B-ALL: Bone marrow biopsy on 1/17/23 showed 99% B-lymphoblasts. She started treatment per the CAL[facility] 38260 protocol with steroids and dasatinib on 1/18/23. She received 1 dose of vincristine 1.5 mg on 1/25/23 for LDH plateau. She received rasburicase twice, but no current evidence of TLS. She also received cryoprecipitate several times for hypofibrinogenemia, now normal. She has therapy- and disease-associated pancytopenia.   - S/p dexamethasone 20 mg D1-7. Continue dasatinib 140 mg daily. Today is D13.  - Trend CBC with differential and coagulation studies daily. Supportive transfusions to maintain Hgb > 7, plt > 50, and fibrinogen > 100.  - Trend TLS labs daily. Replete electrolytes PRN.   - Antimicrobial prophylaxis: levaquin, caspofungin, acyclovir  - Due for repeat bone marrow biopsy and LP with IT chemotherapy on D15. Pending results, will determine next course of therapy.     # SDH: CTH on 1/15/23 showed an 8 mm bleed, which was stable on subsequent scans. No neurologic deficits. No surgical intervention.  - Hold Coumadin   - Maintain plt > 50     # Mechanical valve: Currently off AC, discussed with Cardiology  - Hold anticoagulation in setting of SDH and persistent thrombocytopenia    The information documented within the attending attestation was documented solely by Carri Jesus. 82F with PMH of AFib (s/p [place] mechanical AVR, 1992, [facility]) for rheumatic aortic valve stenosis, ascending aortic aneurysm, HTN,  HLD, depression, GERD, who presented with newly diagnosed Ph-positive B-ALL.     # Ph-positive B-ALL: Bone marrow biopsy on 1/17/23 showed 99% B-lymphoblasts. She started treatment per the CALGB 13306 protocol with steroids and dasatinib on 1/18/23. She received 1 dose of vincristine 1.5 mg on 1/25/23 for LDH plateau. She received rasburicase twice, but no current evidence of TLS. She also received cryoprecipitate several times for hypofibrinogenemia, now normal. She has therapy- and disease-associated pancytopenia.   - S/p dexamethasone 20 mg D1-7. Continue dasatinib 140 mg daily. Today is D14.  - Trend CBC with differential and coagulation studies daily. Supportive transfusions to maintain Hgb > 7, plt > 50, and fibrinogen > 100.  - Trend TLS labs daily. Replete electrolytes PRN.   - Antimicrobial prophylaxis: levaquin, caspofungin, acyclovir  - Due for repeat bone marrow biopsy and LP with IT chemotherapy on D15. Pending results, will determine next course of therapy.     # SDH: CTH on 1/15/23 showed an 8 mm bleed, which was stable on subsequent scans. No neurologic deficits. No surgical intervention.  - Hold Coumadin   - Maintain plt > 50     # Mechanical valve: Currently off AC, discussed with Cardiology  - Hold anticoagulation in setting of SDH and persistent thrombocytopenia    The information documented within the attending attestation was documented solely by Carri Jesus.

## 2023-01-31 NOTE — CONSULT NOTE ADULT - PROBLEM SELECTOR RECOMMENDATION 9
Condition:  Previous malignancy:  Active treatment:  Clinical impact on complexity: Condition: ALL  Previous malignancy: None  Active treatment: On oral oncolytic therapy, dasatinib  Clinical impact on complexity: BMBx path forthcoming

## 2023-01-31 NOTE — CONSULT NOTE ADULT - SUBJECTIVE AND OBJECTIVE BOX
HPI:  82 year old female with atrial fibrillation, s/p [employer] Darian mechanical AVR, (–[facility]) for rheumatic aortic valve stenosis, ascending aortic aneurysm, HTN,  HLD, depression, GERD transferred from [facility] for further work-up and management of leukocytosis associated with thrombocytopenia and anemia. Patient initially presented to [facility] on 23 with complaints of black tarry stool and epigastric pain, and was subsequently found to be with anemia, thrombocytopenia and leukocytosis (lymphocytic predominant). She was also noted to have a supratherapeutic INR. She had CT angio A/P which showed no evidence of active GIB, but showing focal diminished cortical enhancement involving the upper left kidney concerning for focal pyelonephritis or possibly infarct. EGD without any acute findings. Colonoscopy showed showed grade I internal hemorrhoid and polyps in transverse colon and hepatic flexure. 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 (2023 11:56)    PERTINENT PM/SXH:   Hypertension    Hypercholesteremia      H/O heart valve replacement with mechanical valve    History of 2  sections      FAMILY HISTORY:    Family Hx substance abuse [ ]yes [ ]no  ITEMS NOT CHECKED ARE NOT PRESENT    SOCIAL HISTORY:   Significant other/partner[ ]  Children[ ]  Yazidi/Spirituality:  Substance hx:  [ ]   Tobacco hx:  [ ]   Alcohol hx: [ ]   Home Opioid hx:  [ ] I-Stop Reference No:  Living Situation: [ ]Home  [ ]Long term care  [ ]Rehab [ ]Other    ADVANCE DIRECTIVES:    DNR/MOLST  [ ]  Living Will  [ ]   DECISION MAKER(s):  [ ] Health Care Proxy(s)  [ ] Surrogate(s)  [ ] Guardian           Name(s): Phone Number(s):    BASELINE (I)ADL(s) (prior to admission):  [place]: [ ]Total  [ ] Moderate [ ]Dependent    Allergies    No Known Allergies    Intolerances    MEDICATIONS  (STANDING):  acyclovir   Oral Tab/Cap 400 milliGRAM(s) Oral every 8 hours  ascorbic acid 500 milliGRAM(s) Oral daily  atorvastatin 80 milliGRAM(s) Oral at bedtime  caspofungin IVPB 50 milliGRAM(s) IV Intermittent every 24 hours  chlorhexidine 4% Liquid 1 Application(s) Topical <User Schedule>  cyanocobalamin 1000 MICROGram(s) Oral daily  dasatinib 140 milliGRAM(s) Oral daily  dextrose 5%. 1000 milliLiter(s) (50 mL/Hr) IV Continuous <Continuous>  dextrose 5%. 1000 milliLiter(s) (100 mL/Hr) IV Continuous <Continuous>  dextrose 50% Injectable 25 Gram(s) IV Push once  dextrose 50% Injectable 12.5 Gram(s) IV Push once  diltiazem    milliGRAM(s) Oral daily  glucagon  Injectable 1 milliGRAM(s) IntraMuscular once  hydrochlorothiazide 25 milliGRAM(s) Oral daily  hydrocortisone 2.5% Rectal Cream 1 Application(s) Rectal two times a day  insulin lispro (ADMELOG) corrective regimen sliding scale   SubCutaneous three times a day before meals  levoFLOXacin  Tablet 500 milliGRAM(s) Oral every 24 hours  melatonin 5 milliGRAM(s) Oral at bedtime  pantoprazole    Tablet 40 milliGRAM(s) Oral before breakfast  potassium chloride  20 mEq/100 mL IVPB 20 milliEquivalent(s) IV Intermittent every 2 hours  sodium chloride 0.9%. 1000 milliLiter(s) (50 mL/Hr) IV Continuous <Continuous>  sodium phosphate 15 milliMole(s)/250 mL IVPB 15 milliMole(s) IV Intermittent once  valsartan 320 milliGRAM(s) Oral daily  zinc oxide 40% Paste 1 Application(s) Topical two times a day    MEDICATIONS  (PRN):  acetaminophen     Tablet .. 650 milliGRAM(s) Oral every 6 hours PRN Temp greater or equal to 38C (100.4F), Mild Pain (1 - 3)  aluminum hydroxide/magnesium hydroxide/simethicone Suspension 30 milliLiter(s) Oral every 4 hours PRN Dyspepsia  dextrose Oral Gel 15 Gram(s) Oral once PRN Blood Glucose LESS THAN 70 milliGRAM(s)/deciliter  loperamide 2 milliGRAM(s) Oral three times a day PRN Diarrhea  ondansetron Injectable 8 milliGRAM(s) IV Push every 8 hours PRN Nausea  sodium chloride 0.9% lock flush 10 milliLiter(s) IV Push every 1 hour PRN Pre/post blood products, medications, blood draw, and to maintain line patency    PRESENT SYMPTOMS: [ ]Unable to self-report  [ ] CPOT [ ] PAINADs [ ] RDOS  Source if other than patient:  [ ]Family   [ ]Team     Pain: [ ]yes [ ]no  QOL impact -   Location -                    Aggravating factors -  Quality -  Radiation -  Timing-  Severity (0-10 scale):  Minimal acceptable level (0-10 scale):     CPOT:    https://www.[facility].org/getattachment/[email or web address]-[email or web address]/Critical-Care-Pain-Observation-Tool-(CPOT)    PAIN AD Score:   http://geriatrictoolkit.[facility]/cog/painad.pdf (press ctrl +  left click to view)    Dyspnea:                           [ ]Mild [ ]Moderate [ ]Severe      RDOS:  0 to 2  minimal or no respiratory distress   3  mild distress  4 to 6 moderate distress  >7 severe distress  https://homecareinformation.net/handouts/hen/Respiratory_Distress_Observation_Scale.pdf (Ctrl +  left click to view)     Anxiety:                             [ ]Mild [ ]Moderate [ ]Severe  Fatigue:                             [ ]Mild [ ]Moderate [ ]Severe  Nausea:                             [ ]Mild [ ]Moderate [ ]Severe  Loss of appetite:              [ ]Mild [ ]Moderate [ ]Severe  Constipation:                    [ ]Mild [ ]Moderate [ ]Severe    PCSSQ[Palliative Care Spiritual Screening Question]   Severity (0-10):  Score of 4 or > indicate consideration of Chaplaincy referral.  Chaplaincy Referral: [ ] yes [ ] refused [ ] following [ ] Deferred     Caregiver [place]abella? : [ ] yes [ ] no [ ] Deferred [ ] Declined             Social work referral [ ] Patient & Family Centered Care Referral [ ]     Anticipatory Grief present?:  [ ] yes [ ] no  [ ] Deferred                  Social work referral [ ] Chaplaincy Referral[ ]      Other Symptoms:  [ ]All other review of systems negative     Palliative Performance Status Version 2:         %    http://npcrc.org/files/news/palliative_performance_scale_ppsv2.pdf  PHYSICAL EXAM:  Vital Signs Last 24 Hrs  T(C): 36.8 (2023 04:31), Max: 37.1 (2023 11:10)  T(F): 98.2 (2023 04:31), Max: 98.7 (2023 11:10)  HR: 75 (2023 04:31) (69 - 85)  BP: 103/64 (2023 04:31) (103/64 - 133/81)  BP(mean): --  RR: 18 (2023 04:31) (18 - 18)  SpO2: 98% (2023 04:31) (96% - 100%)    Parameters below as of 2023 04:31  Patient On (Oxygen Delivery Method): room air     I&O's Summary    2023 07:01  -  2023 07:00  --------------------------------------------------------  IN: 2199 mL / OUT: 2100 mL / NET: 99 mL      GENERAL: [ ]Cachexia    [ ]Alert  [ ]Oriented x   [ ]Lethargic  [ ]Unarousable  [ ]Verbal  [ ]Non-Verbal  Behavioral:   [ ] Anxiety  [ ] Delirium [ ] Agitation [ ] Other  HEENT:  [ ]Normal   [ ]Dry mouth   [ ]ET Tube/Trach  [ ]Oral lesions  PULMONARY:   [ ]Clear [ ]Tachypnea  [ ]Audible excessive secretions   [ ]Rhonchi        [ ]Right [ ]Left [ ]Bilateral  [ ]Crackles        [ ]Right [ ]Left [ ]Bilateral  [ ]Wheezing     [ ]Right [ ]Left [ ]Bilateral  [ ]Diminished breath sounds [ ]right [ ]left [ ]bilateral  CARDIOVASCULAR:    [ ]Regular [ ]Irregular [ ]Tachy  [ ]Aldo [ ]Murmur [ ]Other  GASTROINTESTINAL:  [ ]Soft  [ ]Distended   [ ]+BS  [ ]Non tender [ ]Tender  [ ]Other [ ]PEG [ ]OGT/ NGT  Last BM:  GENITOURINARY:  [ ]Normal [ ] Incontinent   [ ]Oliguria/Anuria   [ ]Doty  MUSCULOSKELETAL:   [ ]Normal   [ ]Weakness  [ ]Bed/Wheelchair bound [ ]Edema  NEUROLOGIC:   [ ]No focal deficits  [ ]Cognitive impairment  [ ]Dysphagia [ ]Dysarthria [ ]Paresis [ ]Other   SKIN:   [ ]Normal  [ ]Rash  [ ]Other  [ ]Pressure ulcer(s)       Present on admission [ ]y [ ]n    CRITICAL CARE:  [ ] Shock Present  [ ]Septic [ ]Cardiogenic [ ]Neurologic [ ]Hypovolemic  [ ]  Vasopressors [ ]  Inotropes   [ ]Respiratory failure present [ ]Mechanical ventilation [ ]Non-invasive ventilatory support [ ]High flow    [ ]Acute  [ ]Chronic [ ]Hypoxic  [ ]Hypercarbic [ ]Other  [ ]Other organ failure     LABS:                        7.6    0.47  )-----------( 52       ( 2023 06:52 )             22.4       138  |  105  |  7   ----------------------------<  108<H>  3.1<L>   |  25  |  0.64    Ca    7.4<L>      2023 06:52  Phos  2.1       Mg     1.7         TPro  4.9<L>  /  Alb  3.1<L>  /  TBili  0.6  /  DBili  x   /  AST  45<H>  /  ALT  46<H>  /  AlkPhos  62    PT/INR - ( 2023 06:52 )   PT: 11.2 sec;   INR: 0.97 ratio         PTT - ( 2023 06:52 )  PTT:24.7 sec      RADIOLOGY & ADDITIONAL STUDIES:    PROTEIN CALORIE MALNUTRITION PRESENT: [ ]mild [ ]moderate [ ]severe [ ]underweight [ ]morbid obesity  https://www.andeal.org/vault/2440/web/files/ONC/Table_Clinical%20Characteristics%20to%20Document%20Malnutrition-White%20JV%20et%20al%2020.pdf    Height (cm): 159 (23 @ 10:42), 157.5 (23 @ 08:49)  Weight (kg): 60.9 (23 @ 10:42), 62.6 (23 @ 08:49)  BMI (kg/m2): 24.1 (23 @ 10:42), 25.2 (23 @ 08:49)    [ ]PPSV2 < or = to 30% [ ]significant weight loss  [ ]poor nutritional intake  [ ]anasarca[ ]Artificial Nutrition      Other REFERRALS:  [ ]Hospice  [ ]Child Life  [ ]Social Work  [ ]Case management [ ]Holistic Therapy     Goals of Care Document:  HPI:  82 year old female with atrial fibrillation, s/p [employer] D[employer] mechanical AVR, (–[facility]) for rheumatic aortic valve stenosis, ascending aortic aneurysm, HTN,  HLD, depression, GERD transferred from [facility] for further work-up and management of leukocytosis associated with thrombocytopenia and anemia. Patient initially presented to [facility] on 23 with complaints of black tarry stool and epigastric pain, and was subsequently found to be with anemia, thrombocytopenia and leukocytosis (lymphocytic predominant). She was also noted to have a supratherapeutic INR. She had CT angio A/P which showed no evidence of active GIB, but showing focal diminished cortical enhancement involving the upper left kidney concerning for focal pyelonephritis or possibly infarct. EGD without any acute findings. Colonoscopy showed showed grade I internal hemorrhoid and polyps in transverse colon and hepatic flexure. 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 (2023 11:56)        Palliative Global Assessment  A review of the paper chart has been conducted  HCP form present:               Yes and valid []               Yes but invalid []                No [x]   MOLST present:                   Yes and valid []               Yes but invalid []                No [x]  Incapacity form present:   Yes and valid []                Yes but invalid []                No []                 N/A [x]  Living Will:                            Yes and valid []                Yes but invalid []                No [x]      Family Heatlh Care Decision Act Surrogate Decision Maker Hierarchy  [facility] [address] 83 Guardian-->Spouse or domestic partner--> Adult child-->Parent-->Sibling--> Close friend      Contacts listed in the paper or electronic chart  Name Nydia Sierra  Relationship Dtr  Translation Required: None  Spouse or Partner:  but she reports his has significant cognitive impairment and will be moved into a facility  Family unit: , and two children (Nydia [place] and Benito MCCALLUM)  Family history of hematologic malignancy: None  Residence: [ ] Apartment   [] House  [ ] Other  Degree of functionality in the home: High   Performance Status (PPSv2): 80 prior to admission  BMI: 24  Engagement in the home: Full   Employment: [ ] Currently employed [ ] Exited workforce due to illness  [ ] Retired prior to illness  [ ] No/distant history of employment   Support network (degree):  ---Family:        [ ] Low  [ ] Moderate  [x ] High  ---Neighbors: [ ] Low  [ ] Moderate  [ ] High---> PT lives in [place]  ---Social:         [ ] Low  [ ] Moderate  [x ] High  ---Spiritual:    [x ] Low  [ ] Moderate  [ ] High  Financial concerns: None  Coping Strategies: Positive and well being  Caregiver burden/fatigue/needs: None  Grief identified: [] Yes [x] No       PERTINENT PM/SXH:   Hypertension    Hypercholesteremia      H/O heart valve replacement with mechanical valve    History of 2  sections      FAMILY HISTORY:    Family Hx substance abuse [ ]yes [ x]no  ITEMS NOT CHECKED ARE NOT PRESENT    SOCIAL HISTORY:   Significant other/partner[x ]  Children[x ]  Taoist/Spirituality:  Substance hx:  [ ]   Tobacco hx:  [ ]   Alcohol hx: [ ]   Home Opioid hx:  [ ] I-Stop Reference No:  Living Situation: [ ]Home  [ ]Long term care  [ ]Rehab [ ]Other    ADVANCE DIRECTIVES:    DNR/MOLST  [ ]  Living Will  [ ]   DECISION MAKER(s):  [ ] Health Care Proxy(s)  [ ] Surrogate(s)  [ ] Guardian           Name(s): Phone Number(s):    BASELINE (I)ADL(s) (prior to admission):  [place]: [ ]Total  [ ] Moderate [ ]Dependent    Allergies    No Known Allergies    Intolerances    MEDICATIONS  (STANDING):  acyclovir   Oral Tab/Cap 400 milliGRAM(s) Oral every 8 hours  ascorbic acid 500 milliGRAM(s) Oral daily  atorvastatin 80 milliGRAM(s) Oral at bedtime  caspofungin IVPB 50 milliGRAM(s) IV Intermittent every 24 hours  chlorhexidine 4% Liquid 1 Application(s) Topical <User Schedule>  cyanocobalamin 1000 MICROGram(s) Oral daily  dasatinib 140 milliGRAM(s) Oral daily  dextrose 5%. 1000 milliLiter(s) (50 mL/Hr) IV Continuous <Continuous>  dextrose 5%. 1000 milliLiter(s) (100 mL/Hr) IV Continuous <Continuous>  dextrose 50% Injectable 25 Gram(s) IV Push once  dextrose 50% Injectable 12.5 Gram(s) IV Push once  diltiazem    milliGRAM(s) Oral daily  glucagon  Injectable 1 milliGRAM(s) IntraMuscular once  hydrochlorothiazide 25 milliGRAM(s) Oral daily  hydrocortisone 2.5% Rectal Cream 1 Application(s) Rectal two times a day  insulin lispro (ADMELOG) corrective regimen sliding scale   SubCutaneous three times a day before meals  levoFLOXacin  Tablet 500 milliGRAM(s) Oral every 24 hours  melatonin 5 milliGRAM(s) Oral at bedtime  pantoprazole    Tablet 40 milliGRAM(s) Oral before breakfast  potassium chloride  20 mEq/100 mL IVPB 20 milliEquivalent(s) IV Intermittent every 2 hours  sodium chloride 0.9%. 1000 milliLiter(s) (50 mL/Hr) IV Continuous <Continuous>  sodium phosphate 15 milliMole(s)/250 mL IVPB 15 milliMole(s) IV Intermittent once  valsartan 320 milliGRAM(s) Oral daily  zinc oxide 40% Paste 1 Application(s) Topical two times a day    MEDICATIONS  (PRN):  acetaminophen     Tablet .. 650 milliGRAM(s) Oral every 6 hours PRN Temp greater or equal to 38C (100.4F), Mild Pain (1 - 3)  aluminum hydroxide/magnesium hydroxide/simethicone Suspension 30 milliLiter(s) Oral every 4 hours PRN Dyspepsia  dextrose Oral Gel 15 Gram(s) Oral once PRN Blood Glucose LESS THAN 70 milliGRAM(s)/deciliter  loperamide 2 milliGRAM(s) Oral three times a day PRN Diarrhea  ondansetron Injectable 8 milliGRAM(s) IV Push every 8 hours PRN Nausea  sodium chloride 0.9% lock flush 10 milliLiter(s) IV Push every 1 hour PRN Pre/post blood products, medications, blood draw, and to maintain line patency    PRESENT SYMPTOMS: [ ]Unable to self-report  [ ] CPOT [ ] PAINADs [ ] RDOS  Source if other than patient:  [ ]Family   [ ]Team     Pain: [ ]yes [x ]no  QOL impact -   Location -                    Aggravating factors -  Quality -  Radiation -  Timing-  Severity (0-10 scale):  Minimal acceptable level (0-10 scale):     CPOT:    https://www.sccm.org/getattachment/wer06g47-7n7w-[email or web address]-5037v5768t8w/Critical-Care-Pain-Observation-Tool-(CPOT)    PAIN AD Score:   http://geriatrictoolkit.[place]/cog/painad.pdf (press ctrl +  left click to view)    Dyspnea:                           [ ]Mild [ ]Moderate [ ]Severe      RDOS:  0 to 2  minimal or no respiratory distress   3  mild distress  4 to 6 moderate distress  >7 severe distress  https://[email or web address].net/handouts/hen/Respiratory_Distress_Observation_Scale.pdf (Ctrl +  left click to view)     Anxiety:                             [ ]Mild [ ]Moderate [ ]Severe  Fatigue:                             [ x]Mild [ ]Moderate [ ]Severe  Nausea:                             [ ]Mild [ ]Moderate [ ]Severe  Loss of appetite:              [ ]Mild [ ]Moderate [ ]Severe  Constipation:                    [ ]Mild [ ]Moderate [ ]Severe    PCSSQ[Palliative Care Spiritual Screening Question]   Severity (0-10): No distress. Very positive mindset  Score of 4 or > indicate consideration of Chaplaincy referral.  Chaplaincy Referral: [x ] yes [ ] refused [ ] following [ ] Deferred     Caregiver [place]? : [ ] yes [x ] no [ ] Deferred [ ] Declined             Social work referral [ ] Patient & Family Centered Care Referral [ ]     Anticipatory Grief present?:  [ ] yes [ x] no  [ ] Deferred                  Social work referral [ ] Chaplaincy Referral[ ]      Other Symptoms:  [ x]All other review of systems negative     Palliative Performance Status Version 2:    70     %    http://npcrc.org/files/news/palliative_performance_scale_ppsv2.pdf  PHYSICAL EXAM:  Vital Signs Last 24 Hrs  T(C): 36.8 (2023 04:31), Max: 37.1 (2023 11:10)  T(F): 98.2 (2023 04:31), Max: 98.7 (2023 11:10)  HR: 75 (2023 04:31) (69 - 85)  BP: 103/64 (2023 04:31) (103/64 - 133/81)  BP(mean): --  RR: 18 (2023 04:31) (18 - 18)  SpO2: 98% (2023 04:31) (96% - 100%)    Parameters below as of 2023 04:31  Patient On (Oxygen Delivery Method): room air     I&O's Summary    2023 07:01  -  2023 07:00  --------------------------------------------------------  IN: 2199 mL / OUT: 2100 mL / NET: 99 mL      GENERAL: [ ]Cachexia    [ x]Alert  [ ]Oriented x   [ ]Lethargic  [ ]Unarousable  [ ]Verbal  [ ]Non-Verbal  Behavioral:   [ ] Anxiety  [ ] Delirium [ ] Agitation [x ] Other Calm  HEENT:  [ x]Normal   [ ]Dry mouth   [ ]ET Tube/Trach  [ ]Oral lesions  PULMONARY:   [ x]Clear [ ]Tachypnea  [ ]Audible excessive secretions   [ ]Rhonchi        [ ]Right [ ]Left [ ]Bilateral  [ ]Crackles        [ ]Right [ ]Left [ ]Bilateral  [ ]Wheezing     [ ]Right [ ]Left [ ]Bilateral  [ ]Diminished breath sounds [ ]right [ ]left [ ]bilateral  CARDIOVASCULAR:    [x ]Regular [ ]Irregular [ ]Tachy  [ ]Aldo [ ]Murmur [ ]Other  GASTROINTESTINAL:  [ x]Soft  [ ]Distended   [ ]+BS  [ ]Non tender [ ]Tender  [ ]Other [ ]PEG [ ]OGT/ NGT  Last BM:  GENITOURINARY:  [x ]Normal [ ] Incontinent   [ ]Oliguria/Anuria   [ ]Doty  MUSCULOSKELETAL:   [x ]Normal   [ ]Weakness  [ ]Bed/Wheelchair bound [ ]Edema  NEUROLOGIC:   [x ]No focal deficits  [ ]Cognitive impairment  [ ]Dysphagia [ ]Dysarthria [ ]Paresis [ ]Other   SKIN:   [x ]Normal  [ ]Rash  [ ]Other  [ ]Pressure ulcer(s)       Present on admission [ ]y [ ]n    CRITICAL CARE:  [ ] Shock Present  [ ]Septic [ ]Cardiogenic [ ]Neurologic [ ]Hypovolemic  [ ]  Vasopressors [ ]  Inotropes   [ ]Respiratory failure present [ ]Mechanical ventilation [ ]Non-invasive ventilatory support [ ]High flow    [ ]Acute  [ ]Chronic [ ]Hypoxic  [ ]Hypercarbic [ ]Other  [ ]Other organ failure     LABS:                        7.6    0.47  )-----------( 52       ( 2023 06:52 )             22.4       138  |  105  |  7   ----------------------------<  108<H>  3.1<L>   |  25  |  0.64    Ca    7.4<L>      2023 06:52  Phos  2.1       Mg     1.7         TPro  4.9<L>  /  Alb  3.1<L>  /  TBili  0.6  /  DBili  x   /  AST  45<H>  /  ALT  46<H>  /  AlkPhos  62    PT/INR - ( 2023 06:52 )   PT: 11.2 sec;   INR: 0.97 ratio         PTT - ( 2023 06:52 )  PTT:24.7 sec      RADIOLOGY & ADDITIONAL STUDIES:    PROTEIN CALORIE MALNUTRITION PRESENT: [ ]mild [ ]moderate [ ]severe [ ]underweight [ ]morbid obesity  https://www.andeal.org/vault/2440/web/files/ONC/Table_Clinical%20Characteristics%20to%20Document%20Malnutrition-White%20JV%20et%20al%2020.pdf    Height (cm): 159 (23 @ 10:42), 157.5 (23 @ 08:49)  Weight (kg): 60.9 (23 @ 10:42), 62.6 (23 @ 08:49)  BMI (kg/m2): 24.1 (23 @ 10:42), 25.2 (23 @ 08:49)    [ ]PPSV2 < or = to 30% [ ]significant weight loss  [ ]poor nutritional intake  [ ]anasarca[ ]Artificial Nutrition      Other REFERRALS:  [ ]Hospice  [ ]Child Life  [ ]Social Work  [ ]Case management [ ]Holistic Therapy     Goals of Care Document:

## 2023-01-31 NOTE — PROGRESS NOTE ADULT - ASSESSMENT
82 year old female with atrial fibrillation, s/p [employer] Darian mechanical AVR, (1994–[facility]) for rheumatic aortic valve stenosis, ascending aortic aneurysm, HTN, HLD, depression, GERD transferred from [facility] for management of newly diagnosed PH + B cell ALL. Peripheral flow performed, BMBx consistent with B-cell ALL, FISH Ph+ treated with Dex and Dasatinib following CALGB 75704. 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 Anemia and thrombocytopenia secondary to disease.

## 2023-01-31 NOTE — PROGRESS NOTE ADULT - PROBLEM SELECTOR PLAN 2
patient is neutropenic, afebrile  c/w levaquin, Caspofungin ppx   if spike panculture and CXR  GI PCR (-)  1/21, 1/26 c.diff toxin negative.  1/30- Start Acyclovir for prophylaxis.

## 2023-01-31 NOTE — CONSULT NOTE ADULT - CONSULT REASON
consulted for assistance with complex medical decision making in the context of a patient with consulted for psychosocial support

## 2023-01-31 NOTE — CONSULT NOTE ADULT - PROBLEM SELECTOR RECOMMENDATION 3
Actions:  [] Rapport building     [] Symptom assessment    [] Eliciting preferences of goals   [] Prognostic understanding    [] Emotional Support  [] Coping skill development  []  Other  Interdisciplinary Referrals:  Communication:  Documentation Review: [] Primary Team [] Consultants [] Interdisciplinary team  Content: Actions:  [x] Rapport building     [x] Symptom assessment    [] Eliciting preferences of goals   [] Prognostic understanding    [x] Emotional Support  [] Coping skill development  []  Other  Interdisciplinary Referrals: Chaplaincy  Communication: d/w Team  PT is feeling very positive  Hopeful to return to [place] and resume care  Documentation Review: [x] Primary Team [ ] Consultants [] Interdisciplinary team  Content: n/a

## 2023-02-01 LAB
ALBUMIN SERPL ELPH-MCNC: 3 G/DL — LOW (ref 3.3–5)
ALP SERPL-CCNC: 63 U/L — SIGNIFICANT CHANGE UP (ref 40–120)
ALT FLD-CCNC: 52 U/L — HIGH (ref 10–45)
ANION GAP SERPL CALC-SCNC: 7 MMOL/L — SIGNIFICANT CHANGE UP (ref 5–17)
APPEARANCE CSF: CLEAR — SIGNIFICANT CHANGE UP
APTT BLD: 25.4 SEC — LOW (ref 27.5–35.5)
AST SERPL-CCNC: 48 U/L — HIGH (ref 10–40)
BILIRUB SERPL-MCNC: 0.7 MG/DL — SIGNIFICANT CHANGE UP (ref 0.2–1.2)
BUN SERPL-MCNC: 9 MG/DL — SIGNIFICANT CHANGE UP (ref 7–23)
CALCIUM SERPL-MCNC: 7.1 MG/DL — LOW (ref 8.4–10.5)
CHLORIDE SERPL-SCNC: 106 MMOL/L — SIGNIFICANT CHANGE UP (ref 96–108)
CO2 SERPL-SCNC: 25 MMOL/L — SIGNIFICANT CHANGE UP (ref 22–31)
COLOR CSF: SIGNIFICANT CHANGE UP
CREAT SERPL-MCNC: 0.59 MG/DL — SIGNIFICANT CHANGE UP (ref 0.5–1.3)
EGFR: 91 ML/MIN/1.73M2 — SIGNIFICANT CHANGE UP
FIBRINOGEN PPP-MCNC: 319 MG/DL — SIGNIFICANT CHANGE UP (ref 200–445)
GLUCOSE BLDC GLUCOMTR-MCNC: 106 MG/DL — HIGH (ref 70–99)
GLUCOSE BLDC GLUCOMTR-MCNC: 120 MG/DL — HIGH (ref 70–99)
GLUCOSE BLDC GLUCOMTR-MCNC: 121 MG/DL — HIGH (ref 70–99)
GLUCOSE BLDC GLUCOMTR-MCNC: 172 MG/DL — HIGH (ref 70–99)
GLUCOSE CSF-MCNC: 74 MG/DL — HIGH (ref 40–70)
GLUCOSE SERPL-MCNC: 108 MG/DL — HIGH (ref 70–99)
HCT VFR BLD CALC: 22.2 % — LOW (ref 34.5–45)
HGB BLD-MCNC: 7.4 G/DL — LOW (ref 11.5–15.5)
INR BLD: 0.96 RATIO — SIGNIFICANT CHANGE UP (ref 0.88–1.16)
LDH CSF L TO P-CCNC: 16 U/L — SIGNIFICANT CHANGE UP
LDH FLD-CCNC: 16 U/L — SIGNIFICANT CHANGE UP
LDH SERPL L TO P-CCNC: 419 U/L — HIGH (ref 50–242)
LYMPHOCYTES # CSF: 87 % — HIGH (ref 40–80)
MAGNESIUM SERPL-MCNC: 1.5 MG/DL — LOW (ref 1.6–2.6)
MCHC RBC-ENTMCNC: 28.2 PG — SIGNIFICANT CHANGE UP (ref 27–34)
MCHC RBC-ENTMCNC: 33.3 GM/DL — SIGNIFICANT CHANGE UP (ref 32–36)
MCV RBC AUTO: 84.7 FL — SIGNIFICANT CHANGE UP (ref 80–100)
MONOS+MACROS NFR CSF: 13 % — LOW (ref 15–45)
NEUTROPHILS # CSF: 0 % — SIGNIFICANT CHANGE UP (ref 0–6)
NRBC # BLD: 0 /100 WBCS — SIGNIFICANT CHANGE UP (ref 0–0)
NRBC NFR CSF: 1 /UL — SIGNIFICANT CHANGE UP (ref 0–5)
PHOSPHATE SERPL-MCNC: 1.8 MG/DL — LOW (ref 2.5–4.5)
PLATELET # BLD AUTO: 29 K/UL — LOW (ref 150–400)
PLATELET # BLD AUTO: 43 K/UL — LOW (ref 150–400)
POTASSIUM SERPL-MCNC: 3.4 MMOL/L — LOW (ref 3.5–5.3)
POTASSIUM SERPL-SCNC: 3.4 MMOL/L — LOW (ref 3.5–5.3)
PROT CSF-MCNC: 17 MG/DL — SIGNIFICANT CHANGE UP (ref 15–45)
PROT SERPL-MCNC: 4.9 G/DL — LOW (ref 6–8.3)
PROTHROM AB SERPL-ACNC: 11.1 SEC — SIGNIFICANT CHANGE UP (ref 10.5–13.4)
RBC # BLD: 2.62 M/UL — LOW (ref 3.8–5.2)
RBC # CSF: 42 /UL — HIGH (ref 0–0)
RBC # FLD: 15.3 % — HIGH (ref 10.3–14.5)
SODIUM SERPL-SCNC: 138 MMOL/L — SIGNIFICANT CHANGE UP (ref 135–145)
TUBE TYPE: SIGNIFICANT CHANGE UP
URATE SERPL-MCNC: 1.1 MG/DL — LOW (ref 2.5–7)
WBC # BLD: 0.54 K/UL — CRITICAL LOW (ref 3.8–10.5)
WBC # FLD AUTO: 0.54 K/UL — CRITICAL LOW (ref 3.8–10.5)

## 2023-02-01 PROCEDURE — 62329 THER SPI PNXR CSF FLUOR/CT: CPT

## 2023-02-01 PROCEDURE — 99233 SBSQ HOSP IP/OBS HIGH 50: CPT

## 2023-02-01 PROCEDURE — ZZZZZ: CPT

## 2023-02-01 RX ORDER — POTASSIUM CHLORIDE 20 MEQ
20 PACKET (EA) ORAL
Refills: 0 | Status: COMPLETED | OUTPATIENT
Start: 2023-02-01 | End: 2023-02-01

## 2023-02-01 RX ORDER — METHOTREXATE 2.5 MG/1
12 TABLET ORAL ONCE
Refills: 0 | Status: DISCONTINUED | OUTPATIENT
Start: 2023-02-01 | End: 2023-02-10

## 2023-02-01 RX ORDER — MAGNESIUM SULFATE 500 MG/ML
2 VIAL (ML) INJECTION ONCE
Refills: 0 | Status: COMPLETED | OUTPATIENT
Start: 2023-02-01 | End: 2023-02-01

## 2023-02-01 RX ADMIN — Medication 1 APPLICATION(S): at 21:28

## 2023-02-01 RX ADMIN — ATORVASTATIN CALCIUM 80 MILLIGRAM(S): 80 TABLET, FILM COATED ORAL at 21:29

## 2023-02-01 RX ADMIN — Medication 25 GRAM(S): at 12:07

## 2023-02-01 RX ADMIN — ZINC OXIDE 1 APPLICATION(S): 200 OINTMENT TOPICAL at 05:42

## 2023-02-01 RX ADMIN — Medication 50 MILLIEQUIVALENT(S): at 19:55

## 2023-02-01 RX ADMIN — Medication 2 MILLIGRAM(S): at 23:55

## 2023-02-01 RX ADMIN — Medication 500 MILLIGRAM(S): at 12:08

## 2023-02-01 RX ADMIN — VALSARTAN 320 MILLIGRAM(S): 80 TABLET ORAL at 05:43

## 2023-02-01 RX ADMIN — Medication 255 MILLIMOLE(S): at 19:53

## 2023-02-01 RX ADMIN — Medication 400 MILLIGRAM(S): at 05:43

## 2023-02-01 RX ADMIN — Medication 240 MILLIGRAM(S): at 05:43

## 2023-02-01 RX ADMIN — SODIUM CHLORIDE 50 MILLILITER(S): 9 INJECTION INTRAMUSCULAR; INTRAVENOUS; SUBCUTANEOUS at 05:49

## 2023-02-01 RX ADMIN — Medication 1 APPLICATION(S): at 05:41

## 2023-02-01 RX ADMIN — CASPOFUNGIN ACETATE 260 MILLIGRAM(S): 7 INJECTION, POWDER, LYOPHILIZED, FOR SOLUTION INTRAVENOUS at 17:01

## 2023-02-01 RX ADMIN — PANTOPRAZOLE SODIUM 40 MILLIGRAM(S): 20 TABLET, DELAYED RELEASE ORAL at 05:44

## 2023-02-01 RX ADMIN — PREGABALIN 1000 MICROGRAM(S): 225 CAPSULE ORAL at 12:09

## 2023-02-01 RX ADMIN — Medication 50 MILLIEQUIVALENT(S): at 13:46

## 2023-02-01 RX ADMIN — DASATINIB 140 MILLIGRAM(S): 80 TABLET ORAL at 12:17

## 2023-02-01 RX ADMIN — Medication 400 MILLIGRAM(S): at 13:46

## 2023-02-01 RX ADMIN — Medication 50 MILLIEQUIVALENT(S): at 17:00

## 2023-02-01 RX ADMIN — Medication 5 MILLIGRAM(S): at 21:29

## 2023-02-01 RX ADMIN — Medication 400 MILLIGRAM(S): at 21:29

## 2023-02-01 RX ADMIN — ZINC OXIDE 1 APPLICATION(S): 200 OINTMENT TOPICAL at 18:47

## 2023-02-01 RX ADMIN — CHLORHEXIDINE GLUCONATE 1 APPLICATION(S): 213 SOLUTION TOPICAL at 08:43

## 2023-02-01 NOTE — PROGRESS NOTE ADULT - PROBLEM SELECTOR PLAN 3
neurosurgery consulted  1/24 Now keeping PLTs > 50 neurosurgery consulted  2/1 Now keeping PLTs > 20

## 2023-02-01 NOTE — PRE PROCEDURE NOTE - PRE PROCEDURE EVALUATION
Neuroradiology pre-op note    HPI: 82y Female with ALL     Diagnosis: ALL  Procedure: Fluoroscopically guided lumbar puncture with intrathecal chemotherapy    acyclovir   Oral Tab/Cap 400 milliGRAM(s)                            x      x     )-----------( 43       ( 01 Feb 2023 09:57 )             x        02-01    138  |  106  |  9   ----------------------------<  108<H>  3.4<L>   |  25  |  0.59    Ca    7.1<L>      01 Feb 2023 05:36  Phos  1.8     02-01  Mg     1.5     02-01    TPro  4.9<L>  /  Alb  3.0<L>  /  TBili  0.7  /  DBili  x   /  AST  48<H>  /  ALT  52<H>  /  AlkPhos  63  02-01    PT/INR - ( 01 Feb 2023 05:36 )   PT: 11.1 sec;   INR: 0.96 ratio    PTT - ( 01 Feb 2023 05:36 )  PTT:25.4 sec      Assessment & Plan:  82y Female with ALL    -Will plan for Fluoroscopically guided lumbar puncture with intrathecal chemotherapy  -Informed consent obtained. After risks, benefits, alternatives discussion pt verbalized understanding and signed consent. Risks including bleeding, infection, nerve damage, and/or headaches were discussed.    VESTA Augustine  Available on [employer] Teams  Spectralink [id number]  Ext [phone]

## 2023-02-01 NOTE — PROGRESS NOTE ADULT - PROBLEM SELECTOR PLAN 1
Flow cytometry (1/14/23) consistent with acute leukemia (B ALL), FISH detected BCR/ABL (1/19)  Monitor daily CBC with Diff/CMP and transfuse/replete PRN  Patient was on warfarin-NOW HELD for mechanical AVR (1994 [facility]), atrial fibrillation  last dose of warfarin 1/13 at [facility] (5mg)-cardiology consulted.   TLS labs daily, IVF's, Strict I/O's, daily wights  1/14 TTE - EF 57%, mild pulm HTN, Mechanical AVR likely normal function  1/16 HLA sent  1/17  s/p BMbx c/w ALL (98% blasts) (with Clonoseq sampling)  Decadron 1/18-1/24-monitor sugars now off steroids.   1/19 Started Dasatinib  Vincristine 1.5 mg on Day 8 1/25 with Elitek 3mg IV x1.   Platelet count > 50 (+SDH)  Day 15 BMbx on 2/1 and LP 2/1 with plt coverage.   Social work consult to address family issues  Hypophosphatemia-replace phos, hypokalemia-replace k. Flow cytometry (1/14/23) consistent with acute leukemia (B ALL), FISH detected BCR/ABL (1/19)  Monitor daily CBC with Diff/CMP and transfuse/replete PRN  Patient was on warfarin-NOW HELD for mechanical AVR (1994 [facility]), atrial fibrillation  last dose of warfarin 1/13 at [facility] (5mg)-cardiology consulted.   TLS labs daily, IVF's, Strict I/O's, daily wights  1/14 TTE - EF 57%, mild pulm HTN, Mechanical AVR likely normal function  1/16 HLA sent  1/17  s/p BMbx c/w ALL (98% blasts) (with Clonoseq sampling)  Decadron 1/18-1/24-monitor sugars now off steroids.   1/19 Started Dasatinib  Vincristine 1.5 mg on Day 8 1/25 with Elitek 3mg IV x1.   Platelet count > 20 (+SDH)  Day 15 BMbx on 2/1 will do on 2/2   FU CSF LP done on 2/1.   Social work consult to address family issues  Hypophosphatemia-replace phos, hypokalemia-replace k, hypomagnesemia-replace mg.

## 2023-02-01 NOTE — PROGRESS NOTE ADULT - ASSESSMENT
82 year old female with atrial fibrillation, s/p Saint Darian mechanical AVR, (1992–[facility]) for rheumatic aortic valve stenosis, ascending aortic aneurysm, HTN, HLD, depression, GERD transferred from [facility] for management of newly diagnosed PH + B cell ALL. Peripheral flow performed, BMBx consistent with B-cell ALL, FISH Ph+ treated with Dex and Dasatinib following CALGB 19195. 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 Anemia and thrombocytopenia secondary to disease.   82 year old female with atrial fibrillation, s/p Saint Darian mechanical AVR, (1994–[facility]) for rheumatic aortic valve stenosis, ascending aortic aneurysm, HTN, HLD, depression, GERD transferred from [facility] for management of newly diagnosed PH + B cell ALL. Peripheral flow performed, BMBx consistent with B-cell ALL, FISH Ph+ treated with Dex and Dasatinib following CALGB 92527. Course complicated by hypofibrinogenemia treated with cryoprecipitate, subdural hemorrhage plt goal >20 seen by neurosurgery. Anemia and thrombocytopenia secondary to disease.

## 2023-02-01 NOTE — PROGRESS NOTE ADULT - PROBLEM SELECTOR PLAN 5
mechanical AVR on coumadin now on hold in setting of acute leukemia and thombocytopenia  Seen by cardiology mechanical AVR on coumadin now on hold in setting of acute leukemia and thrombocytopenia  Seen by cardiology

## 2023-02-01 NOTE — CHART NOTE - NSCHARTNOTEFT_GEN_A_CORE
While there is a theoretical risk of the hemorrhage expanding with potential associated morbidity and even mortality, there is also risk to repeated transfusions. A decision must be made taking into consideration the patient's overall clinical course. While it would be ideal to keep the platelet >80k, it would not be unreasonable to have a platelet transfusion goal of keeping above 20k

## 2023-02-01 NOTE — PROGRESS NOTE ADULT - SUBJECTIVE AND OBJECTIVE BOX
Diagnosis: newly diagnosed B-ALL, Ph (+)    Protocol/Chemo Regimen: Following 82589 (Decadron days 1-7 + Dasatinib), Vincristine 1.5mg (on D8)    Day: 15     Pt endorsed: +fatigue    Review of Systems: Denies any nausea, vomiting, diarrhea, chest pain, palpitation, SOB or abdominal pain.    Pain scale: denies    Diet: regular    Allergies    No Known Allergies    Intolerances        ANTIMICROBIALS  acyclovir   Oral Tab/Cap 400 milliGRAM(s) Oral every 8 hours  caspofungin IVPB 50 milliGRAM(s) IV Intermittent every 24 hours  levoFLOXacin  Tablet 500 milliGRAM(s) Oral every 24 hours      HEME/ONC MEDICATIONS  dasatinib 140 milliGRAM(s) Oral daily      STANDING MEDICATIONS  ascorbic acid 500 milliGRAM(s) Oral daily  atorvastatin 80 milliGRAM(s) Oral at bedtime  chlorhexidine 4% Liquid 1 Application(s) Topical <User Schedule>  cyanocobalamin 1000 MICROGram(s) Oral daily  dextrose 5%. 1000 milliLiter(s) IV Continuous <Continuous>  dextrose 5%. 1000 milliLiter(s) IV Continuous <Continuous>  dextrose 50% Injectable 25 Gram(s) IV Push once  dextrose 50% Injectable 12.5 Gram(s) IV Push once  diltiazem    milliGRAM(s) Oral daily  glucagon  Injectable 1 milliGRAM(s) IntraMuscular once  hydrochlorothiazide 25 milliGRAM(s) Oral daily  hydrocortisone 2.5% Rectal Cream 1 Application(s) Rectal two times a day  insulin lispro (ADMELOG) corrective regimen sliding scale   SubCutaneous three times a day before meals  melatonin 5 milliGRAM(s) Oral at bedtime  pantoprazole    Tablet 40 milliGRAM(s) Oral before breakfast  sodium chloride 0.9%. 1000 milliLiter(s) IV Continuous <Continuous>  valsartan 320 milliGRAM(s) Oral daily  zinc oxide 40% Paste 1 Application(s) Topical two times a day      PRN MEDICATIONS  acetaminophen     Tablet .. 650 milliGRAM(s) Oral every 6 hours PRN  aluminum hydroxide/magnesium hydroxide/simethicone Suspension 30 milliLiter(s) Oral every 4 hours PRN  dextrose Oral Gel 15 Gram(s) Oral once PRN  loperamide 2 milliGRAM(s) Oral three times a day PRN  ondansetron Injectable 8 milliGRAM(s) IV Push every 8 hours PRN  sodium chloride 0.9% lock flush 10 milliLiter(s) IV Push every 1 hour PRN        Vital Signs Last 24 Hrs  T(C): 36.5 (01 Feb 2023 04:30), Max: 36.8 (31 Jan 2023 21:40)  T(F): 97.7 (01 Feb 2023 04:30), Max: 98.2 (31 Jan 2023 21:40)  HR: 66 (01 Feb 2023 04:30) (65 - 76)  BP: 107/66 (01 Feb 2023 04:30) (100/62 - 118/71)  BP(mean): --  RR: 18 (01 Feb 2023 04:30) (18 - 18)  SpO2: 97% (01 Feb 2023 04:30) (96% - 98%)    Parameters below as of 01 Feb 2023 04:30  Patient On (Oxygen Delivery Method): room air    PHYSICAL EXAM  General: NAD  HEENT: Sclerae anicteric, clear oropharynx, no oral lesions  CV: normal S1/S2, reg  Lungs: breath sounds clear and equal bilaterally  Abdomen: soft non-tender non-distended  Ext: no edema  Skin: no rash  Neuro: alert and oriented X 4  Central Line: RUE PICC clean, dry, intact    LABS:                        7.4    0.54  )-----------( 29       ( 01 Feb 2023 05:36 )             22.2         Mean Cell Volume : 84.7 fl  Mean Cell Hemoglobin : 28.2 pg  Mean Cell Hemoglobin Concentration : 33.3 gm/dL  Auto Neutrophil # : x  Auto Lymphocyte # : x  Auto Monocyte # : x  Auto Eosinophil # : x  Auto Basophil # : x  Auto Neutrophil % : x  Auto Lymphocyte % : x  Auto Monocyte % : x  Auto Eosinophil % : x  Auto Basophil % : x      02-01    138  |  106  |  9   ----------------------------<  108<H>  3.4<L>   |  25  |  0.59    Ca    7.1<L>      01 Feb 2023 05:36  Phos  1.8     02-01  Mg     1.5     02-01    TPro  4.9<L>  /  Alb  3.0<L>  /  TBili  0.7  /  DBili  x   /  AST  48<H>  /  ALT  52<H>  /  AlkPhos  63  02-01  PT/INR - ( 01 Feb 2023 05:36 )   PT: 11.1 sec;   INR: 0.96 ratio    PTT - ( 01 Feb 2023 05:36 )  PTT:25.4 sec    Uric Acid 1.1               Diagnosis: newly diagnosed B-ALL, Ph (+)    Protocol/Chemo Regimen: Following [id number] (Decadron days 1-7 + Dasatinib), Vincristine 1.5mg (on D8)    Day: 15     Pt endorsed: No complaints today    Review of Systems: Denies any nausea, vomiting, diarrhea, chest pain, palpitation, SOB or abdominal pain.    Pain scale: denies    Diet: regular    Allergies    No Known Allergies    Intolerances    ANTIMICROBIALS  acyclovir   Oral Tab/Cap 400 milliGRAM(s) Oral every 8 hours  caspofungin IVPB 50 milliGRAM(s) IV Intermittent every 24 hours  levoFLOXacin  Tablet 500 milliGRAM(s) Oral every 24 hours      HEME/ONC MEDICATIONS  dasatinib 140 milliGRAM(s) Oral daily      STANDING MEDICATIONS  ascorbic acid 500 milliGRAM(s) Oral daily  atorvastatin 80 milliGRAM(s) Oral at bedtime  chlorhexidine 4% Liquid 1 Application(s) Topical <User Schedule>  cyanocobalamin 1000 MICROGram(s) Oral daily  dextrose 5%. 1000 milliLiter(s) IV Continuous <Continuous>  dextrose 5%. 1000 milliLiter(s) IV Continuous <Continuous>  dextrose 50% Injectable 25 Gram(s) IV Push once  dextrose 50% Injectable 12.5 Gram(s) IV Push once  diltiazem    milliGRAM(s) Oral daily  glucagon  Injectable 1 milliGRAM(s) IntraMuscular once  hydrochlorothiazide 25 milliGRAM(s) Oral daily  hydrocortisone 2.5% Rectal Cream 1 Application(s) Rectal two times a day  insulin lispro (ADMELOG) corrective regimen sliding scale   SubCutaneous three times a day before meals  melatonin 5 milliGRAM(s) Oral at bedtime  pantoprazole    Tablet 40 milliGRAM(s) Oral before breakfast  sodium chloride 0.9%. 1000 milliLiter(s) IV Continuous <Continuous>  valsartan 320 milliGRAM(s) Oral daily  zinc oxide 40% Paste 1 Application(s) Topical two times a day      PRN MEDICATIONS  acetaminophen     Tablet .. 650 milliGRAM(s) Oral every 6 hours PRN  aluminum hydroxide/magnesium hydroxide/simethicone Suspension 30 milliLiter(s) Oral every 4 hours PRN  dextrose Oral Gel 15 Gram(s) Oral once PRN  loperamide 2 milliGRAM(s) Oral three times a day PRN  ondansetron Injectable 8 milliGRAM(s) IV Push every 8 hours PRN  sodium chloride 0.9% lock flush 10 milliLiter(s) IV Push every 1 hour PRN      Vital Signs Last 24 Hrs  T(C): 36.5 (01 Feb 2023 04:30), Max: 36.8 (31 Jan 2023 21:40)  T(F): 97.7 (01 Feb 2023 04:30), Max: 98.2 (31 Jan 2023 21:40)  HR: 66 (01 Feb 2023 04:30) (65 - 76)  BP: 107/66 (01 Feb 2023 04:30) (100/62 - 118/71)  BP(mean): --  RR: 18 (01 Feb 2023 04:30) (18 - 18)  SpO2: 97% (01 Feb 2023 04:30) (96% - 98%)    Parameters below as of 01 Feb 2023 04:30  Patient On (Oxygen Delivery Method): room air    PHYSICAL EXAM  General: NAD  HEENT: Sclerae anicteric, clear oropharynx, no oral lesions  CV: normal S1/S2, reg  Lungs: breath sounds clear and equal bilaterally  Abdomen: soft non-tender non-distended  Ext: no edema  Skin: no rash  Neuro: alert and oriented X 4  Central Line: RUE PICC clean, dry, intact    LABS:                        7.4    0.54  )-----------( 29       ( 01 Feb 2023 05:36 )             22.2         Mean Cell Volume : 84.7 fl  Mean Cell Hemoglobin : 28.2 pg  Mean Cell Hemoglobin Concentration : 33.3 gm/dL  Auto Neutrophil # : x  Auto Lymphocyte # : x  Auto Monocyte # : x  Auto Eosinophil # : x  Auto Basophil # : x  Auto Neutrophil % : x  Auto Lymphocyte % : x  Auto Monocyte % : x  Auto Eosinophil % : x  Auto Basophil % : x      02-01    138  |  106  |  9   ----------------------------<  108<H>  3.4<L>   |  25  |  0.59    Ca    7.1<L>      01 Feb 2023 05:36  Phos  1.8     02-01  Mg     1.5     02-01    TPro  4.9<L>  /  Alb  3.0<L>  /  TBili  0.7  /  DBili  x   /  AST  48<H>  /  ALT  52<H>  /  AlkPhos  63  02-01  PT/INR - ( 01 Feb 2023 05:36 )   PT: 11.1 sec;   INR: 0.96 ratio    PTT - ( 01 Feb 2023 05:36 )  PTT:25.4 sec    Uric Acid 1.1

## 2023-02-01 NOTE — PROGRESS NOTE ADULT - ATTENDING COMMENTS
82F with PMH of AFib (s/p Saint Darian mechanical AVR, 1994, [facility]) for rheumatic aortic valve stenosis, ascending aortic aneurysm, HTN,  HLD, depression, GERD, who presented with newly diagnosed Ph-positive B-ALL.     # Ph-positive B-ALL: Bone marrow biopsy on 1/17/23 showed 99% B-lymphoblasts. She started treatment per the CALGB 13132 protocol with steroids and dasatinib on 1/18/23. She received 1 dose of vincristine 1.5 mg on 1/25/23 for LDH plateau. She received rasburicase twice, but no current evidence of TLS. She also received cryoprecipitate several times for hypofibrinogenemia, now normal. She has therapy- and disease-associated pancytopenia.   - S/p dexamethasone 20 mg D1-7. Continue dasatinib 140 mg daily. Today is D14.  - Trend CBC with differential and coagulation studies daily. Supportive transfusions to maintain Hgb > 7, plt > 50, and fibrinogen > 100.  - Trend TLS labs daily. Replete electrolytes PRN.   - Antimicrobial prophylaxis: levaquin, caspofungin, acyclovir  - Due for repeat bone marrow biopsy and LP with IT chemotherapy on D15. Pending results, will determine next course of therapy.     # SDH: CTH on 1/15/23 showed an 8 mm bleed, which was stable on subsequent scans. No neurologic deficits. No surgical intervention.  - Hold Coumadin   - Maintain plt > 50     # Mechanical valve: Currently off AC, discussed with Cardiology  - Hold anticoagulation in setting of SDH and persistent thrombocytopenia    The information documented within the attending attestation was documented solely by Carri Jesus. 82F with PMH of AFib (s/p Saint Darian mechanical AVR, 1994, [facility]) for rheumatic aortic valve stenosis, ascending aortic aneurysm, HTN,  HLD, depression, GERD, who presented with newly diagnosed Ph-positive B-ALL.     # Ph-positive B-ALL: Bone marrow biopsy on 1/17/23 showed 99% B-lymphoblasts. She started treatment per the CALGB 39509 protocol with steroids and dasatinib on 1/18/23. She received 1 dose of vincristine 1.5 mg on 1/25/23 for LDH plateau. She received rasburicase twice, but no current evidence of TLS. She also received cryoprecipitate several times for hypofibrinogenemia, now normal. She has therapy- and disease-associated pancytopenia.   - S/p dexamethasone 20 mg D1-7. Continue dasatinib 140 mg daily. Today is D15.  - Trend CBC with differential and coagulation studies daily. Supportive transfusions to maintain Hgb > 7, plt > 50, and fibrinogen > 100.  - Trend TLS labs daily. Replete electrolytes PRN.   - Antimicrobial prophylaxis: levaquin, caspofungin, acyclovir  - Due for LP with IT MTX today and repeat bone marrow biopsy tomorrow. Pending results, will determine next course of therapy.     # SDH: CTH on 1/15/23 showed an 8 mm bleed, which was stable on subsequent scans. No neurologic deficits. No surgical intervention.  - Hold Coumadin   - Maintain plt > 50  - Will speak to Neurosurgery regarding platelet count and need for repeat imaging     # Mechanical valve: Currently off AC, discussed with Cardiology  - Hold anticoagulation in setting of SDH and persistent thrombocytopenia    The information documented within the attending attestation was documented solely by Carri Jesus.

## 2023-02-01 NOTE — PROGRESS NOTE ADULT - PROBLEM SELECTOR PLAN 2
patient is neutropenic, afebrile  c/w levaquin, Caspofungin ppx   if spike panculture and CXR  GI PCR (-)  1/21, 1/26 c.diff toxin negative.  1/30- Start Acyclovir for prophylaxis. patient is neutropenic, afebrile  c/w levaquin, Caspofungin and acyclovir ppx   if spike panculture and CXR  GI PCR (-)  1/21, 1/26 c.diff toxin negative.

## 2023-02-01 NOTE — PROCEDURE NOTE - ADDITIONAL PROCEDURE DETAILS
s/p fluoroscopically guided lumbar puncture with administration of intrathecal chemotherapy. Procedure was done at the L2-L3 level using a 22 gauge spinal needle. drained 5 mL of clear CSF and this was later hand delivered to the laboratory for analysis. Methotrexate 12 mg was intrathecally administered without difficulty. Hemostasis secure after procedure.

## 2023-02-02 ENCOUNTER — RESULT REVIEW (OUTPATIENT)
Age: 83
End: 2023-02-02

## 2023-02-02 LAB
ALBUMIN SERPL ELPH-MCNC: 3 G/DL — LOW (ref 3.3–5)
ALP SERPL-CCNC: 64 U/L — SIGNIFICANT CHANGE UP (ref 40–120)
ALT FLD-CCNC: 56 U/L — HIGH (ref 10–45)
ANION GAP SERPL CALC-SCNC: 6 MMOL/L — SIGNIFICANT CHANGE UP (ref 5–17)
APTT BLD: 28.9 SEC — SIGNIFICANT CHANGE UP (ref 27.5–35.5)
AST SERPL-CCNC: 49 U/L — HIGH (ref 10–40)
BASOPHILS # BLD AUTO: 0 K/UL — SIGNIFICANT CHANGE UP (ref 0–0.2)
BASOPHILS NFR BLD AUTO: 0 % — SIGNIFICANT CHANGE UP (ref 0–2)
BILIRUB SERPL-MCNC: 0.6 MG/DL — SIGNIFICANT CHANGE UP (ref 0.2–1.2)
BUN SERPL-MCNC: 7 MG/DL — SIGNIFICANT CHANGE UP (ref 7–23)
CALCIUM SERPL-MCNC: 7.3 MG/DL — LOW (ref 8.4–10.5)
CHLORIDE SERPL-SCNC: 108 MMOL/L — SIGNIFICANT CHANGE UP (ref 96–108)
CO2 SERPL-SCNC: 24 MMOL/L — SIGNIFICANT CHANGE UP (ref 22–31)
CREAT SERPL-MCNC: 0.57 MG/DL — SIGNIFICANT CHANGE UP (ref 0.5–1.3)
EGFR: 92 ML/MIN/1.73M2 — SIGNIFICANT CHANGE UP
EOSINOPHIL # BLD AUTO: 0.01 K/UL — SIGNIFICANT CHANGE UP (ref 0–0.5)
EOSINOPHIL NFR BLD AUTO: 1.3 % — SIGNIFICANT CHANGE UP (ref 0–6)
GLUCOSE BLDC GLUCOMTR-MCNC: 114 MG/DL — HIGH (ref 70–99)
GLUCOSE BLDC GLUCOMTR-MCNC: 129 MG/DL — HIGH (ref 70–99)
GLUCOSE BLDC GLUCOMTR-MCNC: 133 MG/DL — HIGH (ref 70–99)
GLUCOSE BLDC GLUCOMTR-MCNC: 140 MG/DL — HIGH (ref 70–99)
GLUCOSE SERPL-MCNC: 92 MG/DL — SIGNIFICANT CHANGE UP (ref 70–99)
HCT VFR BLD CALC: 21.4 % — LOW (ref 34.5–45)
HGB BLD-MCNC: 7.1 G/DL — LOW (ref 11.5–15.5)
INR BLD: 0.91 RATIO — SIGNIFICANT CHANGE UP (ref 0.88–1.16)
LDH SERPL L TO P-CCNC: 393 U/L — HIGH (ref 50–242)
LYMPHOCYTES # BLD AUTO: 0.78 K/UL — LOW (ref 1–3.3)
LYMPHOCYTES # BLD AUTO: 94.7 % — HIGH (ref 13–44)
MAGNESIUM SERPL-MCNC: 2.1 MG/DL — SIGNIFICANT CHANGE UP (ref 1.6–2.6)
MCHC RBC-ENTMCNC: 28.2 PG — SIGNIFICANT CHANGE UP (ref 27–34)
MCHC RBC-ENTMCNC: 33.2 GM/DL — SIGNIFICANT CHANGE UP (ref 32–36)
MCV RBC AUTO: 84.9 FL — SIGNIFICANT CHANGE UP (ref 80–100)
MONOCYTES # BLD AUTO: 0 K/UL — SIGNIFICANT CHANGE UP (ref 0–0.9)
MONOCYTES NFR BLD AUTO: 0 % — LOW (ref 2–14)
NEUTROPHILS # BLD AUTO: 0.03 K/UL — LOW (ref 1.8–7.4)
NEUTROPHILS NFR BLD AUTO: 4 % — LOW (ref 43–77)
PHOSPHATE SERPL-MCNC: 1.8 MG/DL — LOW (ref 2.5–4.5)
PHOSPHATE SERPL-MCNC: 2.6 MG/DL — SIGNIFICANT CHANGE UP (ref 2.5–4.5)
PLATELET # BLD AUTO: 24 K/UL — LOW (ref 150–400)
POTASSIUM SERPL-MCNC: 3.5 MMOL/L — SIGNIFICANT CHANGE UP (ref 3.5–5.3)
POTASSIUM SERPL-SCNC: 3.5 MMOL/L — SIGNIFICANT CHANGE UP (ref 3.5–5.3)
PROT SERPL-MCNC: 5 G/DL — LOW (ref 6–8.3)
PROTHROM AB SERPL-ACNC: 10.5 SEC — SIGNIFICANT CHANGE UP (ref 10.5–13.4)
RBC # BLD: 2.52 M/UL — LOW (ref 3.8–5.2)
RBC # FLD: 15.5 % — HIGH (ref 10.3–14.5)
SODIUM SERPL-SCNC: 138 MMOL/L — SIGNIFICANT CHANGE UP (ref 135–145)
URATE SERPL-MCNC: 1.4 MG/DL — LOW (ref 2.5–7)
WBC # BLD: 0.82 K/UL — CRITICAL LOW (ref 3.8–10.5)
WBC # FLD AUTO: 0.82 K/UL — CRITICAL LOW (ref 3.8–10.5)

## 2023-02-02 PROCEDURE — 88189 FLOWCYTOMETRY/READ 16 & >: CPT

## 2023-02-02 PROCEDURE — 88291 CYTO/MOLECULAR REPORT: CPT | Mod: 59

## 2023-02-02 PROCEDURE — G0452: CPT | Mod: 26

## 2023-02-02 PROCEDURE — 85097 BONE MARROW INTERPRETATION: CPT

## 2023-02-02 PROCEDURE — 88313 SPECIAL STAINS GROUP 2: CPT | Mod: 26

## 2023-02-02 PROCEDURE — 88305 TISSUE EXAM BY PATHOLOGIST: CPT | Mod: 26

## 2023-02-02 PROCEDURE — 99233 SBSQ HOSP IP/OBS HIGH 50: CPT

## 2023-02-02 RX ORDER — POTASSIUM CHLORIDE 20 MEQ
20 PACKET (EA) ORAL
Refills: 0 | Status: COMPLETED | OUTPATIENT
Start: 2023-02-02 | End: 2023-02-02

## 2023-02-02 RX ADMIN — Medication 20 MILLIEQUIVALENT(S): at 16:53

## 2023-02-02 RX ADMIN — Medication 650 MILLIGRAM(S): at 16:53

## 2023-02-02 RX ADMIN — Medication 2 MILLIGRAM(S): at 18:54

## 2023-02-02 RX ADMIN — DASATINIB 140 MILLIGRAM(S): 80 TABLET ORAL at 12:17

## 2023-02-02 RX ADMIN — SODIUM CHLORIDE 50 MILLILITER(S): 9 INJECTION INTRAMUSCULAR; INTRAVENOUS; SUBCUTANEOUS at 06:20

## 2023-02-02 RX ADMIN — Medication 400 MILLIGRAM(S): at 21:17

## 2023-02-02 RX ADMIN — ATORVASTATIN CALCIUM 80 MILLIGRAM(S): 80 TABLET, FILM COATED ORAL at 21:17

## 2023-02-02 RX ADMIN — Medication 240 MILLIGRAM(S): at 06:06

## 2023-02-02 RX ADMIN — Medication 500 MILLIGRAM(S): at 12:18

## 2023-02-02 RX ADMIN — PREGABALIN 1000 MICROGRAM(S): 225 CAPSULE ORAL at 12:18

## 2023-02-02 RX ADMIN — Medication 255 MILLIMOLE(S): at 09:54

## 2023-02-02 RX ADMIN — CHLORHEXIDINE GLUCONATE 1 APPLICATION(S): 213 SOLUTION TOPICAL at 08:37

## 2023-02-02 RX ADMIN — CASPOFUNGIN ACETATE 260 MILLIGRAM(S): 7 INJECTION, POWDER, LYOPHILIZED, FOR SOLUTION INTRAVENOUS at 13:53

## 2023-02-02 RX ADMIN — Medication 20 MILLIEQUIVALENT(S): at 13:53

## 2023-02-02 RX ADMIN — PANTOPRAZOLE SODIUM 40 MILLIGRAM(S): 20 TABLET, DELAYED RELEASE ORAL at 06:06

## 2023-02-02 RX ADMIN — Medication 5 MILLIGRAM(S): at 21:17

## 2023-02-02 RX ADMIN — VALSARTAN 320 MILLIGRAM(S): 80 TABLET ORAL at 06:07

## 2023-02-02 RX ADMIN — Medication 400 MILLIGRAM(S): at 13:53

## 2023-02-02 RX ADMIN — Medication 650 MILLIGRAM(S): at 17:30

## 2023-02-02 RX ADMIN — Medication 400 MILLIGRAM(S): at 06:07

## 2023-02-02 NOTE — PROCEDURE NOTE - NSPOSTCAREGUIDE_GEN_A_CORE
Verbal/written post procedure instructions were given to patient/caregiver/Instructed patient/caregiver to follow-up with primary care physician/Instructed patient/caregiver regarding signs and symptoms of infection/Keep the cast/splint/dressing clean and dry
Verbal/written post procedure instructions were given to patient/caregiver
Keep the cast/splint/dressing clean and dry

## 2023-02-02 NOTE — PROGRESS NOTE ADULT - PROBLEM SELECTOR PLAN 2
patient is neutropenic, afebrile  c/w levaquin, Caspofungin and acyclovir ppx   if spike panculture and CXR  GI PCR (-)  1/21, 1/26 c.diff toxin negative.

## 2023-02-02 NOTE — PROGRESS NOTE ADULT - SUBJECTIVE AND OBJECTIVE BOX
Diagnosis: newly diagnosed B-ALL, Ph (+)    Protocol/Chemo Regimen: Following 91722 (Decadron days 1-7 + Dasatinib), Vincristine 1.5mg (on D8)    Day: 16     Pt endorsed: No complaints today    Review of Systems: Denies any nausea, vomiting, diarrhea, chest pain, palpitation, SOB or abdominal pain.    Pain scale: denies    Diet: regular    Allergies    No Known Allergies    Intolerances        ANTIMICROBIALS  acyclovir   Oral Tab/Cap 400 milliGRAM(s) Oral every 8 hours  caspofungin IVPB 50 milliGRAM(s) IV Intermittent every 24 hours  levoFLOXacin  Tablet 500 milliGRAM(s) Oral every 24 hours      HEME/ONC MEDICATIONS  dasatinib 140 milliGRAM(s) Oral daily  methotrexate PF IntraThecal (eMAR) 12 milliGRAM(s) IntraThecal once      STANDING MEDICATIONS  ascorbic acid 500 milliGRAM(s) Oral daily  atorvastatin 80 milliGRAM(s) Oral at bedtime  chlorhexidine 4% Liquid 1 Application(s) Topical <User Schedule>  cyanocobalamin 1000 MICROGram(s) Oral daily  dextrose 5%. 1000 milliLiter(s) IV Continuous <Continuous>  dextrose 5%. 1000 milliLiter(s) IV Continuous <Continuous>  dextrose 50% Injectable 25 Gram(s) IV Push once  dextrose 50% Injectable 12.5 Gram(s) IV Push once  diltiazem    milliGRAM(s) Oral daily  glucagon  Injectable 1 milliGRAM(s) IntraMuscular once  hydrochlorothiazide 25 milliGRAM(s) Oral daily  hydrocortisone 2.5% Rectal Cream 1 Application(s) Rectal two times a day  insulin lispro (ADMELOG) corrective regimen sliding scale   SubCutaneous three times a day before meals  melatonin 5 milliGRAM(s) Oral at bedtime  pantoprazole    Tablet 40 milliGRAM(s) Oral before breakfast  sodium chloride 0.9%. 1000 milliLiter(s) IV Continuous <Continuous>  valsartan 320 milliGRAM(s) Oral daily  zinc oxide 40% Paste 1 Application(s) Topical two times a day      PRN MEDICATIONS  acetaminophen     Tablet .. 650 milliGRAM(s) Oral every 6 hours PRN  aluminum hydroxide/magnesium hydroxide/simethicone Suspension 30 milliLiter(s) Oral every 4 hours PRN  dextrose Oral Gel 15 Gram(s) Oral once PRN  loperamide 2 milliGRAM(s) Oral three times a day PRN  ondansetron Injectable 8 milliGRAM(s) IV Push every 8 hours PRN  sodium chloride 0.9% lock flush 10 milliLiter(s) IV Push every 1 hour PRN        Vital Signs Last 24 Hrs  T(C): 36.9 (02 Feb 2023 05:10), Max: 36.9 (02 Feb 2023 05:10)  T(F): 98.4 (02 Feb 2023 05:10), Max: 98.4 (02 Feb 2023 05:10)  HR: 72 (02 Feb 2023 05:10) (69 - 77)  BP: 114/62 (02 Feb 2023 05:10) (107/55 - 151/78)  BP(mean): --  RR: 18 (02 Feb 2023 05:10) (18 - 18)  SpO2: 92% (02 Feb 2023 05:10) (92% - 99%)    Parameters below as of 02 Feb 2023 05:10  Patient On (Oxygen Delivery Method): room air    PHYSICAL EXAM  General: NAD  HEENT: Sclerae anicteric, clear oropharynx, no oral lesions  CV: normal S1/S2, reg  Lungs: breath sounds clear and equal bilaterally  Abdomen: soft non-tender non-distended  Ext: no edema  Skin: no rash  Neuro: alert and oriented X 4  Central Line: RUE PICC clean, dry, intact      LABS:                        Diagnosis: newly diagnosed B-ALL, Ph (+)    Protocol/Chemo Regimen: Following [id number] (Decadron days 1-7 + Dasatinib), Vincristine 1.5mg (on D8)    Day: 16     Pt endorsed: No complaints today    Review of Systems: Denies any nausea, vomiting, diarrhea, chest pain, palpitation, SOB or abdominal pain.    Pain scale: denies    Diet: regular    Allergies    No Known Allergies    ANTIMICROBIALS  acyclovir   Oral Tab/Cap 400 milliGRAM(s) Oral every 8 hours  caspofungin IVPB 50 milliGRAM(s) IV Intermittent every 24 hours  levoFLOXacin  Tablet 500 milliGRAM(s) Oral every 24 hours      HEME/ONC MEDICATIONS  dasatinib 140 milliGRAM(s) Oral daily  methotrexate PF IntraThecal (eMAR) 12 milliGRAM(s) IntraThecal once      STANDING MEDICATIONS  ascorbic acid 500 milliGRAM(s) Oral daily  atorvastatin 80 milliGRAM(s) Oral at bedtime  chlorhexidine 4% Liquid 1 Application(s) Topical <User Schedule>  cyanocobalamin 1000 MICROGram(s) Oral daily  dextrose 5%. 1000 milliLiter(s) IV Continuous <Continuous>  dextrose 5%. 1000 milliLiter(s) IV Continuous <Continuous>  dextrose 50% Injectable 25 Gram(s) IV Push once  dextrose 50% Injectable 12.5 Gram(s) IV Push once  diltiazem    milliGRAM(s) Oral daily  glucagon  Injectable 1 milliGRAM(s) IntraMuscular once  hydrochlorothiazide 25 milliGRAM(s) Oral daily  hydrocortisone 2.5% Rectal Cream 1 Application(s) Rectal two times a day  insulin lispro (ADMELOG) corrective regimen sliding scale   SubCutaneous three times a day before meals  melatonin 5 milliGRAM(s) Oral at bedtime  pantoprazole    Tablet 40 milliGRAM(s) Oral before breakfast  sodium chloride 0.9%. 1000 milliLiter(s) IV Continuous <Continuous>  valsartan 320 milliGRAM(s) Oral daily  zinc oxide 40% Paste 1 Application(s) Topical two times a day      PRN MEDICATIONS  acetaminophen     Tablet .. 650 milliGRAM(s) Oral every 6 hours PRN  aluminum hydroxide/magnesium hydroxide/simethicone Suspension 30 milliLiter(s) Oral every 4 hours PRN  dextrose Oral Gel 15 Gram(s) Oral once PRN  loperamide 2 milliGRAM(s) Oral three times a day PRN  ondansetron Injectable 8 milliGRAM(s) IV Push every 8 hours PRN  sodium chloride 0.9% lock flush 10 milliLiter(s) IV Push every 1 hour PRN        Vital Signs Last 24 Hrs  T(C): 36.9 (02 Feb 2023 05:10), Max: 36.9 (02 Feb 2023 05:10)  T(F): 98.4 (02 Feb 2023 05:10), Max: 98.4 (02 Feb 2023 05:10)  HR: 72 (02 Feb 2023 05:10) (69 - 77)  BP: 114/62 (02 Feb 2023 05:10) (107/55 - 151/78)  BP(mean): --  RR: 18 (02 Feb 2023 05:10) (18 - 18)  SpO2: 92% (02 Feb 2023 05:10) (92% - 99%)    Parameters below as of 02 Feb 2023 05:10  Patient On (Oxygen Delivery Method): room air    PHYSICAL EXAM  General: NAD  HEENT: Sclerae anicteric, clear oropharynx, no oral lesions  CV: normal S1/S2, reg  Lungs: breath sounds clear and equal bilaterally  Abdomen: soft non-tender non-distended  Ext: no edema  Skin: no rash  Neuro: alert and oriented X 4  Central Line: RUE PICC clean, dry, intact      LABS:

## 2023-02-02 NOTE — PROCEDURE NOTE - NSSITEPREP_SKIN_A_CORE
povidone iodine (if allergic to chlorhexidine)
povidone iodine (if allergic to chlorhexidine)
povidone iodine (if allergic to chlorhexidine)/Adherence to aseptic technique: hand hygiene prior to donning barriers (gown, gloves), don cap and mask, sterile drape over patient

## 2023-02-02 NOTE — CHART NOTE - NSCHARTNOTESELECT_GEN_ALL_CORE
Neurosurgery/Event Note
Nutrition Services
Headache/Event Note
Neurosurgery/Event Note

## 2023-02-02 NOTE — PROGRESS NOTE ADULT - ATTENDING COMMENTS
82F with PMH of AFib (s/p Saint Darian mechanical AVR, 1994, [facility]) for rheumatic aortic valve stenosis, ascending aortic aneurysm, HTN,  HLD, depression, GERD, who presented with newly diagnosed Ph-positive B-ALL.     # Ph-positive B-ALL: Bone marrow biopsy on 1/17/23 showed 99% B-lymphoblasts. She started treatment per the CALGB 83750 protocol with steroids and dasatinib on 1/18/23. She received 1 dose of vincristine 1.5 mg on 1/25/23 for LDH plateau. She received rasburicase twice, but no current evidence of TLS. She also received cryoprecipitate several times for hypofibrinogenemia, now normal. She has therapy- and disease-associated pancytopenia.   - S/p dexamethasone 20 mg D1-7. Continue dasatinib 140 mg daily. Today is D15.  - Trend CBC with differential and coagulation studies daily. Supportive transfusions to maintain Hgb > 7, plt > 50, and fibrinogen > 100.  - Trend TLS labs daily. Replete electrolytes PRN.   - Antimicrobial prophylaxis: levaquin, caspofungin, acyclovir  - Due for LP with IT MTX today and repeat bone marrow biopsy tomorrow. Pending results, will determine next course of therapy.     # SDH: CTH on 1/15/23 showed an 8 mm bleed, which was stable on subsequent scans. No neurologic deficits. No surgical intervention.  - Hold Coumadin   - Maintain plt > 50  - Will speak to Neurosurgery regarding platelet count and need for repeat imaging     # Mechanical valve: Currently off AC, discussed with Cardiology  - Hold anticoagulation in setting of SDH and persistent thrombocytopenia    The information documented within the attending attestation was documented solely by Carri Jesus. 82F with PMH of AFib (s/p Saint Darian mechanical AVR, 1992, [facility]) for rheumatic aortic valve stenosis, ascending aortic aneurysm, HTN,  HLD, depression, GERD, who presented with newly diagnosed Ph-positive B-ALL.     # Ph-positive B-ALL: Bone marrow biopsy on 1/17/23 showed 99% B-lymphoblasts. She started treatment per the CALGB 12108 protocol with steroids and dasatinib on 1/18/23. She received 1 dose of vincristine 1.5 mg on 1/25/23 for LDH plateau. She received rasburicase twice, but no current evidence of TLS. She also received cryoprecipitate several times for hypofibrinogenemia, now normal. She has therapy- and disease-associated pancytopenia.   - S/p dexamethasone 20 mg D1-7. Continue dasatinib 140 mg daily. Today is D16.  - Trend CBC with differential and coagulation studies daily. Supportive transfusions to maintain Hgb > 7, plt > 20 given SDH, and fibrinogen > 100.  - Trend TLS labs daily. Replete electrolytes PRN.   - Antimicrobial prophylaxis: levaquin, caspofungin, acyclovir  - S/p LP with IT MTX on D15 (2/1/23). Follow up CSF flow.  - Repeat bone marrow biopsy today. Pending results, will determine next course of therapy.     # SDH: CTH on 1/15/23 showed an 8 mm bleed, which was stable on subsequent scans. No neurologic deficits. No surgical intervention.  - Hold Coumadin   - Maintain plt > 20 per Neurosurgery    # Mechanical valve: Currently off AC, discussed with Cardiology  - Hold anticoagulation in setting of SDH and persistent thrombocytopenia    The information documented within the attending attestation was documented solely by Carri Jesus.

## 2023-02-02 NOTE — PROGRESS NOTE ADULT - PROBLEM SELECTOR PLAN 1
Flow cytometry (1/14/23) consistent with acute leukemia (B ALL), FISH detected BCR/ABL (1/19)  Monitor daily CBC with Diff/CMP and transfuse/replete PRN  Patient was on warfarin-NOW HELD for mechanical AVR (1994 [facility]), atrial fibrillation  last dose of warfarin 1/13 at [facility] (5mg)-cardiology consulted.   TLS labs daily, IVF's, Strict I/O's, daily wights  1/14 TTE - EF 57%, mild pulm HTN, Mechanical AVR likely normal function  1/16 HLA sent  1/17  s/p BMbx c/w ALL (98% blasts) (with Clonoseq sampling)  Decadron 1/18-1/24-monitor sugars now off steroids.   1/19 Started Dasatinib  Vincristine 1.5 mg on Day 8 1/25 with Elitek 3mg IV x1.   Platelet count > 20 (+SDH)  Day 15 BMbx on 2/1 will do on 2/2   FU CSF LP done on 2/1.   Social work consult to address family issues  Hypophosphatemia-replace phos, hypokalemia-replace k, hypomagnesemia-replace mg. Flow cytometry (1/14/23) consistent with acute leukemia (B ALL), FISH detected BCR/ABL (1/19)  Monitor daily CBC with Diff/CMP and transfuse/replete PRN  Patient was on warfarin-NOW HELD for mechanical AVR (1994 [facility]), atrial fibrillation  last dose of warfarin 1/13 at [facility] (5mg)-cardiology consulted.   TLS labs daily, IVF's, Strict I/O's, daily wights  1/14 TTE - EF 57%, mild pulm HTN, Mechanical AVR likely normal function  1/16 HLA sent  1/17  s/p BMbx c/w ALL (98% blasts) (with Clonoseq sampling)  Decadron 1/18-1/24-monitor sugars now off steroids.   1/19 Started Dasatinib  Vincristine 1.5 mg on Day 8 1/25 with Elitek 3mg IV x1.   Platelet count > 20 (+SDH)  FU Day 15 BMbx on 2/1 done on 2/2   FU CSF LP done on 2/1.   Social work consult to address family issues  Hypophosphatemia-replace phos, hypokalemia-replace k.

## 2023-02-02 NOTE — CHART NOTE - NSCHARTNOTEFT_GEN_A_CORE
Nutrition Follow Up Note  Patient seen for: 1st malnutrition follow up   Chart reviewed, events noted.     Per chart, "82 year old female with atrial fibrillation, s/p Saint Darian mechanical AVR, (–[facility] [place]) for rheumatic aortic valve stenosis, ascending aortic aneurysm, HTN, HLD, depression, GERD transferred from [facility] for management of newly diagnosed PH + B cell ALL. Peripheral flow performed, BMBx consistent with B-cell ALL, FISH Ph+ treated with Dex and Dasatinib following CALGB 98103. Course complicated by hypofibrinogenemia treated with cryoprecipitate, subdural hemorrhage plt goal >20 seen by neurosurgery. Anemia and thrombocytopenia secondary to disease."    Source: [x] Patient       [x] Medical Record        [] RN        [] Family at bedside       [] Other:    -If unable to interview patient: [] Trach/Vent/BiPAP  [] Disoriented/confused/inappropriate to interview    Diet Order:   Diet, Consistent Carbohydrate w/Evening Snack:   Banatrol TF     Qty per Day:  2  Supplement Feeding Modality:  Oral  Ensure Clear Cans or Servings Per Day:  3       Frequency:  Daily (23)    - Is current order appropriate/adequate? [] Yes  []  No: See recommendations below     - PO intake :   [] >75%  Adequate    [] 50-75%  Fair       [x] <50%  Poor    Protocol/Chemo Regimen: Following 49917 (Decadron days 1-7 + Dasatinib), Vincristine 1.5mg (on D8)  Day: 16    - Nutrition-related concerns:      - Pt seen out of bed to chair, reports poor appetite and PO intake secondary to taste changes. Lunch tray seen at bedside, <50% consumed.       - Pt endorses drinking Ensure Clear 1x/day, reports she dislikes them secondary to being to sweet. States she dilutes them with water and ice, would like to receive 1x/day. Pt is amenable to trialing Shop2 glucose support (vanilla flavor). RD to follow up for acceptability.       - Pt reports she does not like High Protein Gelatin, would not like to continue to receive.       - IVF: NaCl 0.9% @ 50 ml/hr.      - Micronutrient coverage: Vitamin B12 and Vitamin C.      - Renal: Phos low today .    GI:  Pt denies nausea/vomiting. Reports diarrhea. Confirms use of Banatrol 1x/day.  Last BM .   Bowel Regimen? [x] Yes  Imodium  [] No      Weights:   Dosin.9 kg (1-14)  Daily Weight in k.3 (), Weight in k.4 (), Weight in k.6 (), Weight in k.4 (), Weight in k (), Weight in k.6 (), Weight in k ()  Weight fluctuating likely in the setting of fluid shifts. Will continue to monitor weight trend as able.     Nutritionally Pertinent MEDICATIONS  (STANDING):  acyclovir   Oral Tab/Cap  ascorbic acid  atorvastatin  caspofungin IVPB  cyanocobalamin  dasatinib  dextrose 5%.  dextrose 5%.  dextrose 50% Injectable  dextrose 50% Injectable  diltiazem   CD  glucagon  Injectable  hydrochlorothiazide  insulin lispro (ADMELOG) corrective regimen sliding scale  levoFLOXacin  Tablet  methotrexate PF IntraThecal (eMAR)  pantoprazole    Tablet  potassium chloride    Tablet ER  sodium chloride 0.9%.  valsartan    Pertinent Labs:  @ 07:07: Na 138, BUN 7, Cr 0.57, BG 92, K+ 3.5, Phos 1.8<L>, Mg 2.1, Alk Phos 64, ALT/SGPT 56<H>, AST/SGOT 49<H>, HbA1c --   @ 23:18: Na --, BUN --, Cr --, BG --, K+ --, Phos 2.6, Mg --, Alk Phos --, ALT/SGPT --, AST/SGOT --, HbA1c --    A1C with Estimated Average Glucose Result: 6.1 % (01-10-23 @ 07:00)    Finger Sticks:  POCT Blood Glucose.: 140 mg/dL ( @ 12:20)  POCT Blood Glucose.: 133 mg/dL ( @ 08:31)  POCT Blood Glucose.: 172 mg/dL ( @ 21:20)  POCT Blood Glucose.: 120 mg/dL ( @ 17:12)      Skin per nursing documentation: No pressure injuries noted.  Edema per nursing documentation: No edema per flow sheets.     Estimated Needs:   Energy needs: 5945-5788 kcal/kg (28-32 kcal/kg)  Protein needs: 60.9-73.08 g/kg (1.0-1.2 g/kg)  Fluid needs:  Deferred to team  [x] no change since previous assessment  [] recalculated:     Previous Nutrition Diagnosis: Severe acute Malnutrition  Nutrition Diagnosis is: [x] ongoing  - being addressed with PO intake, oral nutritional supplements, food preferences     Nutrition Care Plan:  [x] In Progress  [] Achieved  [] Not applicable    New Nutrition Diagnosis: [x] Not applicable    Nutrition Interventions:     Education Provided   [x] Yes:  [] No: Emphasized the importance of adequate kcal and protein intake, provided recommendations to optimize nutritional intake in case of decreased appetite, recommended small frequent meals by consuming nutrient-dense snacks between meals, to start with protein, and sips of supplement throughout the day.     Recommendations:      1) Continue current diet order; consistent carbohydrate diet with snack.   2) Recommend Ensure Clear 1x/day.   3) Pt will trial Shop2 glucose support, RD will follow up for acceptability.   4) Continue Banatrol 2x/day.   5) Continue micronutrient supplementation: vitamin B12, vitamin C (if no contraindications).  6) RD to follow up for completed survey, pt has not completed yet.       Monitoring and Evaluation:   Continue to monitor nutritional intake, tolerance to diet prescription, weights, labs, skin integrity    RD remains available upon request and will follow up per protocol  Radha Hung RD CDN pager # [phone]

## 2023-02-03 DIAGNOSIS — R74.01 ELEVATION OF LEVELS OF LIVER TRANSAMINASE LEVELS: ICD-10-CM

## 2023-02-03 LAB
ALBUMIN SERPL ELPH-MCNC: 3.1 G/DL — LOW (ref 3.3–5)
ALP SERPL-CCNC: 68 U/L — SIGNIFICANT CHANGE UP (ref 40–120)
ALT FLD-CCNC: 82 U/L — HIGH (ref 10–45)
ANION GAP SERPL CALC-SCNC: 8 MMOL/L — SIGNIFICANT CHANGE UP (ref 5–17)
APTT BLD: 26.3 SEC — LOW (ref 27.5–35.5)
AST SERPL-CCNC: 85 U/L — HIGH (ref 10–40)
BASOPHILS # BLD AUTO: 0 K/UL — SIGNIFICANT CHANGE UP (ref 0–0.2)
BASOPHILS # BLD AUTO: 0 K/UL — SIGNIFICANT CHANGE UP (ref 0–0.2)
BASOPHILS NFR BLD AUTO: 0 % — SIGNIFICANT CHANGE UP (ref 0–2)
BASOPHILS NFR BLD AUTO: 0 % — SIGNIFICANT CHANGE UP (ref 0–2)
BCR/ABL BY RT - PCR QUANTITATIVE: SIGNIFICANT CHANGE UP
BILIRUB SERPL-MCNC: 0.5 MG/DL — SIGNIFICANT CHANGE UP (ref 0.2–1.2)
BLD GP AB SCN SERPL QL: NEGATIVE — SIGNIFICANT CHANGE UP
BUN SERPL-MCNC: 7 MG/DL — SIGNIFICANT CHANGE UP (ref 7–23)
CALCIUM SERPL-MCNC: 7.6 MG/DL — LOW (ref 8.4–10.5)
CHLORIDE SERPL-SCNC: 107 MMOL/L — SIGNIFICANT CHANGE UP (ref 96–108)
CO2 SERPL-SCNC: 22 MMOL/L — SIGNIFICANT CHANGE UP (ref 22–31)
CREAT SERPL-MCNC: 0.61 MG/DL — SIGNIFICANT CHANGE UP (ref 0.5–1.3)
EGFR: 90 ML/MIN/1.73M2 — SIGNIFICANT CHANGE UP
EOSINOPHIL # BLD AUTO: 0 K/UL — SIGNIFICANT CHANGE UP (ref 0–0.5)
EOSINOPHIL # BLD AUTO: 0 K/UL — SIGNIFICANT CHANGE UP (ref 0–0.5)
EOSINOPHIL NFR BLD AUTO: 0 % — SIGNIFICANT CHANGE UP (ref 0–6)
EOSINOPHIL NFR BLD AUTO: 0 % — SIGNIFICANT CHANGE UP (ref 0–6)
FIBRINOGEN PPP-MCNC: 288 MG/DL — SIGNIFICANT CHANGE UP (ref 200–445)
GIANT PLATELETS BLD QL SMEAR: PRESENT — SIGNIFICANT CHANGE UP
GLUCOSE BLDC GLUCOMTR-MCNC: 109 MG/DL — HIGH (ref 70–99)
GLUCOSE BLDC GLUCOMTR-MCNC: 114 MG/DL — HIGH (ref 70–99)
GLUCOSE BLDC GLUCOMTR-MCNC: 119 MG/DL — HIGH (ref 70–99)
GLUCOSE BLDC GLUCOMTR-MCNC: 140 MG/DL — HIGH (ref 70–99)
GLUCOSE SERPL-MCNC: 118 MG/DL — HIGH (ref 70–99)
HCT VFR BLD CALC: 20.6 % — CRITICAL LOW (ref 34.5–45)
HCT VFR BLD CALC: 20.8 % — CRITICAL LOW (ref 34.5–45)
HGB BLD-MCNC: 6.9 G/DL — CRITICAL LOW (ref 11.5–15.5)
HGB BLD-MCNC: 7.1 G/DL — LOW (ref 11.5–15.5)
INR BLD: 0.89 RATIO — SIGNIFICANT CHANGE UP (ref 0.88–1.16)
LDH SERPL L TO P-CCNC: 449 U/L — HIGH (ref 50–242)
LYMPHOCYTES # BLD AUTO: 0.64 K/UL — LOW (ref 1–3.3)
LYMPHOCYTES # BLD AUTO: 0.9 K/UL — LOW (ref 1–3.3)
LYMPHOCYTES # BLD AUTO: 86.8 % — HIGH (ref 13–44)
LYMPHOCYTES # BLD AUTO: 87.5 % — HIGH (ref 13–44)
MAGNESIUM SERPL-MCNC: 1.9 MG/DL — SIGNIFICANT CHANGE UP (ref 1.6–2.6)
MANUAL SMEAR VERIFICATION: SIGNIFICANT CHANGE UP
MCHC RBC-ENTMCNC: 28.2 PG — SIGNIFICANT CHANGE UP (ref 27–34)
MCHC RBC-ENTMCNC: 29.1 PG — SIGNIFICANT CHANGE UP (ref 27–34)
MCHC RBC-ENTMCNC: 33.2 GM/DL — SIGNIFICANT CHANGE UP (ref 32–36)
MCHC RBC-ENTMCNC: 34.5 GM/DL — SIGNIFICANT CHANGE UP (ref 32–36)
MCV RBC AUTO: 84.4 FL — SIGNIFICANT CHANGE UP (ref 80–100)
MCV RBC AUTO: 84.9 FL — SIGNIFICANT CHANGE UP (ref 80–100)
MONOCYTES # BLD AUTO: 0 K/UL — SIGNIFICANT CHANGE UP (ref 0–0.9)
MONOCYTES # BLD AUTO: 0.01 K/UL — SIGNIFICANT CHANGE UP (ref 0–0.9)
MONOCYTES NFR BLD AUTO: 0 % — LOW (ref 2–14)
MONOCYTES NFR BLD AUTO: 1.4 % — LOW (ref 2–14)
NEUTROPHILS # BLD AUTO: 0.1 K/UL — LOW (ref 1.8–7.4)
NEUTROPHILS # BLD AUTO: 0.11 K/UL — LOW (ref 1.8–7.4)
NEUTROPHILS NFR BLD AUTO: 11.1 % — LOW (ref 43–77)
NEUTROPHILS NFR BLD AUTO: 13.2 % — LOW (ref 43–77)
OB PNL STL: POSITIVE
PHOSPHATE SERPL-MCNC: 2 MG/DL — LOW (ref 2.5–4.5)
PLAT MORPH BLD: NORMAL — SIGNIFICANT CHANGE UP
PLATELET # BLD AUTO: 18 K/UL — CRITICAL LOW (ref 150–400)
PLATELET # BLD AUTO: 34 K/UL — LOW (ref 150–400)
PLATELET # BLD AUTO: 43 K/UL — LOW (ref 150–400)
POTASSIUM SERPL-MCNC: 3.3 MMOL/L — LOW (ref 3.5–5.3)
POTASSIUM SERPL-SCNC: 3.3 MMOL/L — LOW (ref 3.5–5.3)
PROT SERPL-MCNC: 5.1 G/DL — LOW (ref 6–8.3)
PROTHROM AB SERPL-ACNC: 10.3 SEC — LOW (ref 10.5–13.4)
RBC # BLD: 2.44 M/UL — LOW (ref 3.8–5.2)
RBC # BLD: 2.45 M/UL — LOW (ref 3.8–5.2)
RBC # FLD: 14.6 % — HIGH (ref 10.3–14.5)
RBC # FLD: 15.6 % — HIGH (ref 10.3–14.5)
RBC BLD AUTO: SIGNIFICANT CHANGE UP
RH IG SCN BLD-IMP: POSITIVE — SIGNIFICANT CHANGE UP
SMUDGE CELLS # BLD: PRESENT — SIGNIFICANT CHANGE UP
SODIUM SERPL-SCNC: 137 MMOL/L — SIGNIFICANT CHANGE UP (ref 135–145)
TM INTERPRETATION: SIGNIFICANT CHANGE UP
URATE SERPL-MCNC: 1.5 MG/DL — LOW (ref 2.5–7)
WBC # BLD: 0.74 K/UL — CRITICAL LOW (ref 3.8–10.5)
WBC # BLD: 1.03 K/UL — LOW (ref 3.8–10.5)
WBC # FLD AUTO: 0.74 K/UL — CRITICAL LOW (ref 3.8–10.5)
WBC # FLD AUTO: 1.03 K/UL — LOW (ref 3.8–10.5)

## 2023-02-03 PROCEDURE — 99233 SBSQ HOSP IP/OBS HIGH 50: CPT

## 2023-02-03 RX ORDER — FUROSEMIDE 40 MG
40 TABLET ORAL ONCE
Refills: 0 | Status: COMPLETED | OUTPATIENT
Start: 2023-02-03 | End: 2023-02-03

## 2023-02-03 RX ORDER — PANTOPRAZOLE SODIUM 20 MG/1
40 TABLET, DELAYED RELEASE ORAL
Refills: 0 | Status: DISCONTINUED | OUTPATIENT
Start: 2023-02-03 | End: 2023-02-09

## 2023-02-03 RX ORDER — POTASSIUM PHOSPHATE, MONOBASIC POTASSIUM PHOSPHATE, DIBASIC 236; 224 MG/ML; MG/ML
15 INJECTION, SOLUTION INTRAVENOUS ONCE
Refills: 0 | Status: COMPLETED | OUTPATIENT
Start: 2023-02-03 | End: 2023-02-03

## 2023-02-03 RX ORDER — POTASSIUM CHLORIDE 20 MEQ
20 PACKET (EA) ORAL
Refills: 0 | Status: COMPLETED | OUTPATIENT
Start: 2023-02-03 | End: 2023-02-03

## 2023-02-03 RX ADMIN — ZINC OXIDE 1 APPLICATION(S): 200 OINTMENT TOPICAL at 17:43

## 2023-02-03 RX ADMIN — ZINC OXIDE 1 APPLICATION(S): 200 OINTMENT TOPICAL at 05:55

## 2023-02-03 RX ADMIN — Medication 500 MILLIGRAM(S): at 11:37

## 2023-02-03 RX ADMIN — PREGABALIN 1000 MICROGRAM(S): 225 CAPSULE ORAL at 11:37

## 2023-02-03 RX ADMIN — Medication 2 MILLIGRAM(S): at 15:39

## 2023-02-03 RX ADMIN — Medication 1 APPLICATION(S): at 17:43

## 2023-02-03 RX ADMIN — Medication 5 MILLIGRAM(S): at 21:31

## 2023-02-03 RX ADMIN — ATORVASTATIN CALCIUM 80 MILLIGRAM(S): 80 TABLET, FILM COATED ORAL at 21:30

## 2023-02-03 RX ADMIN — POTASSIUM PHOSPHATE, MONOBASIC POTASSIUM PHOSPHATE, DIBASIC 62.5 MILLIMOLE(S): 236; 224 INJECTION, SOLUTION INTRAVENOUS at 17:44

## 2023-02-03 RX ADMIN — PANTOPRAZOLE SODIUM 40 MILLIGRAM(S): 20 TABLET, DELAYED RELEASE ORAL at 17:43

## 2023-02-03 RX ADMIN — CASPOFUNGIN ACETATE 260 MILLIGRAM(S): 7 INJECTION, POWDER, LYOPHILIZED, FOR SOLUTION INTRAVENOUS at 15:39

## 2023-02-03 RX ADMIN — CHLORHEXIDINE GLUCONATE 1 APPLICATION(S): 213 SOLUTION TOPICAL at 08:53

## 2023-02-03 RX ADMIN — Medication 120 MILLIGRAM(S): at 05:58

## 2023-02-03 RX ADMIN — Medication 1 APPLICATION(S): at 05:55

## 2023-02-03 RX ADMIN — PANTOPRAZOLE SODIUM 40 MILLIGRAM(S): 20 TABLET, DELAYED RELEASE ORAL at 05:58

## 2023-02-03 RX ADMIN — Medication 50 MILLIEQUIVALENT(S): at 12:49

## 2023-02-03 RX ADMIN — VALSARTAN 320 MILLIGRAM(S): 80 TABLET ORAL at 05:59

## 2023-02-03 RX ADMIN — Medication 40 MILLIGRAM(S): at 17:43

## 2023-02-03 RX ADMIN — Medication 400 MILLIGRAM(S): at 21:30

## 2023-02-03 RX ADMIN — DASATINIB 140 MILLIGRAM(S): 80 TABLET ORAL at 11:37

## 2023-02-03 RX ADMIN — Medication 400 MILLIGRAM(S): at 05:58

## 2023-02-03 RX ADMIN — Medication 50 MILLIEQUIVALENT(S): at 15:39

## 2023-02-03 RX ADMIN — SODIUM CHLORIDE 50 MILLILITER(S): 9 INJECTION INTRAMUSCULAR; INTRAVENOUS; SUBCUTANEOUS at 05:59

## 2023-02-03 RX ADMIN — Medication 400 MILLIGRAM(S): at 14:20

## 2023-02-03 RX ADMIN — Medication 50 MILLIEQUIVALENT(S): at 10:01

## 2023-02-03 NOTE — PROGRESS NOTE ADULT - PROBLEM SELECTOR PLAN 5
mechanical AVR on coumadin now on hold in setting of acute leukemia and thrombocytopenia  Seen by cardiology

## 2023-02-03 NOTE — PROGRESS NOTE ADULT - ASSESSMENT
82 year old female with atrial fibrillation, s/p Saint Darian mechanical AVR, (1992–[facility]) for rheumatic aortic valve stenosis, ascending aortic aneurysm, HTN, HLD, depression, GERD transferred from [facility] for management of newly diagnosed PH + B cell ALL. Peripheral flow performed, BMBx consistent with B-cell ALL, FISH Ph+ treated with Dex and Dasatinib following CALGB 60608. Course complicated by hypofibrinogenemia treated with cryoprecipitate, subdural hemorrhage plt goal >20 seen by neurosurgery. Anemia and thrombocytopenia secondary to disease.

## 2023-02-03 NOTE — PROGRESS NOTE ADULT - NS ATTEND AMEND GEN_ALL_CORE FT
82F with PMH of AFib (s/p Saint Darian mechanical AVR, 1992, [facility]) for rheumatic aortic valve stenosis, ascending aortic aneurysm, HTN,  HLD, depression, GERD, who presented with newly diagnosed Ph-positive B-ALL.     # Ph-positive B-ALL: Bone marrow biopsy on 1/17/23 showed 99% B-lymphoblasts. She started treatment per the CALGB 08443 protocol with steroids and dasatinib on 1/18/23. She received 1 dose of vincristine 1.5 mg on 1/25/23 for LDH plateau. She received rasburicase twice, but no current evidence of TLS. She also received cryoprecipitate several times for hypofibrinogenemia, now normal. She has therapy- and disease-associated pancytopenia.   - S/p dexamethasone 20 mg D1-7. Continue dasatinib 140 mg daily. Today is D17.  - Trend CBC with differential and coagulation studies daily. Supportive transfusions to maintain Hgb > 7, plt > 20 given SDH, and fibrinogen > 100. Will give 1 unit pRBC and 1 unit plt today.   - Trend TLS labs daily. Replete electrolytes PRN.   - Antimicrobial prophylaxis: levaquin, caspofungin, acyclovir  - S/p LP with IT MTX on D15 (2/1/23). Follow up CSF flow.  - Follow up results from D16 bone marrow biopsy (2/2/23). Pending results, will determine next course of therapy.     # SDH: CTH on 1/15/23 showed an 8 mm bleed, which was stable on subsequent scans. No neurologic deficits. No surgical intervention.  - Hold Coumadin   - Maintain plt > 20 per Neurosurgery    # Mechanical valve: Currently off AC, discussed with Cardiology  - Hold anticoagulation in setting of SDH and persistent thrombocytopenia    The information documented within the attending attestation was documented solely by Carri Jesus.

## 2023-02-03 NOTE — PROVIDER CONTACT NOTE (OTHER) - ACTION/TREATMENT ORDERED:
Do not give platelets, recheck BP 1 hour @ 2100 pm
Okay to give platelets, will modify diet to low sodium
No orders at this time, will continue to monitor vital signs Q4  Will take BP prior to lasix
Stool OB ordered - collected.  Due Pantoprazole administered - some effect.  Offered PRN Mylanta - taken with good effect.

## 2023-02-03 NOTE — PROVIDER CONTACT NOTE (OTHER) - SITUATION
BP recheck manual 162/70
Patient toileted - noted liquid stool, dark brown. (+) abdominal discomfort.
Manual /80, patient due for platelets
Pt hypotensive, 94/56

## 2023-02-03 NOTE — PROGRESS NOTE ADULT - PROBLEM SELECTOR PLAN 1
Flow cytometry (1/14/23) consistent with acute leukemia (B ALL), FISH detected BCR/ABL (1/19)  Monitor daily CBC with Diff/CMP and transfuse/replete PRN  Patient was on warfarin-NOW HELD for mechanical AVR (1994 [facility]), atrial fibrillation  last dose of warfarin 1/13 at [facility] (5mg)-cardiology consulted.   TLS labs daily, IVF's, Strict I/O's, daily wights  1/14 TTE - EF 57%, mild pulm HTN, Mechanical AVR likely normal function  1/16 HLA sent  1/17  s/p BMbx c/w ALL (98% blasts) (with Clonoseq sampling)  Decadron 1/18-1/24-monitor sugars now off steroids.   1/19 Started Dasatinib  Vincristine 1.5 mg on Day 8 1/25 with Elitek 3mg IV x1.   Platelet count > 20 (+SDH)  FU Day 15 BMbx on 2/1 done on 2/2   FU CSF LP done on 2/1.   Social work consult to address family issues  Hypophosphatemia-replace phos, hypokalemia-replace k. Flow cytometry (1/14/23) consistent with acute leukemia (B ALL), FISH detected BCR/ABL (1/19)  Monitor daily CBC with Diff/CMP and transfuse/replete PRN  Patient was on warfarin-NOW HELD for mechanical AVR (1994 [facility]), atrial fibrillation  last dose of warfarin 1/13 at [facility] (5mg)-cardiology consulted.   TLS labs daily, IVF's, Strict I/O's, daily wights  1/14 TTE - EF 57%, mild pulm HTN, Mechanical AVR likely normal function  1/16 HLA sent  1/17  s/p BMbx c/w ALL (98% blasts) (with Clonoseq sampling)  Decadron 1/18-1/24-monitor sugars now off steroids.   1/19 Started Dasatinib  Vincristine 1.5 mg on Day 8 1/25 with Elitek 3mg IV x1.   Platelet count > 20 (+SDH)  FU Day 15 BMbx on 2/1 done on 2/2   FU CSF LP done on 2/1.   Social work consult to address family issues  2/3- Transfuse one unit PRBC and one unit platelets today, Lasix in between transfusions.  2/3- Hypophosphatemia- Replete phos.

## 2023-02-03 NOTE — PROVIDER CONTACT NOTE (OTHER) - ASSESSMENT
NAD, pt denies dizziness or lightheadedness
No apparent bleeding or nausea.
VSS, no acute distress
No acute distress noted

## 2023-02-03 NOTE — PROGRESS NOTE ADULT - ATTENDING COMMENTS
82F with PMH of AFib (s/p Saint Darian mechanical AVR, 1992, [facility]) for rheumatic aortic valve stenosis, ascending aortic aneurysm, HTN,  HLD, depression, GERD, who presented with newly diagnosed Ph-positive B-ALL.     # Ph-positive B-ALL: Bone marrow biopsy on 1/17/23 showed 99% B-lymphoblasts. She started treatment per the CALGB 01656 protocol with steroids and dasatinib on 1/18/23. She received 1 dose of vincristine 1.5 mg on 1/25/23 for LDH plateau. She received rasburicase twice, but no current evidence of TLS. She also received cryoprecipitate several times for hypofibrinogenemia, now normal. She has therapy- and disease-associated pancytopenia.   - S/p dexamethasone 20 mg D1-7. Continue dasatinib 140 mg daily. Today is D16.  - Trend CBC with differential and coagulation studies daily. Supportive transfusions to maintain Hgb > 7, plt > 20 given SDH, and fibrinogen > 100.  - Trend TLS labs daily. Replete electrolytes PRN.   - Antimicrobial prophylaxis: levaquin, caspofungin, acyclovir  - S/p LP with IT MTX on D15 (2/1/23). Follow up CSF flow.  - Repeat bone marrow biopsy today. Pending results, will determine next course of therapy.     # SDH: CTH on 1/15/23 showed an 8 mm bleed, which was stable on subsequent scans. No neurologic deficits. No surgical intervention.  - Hold Coumadin   - Maintain plt > 20 per Neurosurgery    # Mechanical valve: Currently off AC, discussed with Cardiology  - Hold anticoagulation in setting of SDH and persistent thrombocytopenia    The information documented within the attending attestation was documented solely by Carri Jesus. 82F with PMH of AFib (s/p [employer] [place] mechanical AVR, 1992, [facility]) for rheumatic aortic valve stenosis, ascending aortic aneurysm, HTN,  HLD, depression, GERD, who presented with newly diagnosed Ph-positive B-ALL.     # Ph-positive B-ALL: Bone marrow biopsy on 1/17/23 showed 99% B-lymphoblasts. She started treatment per the CALGB 53798 protocol with steroids and dasatinib on 1/18/23. She received 1 dose of vincristine 1.5 mg on 1/25/23 for LDH plateau. She received rasburicase twice, but no current evidence of TLS. She also received cryoprecipitate several times for hypofibrinogenemia, now normal. She has therapy- and disease-associated pancytopenia.   - S/p dexamethasone 20 mg D1-7. Continue dasatinib 140 mg daily. Today is D17.  - Trend CBC with differential and coagulation studies daily. Supportive transfusions to maintain Hgb > 7, plt > 20 given SDH, and fibrinogen > 100. Will give 1 unit pRBC and 1 unit plt today.   - Trend TLS labs daily. Replete electrolytes PRN.   - Antimicrobial prophylaxis: levaquin, caspofungin, acyclovir  - S/p LP with IT MTX on D15 (2/1/23). Follow up CSF flow.  - Follow up results from D16 bone marrow biopsy (2/2/23). Pending results, will determine next course of therapy.     # SDH: CTH on 1/15/23 showed an 8 mm bleed, which was stable on subsequent scans. No neurologic deficits. No surgical intervention.  - Hold Coumadin   - Maintain plt > 20 per Neurosurgery    # Mechanical valve: Currently off AC, discussed with Cardiology  - Hold anticoagulation in setting of SDH and persistent thrombocytopenia    The information documented within the attending attestation was documented solely by Carri Jesus.

## 2023-02-03 NOTE — ADVANCED PRACTICE NURSE CONSULT - ASSESSMENT
Pt seen in bed, awake, alert and oriented x 3. Rt arm PIV in place with dressing dry and intact with no s/s of bleeding, swelling or redness. Which has positive blood return and flushing well with 10cc Normal saline. Lab results wnl, and MD aware . Reeducate pt about chemotherapy administration, possible side effects and expected outcome. Pt verbalized understandings. Zofran 8 mg ivss given Q8hrs as ordered..Pt denies any nausea or vomitting. Chemo drug dosage verified with another RN. Bilateral Nystagmous remains negative. Patients signature obtained and verified with the previous signature prior to chemo adminstration. Day 2 of Cytarabine 2000mg /m2 = 3120 mg iv infusion given at 0510 AM over 3 hrs, on Feb 3rd 2023 through Rt arm PIV with positive blood return via Alaris pump.Pt resting in bed comfortably.

## 2023-02-04 DIAGNOSIS — K92.2 GASTROINTESTINAL HEMORRHAGE, UNSPECIFIED: ICD-10-CM

## 2023-02-04 LAB
ALBUMIN SERPL ELPH-MCNC: 3.1 G/DL — LOW (ref 3.3–5)
ALP SERPL-CCNC: 61 U/L — SIGNIFICANT CHANGE UP (ref 40–120)
ALT FLD-CCNC: 101 U/L — HIGH (ref 10–45)
AMYLASE P1 CFR SERPL: 84 U/L — SIGNIFICANT CHANGE UP (ref 25–125)
ANION GAP SERPL CALC-SCNC: 9 MMOL/L — SIGNIFICANT CHANGE UP (ref 5–17)
APTT BLD: 26.3 SEC — LOW (ref 27.5–35.5)
AST SERPL-CCNC: 104 U/L — HIGH (ref 10–40)
BILIRUB SERPL-MCNC: 0.7 MG/DL — SIGNIFICANT CHANGE UP (ref 0.2–1.2)
BUN SERPL-MCNC: 8 MG/DL — SIGNIFICANT CHANGE UP (ref 7–23)
CALCIUM SERPL-MCNC: 7.6 MG/DL — LOW (ref 8.4–10.5)
CHLORIDE SERPL-SCNC: 104 MMOL/L — SIGNIFICANT CHANGE UP (ref 96–108)
CO2 SERPL-SCNC: 25 MMOL/L — SIGNIFICANT CHANGE UP (ref 22–31)
CREAT SERPL-MCNC: 0.67 MG/DL — SIGNIFICANT CHANGE UP (ref 0.5–1.3)
EGFR: 88 ML/MIN/1.73M2 — SIGNIFICANT CHANGE UP
FIBRINOGEN PPP-MCNC: 324 MG/DL — SIGNIFICANT CHANGE UP (ref 200–445)
GLUCOSE BLDC GLUCOMTR-MCNC: 102 MG/DL — HIGH (ref 70–99)
GLUCOSE BLDC GLUCOMTR-MCNC: 125 MG/DL — HIGH (ref 70–99)
GLUCOSE BLDC GLUCOMTR-MCNC: 99 MG/DL — SIGNIFICANT CHANGE UP (ref 70–99)
GLUCOSE SERPL-MCNC: 102 MG/DL — HIGH (ref 70–99)
HCT VFR BLD CALC: 18.3 % — CRITICAL LOW (ref 34.5–45)
HCT VFR BLD CALC: 24.3 % — LOW (ref 34.5–45)
HGB BLD-MCNC: 6.2 G/DL — CRITICAL LOW (ref 11.5–15.5)
HGB BLD-MCNC: 8.5 G/DL — LOW (ref 11.5–15.5)
INR BLD: 1.04 RATIO — SIGNIFICANT CHANGE UP (ref 0.88–1.16)
LDH SERPL L TO P-CCNC: 428 U/L — HIGH (ref 50–242)
LIDOCAIN IGE QN: 21 U/L — SIGNIFICANT CHANGE UP (ref 7–60)
MAGNESIUM SERPL-MCNC: 1.7 MG/DL — SIGNIFICANT CHANGE UP (ref 1.6–2.6)
MCHC RBC-ENTMCNC: 28.8 PG — SIGNIFICANT CHANGE UP (ref 27–34)
MCHC RBC-ENTMCNC: 29.7 PG — SIGNIFICANT CHANGE UP (ref 27–34)
MCHC RBC-ENTMCNC: 33.9 GM/DL — SIGNIFICANT CHANGE UP (ref 32–36)
MCHC RBC-ENTMCNC: 35 GM/DL — SIGNIFICANT CHANGE UP (ref 32–36)
MCV RBC AUTO: 85 FL — SIGNIFICANT CHANGE UP (ref 80–100)
MCV RBC AUTO: 85.1 FL — SIGNIFICANT CHANGE UP (ref 80–100)
NRBC # BLD: 0 /100 WBCS — SIGNIFICANT CHANGE UP (ref 0–0)
NRBC # BLD: 0 /100 WBCS — SIGNIFICANT CHANGE UP (ref 0–0)
PHOSPHATE SERPL-MCNC: 2.4 MG/DL — LOW (ref 2.5–4.5)
PLATELET # BLD AUTO: 37 K/UL — LOW (ref 150–400)
PLATELET # BLD AUTO: 57 K/UL — LOW (ref 150–400)
POTASSIUM SERPL-MCNC: 3.1 MMOL/L — LOW (ref 3.5–5.3)
POTASSIUM SERPL-SCNC: 3.1 MMOL/L — LOW (ref 3.5–5.3)
PROT SERPL-MCNC: 5 G/DL — LOW (ref 6–8.3)
PROTHROM AB SERPL-ACNC: 12.1 SEC — SIGNIFICANT CHANGE UP (ref 10.5–13.4)
RBC # BLD: 2.15 M/UL — LOW (ref 3.8–5.2)
RBC # BLD: 2.86 M/UL — LOW (ref 3.8–5.2)
RBC # FLD: 14.2 % — SIGNIFICANT CHANGE UP (ref 10.3–14.5)
RBC # FLD: 14.6 % — HIGH (ref 10.3–14.5)
SODIUM SERPL-SCNC: 138 MMOL/L — SIGNIFICANT CHANGE UP (ref 135–145)
URATE SERPL-MCNC: 1.9 MG/DL — LOW (ref 2.5–7)
WBC # BLD: 0.4 K/UL — CRITICAL LOW (ref 3.8–10.5)
WBC # BLD: 0.91 K/UL — CRITICAL LOW (ref 3.8–10.5)
WBC # FLD AUTO: 0.4 K/UL — CRITICAL LOW (ref 3.8–10.5)
WBC # FLD AUTO: 0.91 K/UL — CRITICAL LOW (ref 3.8–10.5)

## 2023-02-04 PROCEDURE — 99233 SBSQ HOSP IP/OBS HIGH 50: CPT

## 2023-02-04 PROCEDURE — 74177 CT ABD & PELVIS W/CONTRAST: CPT | Mod: 26

## 2023-02-04 RX ORDER — FUROSEMIDE 40 MG
40 TABLET ORAL ONCE
Refills: 0 | Status: COMPLETED | OUTPATIENT
Start: 2023-02-04 | End: 2023-02-04

## 2023-02-04 RX ORDER — POTASSIUM CHLORIDE 20 MEQ
20 PACKET (EA) ORAL
Refills: 0 | Status: COMPLETED | OUTPATIENT
Start: 2023-02-04 | End: 2023-02-04

## 2023-02-04 RX ORDER — SUCRALFATE 1 G
1 TABLET ORAL EVERY 6 HOURS
Refills: 0 | Status: DISCONTINUED | OUTPATIENT
Start: 2023-02-04 | End: 2023-02-10

## 2023-02-04 RX ORDER — POTASSIUM PHOSPHATE, MONOBASIC POTASSIUM PHOSPHATE, DIBASIC 236; 224 MG/ML; MG/ML
15 INJECTION, SOLUTION INTRAVENOUS ONCE
Refills: 0 | Status: COMPLETED | OUTPATIENT
Start: 2023-02-04 | End: 2023-02-04

## 2023-02-04 RX ORDER — MAGNESIUM SULFATE 500 MG/ML
2 VIAL (ML) INJECTION ONCE
Refills: 0 | Status: COMPLETED | OUTPATIENT
Start: 2023-02-04 | End: 2023-02-04

## 2023-02-04 RX ADMIN — ZINC OXIDE 1 APPLICATION(S): 200 OINTMENT TOPICAL at 05:30

## 2023-02-04 RX ADMIN — SODIUM CHLORIDE 50 MILLILITER(S): 9 INJECTION INTRAMUSCULAR; INTRAVENOUS; SUBCUTANEOUS at 05:27

## 2023-02-04 RX ADMIN — VALSARTAN 320 MILLIGRAM(S): 80 TABLET ORAL at 05:29

## 2023-02-04 RX ADMIN — Medication 400 MILLIGRAM(S): at 05:29

## 2023-02-04 RX ADMIN — Medication 1 GRAM(S): at 17:31

## 2023-02-04 RX ADMIN — Medication 1 APPLICATION(S): at 17:24

## 2023-02-04 RX ADMIN — POTASSIUM PHOSPHATE, MONOBASIC POTASSIUM PHOSPHATE, DIBASIC 62.5 MILLIMOLE(S): 236; 224 INJECTION, SOLUTION INTRAVENOUS at 18:39

## 2023-02-04 RX ADMIN — DASATINIB 140 MILLIGRAM(S): 80 TABLET ORAL at 11:26

## 2023-02-04 RX ADMIN — Medication 500 MILLIGRAM(S): at 11:26

## 2023-02-04 RX ADMIN — ZINC OXIDE 1 APPLICATION(S): 200 OINTMENT TOPICAL at 17:23

## 2023-02-04 RX ADMIN — Medication 5 MILLIGRAM(S): at 21:56

## 2023-02-04 RX ADMIN — PREGABALIN 1000 MICROGRAM(S): 225 CAPSULE ORAL at 11:26

## 2023-02-04 RX ADMIN — Medication 400 MILLIGRAM(S): at 13:16

## 2023-02-04 RX ADMIN — Medication 50 MILLIEQUIVALENT(S): at 12:06

## 2023-02-04 RX ADMIN — Medication 50 MILLIEQUIVALENT(S): at 14:17

## 2023-02-04 RX ADMIN — Medication 50 MILLIEQUIVALENT(S): at 09:57

## 2023-02-04 RX ADMIN — Medication 400 MILLIGRAM(S): at 21:56

## 2023-02-04 RX ADMIN — Medication 240 MILLIGRAM(S): at 05:28

## 2023-02-04 RX ADMIN — CASPOFUNGIN ACETATE 260 MILLIGRAM(S): 7 INJECTION, POWDER, LYOPHILIZED, FOR SOLUTION INTRAVENOUS at 16:25

## 2023-02-04 RX ADMIN — Medication 2 MILLIGRAM(S): at 09:04

## 2023-02-04 RX ADMIN — Medication 1 APPLICATION(S): at 05:29

## 2023-02-04 RX ADMIN — PANTOPRAZOLE SODIUM 40 MILLIGRAM(S): 20 TABLET, DELAYED RELEASE ORAL at 17:24

## 2023-02-04 RX ADMIN — PANTOPRAZOLE SODIUM 40 MILLIGRAM(S): 20 TABLET, DELAYED RELEASE ORAL at 05:29

## 2023-02-04 RX ADMIN — Medication 40 MILLIGRAM(S): at 16:58

## 2023-02-04 RX ADMIN — CHLORHEXIDINE GLUCONATE 1 APPLICATION(S): 213 SOLUTION TOPICAL at 08:48

## 2023-02-04 RX ADMIN — Medication 1 GRAM(S): at 22:04

## 2023-02-04 RX ADMIN — Medication 25 GRAM(S): at 16:25

## 2023-02-04 NOTE — PROGRESS NOTE ADULT - ASSESSMENT
82 year old female with atrial fibrillation, s/p Saint Darian mechanical AVR, (1992–[facility]) for rheumatic aortic valve stenosis, ascending aortic aneurysm, HTN, HLD, depression, GERD transferred from [facility] for management of newly diagnosed PH + B cell ALL. Peripheral flow performed, BMBx consistent with B-cell ALL, FISH Ph+ treated with Dex and Dasatinib following CALGB 82219. Course complicated by hypofibrinogenemia treated with cryoprecipitate, subdural hemorrhage plt goal >20 seen by neurosurgery. Anemia and thrombocytopenia secondary to disease.   82 year old female with atrial fibrillation, s/p Saint Darian mechanical AVR, (1992–[facility]) for rheumatic aortic valve stenosis, ascending aortic aneurysm, HTN, HLD, depression, GERD transferred from [facility] for management of newly diagnosed PH + B cell ALL. Peripheral flow performed, BMBx consistent with B-cell ALL, FISH Ph+ treated with Dex and Dasatinib following CALGB 79682. Course complicated by hypofibrinogenemia treated with cryoprecipitate, subdural hemorrhage plt goal >20 seen by neurosurgery, GIB. Anemia and thrombocytopenia secondary to disease.

## 2023-02-04 NOTE — PROVIDER CONTACT NOTE (CRITICAL VALUE NOTIFICATION) - ASSESSMENT
Pt is stable, resting comfortably in bed, no signs of bleeding Pt is stable, resting comfortably in bed, no signs of bleeding  NAD, afebrile, pt denies sob, chest pain

## 2023-02-04 NOTE — PROGRESS NOTE ADULT - ATTENDING COMMENTS
82F with PMH of AFib (s/p [employer] mechanical AVR, 1994, [facility]) for rheumatic aortic valve stenosis, ascending aortic aneurysm, HTN,  HLD, depression, GERD, who presented with newly diagnosed Ph-positive B-ALL.     # Ph-positive B-ALL: Bone marrow biopsy on 1/17/23 showed 99% B-lymphoblasts. She started treatment per the CALGB 56587 protocol with steroids and dasatinib on 1/18/23. She received 1 dose of vincristine 1.5 mg on 1/25/23 for LDH plateau. She received rasburicase twice, but no current evidence of TLS. She also received cryoprecipitate several times for hypofibrinogenemia, now normal. She has therapy- and disease-associated pancytopenia.   - S/p dexamethasone 20 mg D1-7. Continue dasatinib 140 mg daily. Today is D17.  - Trend CBC with differential and coagulation studies daily. Supportive transfusions to maintain Hgb > 7, plt > 20 given SDH, and fibrinogen > 100. Will give 1 unit pRBC and 1 unit plt today.   - Trend TLS labs daily. Replete electrolytes PRN.   - Antimicrobial prophylaxis: levaquin, caspofungin, acyclovir  - S/p LP with IT MTX on D15 (2/1/23). Follow up CSF flow.  - Follow up results from D16 bone marrow biopsy (2/2/23). Pending results, will determine next course of therapy.     # SDH: CTH on 1/15/23 showed an 8 mm bleed, which was stable on subsequent scans. No neurologic deficits. No surgical intervention.  - Hold Coumadin   - Maintain plt > 20 per Neurosurgery    # Mechanical valve: Currently off AC, discussed with Cardiology  - Hold anticoagulation in setting of SDH and persistent thrombocytopenia    The information documented within the attending attestation was documented solely by Carri Jesus. 82F with PMH of AFib (s/p [employer] mechanical AVR, 1994, [facility]) for rheumatic aortic valve stenosis, ascending aortic aneurysm, HTN,  HLD, depression, GERD, who presented with newly diagnosed Ph-positive B-ALL.     # Ph-positive B-ALL: Bone marrow biopsy on 1/17/23 showed 99% B-lymphoblasts. She started treatment per the CALGB 44082 protocol with steroids and dasatinib on 1/18/23. She received 1 dose of vincristine 1.5 mg on 1/25/23 for LDH plateau. She received rasburicase twice, but no current evidence of TLS. She also received cryoprecipitate several times for hypofibrinogenemia, now normal. She has therapy- and disease-associated pancytopenia.   - S/p dexamethasone 20 mg D1-7. Continue dasatinib 140 mg daily. Today is D18.  - Trend CBC with differential and coagulation studies daily. Supportive transfusions to maintain Hgb > 7, plt > 50 given GI bleed, and fibrinogen > 100. Transfuse 1 unit pRBC.   - Trend TLS labs daily. Replete electrolytes PRN.   - Antimicrobial prophylaxis: levaquin, caspofungin, acyclovir  - S/p LP with IT MTX on D15 (2/1/23). CSF flow negative.   - Follow up results from D16 bone marrow biopsy (2/2/23). Pending results, will determine next course of therapy.     # SDH: CTH on 1/15/23 showed an 8 mm bleed, which was stable on subsequent scans. No neurologic deficits. No surgical intervention.  - Hold Coumadin   - Maintain plt > 20 per Neurosurgery    # Mechanical valve: Currently off AC, discussed with Cardiology  - Hold anticoagulation in setting of SDH and persistent thrombocytopenia    # GI bleed: Had dark stools on 2/3/23, with FOBT+.  - Trend CBC with differential and coagulation studies daily. Supportive transfusions to maintain Hgb > 7, plt > 50 given GI bleed, and fibrinogen > 100.   - Continue PPI twice daily    # Elevated transaminases: Previously elevated earlier in her admission -  (1/21/23). AST now 104, . Alk Phos and bilirubin normal.  - Trend LFTs daily  - Check abdominal ultrasound  - Hold atorvastatin     The information documented within the attending attestation was documented solely by Carri Jesus. 82F with PMH of AFib (s/p [employer] mechanical AVR, 1994, [facility]) for rheumatic aortic valve stenosis, ascending aortic aneurysm, HTN,  HLD, depression, GERD, who presented with newly diagnosed Ph-positive B-ALL.     # Ph-positive B-ALL: Bone marrow biopsy on 1/17/23 showed 99% B-lymphoblasts. She started treatment per the CALGB 28805 protocol with steroids and dasatinib on 1/18/23. She received 1 dose of vincristine 1.5 mg on 1/25/23 for LDH plateau. She received rasburicase twice, but no current evidence of TLS. She also received cryoprecipitate several times for hypofibrinogenemia, now normal. She has therapy- and disease-associated pancytopenia.   - S/p dexamethasone 20 mg D1-7. Continue dasatinib 140 mg daily. Today is D18.  - Trend CBC with differential and coagulation studies daily. Supportive transfusions to maintain Hgb > 7, plt > 50 given GI bleed, and fibrinogen > 100. Transfuse 1 unit pRBC.   - Trend TLS labs daily. Replete electrolytes PRN.   - Antimicrobial prophylaxis: levaquin, caspofungin, acyclovir  - S/p LP with IT MTX on D15 (2/1/23). CSF flow negative.   - Follow up results from D16 bone marrow biopsy (2/2/23). Pending results, will determine next course of therapy.     # SDH: CTH on 1/15/23 showed an 8 mm bleed, which was stable on subsequent scans. No neurologic deficits. No surgical intervention.  - Hold Coumadin   - Maintain plt > 20 per Neurosurgery    # Mechanical valve: Currently off AC, discussed with Cardiology  - Hold anticoagulation in setting of SDH and persistent thrombocytopenia    # GI bleed: Had dark stools on 2/3/23, with FOBT+.  - Trend CBC with differential and coagulation studies daily. Supportive transfusions to maintain Hgb > 7, plt > 50 given GI bleed, and fibrinogen > 100.   - Continue PPI twice daily    # Elevated transaminases: Previously elevated earlier in her admission -  (1/21/23). AST now 104, . Alk Phos and bilirubin normal.  - Trend LFTs daily  - Check CT A/P as she also reports new abdominal pain with tenderness on exam  - Hold atorvastatin     The information documented within the attending attestation was documented solely by Carri Jesus.

## 2023-02-04 NOTE — PROGRESS NOTE ADULT - SUBJECTIVE AND OBJECTIVE BOX
Diagnosis:    Protocol/Chemo Regimen:    Day:     Pt endorsed:    Review of Systems:     Pain scale:     Diet:     Allergies    No Known Allergies    Intolerances        ANTIMICROBIALS  acyclovir   Oral Tab/Cap 400 milliGRAM(s) Oral every 8 hours  caspofungin IVPB 50 milliGRAM(s) IV Intermittent every 24 hours  levoFLOXacin  Tablet 500 milliGRAM(s) Oral every 24 hours      HEME/ONC MEDICATIONS  dasatinib 140 milliGRAM(s) Oral daily  methotrexate PF IntraThecal (eMAR) 12 milliGRAM(s) IntraThecal once      STANDING MEDICATIONS  ascorbic acid 500 milliGRAM(s) Oral daily  atorvastatin 80 milliGRAM(s) Oral at bedtime  chlorhexidine 4% Liquid 1 Application(s) Topical <User Schedule>  cyanocobalamin 1000 MICROGram(s) Oral daily  dextrose 5%. 1000 milliLiter(s) IV Continuous <Continuous>  dextrose 5%. 1000 milliLiter(s) IV Continuous <Continuous>  dextrose 50% Injectable 25 Gram(s) IV Push once  dextrose 50% Injectable 12.5 Gram(s) IV Push once  diltiazem    milliGRAM(s) Oral daily  glucagon  Injectable 1 milliGRAM(s) IntraMuscular once  hydrochlorothiazide 25 milliGRAM(s) Oral daily  hydrocortisone 2.5% Rectal Cream 1 Application(s) Rectal two times a day  insulin lispro (ADMELOG) corrective regimen sliding scale   SubCutaneous three times a day before meals  melatonin 5 milliGRAM(s) Oral at bedtime  pantoprazole    Tablet 40 milliGRAM(s) Oral two times a day  sodium chloride 0.9%. 1000 milliLiter(s) IV Continuous <Continuous>  valsartan 320 milliGRAM(s) Oral daily  zinc oxide 40% Paste 1 Application(s) Topical two times a day      PRN MEDICATIONS  acetaminophen     Tablet .. 650 milliGRAM(s) Oral every 6 hours PRN  aluminum hydroxide/magnesium hydroxide/simethicone Suspension 30 milliLiter(s) Oral every 4 hours PRN  dextrose Oral Gel 15 Gram(s) Oral once PRN  loperamide 2 milliGRAM(s) Oral three times a day PRN  ondansetron Injectable 8 milliGRAM(s) IV Push every 8 hours PRN  sodium chloride 0.9% lock flush 10 milliLiter(s) IV Push every 1 hour PRN        Vital Signs Last 24 Hrs  T(C): 37.5 (04 Feb 2023 04:46), Max: 37.5 (04 Feb 2023 00:48)  T(F): 99.5 (04 Feb 2023 04:46), Max: 99.5 (04 Feb 2023 00:48)  HR: 87 (04 Feb 2023 04:46) (83 - 102)  BP: 110/64 (04 Feb 2023 04:46) (94/56 - 142/79)  BP(mean): --  RR: 18 (04 Feb 2023 04:46) (18 - 18)  SpO2: 98% (04 Feb 2023 04:46) (98% - 100%)    Parameters below as of 04 Feb 2023 04:46  Patient On (Oxygen Delivery Method): room air        PHYSICAL EXAM  General: NAD  HEENT: PERRLA, EOMOI, clear oropharynx, anicteric sclera, pink conjunctiva  Neck: supple  CV: (+) S1/S2 RRR  Lungs: clear to auscultation, no wheezes or rales  Abdomen: soft, non-tender, non-distended (+) BS  Ext: no clubbing, cyanosis or edema  Skin: no rashes and no petechiae  Neuro: alert and oriented X 3, no focal deficits  Central Line:     RECENT CULTURES:        LABS:                        7.1    0.74  )-----------( 43       ( 03 Feb 2023 22:39 )             20.6         Mean Cell Volume : 84.4 fl  Mean Cell Hemoglobin : 29.1 pg  Mean Cell Hemoglobin Concentration : 34.5 gm/dL  Auto Neutrophil # : 0.10 K/uL  Auto Lymphocyte # : 0.64 K/uL  Auto Monocyte # : 0.00 K/uL  Auto Eosinophil # : 0.00 K/uL  Auto Basophil # : 0.00 K/uL  Auto Neutrophil % : 13.2 %  Auto Lymphocyte % : 86.8 %  Auto Monocyte % : 0.0 %  Auto Eosinophil % : 0.0 %  Auto Basophil % : 0.0 %      02-03    137  |  107  |  7   ----------------------------<  118<H>  3.3<L>   |  22  |  0.61    Ca    7.6<L>      03 Feb 2023 06:54  Phos  2.0     02-03  Mg     1.9     02-03    TPro  5.1<L>  /  Alb  3.1<L>  /  TBili  0.5  /  DBili  x   /  AST  85<H>  /  ALT  82<H>  /  AlkPhos  68  02-03          PT/INR - ( 03 Feb 2023 06:54 )   PT: 10.3 sec;   INR: 0.89 ratio         PTT - ( 03 Feb 2023 06:54 )  PTT:26.3 sec        RADIOLOGY & ADDITIONAL STUDIES:         Diagnosis: newly diagnosed B-ALL, Ph (+)    Protocol/Chemo Regimen: Following 44751 (Decadron days 1-7 + Dasatinib), Vincristine 1.5mg (on D8)    Day: 18     Pt endorsed: +fatigue  +abdominal pain.    Review of Systems: Denies any nausea, vomiting, diarrhea, chest pain, palpitation, SOB    Pain scale: 4/10    Diet: regular    Allergies: No Known Allergies    ANTIMICROBIALS  acyclovir   Oral Tab/Cap 400 milliGRAM(s) Oral every 8 hours  caspofungin IVPB 50 milliGRAM(s) IV Intermittent every 24 hours  levoFLOXacin  Tablet 500 milliGRAM(s) Oral every 24 hours    HEME/ONC MEDICATIONS  dasatinib 140 milliGRAM(s) Oral daily  methotrexate PF IntraThecal (eMAR) 12 milliGRAM(s) IntraThecal once    STANDING MEDICATIONS  ascorbic acid 500 milliGRAM(s) Oral daily  atorvastatin 80 milliGRAM(s) Oral at bedtime  chlorhexidine 4% Liquid 1 Application(s) Topical <User Schedule>  cyanocobalamin 1000 MICROGram(s) Oral daily  dextrose 5%. 1000 milliLiter(s) IV Continuous <Continuous>  dextrose 5%. 1000 milliLiter(s) IV Continuous <Continuous>  dextrose 50% Injectable 25 Gram(s) IV Push once  dextrose 50% Injectable 12.5 Gram(s) IV Push once  diltiazem    milliGRAM(s) Oral daily  glucagon  Injectable 1 milliGRAM(s) IntraMuscular once  hydrochlorothiazide 25 milliGRAM(s) Oral daily  hydrocortisone 2.5% Rectal Cream 1 Application(s) Rectal two times a day  insulin lispro (ADMELOG) corrective regimen sliding scale   SubCutaneous three times a day before meals  melatonin 5 milliGRAM(s) Oral at bedtime  pantoprazole    Tablet 40 milliGRAM(s) Oral two times a day  sodium chloride 0.9%. 1000 milliLiter(s) IV Continuous <Continuous>  valsartan 320 milliGRAM(s) Oral daily  zinc oxide 40% Paste 1 Application(s) Topical two times a day    PRN MEDICATIONS  acetaminophen     Tablet .. 650 milliGRAM(s) Oral every 6 hours PRN  aluminum hydroxide/magnesium hydroxide/simethicone Suspension 30 milliLiter(s) Oral every 4 hours PRN  dextrose Oral Gel 15 Gram(s) Oral once PRN  loperamide 2 milliGRAM(s) Oral three times a day PRN  ondansetron Injectable 8 milliGRAM(s) IV Push every 8 hours PRN  sodium chloride 0.9% lock flush 10 milliLiter(s) IV Push every 1 hour PRN    Vital Signs Last 24 Hrs  T(C): 37.5 (04 Feb 2023 04:46), Max: 37.5 (04 Feb 2023 00:48)  T(F): 99.5 (04 Feb 2023 04:46), Max: 99.5 (04 Feb 2023 00:48)  HR: 87 (04 Feb 2023 04:46) (83 - 102)  BP: 110/64 (04 Feb 2023 04:46) (94/56 - 142/79)  BP(mean): --  RR: 18 (04 Feb 2023 04:46) (18 - 18)  SpO2: 98% (04 Feb 2023 04:46) (98% - 100%)    Parameters below as of 04 Feb 2023 04:46  Patient On (Oxygen Delivery Method): room air    PHYSICAL EXAM  General: NAD  HEENT: Sclerae anicteric, clear oropharynx, no oral lesions  CV: normal S1/S2, reg  Lungs: breath sounds clear and equal bilaterally  Abdomen: soft, non-distended, tenderness + RUQ  Ext: no edema  Skin: no rash  Neuro: alert and oriented X 4  Central Line: RUE PICC clean, dry, intact    RECENT CULTURES:  Culture - Stool (01.21.23 @ 12:49)    Specimen Source: .Stool Feces    Culture Results:   No enteric pathogens isolated.  (Stool culture examined for Salmonella,  Shigella, Campylobacter, Aeromonas, Plesiomonas,  Vibrio, E.coli O157 and Yersinia)    LABS:                            6.2    0.40  )-----------( 57       ( 04 Feb 2023 06:52 )             18.3     Mean Cell Volume : 85.1 fl  Mean Cell Hemoglobin : 28.8 pg  Mean Cell Hemoglobin Concentration : 33.9 gm/dL  Auto Neutrophil # : x  Auto Lymphocyte # : x  Auto Monocyte # : x  Auto Eosinophil # : x  Auto Basophil # : x  Auto Neutrophil % : x  Auto Lymphocyte % : x  Auto Monocyte % : x  Auto Eosinophil % : x  Auto Basophil % : x    02-04    138  |  104  |  8   ----------------------------<  102<H>  3.1<L>   |  25  |  0.67    Ca    7.6<L>      04 Feb 2023 06:52  Phos  2.4     02-04  Mg     1.7     02-04    TPro  5.0<L>  /  Alb  3.1<L>  /  TBili  0.7  /  DBili  x   /  AST  104<H>  /  ALT  101<H>  /  AlkPhos  61  02-04  Mg 1.7  Phos 2.4  PT/INR - ( 04 Feb 2023 06:52 )   PT: 12.1 sec;   INR: 1.04 ratio    PTT - ( 04 Feb 2023 06:52 )  PTT:26.3 sec    Uric Acid 1.9    RADIOLOGY & ADDITIONAL STUDIES:   Xray Chest 1 View- PORTABLE-Urgent (Xray Chest 1 View- PORTABLE-Urgent .) (01.16.23 @ 13:16) >  Impression:  Right PICC terminating within the distal SVC.

## 2023-02-04 NOTE — PROGRESS NOTE ADULT - PROBLEM SELECTOR PLAN 10
2/4- Dark stool yesterday, stool occult +, transfuse one unit PRBC, keep platelets >50. 2/4- Dark stool yesterday, stool occult +, transfuse one unit PRBC, keep platelets >50.  Continue PPI.  Amylase, Lipase WNL.

## 2023-02-04 NOTE — PROGRESS NOTE ADULT - PROBLEM SELECTOR PLAN 9
2/3- Grade 1 transaminitis, will monitor closely, avoid hepatotoxins. 2/3- Grade 1 transaminitis, will monitor closely, avoid hepatotoxins.  D/Brock Atorvastatin.

## 2023-02-05 LAB
ALBUMIN SERPL ELPH-MCNC: 3.4 G/DL — SIGNIFICANT CHANGE UP (ref 3.3–5)
ALP SERPL-CCNC: 68 U/L — SIGNIFICANT CHANGE UP (ref 40–120)
ALT FLD-CCNC: 115 U/L — HIGH (ref 10–45)
ANION GAP SERPL CALC-SCNC: 10 MMOL/L — SIGNIFICANT CHANGE UP (ref 5–17)
APTT BLD: 26.8 SEC — LOW (ref 27.5–35.5)
AST SERPL-CCNC: 102 U/L — HIGH (ref 10–40)
BASOPHILS # BLD AUTO: 0 K/UL — SIGNIFICANT CHANGE UP (ref 0–0.2)
BASOPHILS # BLD AUTO: 0 K/UL — SIGNIFICANT CHANGE UP (ref 0–0.2)
BASOPHILS NFR BLD AUTO: 0 % — SIGNIFICANT CHANGE UP (ref 0–2)
BASOPHILS NFR BLD AUTO: 0 % — SIGNIFICANT CHANGE UP (ref 0–2)
BILIRUB SERPL-MCNC: 1 MG/DL — SIGNIFICANT CHANGE UP (ref 0.2–1.2)
BUN SERPL-MCNC: 9 MG/DL — SIGNIFICANT CHANGE UP (ref 7–23)
CALCIUM SERPL-MCNC: 8 MG/DL — LOW (ref 8.4–10.5)
CHLORIDE SERPL-SCNC: 102 MMOL/L — SIGNIFICANT CHANGE UP (ref 96–108)
CO2 SERPL-SCNC: 26 MMOL/L — SIGNIFICANT CHANGE UP (ref 22–31)
CREAT SERPL-MCNC: 0.66 MG/DL — SIGNIFICANT CHANGE UP (ref 0.5–1.3)
EGFR: 89 ML/MIN/1.73M2 — SIGNIFICANT CHANGE UP
EOSINOPHIL # BLD AUTO: 0 K/UL — SIGNIFICANT CHANGE UP (ref 0–0.5)
EOSINOPHIL # BLD AUTO: 0 K/UL — SIGNIFICANT CHANGE UP (ref 0–0.5)
EOSINOPHIL NFR BLD AUTO: 0 % — SIGNIFICANT CHANGE UP (ref 0–6)
EOSINOPHIL NFR BLD AUTO: 0 % — SIGNIFICANT CHANGE UP (ref 0–6)
GLUCOSE BLDC GLUCOMTR-MCNC: 111 MG/DL — HIGH (ref 70–99)
GLUCOSE BLDC GLUCOMTR-MCNC: 119 MG/DL — HIGH (ref 70–99)
GLUCOSE BLDC GLUCOMTR-MCNC: 131 MG/DL — HIGH (ref 70–99)
GLUCOSE BLDC GLUCOMTR-MCNC: 170 MG/DL — HIGH (ref 70–99)
GLUCOSE SERPL-MCNC: 103 MG/DL — HIGH (ref 70–99)
HCT VFR BLD CALC: 22.5 % — LOW (ref 34.5–45)
HCT VFR BLD CALC: 22.6 % — LOW (ref 34.5–45)
HCT VFR BLD CALC: 24.1 % — LOW (ref 34.5–45)
HGB BLD-MCNC: 7.7 G/DL — LOW (ref 11.5–15.5)
HGB BLD-MCNC: 7.9 G/DL — LOW (ref 11.5–15.5)
HGB BLD-MCNC: 8.3 G/DL — LOW (ref 11.5–15.5)
INR BLD: 0.99 RATIO — SIGNIFICANT CHANGE UP (ref 0.88–1.16)
LDH SERPL L TO P-CCNC: 438 U/L — HIGH (ref 50–242)
LYMPHOCYTES # BLD AUTO: 0.45 K/UL — LOW (ref 1–3.3)
LYMPHOCYTES # BLD AUTO: 0.57 K/UL — LOW (ref 1–3.3)
LYMPHOCYTES # BLD AUTO: 78 % — HIGH (ref 13–44)
LYMPHOCYTES # BLD AUTO: 83.7 % — HIGH (ref 13–44)
MAGNESIUM SERPL-MCNC: 2.1 MG/DL — SIGNIFICANT CHANGE UP (ref 1.6–2.6)
MANUAL SMEAR VERIFICATION: SIGNIFICANT CHANGE UP
MCHC RBC-ENTMCNC: 29.2 PG — SIGNIFICANT CHANGE UP (ref 27–34)
MCHC RBC-ENTMCNC: 29.6 PG — SIGNIFICANT CHANGE UP (ref 27–34)
MCHC RBC-ENTMCNC: 29.7 PG — SIGNIFICANT CHANGE UP (ref 27–34)
MCHC RBC-ENTMCNC: 34.2 GM/DL — SIGNIFICANT CHANGE UP (ref 32–36)
MCHC RBC-ENTMCNC: 34.4 GM/DL — SIGNIFICANT CHANGE UP (ref 32–36)
MCHC RBC-ENTMCNC: 35 GM/DL — SIGNIFICANT CHANGE UP (ref 32–36)
MCV RBC AUTO: 84.6 FL — SIGNIFICANT CHANGE UP (ref 80–100)
MCV RBC AUTO: 85.2 FL — SIGNIFICANT CHANGE UP (ref 80–100)
MCV RBC AUTO: 86.4 FL — SIGNIFICANT CHANGE UP (ref 80–100)
MONOCYTES # BLD AUTO: 0 K/UL — SIGNIFICANT CHANGE UP (ref 0–0.9)
MONOCYTES # BLD AUTO: 0.04 K/UL — SIGNIFICANT CHANGE UP (ref 0–0.9)
MONOCYTES NFR BLD AUTO: 0 % — LOW (ref 2–14)
MONOCYTES NFR BLD AUTO: 6 % — SIGNIFICANT CHANGE UP (ref 2–14)
NEUTROPHILS # BLD AUTO: 0.09 K/UL — LOW (ref 1.8–7.4)
NEUTROPHILS # BLD AUTO: 0.09 K/UL — LOW (ref 1.8–7.4)
NEUTROPHILS NFR BLD AUTO: 12 % — LOW (ref 43–77)
NEUTROPHILS NFR BLD AUTO: 16.3 % — LOW (ref 43–77)
NRBC # BLD: 0 /100 WBCS — SIGNIFICANT CHANGE UP (ref 0–0)
NRBC # BLD: 0 /100 — SIGNIFICANT CHANGE UP (ref 0–0)
PHOSPHATE SERPL-MCNC: 2.8 MG/DL — SIGNIFICANT CHANGE UP (ref 2.5–4.5)
PLAT MORPH BLD: NORMAL — SIGNIFICANT CHANGE UP
PLATELET # BLD AUTO: 48 K/UL — LOW (ref 150–400)
PLATELET # BLD AUTO: 54 K/UL — LOW (ref 150–400)
PLATELET # BLD AUTO: 61 K/UL — LOW (ref 150–400)
POTASSIUM SERPL-MCNC: 3.4 MMOL/L — LOW (ref 3.5–5.3)
POTASSIUM SERPL-SCNC: 3.4 MMOL/L — LOW (ref 3.5–5.3)
PROT SERPL-MCNC: 5.5 G/DL — LOW (ref 6–8.3)
PROTHROM AB SERPL-ACNC: 11.4 SEC — SIGNIFICANT CHANGE UP (ref 10.5–13.4)
RBC # BLD: 2.64 M/UL — LOW (ref 3.8–5.2)
RBC # BLD: 2.67 M/UL — LOW (ref 3.8–5.2)
RBC # BLD: 2.79 M/UL — LOW (ref 3.8–5.2)
RBC # FLD: 14.5 % — SIGNIFICANT CHANGE UP (ref 10.3–14.5)
RBC # FLD: 14.6 % — HIGH (ref 10.3–14.5)
RBC # FLD: 14.6 % — HIGH (ref 10.3–14.5)
RBC BLD AUTO: SIGNIFICANT CHANGE UP
SMUDGE CELLS # BLD: PRESENT — SIGNIFICANT CHANGE UP
SODIUM SERPL-SCNC: 138 MMOL/L — SIGNIFICANT CHANGE UP (ref 135–145)
URATE SERPL-MCNC: 2.2 MG/DL — LOW (ref 2.5–7)
VARIANT LYMPHS # BLD: 4 % — SIGNIFICANT CHANGE UP (ref 0–6)
WBC # BLD: 0.54 K/UL — CRITICAL LOW (ref 3.8–10.5)
WBC # BLD: 0.73 K/UL — CRITICAL LOW (ref 3.8–10.5)
WBC # BLD: 1.19 K/UL — LOW (ref 3.8–10.5)
WBC # FLD AUTO: 0.54 K/UL — CRITICAL LOW (ref 3.8–10.5)
WBC # FLD AUTO: 0.73 K/UL — CRITICAL LOW (ref 3.8–10.5)
WBC # FLD AUTO: 1.19 K/UL — LOW (ref 3.8–10.5)

## 2023-02-05 PROCEDURE — 99233 SBSQ HOSP IP/OBS HIGH 50: CPT

## 2023-02-05 RX ORDER — POTASSIUM CHLORIDE 20 MEQ
20 PACKET (EA) ORAL
Refills: 0 | Status: COMPLETED | OUTPATIENT
Start: 2023-02-05 | End: 2023-02-05

## 2023-02-05 RX ADMIN — VALSARTAN 320 MILLIGRAM(S): 80 TABLET ORAL at 05:33

## 2023-02-05 RX ADMIN — Medication 50 MILLIEQUIVALENT(S): at 10:13

## 2023-02-05 RX ADMIN — CASPOFUNGIN ACETATE 260 MILLIGRAM(S): 7 INJECTION, POWDER, LYOPHILIZED, FOR SOLUTION INTRAVENOUS at 15:15

## 2023-02-05 RX ADMIN — SODIUM CHLORIDE 50 MILLILITER(S): 9 INJECTION INTRAMUSCULAR; INTRAVENOUS; SUBCUTANEOUS at 05:34

## 2023-02-05 RX ADMIN — Medication 1 GRAM(S): at 17:02

## 2023-02-05 RX ADMIN — Medication 50 MILLIEQUIVALENT(S): at 11:37

## 2023-02-05 RX ADMIN — Medication 1 APPLICATION(S): at 17:04

## 2023-02-05 RX ADMIN — PANTOPRAZOLE SODIUM 40 MILLIGRAM(S): 20 TABLET, DELAYED RELEASE ORAL at 17:02

## 2023-02-05 RX ADMIN — DASATINIB 140 MILLIGRAM(S): 80 TABLET ORAL at 11:39

## 2023-02-05 RX ADMIN — PREGABALIN 1000 MICROGRAM(S): 225 CAPSULE ORAL at 11:38

## 2023-02-05 RX ADMIN — CHLORHEXIDINE GLUCONATE 1 APPLICATION(S): 213 SOLUTION TOPICAL at 08:27

## 2023-02-05 RX ADMIN — Medication 400 MILLIGRAM(S): at 21:36

## 2023-02-05 RX ADMIN — Medication 1 GRAM(S): at 11:38

## 2023-02-05 RX ADMIN — Medication 400 MILLIGRAM(S): at 15:15

## 2023-02-05 RX ADMIN — Medication 1 GRAM(S): at 05:34

## 2023-02-05 RX ADMIN — ZINC OXIDE 1 APPLICATION(S): 200 OINTMENT TOPICAL at 05:34

## 2023-02-05 RX ADMIN — ZINC OXIDE 1 APPLICATION(S): 200 OINTMENT TOPICAL at 17:03

## 2023-02-05 RX ADMIN — Medication 5 MILLIGRAM(S): at 21:36

## 2023-02-05 RX ADMIN — PANTOPRAZOLE SODIUM 40 MILLIGRAM(S): 20 TABLET, DELAYED RELEASE ORAL at 05:33

## 2023-02-05 RX ADMIN — Medication 500 MILLIGRAM(S): at 11:40

## 2023-02-05 RX ADMIN — Medication 1 APPLICATION(S): at 05:34

## 2023-02-05 RX ADMIN — Medication 400 MILLIGRAM(S): at 05:33

## 2023-02-05 RX ADMIN — Medication 240 MILLIGRAM(S): at 05:34

## 2023-02-05 NOTE — PROGRESS NOTE ADULT - PROBLEM SELECTOR PLAN 9
2/3- Grade 1 transaminitis, will monitor closely, avoid hepatotoxins.  D/Brock Atorvastatin. 2/3- Grade 1 transaminitis, will monitor closely, avoid hepatotoxins.  D/Brock Atorvastatin.  2/5- CTA/P-  Shows 1.4 cm heterogeneous region within the peripheral upper pole of the left   kidney that has increased in size since 12/6/2019 and is concerning for  renal cell carcinoma.  Will consult IR for bx.

## 2023-02-05 NOTE — PROGRESS NOTE ADULT - SUBJECTIVE AND OBJECTIVE BOX
Diagnosis:    Protocol/Chemo Regimen:    Day:     Pt endorsed:    Review of Systems:     Pain scale:     Diet:     Allergies    No Known Allergies    Intolerances        ANTIMICROBIALS  acyclovir   Oral Tab/Cap 400 milliGRAM(s) Oral every 8 hours  caspofungin IVPB 50 milliGRAM(s) IV Intermittent every 24 hours  levoFLOXacin  Tablet 500 milliGRAM(s) Oral every 24 hours      HEME/ONC MEDICATIONS  dasatinib 140 milliGRAM(s) Oral daily  methotrexate PF IntraThecal (eMAR) 12 milliGRAM(s) IntraThecal once      STANDING MEDICATIONS  ascorbic acid 500 milliGRAM(s) Oral daily  chlorhexidine 4% Liquid 1 Application(s) Topical <User Schedule>  cyanocobalamin 1000 MICROGram(s) Oral daily  dextrose 5%. 1000 milliLiter(s) IV Continuous <Continuous>  dextrose 5%. 1000 milliLiter(s) IV Continuous <Continuous>  dextrose 50% Injectable 25 Gram(s) IV Push once  dextrose 50% Injectable 12.5 Gram(s) IV Push once  diltiazem    milliGRAM(s) Oral daily  glucagon  Injectable 1 milliGRAM(s) IntraMuscular once  hydrochlorothiazide 25 milliGRAM(s) Oral daily  hydrocortisone 2.5% Rectal Cream 1 Application(s) Rectal two times a day  insulin lispro (ADMELOG) corrective regimen sliding scale   SubCutaneous three times a day before meals  melatonin 5 milliGRAM(s) Oral at bedtime  pantoprazole    Tablet 40 milliGRAM(s) Oral two times a day  sodium chloride 0.9%. 1000 milliLiter(s) IV Continuous <Continuous>  sucralfate suspension 1 Gram(s) Oral every 6 hours  valsartan 320 milliGRAM(s) Oral daily  zinc oxide 40% Paste 1 Application(s) Topical two times a day      PRN MEDICATIONS  acetaminophen     Tablet .. 650 milliGRAM(s) Oral every 6 hours PRN  aluminum hydroxide/magnesium hydroxide/simethicone Suspension 30 milliLiter(s) Oral every 4 hours PRN  dextrose Oral Gel 15 Gram(s) Oral once PRN  loperamide 2 milliGRAM(s) Oral three times a day PRN  ondansetron Injectable 8 milliGRAM(s) IV Push every 8 hours PRN  sodium chloride 0.9% lock flush 10 milliLiter(s) IV Push every 1 hour PRN        Vital Signs Last 24 Hrs  T(C): 36.9 (05 Feb 2023 05:10), Max: 37.2 (04 Feb 2023 08:40)  T(F): 98.4 (05 Feb 2023 05:10), Max: 99 (04 Feb 2023 08:40)  HR: 77 (05 Feb 2023 05:10) (76 - 87)  BP: 110/68 (05 Feb 2023 05:10) (104/63 - 148/75)  BP(mean): --  RR: 18 (05 Feb 2023 05:10) (18 - 18)  SpO2: 99% (05 Feb 2023 05:10) (98% - 100%)    Parameters below as of 05 Feb 2023 05:10  Patient On (Oxygen Delivery Method): room air        PHYSICAL EXAM  General: NAD  HEENT: PERRLA, EOMOI, clear oropharynx, anicteric sclera, pink conjunctiva  Neck: supple  CV: (+) S1/S2 RRR  Lungs: clear to auscultation, no wheezes or rales  Abdomen: soft, non-tender, non-distended (+) BS  Ext: no clubbing, cyanosis or edema  Skin: no rashes and no petechiae  Neuro: alert and oriented X 3, no focal deficits  Central Line:     RECENT CULTURES:        LABS:                        7.9    0.73  )-----------( 61       ( 04 Feb 2023 23:50 )             22.6         Mean Cell Volume : 84.6 fl  Mean Cell Hemoglobin : 29.6 pg  Mean Cell Hemoglobin Concentration : 35.0 gm/dL  Auto Neutrophil # : 0.09 K/uL  Auto Lymphocyte # : 0.57 K/uL  Auto Monocyte # : 0.04 K/uL  Auto Eosinophil # : 0.00 K/uL  Auto Basophil # : 0.00 K/uL  Auto Neutrophil % : 12.0 %  Auto Lymphocyte % : 78.0 %  Auto Monocyte % : 6.0 %  Auto Eosinophil % : 0.0 %  Auto Basophil % : 0.0 %      02-04    138  |  104  |  8   ----------------------------<  102<H>  3.1<L>   |  25  |  0.67    Ca    7.6<L>      04 Feb 2023 06:52  Phos  2.4     02-04  Mg     1.7     02-04    TPro  5.0<L>  /  Alb  3.1<L>  /  TBili  0.7  /  DBili  x   /  AST  104<H>  /  ALT  101<H>  /  AlkPhos  61  02-04          PT/INR - ( 04 Feb 2023 06:52 )   PT: 12.1 sec;   INR: 1.04 ratio         PTT - ( 04 Feb 2023 06:52 )  PTT:26.3 sec        RADIOLOGY & ADDITIONAL STUDIES:         Diagnosis: newly diagnosed B-ALL, Ph (+)    Protocol/Chemo Regimen: Following 84206 (Decadron days 1-7 + Dasatinib), Vincristine 1.5mg (on D8)    Day: 19     Pt endorsed: +fatigue, slept well at HS  abdominal pain resolved.    Review of Systems: Denies any nausea, vomiting, diarrhea, chest pain, palpitation, SOB    Pain scale: Denies    Diet: regular    Allergies: No Known Allergies    ANTIMICROBIALS  acyclovir   Oral Tab/Cap 400 milliGRAM(s) Oral every 8 hours  caspofungin IVPB 50 milliGRAM(s) IV Intermittent every 24 hours  levoFLOXacin  Tablet 500 milliGRAM(s) Oral every 24 hours    HEME/ONC MEDICATIONS  dasatinib 140 milliGRAM(s) Oral daily  methotrexate PF IntraThecal (eMAR) 12 milliGRAM(s) IntraThecal once    STANDING MEDICATIONS  ascorbic acid 500 milliGRAM(s) Oral daily  chlorhexidine 4% Liquid 1 Application(s) Topical <User Schedule>  cyanocobalamin 1000 MICROGram(s) Oral daily  dextrose 5%. 1000 milliLiter(s) IV Continuous <Continuous>  dextrose 5%. 1000 milliLiter(s) IV Continuous <Continuous>  dextrose 50% Injectable 25 Gram(s) IV Push once  dextrose 50% Injectable 12.5 Gram(s) IV Push once  diltiazem    milliGRAM(s) Oral daily  glucagon  Injectable 1 milliGRAM(s) IntraMuscular once  hydrochlorothiazide 25 milliGRAM(s) Oral daily  hydrocortisone 2.5% Rectal Cream 1 Application(s) Rectal two times a day  insulin lispro (ADMELOG) corrective regimen sliding scale   SubCutaneous three times a day before meals  melatonin 5 milliGRAM(s) Oral at bedtime  pantoprazole    Tablet 40 milliGRAM(s) Oral two times a day  sodium chloride 0.9%. 1000 milliLiter(s) IV Continuous <Continuous>  sucralfate suspension 1 Gram(s) Oral every 6 hours  valsartan 320 milliGRAM(s) Oral daily  zinc oxide 40% Paste 1 Application(s) Topical two times a day    PRN MEDICATIONS  acetaminophen     Tablet .. 650 milliGRAM(s) Oral every 6 hours PRN  aluminum hydroxide/magnesium hydroxide/simethicone Suspension 30 milliLiter(s) Oral every 4 hours PRN  dextrose Oral Gel 15 Gram(s) Oral once PRN  loperamide 2 milliGRAM(s) Oral three times a day PRN  ondansetron Injectable 8 milliGRAM(s) IV Push every 8 hours PRN  sodium chloride 0.9% lock flush 10 milliLiter(s) IV Push every 1 hour PRN    Vital Signs Last 24 Hrs  T(C): 36.9 (05 Feb 2023 05:10), Max: 37.2 (04 Feb 2023 08:40)  T(F): 98.4 (05 Feb 2023 05:10), Max: 99 (04 Feb 2023 08:40)  HR: 77 (05 Feb 2023 05:10) (76 - 87)  BP: 110/68 (05 Feb 2023 05:10) (104/63 - 148/75)  BP(mean): --  RR: 18 (05 Feb 2023 05:10) (18 - 18)  SpO2: 99% (05 Feb 2023 05:10) (98% - 100%)    Parameters below as of 05 Feb 2023 05:10  Patient On (Oxygen Delivery Method): room air    PHYSICAL EXAM  General: NAD  HEENT: PERRLA, EOMOI, clear oropharynx, anicteric sclera, pink conjunctiva  Neck: supple  CV: (+) S1/S2 RRR  Lungs: clear to auscultation, no wheezes or rales  Abdomen: soft, non-tender, non-distended (+) BS  Ext: no clubbing, cyanosis or edema  Skin: no rashes and no petechiae  Neuro: alert and oriented X 3, no focal deficits  Central Line:     RECENT CULTURES:  Culture - Stool (01.21.23 @ 12:49)    Specimen Source: .Stool Feces    Culture Results:   No enteric pathogens isolated.  (Stool culture examined for Salmonella,  Shigella, Campylobacter, Aeromonas, Plesiomonas,  Vibrio, E.coli O157 and Yersinia)    LABS:                  7.7    0.54  )-----------( 54       ( 05 Feb 2023 06:48 )             22.5     Mean Cell Volume : 85.2 fl  Mean Cell Hemoglobin : 29.2 pg  Mean Cell Hemoglobin Concentration : 34.2 gm/dL  Auto Neutrophil # : 0.09 K/uL  Auto Lymphocyte # : 0.45 K/uL  Auto Monocyte # : 0.00 K/uL  Auto Eosinophil # : 0.00 K/uL  Auto Basophil # : 0.00 K/uL  Auto Neutrophil % : 16.3 %  Auto Lymphocyte % : 83.7 %  Auto Monocyte % : 0.0 %  Auto Eosinophil % : 0.0 %  Auto Basophil % : 0.0 %    02-05    138  |  102  |  9   ----------------------------<  103<H>  3.4<L>   |  26  |  0.66    Ca    8.0<L>      05 Feb 2023 06:49  Phos  2.8     02-05  Mg     2.1     02-05    TPro  5.5<L>  /  Alb  3.4  /  TBili  1.0  /  DBili  x   /  AST  102<H>  /  ALT  115<H>  /  AlkPhos  68  02-05  Mg 2.1  Phos 2.8  PT/INR - ( 05 Feb 2023 06:48 )   PT: 11.4 sec;   INR: 0.99 ratio    PTT - ( 05 Feb 2023 06:48 )  PTT:26.8 sec    Uric Acid 2.2    RADIOLOGY & ADDITIONAL STUDIES:  CT Abdomen and Pelvis w/ Oral Cont and w/ IV Cont (02.04.23 @ 18:24) >  1.4 cm heterogeneous region within the peripheral upper pole of the left   kidney that has increased in size since 12/6/2019 and is concerning for   renal cell carcinoma.

## 2023-02-05 NOTE — PROGRESS NOTE ADULT - PROBLEM SELECTOR PLAN 10
2/4- Dark stool yesterday, stool occult +, transfuse one unit PRBC, keep platelets >50.  Continue PPI.  Amylase, Lipase WNL.

## 2023-02-05 NOTE — PROGRESS NOTE ADULT - ATTENDING COMMENTS
82F with PMH of AFib (s/p [employer] mechanical AVR, 1994, [facility]) for rheumatic aortic valve stenosis, ascending aortic aneurysm, HTN,  HLD, depression, GERD, who presented with newly diagnosed Ph-positive B-ALL.     # Ph-positive B-ALL: Bone marrow biopsy on 1/17/23 showed 99% B-lymphoblasts. She started treatment per the CALGB 02420 protocol with steroids and dasatinib on 1/18/23. She received 1 dose of vincristine 1.5 mg on 1/25/23 for LDH plateau. She received rasburicase twice, but no current evidence of TLS. She also received cryoprecipitate several times for hypofibrinogenemia, now normal. She has therapy- and disease-associated pancytopenia.   - S/p dexamethasone 20 mg D1-7. Continue dasatinib 140 mg daily. Today is D18.  - Trend CBC with differential and coagulation studies daily. Supportive transfusions to maintain Hgb > 7, plt > 50 given GI bleed, and fibrinogen > 100. Transfuse 1 unit pRBC.   - Trend TLS labs daily. Replete electrolytes PRN.   - Antimicrobial prophylaxis: levaquin, caspofungin, acyclovir  - S/p LP with IT MTX on D15 (2/1/23). CSF flow negative.   - Follow up results from D16 bone marrow biopsy (2/2/23). Pending results, will determine next course of therapy.     # SDH: CTH on 1/15/23 showed an 8 mm bleed, which was stable on subsequent scans. No neurologic deficits. No surgical intervention.  - Hold Coumadin   - Maintain plt > 20 per Neurosurgery    # Mechanical valve: Currently off AC, discussed with Cardiology  - Hold anticoagulation in setting of SDH and persistent thrombocytopenia    # GI bleed: Had dark stools on 2/3/23, with FOBT+.  - Trend CBC with differential and coagulation studies daily. Supportive transfusions to maintain Hgb > 7, plt > 50 given GI bleed, and fibrinogen > 100.   - Continue PPI twice daily    # Elevated transaminases: Previously elevated earlier in her admission -  (1/21/23). AST now 104, . Alk Phos and bilirubin normal.  - Trend LFTs daily  - Check CT A/P as she also reports new abdominal pain with tenderness on exam  - Hold atorvastatin     The information documented within the attending attestation was documented solely by Carri Jesus. 82F with PMH of AFib (s/p [employer] mechanical AVR, 1994, [facility]) for rheumatic aortic valve stenosis, ascending aortic aneurysm, HTN,  HLD, depression, GERD, who presented with newly diagnosed Ph-positive B-ALL.     # Ph-positive B-ALL: Bone marrow biopsy on 1/17/23 showed 99% B-lymphoblasts. She started treatment per the CALGB 06738 protocol with steroids and dasatinib on 1/18/23. She received 1 dose of vincristine 1.5 mg on 1/25/23 for LDH plateau. She received rasburicase twice, but no current evidence of TLS. She also received cryoprecipitate several times for hypofibrinogenemia, now normal. She has therapy- and disease-associated pancytopenia.   - S/p dexamethasone 20 mg D1-7. Continue dasatinib 140 mg daily. Today is D19.  - Trend CBC with differential twice daily and coagulation studies daily. Supportive transfusions to maintain Hgb > 7, plt > 50 given GI bleed, and fibrinogen > 100.   - Trend TLS labs daily. Replete electrolytes PRN.   - Antimicrobial prophylaxis: levaquin, caspofungin, acyclovir  - S/p LP with IT MTX on D15 (2/1/23). CSF flow negative.   - Follow up results from D16 bone marrow biopsy (2/2/23). Pending results, will determine next course of therapy.     # SDH: CTH on 1/15/23 showed an 8 mm bleed, which was stable on subsequent scans. No neurologic deficits. No surgical intervention.  - Hold Coumadin   - Maintain plt > 20 per Neurosurgery    # Mechanical valve: Currently off AC, discussed with Cardiology  - Hold anticoagulation in setting of SDH and persistent thrombocytopenia    # GI bleed: Had dark stools on 2/3/23, with FOBT+.  - Trend CBC with differential and coagulation studies daily. Supportive transfusions to maintain Hgb > 7, plt > 50 given GI bleed, and fibrinogen > 100.   - Continue PPI twice daily    # Elevated transaminases: Previously elevated earlier in her admission -  (1/21/23). AST now 102, . Alk Phos and bilirubin normal. CT A/P showed stable subcentimeter cysts.   - Trend LFTs daily  - Hold atorvastatin     # Renal mass: CT A/P (2/4/23) - 1.4 cm heterogeneous region within the peripheral upper pole of the left kidney that has increased in size since 12/6/2019 and is concerning for renal cell carcinoma.  - Will consult IR tomorrow for renal biopsy    The information documented within the attending attestation was documented solely by Carri Jesus.

## 2023-02-05 NOTE — PROGRESS NOTE ADULT - ASSESSMENT
82 year old female with atrial fibrillation, s/p [employer] mechanical AVR, (1994–[facility]) for rheumatic aortic valve stenosis, ascending aortic aneurysm, HTN, HLD, depression, GERD transferred from [facility] for management of newly diagnosed PH + B cell ALL. Peripheral flow performed, BMBx consistent with B-cell ALL, FISH Ph+ treated with Dex and Dasatinib following CALGB 38731. Course complicated by hypofibrinogenemia treated with cryoprecipitate, subdural hemorrhage plt goal >20 seen by neurosurgery, GIB. Anemia and thrombocytopenia secondary to disease.

## 2023-02-05 NOTE — PROGRESS NOTE ADULT - PROBLEM SELECTOR PLAN 1
Flow cytometry (1/14/23) consistent with acute leukemia (B ALL), FISH detected BCR/ABL (1/19)  Monitor daily CBC with Diff/CMP and transfuse/replete PRN  Patient was on warfarin-NOW HELD for mechanical AVR (1994 [facility]), atrial fibrillation  last dose of warfarin 1/13 at [facility] (5mg)-cardiology consulted.   TLS labs daily, IVF's, Strict I/O's, daily wights  1/14 TTE - EF 57%, mild pulm HTN, Mechanical AVR likely normal function  1/16 HLA sent  1/17  s/p BMbx c/w ALL (98% blasts) (with Clonoseq sampling)  Decadron 1/18-1/24-monitor sugars now off steroids.   1/19 Started Dasatinib  Vincristine 1.5 mg on Day 8 1/25 with Elitek 3mg IV x1.   Platelet count > 20 (+SDH)  FU Day 15 BMbx on 2/1 done on 2/2   FU CSF LP done on 2/1.   Social work consult to address family issues  2/4 - Transfuse one unit PRBC and one unit platelets today, Lasix in between transfusions.  2/4-  Hypophosphatemia- Replete phos.  2/4- Hypokalemia - Replete K+ Flow cytometry (1/14/23) consistent with acute leukemia (B ALL), FISH detected BCR/ABL (1/19)  Monitor daily CBC with Diff/CMP and transfuse/replete PRN  Patient was on warfarin-NOW HELD for mechanical AVR (1994 [facility]), atrial fibrillation  last dose of warfarin 1/13 at [facility] (5mg)-cardiology consulted.   TLS labs daily, IVF's, Strict I/O's, daily wights  1/14 TTE - EF 57%, mild pulm HTN, Mechanical AVR likely normal function  1/16 HLA sent  1/17  s/p BMbx c/w ALL (98% blasts) (with Clonoseq sampling)  Decadron 1/18-1/24-monitor sugars now off steroids.   1/19 Started Dasatinib  Vincristine 1.5 mg on Day 8 1/25 with Elitek 3mg IV x1.   Platelet count > 20 (+SDH)  FU Day 15 BMbx on 2/1 done on 2/2   FU CSF LP done on 2/1.   Social work consult to address family issues  2/5-   Hypokalemia- Replete K+.

## 2023-02-06 LAB
ALBUMIN SERPL ELPH-MCNC: 3.4 G/DL — SIGNIFICANT CHANGE UP (ref 3.3–5)
ALP SERPL-CCNC: 73 U/L — SIGNIFICANT CHANGE UP (ref 40–120)
ALT FLD-CCNC: 90 U/L — HIGH (ref 10–45)
ANION GAP SERPL CALC-SCNC: 9 MMOL/L — SIGNIFICANT CHANGE UP (ref 5–17)
APTT BLD: 26.8 SEC — LOW (ref 27.5–35.5)
AST SERPL-CCNC: 68 U/L — HIGH (ref 10–40)
BASOPHILS # BLD AUTO: 0 K/UL — SIGNIFICANT CHANGE UP (ref 0–0.2)
BASOPHILS NFR BLD AUTO: 0 % — SIGNIFICANT CHANGE UP (ref 0–2)
BILIRUB SERPL-MCNC: 0.6 MG/DL — SIGNIFICANT CHANGE UP (ref 0.2–1.2)
BLD GP AB SCN SERPL QL: NEGATIVE — SIGNIFICANT CHANGE UP
BUN SERPL-MCNC: 9 MG/DL — SIGNIFICANT CHANGE UP (ref 7–23)
CALCIUM SERPL-MCNC: 8.2 MG/DL — LOW (ref 8.4–10.5)
CHLORIDE SERPL-SCNC: 102 MMOL/L — SIGNIFICANT CHANGE UP (ref 96–108)
CO2 SERPL-SCNC: 28 MMOL/L — SIGNIFICANT CHANGE UP (ref 22–31)
CREAT SERPL-MCNC: 0.64 MG/DL — SIGNIFICANT CHANGE UP (ref 0.5–1.3)
EGFR: 89 ML/MIN/1.73M2 — SIGNIFICANT CHANGE UP
EOSINOPHIL # BLD AUTO: 0 K/UL — SIGNIFICANT CHANGE UP (ref 0–0.5)
EOSINOPHIL NFR BLD AUTO: 0 % — SIGNIFICANT CHANGE UP (ref 0–6)
GLUCOSE BLDC GLUCOMTR-MCNC: 104 MG/DL — HIGH (ref 70–99)
GLUCOSE BLDC GLUCOMTR-MCNC: 120 MG/DL — HIGH (ref 70–99)
GLUCOSE SERPL-MCNC: 102 MG/DL — HIGH (ref 70–99)
HCT VFR BLD CALC: 20.7 % — CRITICAL LOW (ref 34.5–45)
HGB BLD-MCNC: 7 G/DL — CRITICAL LOW (ref 11.5–15.5)
INR BLD: 1.01 RATIO — SIGNIFICANT CHANGE UP (ref 0.88–1.16)
LDH SERPL L TO P-CCNC: 358 U/L — HIGH (ref 50–242)
LYMPHOCYTES # BLD AUTO: 0.52 K/UL — LOW (ref 1–3.3)
LYMPHOCYTES # BLD AUTO: 69.7 % — HIGH (ref 13–44)
MAGNESIUM SERPL-MCNC: 1.9 MG/DL — SIGNIFICANT CHANGE UP (ref 1.6–2.6)
MCHC RBC-ENTMCNC: 29.3 PG — SIGNIFICANT CHANGE UP (ref 27–34)
MCHC RBC-ENTMCNC: 33.8 GM/DL — SIGNIFICANT CHANGE UP (ref 32–36)
MCV RBC AUTO: 86.6 FL — SIGNIFICANT CHANGE UP (ref 80–100)
MONOCYTES # BLD AUTO: 0.01 K/UL — SIGNIFICANT CHANGE UP (ref 0–0.9)
MONOCYTES NFR BLD AUTO: 1.3 % — LOW (ref 2–14)
NEUTROPHILS # BLD AUTO: 0.22 K/UL — LOW (ref 1.8–7.4)
NEUTROPHILS NFR BLD AUTO: 29 % — LOW (ref 43–77)
PHOSPHATE SERPL-MCNC: 2.4 MG/DL — LOW (ref 2.5–4.5)
PLATELET # BLD AUTO: 64 K/UL — LOW (ref 150–400)
POTASSIUM SERPL-MCNC: 3.2 MMOL/L — LOW (ref 3.5–5.3)
POTASSIUM SERPL-SCNC: 3.2 MMOL/L — LOW (ref 3.5–5.3)
PROT SERPL-MCNC: 5.5 G/DL — LOW (ref 6–8.3)
PROTHROM AB SERPL-ACNC: 11.6 SEC — SIGNIFICANT CHANGE UP (ref 10.5–13.4)
RBC # BLD: 2.39 M/UL — LOW (ref 3.8–5.2)
RBC # FLD: 14.6 % — HIGH (ref 10.3–14.5)
RH IG SCN BLD-IMP: POSITIVE — SIGNIFICANT CHANGE UP
SODIUM SERPL-SCNC: 139 MMOL/L — SIGNIFICANT CHANGE UP (ref 135–145)
TM INTERPRETATION: SIGNIFICANT CHANGE UP
URATE SERPL-MCNC: 2.3 MG/DL — LOW (ref 2.5–7)
WBC # BLD: 0.75 K/UL — CRITICAL LOW (ref 3.8–10.5)
WBC # FLD AUTO: 0.75 K/UL — CRITICAL LOW (ref 3.8–10.5)

## 2023-02-06 PROCEDURE — 99233 SBSQ HOSP IP/OBS HIGH 50: CPT

## 2023-02-06 RX ORDER — POTASSIUM PHOSPHATE, MONOBASIC POTASSIUM PHOSPHATE, DIBASIC 236; 224 MG/ML; MG/ML
15 INJECTION, SOLUTION INTRAVENOUS ONCE
Refills: 0 | Status: COMPLETED | OUTPATIENT
Start: 2023-02-06 | End: 2023-02-06

## 2023-02-06 RX ORDER — POTASSIUM CHLORIDE 20 MEQ
20 PACKET (EA) ORAL
Refills: 0 | Status: COMPLETED | OUTPATIENT
Start: 2023-02-06 | End: 2023-02-06

## 2023-02-06 RX ORDER — MAGNESIUM SULFATE 500 MG/ML
1 VIAL (ML) INJECTION ONCE
Refills: 0 | Status: COMPLETED | OUTPATIENT
Start: 2023-02-06 | End: 2023-02-06

## 2023-02-06 RX ADMIN — PANTOPRAZOLE SODIUM 40 MILLIGRAM(S): 20 TABLET, DELAYED RELEASE ORAL at 05:36

## 2023-02-06 RX ADMIN — Medication 1 GRAM(S): at 17:39

## 2023-02-06 RX ADMIN — VALSARTAN 320 MILLIGRAM(S): 80 TABLET ORAL at 05:38

## 2023-02-06 RX ADMIN — DASATINIB 140 MILLIGRAM(S): 80 TABLET ORAL at 11:53

## 2023-02-06 RX ADMIN — POTASSIUM PHOSPHATE, MONOBASIC POTASSIUM PHOSPHATE, DIBASIC 62.5 MILLIMOLE(S): 236; 224 INJECTION, SOLUTION INTRAVENOUS at 21:18

## 2023-02-06 RX ADMIN — Medication 400 MILLIGRAM(S): at 21:19

## 2023-02-06 RX ADMIN — Medication 50 MILLIEQUIVALENT(S): at 18:55

## 2023-02-06 RX ADMIN — ZINC OXIDE 1 APPLICATION(S): 200 OINTMENT TOPICAL at 05:36

## 2023-02-06 RX ADMIN — Medication 400 MILLIGRAM(S): at 13:40

## 2023-02-06 RX ADMIN — CASPOFUNGIN ACETATE 260 MILLIGRAM(S): 7 INJECTION, POWDER, LYOPHILIZED, FOR SOLUTION INTRAVENOUS at 15:21

## 2023-02-06 RX ADMIN — Medication 1 GRAM(S): at 11:53

## 2023-02-06 RX ADMIN — Medication 1 APPLICATION(S): at 05:35

## 2023-02-06 RX ADMIN — CHLORHEXIDINE GLUCONATE 1 APPLICATION(S): 213 SOLUTION TOPICAL at 08:39

## 2023-02-06 RX ADMIN — Medication 1 APPLICATION(S): at 17:38

## 2023-02-06 RX ADMIN — Medication 100 GRAM(S): at 12:49

## 2023-02-06 RX ADMIN — PREGABALIN 1000 MICROGRAM(S): 225 CAPSULE ORAL at 11:53

## 2023-02-06 RX ADMIN — Medication 500 MILLIGRAM(S): at 11:52

## 2023-02-06 RX ADMIN — Medication 400 MILLIGRAM(S): at 05:36

## 2023-02-06 RX ADMIN — Medication 240 MILLIGRAM(S): at 05:37

## 2023-02-06 RX ADMIN — SODIUM CHLORIDE 50 MILLILITER(S): 9 INJECTION INTRAMUSCULAR; INTRAVENOUS; SUBCUTANEOUS at 05:35

## 2023-02-06 RX ADMIN — Medication 650 MILLIGRAM(S): at 09:34

## 2023-02-06 RX ADMIN — Medication 650 MILLIGRAM(S): at 08:34

## 2023-02-06 RX ADMIN — Medication 1 GRAM(S): at 05:35

## 2023-02-06 RX ADMIN — Medication 1 GRAM(S): at 00:29

## 2023-02-06 RX ADMIN — ZINC OXIDE 1 APPLICATION(S): 200 OINTMENT TOPICAL at 17:38

## 2023-02-06 RX ADMIN — PANTOPRAZOLE SODIUM 40 MILLIGRAM(S): 20 TABLET, DELAYED RELEASE ORAL at 17:38

## 2023-02-06 RX ADMIN — Medication 50 MILLIEQUIVALENT(S): at 16:48

## 2023-02-06 RX ADMIN — Medication 5 MILLIGRAM(S): at 21:19

## 2023-02-06 RX ADMIN — Medication 50 MILLIEQUIVALENT(S): at 14:03

## 2023-02-06 NOTE — PROGRESS NOTE ADULT - ATTENDING COMMENTS
82F with PMH of AFib (s/p [employer] mechanical AVR, 1994, [facility]) for rheumatic aortic valve stenosis, ascending aortic aneurysm, HTN,  HLD, depression, GERD, who presented with newly diagnosed Ph-positive B-ALL.     # Ph-positive B-ALL: Bone marrow biopsy on 1/17/23 showed 99% B-lymphoblasts. She started treatment per the CALGB 55039 protocol with steroids and dasatinib on 1/18/23. She received 1 dose of vincristine 1.5 mg on 1/25/23 for LDH plateau. She received rasburicase twice, but no current evidence of TLS. She also received cryoprecipitate several times for hypofibrinogenemia, now normal. She has therapy- and disease-associated pancytopenia.   - S/p dexamethasone 20 mg D1-7. Continue dasatinib 140 mg daily. Today is D19.  - Trend CBC with differential twice daily and coagulation studies daily. Supportive transfusions to maintain Hgb > 7, plt > 50 given GI bleed, and fibrinogen > 100.   - Trend TLS labs daily. Replete electrolytes PRN.   - Antimicrobial prophylaxis: levaquin, caspofungin, acyclovir  - S/p LP with IT MTX on D15 (2/1/23). CSF flow negative.   - Follow up results from D16 bone marrow biopsy (2/2/23). Pending results, will determine next course of therapy.     # SDH: CTH on 1/15/23 showed an 8 mm bleed, which was stable on subsequent scans. No neurologic deficits. No surgical intervention.  - Hold Coumadin   - Maintain plt > 20 per Neurosurgery    # Mechanical valve: Currently off AC, discussed with Cardiology  - Hold anticoagulation in setting of SDH and persistent thrombocytopenia    # GI bleed: Had dark stools on 2/3/23, with FOBT+.  - Trend CBC with differential and coagulation studies daily. Supportive transfusions to maintain Hgb > 7, plt > 50 given GI bleed, and fibrinogen > 100.   - Continue PPI twice daily    # Elevated transaminases: Previously elevated earlier in her admission -  (1/21/23). AST now 102, . Alk Phos and bilirubin normal. CT A/P showed stable subcentimeter cysts.   - Trend LFTs daily  - Hold atorvastatin     # Renal mass: CT A/P (2/4/23) - 1.4 cm heterogeneous region within the peripheral upper pole of the left kidney that has increased in size since 12/6/2019 and is concerning for renal cell carcinoma.  - Will consult IR tomorrow for renal biopsy    The information documented within the attending attestation was documented solely by Carri Jesus. 82F with PMH of AFib (s/p [employer] mechanical AVR, 1992, [facility]) for rheumatic aortic valve stenosis, ascending aortic aneurysm, HTN,  HLD, depression, GERD, who presented with newly diagnosed Ph-positive B-ALL.     # Ph-positive B-ALL: Bone marrow biopsy on 1/17/23 showed 99% B-lymphoblasts. She started treatment per the CALGB 75272 protocol with steroids and dasatinib on 1/18/23. She received 1 dose of vincristine 1.5 mg on 1/25/23 for LDH plateau. She received rasburicase twice, but no current evidence of TLS. She also received cryoprecipitate several times for hypofibrinogenemia, now normal. She has therapy- and disease-associated pancytopenia.   - S/p dexamethasone 20 mg D1-7. Continue dasatinib 140 mg daily. Today is D20.  - Trend CBC with differential twice daily and coagulation studies daily. Supportive transfusions to maintain Hgb > 7, plt > 50 given GI bleed, and fibrinogen > 100.   - Trend TLS labs daily. Replete electrolytes PRN.   - Antimicrobial prophylaxis: levaquin, caspofungin, acyclovir  - S/p LP with IT MTX on D15 (2/1/23). CSF flow negative.   - Follow up results from D16 bone marrow biopsy (2/2/23). Pending results, will determine next course of therapy.     # SDH: CTH on 1/15/23 showed an 8 mm bleed, which was stable on subsequent scans. No neurologic deficits. No surgical intervention.  - Hold Coumadin   - Maintain plt > 20 per Neurosurgery    # Mechanical valve: Currently off AC, discussed with Cardiology  - Hold anticoagulation in setting of SDH and persistent thrombocytopenia    # GI bleed: Had dark stools on 2/3/23, with FOBT+.  - Trend CBC with differential and coagulation studies daily. Supportive transfusions to maintain Hgb > 7, plt > 50 given GI bleed, and fibrinogen > 100.   - Continue PPI twice daily    # Elevated transaminases: Previously elevated earlier in her admission -  (1/21/23). CT A/P showed stable subcentimeter cysts. Now improving.   - Trend LFTs daily  - Hold atorvastatin     # Renal mass: CT A/P (2/4/23) - 1.4 cm heterogeneous region within the peripheral upper pole of the left kidney that has increased in size since 12/6/2019 and is concerning for renal cell carcinoma.  - IR consulted for renal biopsy    The information documented within the attending attestation was documented solely by Carri Jesus.

## 2023-02-06 NOTE — CONSULT NOTE ADULT - SUBJECTIVE AND OBJECTIVE BOX
Interventional Radiology    Evaluate for Procedure: Left renal Biopsy    HPI: 82 year old Female with a PMH of AFib (s/p [employer] mechanical AVR, 1994, [facility]) for rheumatic aortic valve stenosis, ascending aortic aneurysm, HTN, HLD, depression, GERD, who presented with newly diagnosed Ph-positive B-ALL. CT Abd/pelvis on 2/4- with 1.4 cm heterogeneous region within the peripheral upper pole of the left kidney that has increased in size since 12/6/2019 and is concerning for renal cell carcinoma. IR consulted for renal biopsy     Allergies:   Medications (Abx/Cardiac/Anticoagulation/Blood Products)  acyclovir   Oral Tab/Cap: 400 milliGRAM(s) Oral (02-06 @ 05:36)  caspofungin IVPB: 260 mL/Hr IV Intermittent (02-05 @ 15:15)  diltiazem   CD: 240 milliGRAM(s) Oral (02-06 @ 05:37)  furosemide   Injectable: 40 milliGRAM(s) IV Push (02-04 @ 16:58)  hydrochlorothiazide: 25 milliGRAM(s) Oral (02-06 @ 05:37)  levoFLOXacin  Tablet: 500 milliGRAM(s) Oral (02-05 @ 11:39)  valsartan: 320 milliGRAM(s) Oral (02-06 @ 05:38)    Data: T(C): 36.8  HR: 89  BP: 135/71  RR: 18  SpO2: 95%    -WBC 0.75 / HgB 7.0 / Hct 20.7 / Plt 64  -Na 139 / Cl 102 / BUN 9 / Glucose 102  -K 3.2 / CO2 28 / Cr 0.64  -ALT 90 / Alk Phos 73 / T.Bili 0.6  -INR 1.01 / PTT 26.8      Assessment/Plan: 82 year old Female with a PMH of AFib (s/p [employer] mechanical AVR, 1994, [facility]) for rheumatic aortic valve stenosis, ascending aortic aneurysm, HTN, HLD, depression, GERD, who presented with newly diagnosed Ph-positive B-ALL. CT Abd/pelvis on 2/4 with 1.4 cm heterogeneous region within the peripheral upper pole of the left kidney that has increased in size since 12/6/2019 and is concerning for renal cell carcinoma. IR consulted for renal biopsy    - Case reviewed with Attending Dr. Liao  - Obtain MRI of Abd/Pelvis to better evaluate left renal mass  - Will review imaging when obtained

## 2023-02-06 NOTE — PROGRESS NOTE ADULT - PROBLEM SELECTOR PLAN 1
Flow cytometry (1/14/23) consistent with acute leukemia (B ALL), FISH detected BCR/ABL (1/19)  Monitor daily CBC with Diff/CMP and transfuse/replete PRN  Patient was on warfarin-NOW HELD for mechanical AVR (1994 [facility]), atrial fibrillation  last dose of warfarin 1/13 at [facility] (5mg)-cardiology consulted.   TLS labs daily, IVF's, Strict I/O's, daily wights  1/14 TTE - EF 57%, mild pulm HTN, Mechanical AVR likely normal function  1/16 HLA sent  1/17  s/p BMbx c/w ALL (98% blasts) (with Clonoseq sampling)  Decadron 1/18-1/24-monitor sugars now off steroids.   1/19 Started Dasatinib  Vincristine 1.5 mg on Day 8 1/25 with Elitek 3mg IV x1.   Platelet count > 20 (+SDH)  FU Day 15 BMbx on 2/1 done on 2/2   FU CSF LP done on 2/1.   Social work consult to address family issues  2/5-   Hypokalemia- Replete K+. Flow cytometry (1/14/23) consistent with acute leukemia (B ALL), FISH detected BCR/ABL (1/19)  Monitor daily CBC with Diff/CMP and transfuse/replete PRN  Patient was on warfarin-NOW HELD for mechanical AVR (1994 [facility]), atrial fibrillation  last dose of warfarin 1/13 at [facility] (5mg)-cardiology consulted.   TLS labs daily, IVF's, Strict I/O's, daily wights  1/14 TTE - EF 57%, mild pulm HTN, Mechanical AVR likely normal function  1/16 HLA sent  1/17  s/p BMbx c/w ALL (98% blasts) (with Clonoseq sampling)  Decadron 1/18-1/24-monitor sugars now off steroids.   1/19 Started Dasatinib  Vincristine 1.5 mg on Day 8 1/25 with Elitek 3mg IV x1.   Platelet count > 20 (+SDH)  FU Day 15 BMbx on 2/1 done on 2/2   FU CSF LP done on 2/1.   Social work consult to address family issues  2/6-    Hypokalemia- Replete K+.  2/6- Hypophosphatemia- Replete phos.

## 2023-02-06 NOTE — PROGRESS NOTE ADULT - ASSESSMENT
82 year old female with atrial fibrillation, s/p [employer] mechanical AVR, (1992–[facility]) for rheumatic aortic valve stenosis, ascending aortic aneurysm, HTN, HLD, depression, GERD transferred from [facility] for management of newly diagnosed PH + B cell ALL. Peripheral flow performed, BMBx consistent with B-cell ALL, FISH Ph+ treated with Dex and Dasatinib following CALGB 11664. Course complicated by hypofibrinogenemia treated with cryoprecipitate, subdural hemorrhage plt goal >20 seen by neurosurgery, GIB. Anemia and thrombocytopenia secondary to disease.

## 2023-02-06 NOTE — PROGRESS NOTE ADULT - SUBJECTIVE AND OBJECTIVE BOX
Diagnosis:    Protocol/Chemo Regimen:    Day:     Pt endorsed:    Review of Systems:     Pain scale:     Diet:     Allergies    No Known Allergies    Intolerances        ANTIMICROBIALS  acyclovir   Oral Tab/Cap 400 milliGRAM(s) Oral every 8 hours  caspofungin IVPB 50 milliGRAM(s) IV Intermittent every 24 hours  levoFLOXacin  Tablet 500 milliGRAM(s) Oral every 24 hours      HEME/ONC MEDICATIONS  dasatinib 140 milliGRAM(s) Oral daily  methotrexate PF IntraThecal (eMAR) 12 milliGRAM(s) IntraThecal once      STANDING MEDICATIONS  ascorbic acid 500 milliGRAM(s) Oral daily  chlorhexidine 4% Liquid 1 Application(s) Topical <User Schedule>  cyanocobalamin 1000 MICROGram(s) Oral daily  dextrose 5%. 1000 milliLiter(s) IV Continuous <Continuous>  dextrose 5%. 1000 milliLiter(s) IV Continuous <Continuous>  dextrose 50% Injectable 25 Gram(s) IV Push once  dextrose 50% Injectable 12.5 Gram(s) IV Push once  diltiazem    milliGRAM(s) Oral daily  glucagon  Injectable 1 milliGRAM(s) IntraMuscular once  hydrochlorothiazide 25 milliGRAM(s) Oral daily  hydrocortisone 2.5% Rectal Cream 1 Application(s) Rectal two times a day  insulin lispro (ADMELOG) corrective regimen sliding scale   SubCutaneous three times a day before meals  melatonin 5 milliGRAM(s) Oral at bedtime  pantoprazole    Tablet 40 milliGRAM(s) Oral two times a day  sodium chloride 0.9%. 1000 milliLiter(s) IV Continuous <Continuous>  sucralfate suspension 1 Gram(s) Oral every 6 hours  valsartan 320 milliGRAM(s) Oral daily  zinc oxide 40% Paste 1 Application(s) Topical two times a day      PRN MEDICATIONS  acetaminophen     Tablet .. 650 milliGRAM(s) Oral every 6 hours PRN  aluminum hydroxide/magnesium hydroxide/simethicone Suspension 30 milliLiter(s) Oral every 4 hours PRN  dextrose Oral Gel 15 Gram(s) Oral once PRN  loperamide 2 milliGRAM(s) Oral three times a day PRN  ondansetron Injectable 8 milliGRAM(s) IV Push every 8 hours PRN  sodium chloride 0.9% lock flush 10 milliLiter(s) IV Push every 1 hour PRN        Vital Signs Last 24 Hrs  T(C): 37.1 (06 Feb 2023 05:23), Max: 37.1 (05 Feb 2023 13:05)  T(F): 98.8 (06 Feb 2023 05:23), Max: 98.8 (05 Feb 2023 20:53)  HR: 82 (06 Feb 2023 05:23) (70 - 82)  BP: 116/71 (06 Feb 2023 05:23) (106/60 - 131/58)  BP(mean): --  RR: 18 (06 Feb 2023 05:23) (18 - 18)  SpO2: 97% (06 Feb 2023 05:23) (96% - 99%)    Parameters below as of 06 Feb 2023 05:23  Patient On (Oxygen Delivery Method): room air        PHYSICAL EXAM  General: NAD  HEENT: PERRLA, EOMOI, clear oropharynx, anicteric sclera, pink conjunctiva  Neck: supple  CV: (+) S1/S2 RRR  Lungs: clear to auscultation, no wheezes or rales  Abdomen: soft, non-tender, non-distended (+) BS  Ext: no clubbing, cyanosis or edema  Skin: no rashes and no petechiae  Neuro: alert and oriented X 3, no focal deficits  Central Line:     RECENT CULTURES:        LABS:                        8.3    1.19  )-----------( 48       ( 05 Feb 2023 18:57 )             24.1         Mean Cell Volume : 86.4 fl  Mean Cell Hemoglobin : 29.7 pg  Mean Cell Hemoglobin Concentration : 34.4 gm/dL  Auto Neutrophil # : x  Auto Lymphocyte # : x  Auto Monocyte # : x  Auto Eosinophil # : x  Auto Basophil # : x  Auto Neutrophil % : x  Auto Lymphocyte % : x  Auto Monocyte % : x  Auto Eosinophil % : x  Auto Basophil % : x      02-05    138  |  102  |  9   ----------------------------<  103<H>  3.4<L>   |  26  |  0.66    Ca    8.0<L>      05 Feb 2023 06:49  Phos  2.8     02-05  Mg     2.1     02-05    TPro  5.5<L>  /  Alb  3.4  /  TBili  1.0  /  DBili  x   /  AST  102<H>  /  ALT  115<H>  /  AlkPhos  68  02-05          PT/INR - ( 05 Feb 2023 06:48 )   PT: 11.4 sec;   INR: 0.99 ratio         PTT - ( 05 Feb 2023 06:48 )  PTT:26.8 sec        RADIOLOGY & ADDITIONAL STUDIES:         Diagnosis: newly diagnosed B-ALL, Ph (+)    Protocol/Chemo Regimen: Following 95075 (Decadron days 1-7 + Dasatinib), Vincristine 1.5mg (on D8)    Day: 20     Pt endorsed: +fatigue  +abdominal pain     Review of Systems: Denies any nausea, vomiting, diarrhea, chest pain, palpitation, SOB    Pain scale: Denies    Diet: regular    Allergies: No Known Allergies    ANTIMICROBIALS  acyclovir   Oral Tab/Cap 400 milliGRAM(s) Oral every 8 hours  caspofungin IVPB 50 milliGRAM(s) IV Intermittent every 24 hours  levoFLOXacin  Tablet 500 milliGRAM(s) Oral every 24 hours    HEME/ONC MEDICATIONS  dasatinib 140 milliGRAM(s) Oral daily  methotrexate PF IntraThecal (eMAR) 12 milliGRAM(s) IntraThecal once    STANDING MEDICATIONS  ascorbic acid 500 milliGRAM(s) Oral daily  chlorhexidine 4% Liquid 1 Application(s) Topical <User Schedule>  cyanocobalamin 1000 MICROGram(s) Oral daily  dextrose 5%. 1000 milliLiter(s) IV Continuous <Continuous>  dextrose 5%. 1000 milliLiter(s) IV Continuous <Continuous>  dextrose 50% Injectable 25 Gram(s) IV Push once  dextrose 50% Injectable 12.5 Gram(s) IV Push once  diltiazem    milliGRAM(s) Oral daily  glucagon  Injectable 1 milliGRAM(s) IntraMuscular once  hydrochlorothiazide 25 milliGRAM(s) Oral daily  hydrocortisone 2.5% Rectal Cream 1 Application(s) Rectal two times a day  insulin lispro (ADMELOG) corrective regimen sliding scale   SubCutaneous three times a day before meals  melatonin 5 milliGRAM(s) Oral at bedtime  pantoprazole    Tablet 40 milliGRAM(s) Oral two times a day  sodium chloride 0.9%. 1000 milliLiter(s) IV Continuous <Continuous>  sucralfate suspension 1 Gram(s) Oral every 6 hours  valsartan 320 milliGRAM(s) Oral daily  zinc oxide 40% Paste 1 Application(s) Topical two times a day    PRN MEDICATIONS  acetaminophen     Tablet .. 650 milliGRAM(s) Oral every 6 hours PRN  aluminum hydroxide/magnesium hydroxide/simethicone Suspension 30 milliLiter(s) Oral every 4 hours PRN  dextrose Oral Gel 15 Gram(s) Oral once PRN  loperamide 2 milliGRAM(s) Oral three times a day PRN  ondansetron Injectable 8 milliGRAM(s) IV Push every 8 hours PRN  sodium chloride 0.9% lock flush 10 milliLiter(s) IV Push every 1 hour PRN    Vital Signs Last 24 Hrs  T(C): 37.1 (06 Feb 2023 05:23), Max: 37.1 (05 Feb 2023 13:05)  T(F): 98.8 (06 Feb 2023 05:23), Max: 98.8 (05 Feb 2023 20:53)  HR: 82 (06 Feb 2023 05:23) (70 - 82)  BP: 116/71 (06 Feb 2023 05:23) (106/60 - 131/58)  BP(mean): --  RR: 18 (06 Feb 2023 05:23) (18 - 18)  SpO2: 97% (06 Feb 2023 05:23) (96% - 99%)    Parameters below as of 06 Feb 2023 05:23  Patient On (Oxygen Delivery Method): room air    PHYSICAL EXAM  General: NAD  HEENT: PERRLA, EOMOI, clear oropharynx  CV: (+) S1/S2 RRR  Lungs: clear to auscultation, no wheezes or rales  Abdomen: soft, non-tender, non-distended (+) BS  Ext: no clubbing, cyanosis or edema  Skin: no rashes and no petechiae  Neuro: alert and oriented X 3, no focal deficits  Central Line: CDI    RECENT CULTURES:  Culture - Stool (01.21.23 @ 12:49)    Specimen Source: .Stool Feces    Culture Results:   No enteric pathogens isolated.      LABS:                        7.0    0.75  )-----------( 64       ( 06 Feb 2023 06:57 )             20.7     Mean Cell Volume : 86.6 fl  Mean Cell Hemoglobin : 29.3 pg  Mean Cell Hemoglobin Concentration : 33.8 gm/dL  Auto Neutrophil # : 0.22 K/uL  Auto Lymphocyte # : 0.52 K/uL  Auto Monocyte # : 0.01 K/uL  Auto Eosinophil # : 0.00 K/uL  Auto Basophil # : 0.00 K/uL  Auto Neutrophil % : 29.0 %  Auto Lymphocyte % : 69.7 %  Auto Monocyte % : 1.3 %  Auto Eosinophil % : 0.0 %  Auto Basophil % : 0.0 %    02-06    139  |  102  |  9   ----------------------------<  102<H>  3.2<L>   |  28  |  0.64    Ca    8.2<L>      06 Feb 2023 06:57  Phos  2.4     02-06  Mg     1.9     02-06    TPro  5.5<L>  /  Alb  3.4  /  TBili  0.6  /  DBili  x   /  AST  68<H>  /  ALT  90<H>  /  AlkPhos  73  02-06  Mg 1.9  Phos 2.4  PT/INR - ( 06 Feb 2023 06:57 )   PT: 11.6 sec;   INR: 1.01 ratio    PTT - ( 06 Feb 2023 06:57 )  PTT:26.8 sec    Uric Acid 2.3    RADIOLOGY & ADDITIONAL STUDIES:  CT Abdomen and Pelvis w/ Oral Cont and w/ IV Cont (02.04.23 @ 18:24) >  1.4 cm heterogeneous region within the peripheral upper pole of the left   kidney that has increased in size since 12/6/2019 and is concerning for   renal cell carcinoma.

## 2023-02-06 NOTE — PROGRESS NOTE ADULT - PROBLEM SELECTOR PLAN 9
2/3- Grade 1 transaminitis, will monitor closely, avoid hepatotoxins.  D/Brock Atorvastatin.  2/5- CTA/P-  Shows 1.4 cm heterogeneous region within the peripheral upper pole of the left   kidney that has increased in size since 12/6/2019 and is concerning for  renal cell carcinoma.  Will consult IR for bx. 2/3- Grade 1 transaminitis, will monitor closely, avoid hepatotoxins.  D/Brock Atorvastatin.  2/5- CTA/P-  Shows 1.4 cm heterogeneous region within the peripheral upper pole of the left   kidney that has increased in size since 12/6/2019 and is concerning for  renal cell carcinoma.  Will consult IR for bx.  2/6 - Will need MRI abd/pelvis with contrast to better evaluate renal mass.

## 2023-02-06 NOTE — CONSULT NOTE ADULT - REASON FOR ADMISSION
Leukocytosis, Thrombocytopenia and Anemia

## 2023-02-07 LAB
ALBUMIN SERPL ELPH-MCNC: 3.5 G/DL — SIGNIFICANT CHANGE UP (ref 3.3–5)
ALP SERPL-CCNC: 75 U/L — SIGNIFICANT CHANGE UP (ref 40–120)
ALT FLD-CCNC: 71 U/L — HIGH (ref 10–45)
ANION GAP SERPL CALC-SCNC: 11 MMOL/L — SIGNIFICANT CHANGE UP (ref 5–17)
AST SERPL-CCNC: 42 U/L — HIGH (ref 10–40)
BASOPHILS # BLD AUTO: 0 K/UL — SIGNIFICANT CHANGE UP (ref 0–0.2)
BASOPHILS NFR BLD AUTO: 0 % — SIGNIFICANT CHANGE UP (ref 0–2)
BILIRUB SERPL-MCNC: 0.7 MG/DL — SIGNIFICANT CHANGE UP (ref 0.2–1.2)
BUN SERPL-MCNC: 6 MG/DL — LOW (ref 7–23)
CALCIUM SERPL-MCNC: 8.5 MG/DL — SIGNIFICANT CHANGE UP (ref 8.4–10.5)
CHLORIDE SERPL-SCNC: 103 MMOL/L — SIGNIFICANT CHANGE UP (ref 96–108)
CO2 SERPL-SCNC: 25 MMOL/L — SIGNIFICANT CHANGE UP (ref 22–31)
CREAT SERPL-MCNC: 0.56 MG/DL — SIGNIFICANT CHANGE UP (ref 0.5–1.3)
EGFR: 92 ML/MIN/1.73M2 — SIGNIFICANT CHANGE UP
EOSINOPHIL # BLD AUTO: 0.03 K/UL — SIGNIFICANT CHANGE UP (ref 0–0.5)
EOSINOPHIL NFR BLD AUTO: 4 % — SIGNIFICANT CHANGE UP (ref 0–6)
GLUCOSE BLDC GLUCOMTR-MCNC: 114 MG/DL — HIGH (ref 70–99)
GLUCOSE BLDC GLUCOMTR-MCNC: 88 MG/DL — SIGNIFICANT CHANGE UP (ref 70–99)
GLUCOSE SERPL-MCNC: 108 MG/DL — HIGH (ref 70–99)
HCT VFR BLD CALC: 19.3 % — CRITICAL LOW (ref 34.5–45)
HCT VFR BLD CALC: 23.5 % — LOW (ref 34.5–45)
HCT VFR BLD CALC: 24.8 % — LOW (ref 34.5–45)
HGB BLD-MCNC: 6.3 G/DL — CRITICAL LOW (ref 11.5–15.5)
HGB BLD-MCNC: 7.9 G/DL — LOW (ref 11.5–15.5)
HGB BLD-MCNC: 8.2 G/DL — LOW (ref 11.5–15.5)
INR BLD: 1 RATIO — SIGNIFICANT CHANGE UP (ref 0.88–1.16)
LDH SERPL L TO P-CCNC: 344 U/L — HIGH (ref 50–242)
LYMPHOCYTES # BLD AUTO: 0.34 K/UL — LOW (ref 1–3.3)
LYMPHOCYTES # BLD AUTO: 52 % — HIGH (ref 13–44)
MAGNESIUM SERPL-MCNC: 1.8 MG/DL — SIGNIFICANT CHANGE UP (ref 1.6–2.6)
MCHC RBC-ENTMCNC: 28.6 PG — SIGNIFICANT CHANGE UP (ref 27–34)
MCHC RBC-ENTMCNC: 29.2 PG — SIGNIFICANT CHANGE UP (ref 27–34)
MCHC RBC-ENTMCNC: 29.8 PG — SIGNIFICANT CHANGE UP (ref 27–34)
MCHC RBC-ENTMCNC: 32.6 GM/DL — SIGNIFICANT CHANGE UP (ref 32–36)
MCHC RBC-ENTMCNC: 33.1 GM/DL — SIGNIFICANT CHANGE UP (ref 32–36)
MCHC RBC-ENTMCNC: 33.6 GM/DL — SIGNIFICANT CHANGE UP (ref 32–36)
MCV RBC AUTO: 87.7 FL — SIGNIFICANT CHANGE UP (ref 80–100)
MCV RBC AUTO: 88.3 FL — SIGNIFICANT CHANGE UP (ref 80–100)
MCV RBC AUTO: 88.7 FL — SIGNIFICANT CHANGE UP (ref 80–100)
MONOCYTES # BLD AUTO: 0.01 K/UL — SIGNIFICANT CHANGE UP (ref 0–0.9)
MONOCYTES NFR BLD AUTO: 2 % — SIGNIFICANT CHANGE UP (ref 2–14)
NEUTROPHILS # BLD AUTO: 0.27 K/UL — LOW (ref 1.8–7.4)
NEUTROPHILS NFR BLD AUTO: 42 % — LOW (ref 43–77)
NRBC # BLD: 0 /100 WBCS — SIGNIFICANT CHANGE UP (ref 0–0)
NRBC # BLD: 0 /100 WBCS — SIGNIFICANT CHANGE UP (ref 0–0)
PHOSPHATE SERPL-MCNC: 2.7 MG/DL — SIGNIFICANT CHANGE UP (ref 2.5–4.5)
PLATELET # BLD AUTO: 45 K/UL — LOW (ref 150–400)
PLATELET # BLD AUTO: 54 K/UL — LOW (ref 150–400)
PLATELET # BLD AUTO: 57 K/UL — LOW (ref 150–400)
POTASSIUM SERPL-MCNC: 3.4 MMOL/L — LOW (ref 3.5–5.3)
POTASSIUM SERPL-SCNC: 3.4 MMOL/L — LOW (ref 3.5–5.3)
PROT SERPL-MCNC: 5.9 G/DL — LOW (ref 6–8.3)
PROTHROM AB SERPL-ACNC: 11.6 SEC — SIGNIFICANT CHANGE UP (ref 10.5–13.4)
RBC # BLD: 2.2 M/UL — LOW (ref 3.8–5.2)
RBC # BLD: 2.65 M/UL — LOW (ref 3.8–5.2)
RBC # BLD: 2.81 M/UL — LOW (ref 3.8–5.2)
RBC # FLD: 14.6 % — HIGH (ref 10.3–14.5)
RBC # FLD: 14.7 % — HIGH (ref 10.3–14.5)
RBC # FLD: 14.8 % — HIGH (ref 10.3–14.5)
SODIUM SERPL-SCNC: 139 MMOL/L — SIGNIFICANT CHANGE UP (ref 135–145)
URATE SERPL-MCNC: 2 MG/DL — LOW (ref 2.5–7)
WBC # BLD: 0.65 K/UL — CRITICAL LOW (ref 3.8–10.5)
WBC # BLD: 0.73 K/UL — CRITICAL LOW (ref 3.8–10.5)
WBC # BLD: 1.35 K/UL — LOW (ref 3.8–10.5)
WBC # FLD AUTO: 0.65 K/UL — CRITICAL LOW (ref 3.8–10.5)
WBC # FLD AUTO: 0.73 K/UL — CRITICAL LOW (ref 3.8–10.5)
WBC # FLD AUTO: 1.35 K/UL — LOW (ref 3.8–10.5)

## 2023-02-07 PROCEDURE — 99233 SBSQ HOSP IP/OBS HIGH 50: CPT

## 2023-02-07 RX ORDER — LANOLIN ALCOHOL/MO/W.PET/CERES
1 CREAM (GRAM) TOPICAL
Qty: 0 | Refills: 0 | DISCHARGE

## 2023-02-07 RX ORDER — POTASSIUM CHLORIDE 20 MEQ
20 PACKET (EA) ORAL
Refills: 0 | Status: COMPLETED | OUTPATIENT
Start: 2023-02-07 | End: 2023-02-07

## 2023-02-07 RX ORDER — MAGNESIUM SULFATE 500 MG/ML
2 VIAL (ML) INJECTION ONCE
Refills: 0 | Status: COMPLETED | OUTPATIENT
Start: 2023-02-07 | End: 2023-02-07

## 2023-02-07 RX ADMIN — DASATINIB 140 MILLIGRAM(S): 80 TABLET ORAL at 12:11

## 2023-02-07 RX ADMIN — PANTOPRAZOLE SODIUM 40 MILLIGRAM(S): 20 TABLET, DELAYED RELEASE ORAL at 17:22

## 2023-02-07 RX ADMIN — CHLORHEXIDINE GLUCONATE 1 APPLICATION(S): 213 SOLUTION TOPICAL at 07:52

## 2023-02-07 RX ADMIN — Medication 650 MILLIGRAM(S): at 08:56

## 2023-02-07 RX ADMIN — Medication 50 MILLIEQUIVALENT(S): at 12:12

## 2023-02-07 RX ADMIN — Medication 25 GRAM(S): at 16:37

## 2023-02-07 RX ADMIN — Medication 1 GRAM(S): at 12:12

## 2023-02-07 RX ADMIN — Medication 50 MILLIEQUIVALENT(S): at 10:13

## 2023-02-07 RX ADMIN — Medication 500 MILLIGRAM(S): at 12:12

## 2023-02-07 RX ADMIN — CASPOFUNGIN ACETATE 260 MILLIGRAM(S): 7 INJECTION, POWDER, LYOPHILIZED, FOR SOLUTION INTRAVENOUS at 15:18

## 2023-02-07 RX ADMIN — Medication 5 MILLIGRAM(S): at 21:22

## 2023-02-07 RX ADMIN — PREGABALIN 1000 MICROGRAM(S): 225 CAPSULE ORAL at 12:12

## 2023-02-07 RX ADMIN — Medication 1 GRAM(S): at 05:33

## 2023-02-07 RX ADMIN — Medication 400 MILLIGRAM(S): at 21:22

## 2023-02-07 RX ADMIN — Medication 650 MILLIGRAM(S): at 09:17

## 2023-02-07 RX ADMIN — Medication 400 MILLIGRAM(S): at 05:33

## 2023-02-07 RX ADMIN — PANTOPRAZOLE SODIUM 40 MILLIGRAM(S): 20 TABLET, DELAYED RELEASE ORAL at 05:31

## 2023-02-07 RX ADMIN — Medication 50 MILLIEQUIVALENT(S): at 14:19

## 2023-02-07 RX ADMIN — VALSARTAN 320 MILLIGRAM(S): 80 TABLET ORAL at 05:32

## 2023-02-07 RX ADMIN — Medication 240 MILLIGRAM(S): at 05:32

## 2023-02-07 RX ADMIN — Medication 1 GRAM(S): at 17:22

## 2023-02-07 RX ADMIN — Medication 400 MILLIGRAM(S): at 14:19

## 2023-02-07 RX ADMIN — Medication 1 APPLICATION(S): at 05:32

## 2023-02-07 RX ADMIN — ZINC OXIDE 1 APPLICATION(S): 200 OINTMENT TOPICAL at 05:34

## 2023-02-07 NOTE — PROGRESS NOTE ADULT - PROBLEM SELECTOR PLAN 3
neurosurgery consulted  2/1 Now keeping PLTs > 20 neurosurgery consulted  2/1 Now keeping PLTs > 20 however now with GI bleed keep platelets >40 K.

## 2023-02-07 NOTE — PROVIDER CONTACT NOTE (CRITICAL VALUE NOTIFICATION) - RECOMMENDATIONS
-
transfuse
No treatment ordered at this time
Nursing care on-going.
monitor pt
to follow up
no new orders at this time  continue to monitor daily CBC
1 unit prbc
5 units cryoprecipitate
cryoprecipitate 5 units
1 unit prbc

## 2023-02-07 NOTE — PROGRESS NOTE ADULT - ATTENDING COMMENTS
82F with PMH of AFib (s/p [employer] mechanical AVR, 1994, [facility]) for rheumatic aortic valve stenosis, ascending aortic aneurysm, HTN,  HLD, depression, GERD, who presented with newly diagnosed Ph-positive B-ALL.     # Ph-positive B-ALL: Bone marrow biopsy on 1/17/23 showed 99% B-lymphoblasts. She started treatment per the CALGB 07429 protocol with steroids and dasatinib on 1/18/23. She received 1 dose of vincristine 1.5 mg on 1/25/23 for LDH plateau. She received rasburicase twice, but no current evidence of TLS. She also received cryoprecipitate several times for hypofibrinogenemia, now normal. She has therapy- and disease-associated pancytopenia.   - S/p dexamethasone 20 mg D1-7. Continue dasatinib 140 mg daily. Today is D20.  - Trend CBC with differential twice daily and coagulation studies daily. Supportive transfusions to maintain Hgb > 7, plt > 50 given GI bleed, and fibrinogen > 100.   - Trend TLS labs daily. Replete electrolytes PRN.   - Antimicrobial prophylaxis: levaquin, caspofungin, acyclovir  - S/p LP with IT MTX on D15 (2/1/23). CSF flow negative.   - Follow up results from D16 bone marrow biopsy (2/2/23). Pending results, will determine next course of therapy.     # SDH: CTH on 1/15/23 showed an 8 mm bleed, which was stable on subsequent scans. No neurologic deficits. No surgical intervention.  - Hold Coumadin   - Maintain plt > 20 per Neurosurgery    # Mechanical valve: Currently off AC, discussed with Cardiology  - Hold anticoagulation in setting of SDH and persistent thrombocytopenia    # GI bleed: Had dark stools on 2/3/23, with FOBT+.  - Trend CBC with differential and coagulation studies daily. Supportive transfusions to maintain Hgb > 7, plt > 50 given GI bleed, and fibrinogen > 100.   - Continue PPI twice daily    # Elevated transaminases: Previously elevated earlier in her admission -  (1/21/23). CT A/P showed stable subcentimeter cysts. Now improving.   - Trend LFTs daily  - Hold atorvastatin     # Renal mass: CT A/P (2/4/23) - 1.4 cm heterogeneous region within the peripheral upper pole of the left kidney that has increased in size since 12/6/2019 and is concerning for renal cell carcinoma.  - IR consulted for renal biopsy    The information documented within the attending attestation was documented solely by Carri Jesus. 82F with PMH of AFib (s/p [employer] mechanical AVR, 1992, [facility]) for rheumatic aortic valve stenosis, ascending aortic aneurysm, HTN,  HLD, depression, GERD, who presented with newly diagnosed Ph-positive B-ALL.     # Ph-positive B-ALL: Bone marrow biopsy on 1/17/23 showed 99% B-lymphoblasts. She started treatment per the CALGB 25051 protocol with steroids and dasatinib on 1/18/23. She received 1 dose of vincristine 1.5 mg on 1/25/23 for LDH plateau. She received rasburicase twice, but no current evidence of TLS. She also received cryoprecipitate several times for hypofibrinogenemia, now normal. She has therapy- and disease-associated pancytopenia.   - S/p dexamethasone 20 mg D1-7. Continue dasatinib 140 mg daily. Today is D21.  - Trend CBC with differential twice daily and coagulation studies daily. Supportive transfusions to maintain Hgb > 7, plt > 50 given GI bleed, and fibrinogen > 100.   - Trend TLS labs daily. Replete electrolytes PRN.   - Antimicrobial prophylaxis: levaquin, caspofungin, acyclovir  - S/p LP with IT MTX on D15 (2/1/23). CSF flow negative.   - Follow up results from D16 bone marrow biopsy (2/2/23). Pending results, will determine next course of therapy.     # SDH: CTH on 1/15/23 showed an 8 mm bleed, which was stable on subsequent scans. No neurologic deficits. No surgical intervention.  - Hold Coumadin   - Maintain plt > 20 per Neurosurgery    # Mechanical valve: Currently off AC, discussed with Cardiology  - Hold anticoagulation in setting of SDH and persistent thrombocytopenia    # GI bleed: Had dark stools on 2/3/23, with FOBT+.  - Trend CBC with differential and coagulation studies daily. Supportive transfusions to maintain Hgb > 7, plt > 50 given GI bleed, and fibrinogen > 100.   - Continue PPI twice daily    # Elevated transaminases: Previously elevated earlier in her admission -  (1/21/23). CT A/P showed stable subcentimeter hepatic cysts. Now improving.   - Trend LFTs daily  - Hold atorvastatin     # Renal mass: CT A/P (2/4/23) - 1.4 cm heterogeneous region within the peripheral upper pole of the left kidney that has increased in size since 12/6/2019 and is concerning for renal cell carcinoma.  - IR consulted for renal biopsy. Scheduled for MRI A/P with IV contrast tomorrow.    The information documented within the attending attestation was documented solely by Carri Jesus. 82F with PMH of AFib (s/p [employer] mechanical AVR, 1992, [facility]) for rheumatic aortic valve stenosis, ascending aortic aneurysm, HTN,  HLD, depression, GERD, who presented with newly diagnosed Ph-positive B-ALL.     # Ph-positive B-ALL: Bone marrow biopsy on 1/17/23 showed 99% B-lymphoblasts. She started treatment per the CALGB 73709 protocol with steroids and dasatinib on 1/18/23. She received 1 dose of vincristine 1.5 mg on 1/25/23 for LDH plateau. She received rasburicase twice, but no current evidence of TLS. She also received cryoprecipitate several times for hypofibrinogenemia, now normal. She has therapy- and disease-associated pancytopenia.   - S/p dexamethasone 20 mg D1-7. Continue dasatinib 140 mg daily. Today is D21.  - Trend CBC with differential and coagulation studies daily. Supportive transfusions to maintain Hgb > 7, plt > 40 given GI bleed, and fibrinogen > 100.   - Trend TLS labs daily. Replete electrolytes PRN.   - Antimicrobial prophylaxis: levaquin, caspofungin, acyclovir  - S/p LP with IT MTX on D15 (2/1/23). CSF flow negative.   - Follow up results from D16 bone marrow biopsy (2/2/23). Pending results, will determine next course of therapy.     # SDH: CTH on 1/15/23 showed an 8 mm bleed, which was stable on subsequent scans. No neurologic deficits. No surgical intervention.  - Hold Coumadin   - Maintain plt > 20 per Neurosurgery    # Mechanical valve: Currently off AC, discussed with Cardiology  - Hold anticoagulation in setting of SDH and persistent thrombocytopenia    # GI bleed: Had dark stools on 2/3/23, with FOBT+.  - Trend CBC with differential and coagulation studies daily. Supportive transfusions to maintain Hgb > 7, plt > 40 given GI bleed, and fibrinogen > 100.   - Continue PPI twice daily    # Elevated transaminases: Previously elevated earlier in her admission -  (1/21/23). CT A/P showed stable subcentimeter hepatic cysts. Now improving.   - Trend LFTs daily  - Hold atorvastatin     # Renal mass: CT A/P (2/4/23) - 1.4 cm heterogeneous region within the peripheral upper pole of the left kidney that has increased in size since 12/6/2019 and is concerning for renal cell carcinoma.  - IR consulted for renal biopsy. Scheduled for MRI A/P with IV contrast tomorrow.    The information documented within the attending attestation was documented solely by Carri Jesus.

## 2023-02-07 NOTE — PROGRESS NOTE ADULT - ASSESSMENT
82 year old female with atrial fibrillation, s/p [employer] mechanical AVR, (1992–[facility]) for rheumatic aortic valve stenosis, ascending aortic aneurysm, HTN, HLD, depression, GERD transferred from [facility] for management of newly diagnosed PH + B cell ALL. Peripheral flow performed, BMBx consistent with B-cell ALL, FISH Ph+ treated with Dex and Dasatinib following CALGB 86794. Course complicated by hypofibrinogenemia treated with cryoprecipitate, subdural hemorrhage plt goal >20 seen by neurosurgery, GIB. Anemia and thrombocytopenia secondary to disease.

## 2023-02-07 NOTE — PROGRESS NOTE ADULT - PROBLEM SELECTOR PLAN 10
2/4- Dark stool yesterday, stool occult +, transfuse one unit PRBC, keep platelets >50.  Continue PPI.  Amylase, Lipase WNL. 2/4- Dark stool yesterday, stool occult +, transfuse one unit PRBC, keep platelets >50.  Continue PPI.  Amylase, Lipase WNL.  2/7- Melena resolved will lower platelet threshold to 40K.

## 2023-02-07 NOTE — PROGRESS NOTE ADULT - SUBJECTIVE AND OBJECTIVE BOX
Diagnosis:    Protocol/Chemo Regimen:    Day:     Pt endorsed:    Review of Systems:     Pain scale:     Diet:     Allergies    No Known Allergies    Intolerances        ANTIMICROBIALS  acyclovir   Oral Tab/Cap 400 milliGRAM(s) Oral every 8 hours  caspofungin IVPB 50 milliGRAM(s) IV Intermittent every 24 hours  levoFLOXacin  Tablet 500 milliGRAM(s) Oral every 24 hours      HEME/ONC MEDICATIONS  dasatinib 140 milliGRAM(s) Oral daily  methotrexate PF IntraThecal (eMAR) 12 milliGRAM(s) IntraThecal once      STANDING MEDICATIONS  ascorbic acid 500 milliGRAM(s) Oral daily  chlorhexidine 4% Liquid 1 Application(s) Topical <User Schedule>  cyanocobalamin 1000 MICROGram(s) Oral daily  dextrose 5%. 1000 milliLiter(s) IV Continuous <Continuous>  dextrose 5%. 1000 milliLiter(s) IV Continuous <Continuous>  dextrose 50% Injectable 25 Gram(s) IV Push once  dextrose 50% Injectable 12.5 Gram(s) IV Push once  diltiazem    milliGRAM(s) Oral daily  glucagon  Injectable 1 milliGRAM(s) IntraMuscular once  hydrochlorothiazide 25 milliGRAM(s) Oral daily  hydrocortisone 2.5% Rectal Cream 1 Application(s) Rectal two times a day  insulin lispro (ADMELOG) corrective regimen sliding scale   SubCutaneous three times a day before meals  melatonin 5 milliGRAM(s) Oral at bedtime  pantoprazole    Tablet 40 milliGRAM(s) Oral two times a day  sodium chloride 0.9%. 1000 milliLiter(s) IV Continuous <Continuous>  sucralfate suspension 1 Gram(s) Oral every 6 hours  valsartan 320 milliGRAM(s) Oral daily  zinc oxide 40% Paste 1 Application(s) Topical two times a day      PRN MEDICATIONS  acetaminophen     Tablet .. 650 milliGRAM(s) Oral every 6 hours PRN  aluminum hydroxide/magnesium hydroxide/simethicone Suspension 30 milliLiter(s) Oral every 4 hours PRN  dextrose Oral Gel 15 Gram(s) Oral once PRN  loperamide 2 milliGRAM(s) Oral three times a day PRN  ondansetron Injectable 8 milliGRAM(s) IV Push every 8 hours PRN  sodium chloride 0.9% lock flush 10 milliLiter(s) IV Push every 1 hour PRN        Vital Signs Last 24 Hrs  T(C): 36.1 (07 Feb 2023 05:40), Max: 37.1 (06 Feb 2023 11:49)  T(F): 96.9 (07 Feb 2023 05:40), Max: 98.8 (06 Feb 2023 11:49)  HR: 84 (07 Feb 2023 05:40) (78 - 89)  BP: 138/68 (07 Feb 2023 05:40) (105/60 - 138/68)  BP(mean): --  RR: 18 (07 Feb 2023 05:40) (18 - 18)  SpO2: 95% (07 Feb 2023 05:40) (95% - 98%)    Parameters below as of 07 Feb 2023 05:40  Patient On (Oxygen Delivery Method): room air        PHYSICAL EXAM  General: NAD  HEENT: PERRLA, EOMOI, clear oropharynx, anicteric sclera, pink conjunctiva  Neck: supple  CV: (+) S1/S2 RRR  Lungs: clear to auscultation, no wheezes or rales  Abdomen: soft, non-tender, non-distended (+) BS  Ext: no clubbing, cyanosis or edema  Skin: no rashes and no petechiae  Neuro: alert and oriented X 3, no focal deficits  Central Line:     RECENT CULTURES:        LABS:                        6.3    0.73  )-----------( 45       ( 07 Feb 2023 02:50 )             19.3         Mean Cell Volume : 87.7 fl  Mean Cell Hemoglobin : 28.6 pg  Mean Cell Hemoglobin Concentration : 32.6 gm/dL  Auto Neutrophil # : x  Auto Lymphocyte # : x  Auto Monocyte # : x  Auto Eosinophil # : x  Auto Basophil # : x  Auto Neutrophil % : x  Auto Lymphocyte % : x  Auto Monocyte % : x  Auto Eosinophil % : x  Auto Basophil % : x      02-06    139  |  102  |  9   ----------------------------<  102<H>  3.2<L>   |  28  |  0.64    Ca    8.2<L>      06 Feb 2023 06:57  Phos  2.4     02-06  Mg     1.9     02-06    TPro  5.5<L>  /  Alb  3.4  /  TBili  0.6  /  DBili  x   /  AST  68<H>  /  ALT  90<H>  /  AlkPhos  73  02-06      Mg 1.9  Phos 2.4      PT/INR - ( 06 Feb 2023 06:57 )   PT: 11.6 sec;   INR: 1.01 ratio         PTT - ( 06 Feb 2023 06:57 )  PTT:26.8 sec      Uric Acid 2.3        RADIOLOGY & ADDITIONAL STUDIES:         Diagnosis: newly diagnosed B-ALL, Ph (+)    Protocol/Chemo Regimen: Following 13960 (Decadron days 1-7 + Dasatinib), Vincristine 1.5mg (on D8)    Day: 21     Pt endorsed: +fatigue  abdominal pan better today.     Review of Systems: Denies any nausea, vomiting, diarrhea, chest pain, palpitation, SOB    Pain scale: Denies    Diet: regular    Allergies: No Known Allergies    ANTIMICROBIALS  acyclovir   Oral Tab/Cap 400 milliGRAM(s) Oral every 8 hours  caspofungin IVPB 50 milliGRAM(s) IV Intermittent every 24 hours  levoFLOXacin  Tablet 500 milliGRAM(s) Oral every 24 hours    HEME/ONC MEDICATIONS  dasatinib 140 milliGRAM(s) Oral daily  methotrexate PF IntraThecal (eMAR) 12 milliGRAM(s) IntraThecal once    STANDING MEDICATIONS  ascorbic acid 500 milliGRAM(s) Oral daily  chlorhexidine 4% Liquid 1 Application(s) Topical <User Schedule>  cyanocobalamin 1000 MICROGram(s) Oral daily  dextrose 5%. 1000 milliLiter(s) IV Continuous <Continuous>  dextrose 5%. 1000 milliLiter(s) IV Continuous <Continuous>  dextrose 50% Injectable 25 Gram(s) IV Push once  dextrose 50% Injectable 12.5 Gram(s) IV Push once  diltiazem    milliGRAM(s) Oral daily  glucagon  Injectable 1 milliGRAM(s) IntraMuscular once  hydrochlorothiazide 25 milliGRAM(s) Oral daily  hydrocortisone 2.5% Rectal Cream 1 Application(s) Rectal two times a day  insulin lispro (ADMELOG) corrective regimen sliding scale   SubCutaneous three times a day before meals  melatonin 5 milliGRAM(s) Oral at bedtime  pantoprazole    Tablet 40 milliGRAM(s) Oral two times a day  sodium chloride 0.9%. 1000 milliLiter(s) IV Continuous <Continuous>  sucralfate suspension 1 Gram(s) Oral every 6 hours  valsartan 320 milliGRAM(s) Oral daily  zinc oxide 40% Paste 1 Application(s) Topical two times a day    PRN MEDICATIONS  acetaminophen     Tablet .. 650 milliGRAM(s) Oral every 6 hours PRN  aluminum hydroxide/magnesium hydroxide/simethicone Suspension 30 milliLiter(s) Oral every 4 hours PRN  dextrose Oral Gel 15 Gram(s) Oral once PRN  loperamide 2 milliGRAM(s) Oral three times a day PRN  ondansetron Injectable 8 milliGRAM(s) IV Push every 8 hours PRN  sodium chloride 0.9% lock flush 10 milliLiter(s) IV Push every 1 hour PRN    Vital Signs Last 24 Hrs  T(C): 36.1 (07 Feb 2023 05:40), Max: 37.1 (06 Feb 2023 11:49)  T(F): 96.9 (07 Feb 2023 05:40), Max: 98.8 (06 Feb 2023 11:49)  HR: 84 (07 Feb 2023 05:40) (78 - 89)  BP: 138/68 (07 Feb 2023 05:40) (105/60 - 138/68)  BP(mean): --  RR: 18 (07 Feb 2023 05:40) (18 - 18)  SpO2: 95% (07 Feb 2023 05:40) (95% - 98%)    Parameters below as of 07 Feb 2023 05:40  Patient On (Oxygen Delivery Method): room air    PHYSICAL EXAM  General: NAD  HEENT: PERRLA, EOMOI, clear oropharynx  CV: (+) S1/S2 RRR  Lungs: clear to auscultation, no wheezes or rales  Abdomen: soft, non-tender, non-distended (+) BS  Ext: no clubbing, cyanosis or edema  Skin: no rashes and no petechiae  Neuro: alert and oriented X 3, no focal deficits  Central Line: CDI    RECENT CULTURES:  Culture - Stool (01.21.23 @ 12:49)    Specimen Source: .Stool Feces    Culture Results:   No enteric pathogens isolated.  (Stool culture examined for Salmonella,  Shigella, Campylobacter, Aeromonas, Plesiomonas,  Vibrio, E.coli O157 and Yersinia)    LABS:                        7.9    0.65  )-----------( 57       ( 07 Feb 2023 08:46 )             23.5     Mean Cell Volume : 88.7 fl  Mean Cell Hemoglobin : 29.8 pg  Mean Cell Hemoglobin Concentration : 33.6 gm/dL  Auto Neutrophil # : 0.27 K/uL  Auto Lymphocyte # : 0.34 K/uL  Auto Monocyte # : 0.01 K/uL  Auto Eosinophil # : 0.03 K/uL  Auto Basophil # : 0.00 K/uL  Auto Neutrophil % : 42.0 %  Auto Lymphocyte % : 52.0 %  Auto Monocyte % : 2.0 %  Auto Eosinophil % : 4.0 %  Auto Basophil % : 0.0 %    02-07    139  |  103  |  6<L>  ----------------------------<  108<H>  3.4<L>   |  25  |  0.56    Ca    8.5      07 Feb 2023 08:43  Phos  2.7     02-07  Mg     1.8     02-07    TPro  5.9<L>  /  Alb  3.5  /  TBili  0.7  /  DBili  x   /  AST  42<H>  /  ALT  71<H>  /  AlkPhos  75  02-07  Mg 1.8  Phos 2.7  PT/INR - ( 07 Feb 2023 08:46 )   PT: 11.6 sec;   INR: 1.00 ratio    PTT - ( 06 Feb 2023 06:57 )  PTT:26.8 sec    Uric Acid 2.0    RADIOLOGY & ADDITIONAL STUDIES:  CT Abdomen and Pelvis w/ Oral Cont and w/ IV Cont (02.04.23 @ 18:24) >  1.4 cm heterogeneous region within the peripheral upper pole of the left   kidney that has increased in size since 12/6/2019 and is concerning for   renal cell carcinoma.

## 2023-02-07 NOTE — PROGRESS NOTE ADULT - PROBLEM SELECTOR PLAN 1
Flow cytometry (1/14/23) consistent with acute leukemia (B ALL), FISH detected BCR/ABL (1/19)  Monitor daily CBC with Diff/CMP and transfuse/replete PRN  Patient was on warfarin-NOW HELD for mechanical AVR ([facility] [place]), atrial fibrillation  last dose of warfarin 1/13 at [facility] (5mg)-cardiology consulted.   TLS labs daily, IVF's, Strict I/O's, daily wights  1/14 TTE - EF 57%, mild pulm HTN, Mechanical AVR likely normal function  1/16 HLA sent  1/17  s/p BMbx c/w ALL (98% blasts) (with Clonoseq sampling)  Decadron 1/18-1/24-monitor sugars now off steroids.   1/19 Started Dasatinib  Vincristine 1.5 mg on Day 8 1/25 with Elitek 3mg IV x1.   Platelet count > 20 (+SDH)  FU Day 15 BMbx on 2/1 done on 2/2   FU CSF LP done on 2/1.   Social work consult to address family issues  2/6-    Hypokalemia- Replete K+.  2/6- Hypophosphatemia- Replete phos. Flow cytometry (1/14/23) consistent with acute leukemia (B ALL), FISH detected BCR/ABL (1/19)  Monitor daily CBC with Diff/CMP and transfuse/replete PRN  Patient was on warfarin-NOW HELD for mechanical AVR (1992 [facility] [place]), atrial fibrillation  last dose of warfarin 1/13 at [facility] (5mg)-cardiology consulted.   TLS labs daily, IVF's, Strict I/O's, daily wights  1/14 TTE - EF 57%, mild pulm HTN, Mechanical AVR likely normal function  1/17  s/p BMbx c/w ALL (98% blasts) (with Clonoseq sampling)  Decadron 1/18-1/24-monitor sugars now off steroids.   1/19 Started Dasatinib  Vincristine 1.5 mg on Day 8 1/25 with Elitek 3mg IV x1.   Keep Platelet count >40, GI beed, (+SDH)  FU Day 15 BMbx on 2/1 done on 2/2   CSF LP done on 2/1 (-)  2/7-  Hypokalemia- Replete K+.

## 2023-02-07 NOTE — PROGRESS NOTE ADULT - PROBLEM SELECTOR PLAN 9
2/3- Grade 1 transaminitis, will monitor closely, avoid hepatotoxins.  D/Brock Atorvastatin.  2/5- CTA/P-  Shows 1.4 cm heterogeneous region within the peripheral upper pole of the left   kidney that has increased in size since 12/6/2019 and is concerning for  renal cell carcinoma.  Will consult IR for bx.  2/6 - Will need MRI abd/pelvis with contrast to better evaluate renal mass. 2/3- Grade 1 transaminitis, will monitor closely, avoid hepatotoxins.  D/Brock Atorvastatin.  2/5- CTA/P-  Shows 1.4 cm heterogeneous region within the peripheral upper pole of the left   kidney that has increased in size since 12/6/2019 and is concerning for  renal cell carcinoma.  Will consult IR for bx.  2/6 - Will need MRI abd/pelvis with contrast to better evaluate renal mass.  2/7- MRI abd scheduled for 2/8 noon, has mechanical heart valve, product info sent to MRI, compatible as per MRI.

## 2023-02-08 LAB
ALBUMIN SERPL ELPH-MCNC: 3.7 G/DL — SIGNIFICANT CHANGE UP (ref 3.3–5)
ALP SERPL-CCNC: 85 U/L — SIGNIFICANT CHANGE UP (ref 40–120)
ALT FLD-CCNC: 57 U/L — HIGH (ref 10–45)
ANION GAP SERPL CALC-SCNC: 8 MMOL/L — SIGNIFICANT CHANGE UP (ref 5–17)
APTT BLD: 27.9 SEC — SIGNIFICANT CHANGE UP (ref 27.5–35.5)
AST SERPL-CCNC: 30 U/L — SIGNIFICANT CHANGE UP (ref 10–40)
BASOPHILS # BLD AUTO: 0 K/UL — SIGNIFICANT CHANGE UP (ref 0–0.2)
BASOPHILS NFR BLD AUTO: 0 % — SIGNIFICANT CHANGE UP (ref 0–2)
BILIRUB SERPL-MCNC: 0.5 MG/DL — SIGNIFICANT CHANGE UP (ref 0.2–1.2)
BUN SERPL-MCNC: 7 MG/DL — SIGNIFICANT CHANGE UP (ref 7–23)
CALCIUM SERPL-MCNC: 8.5 MG/DL — SIGNIFICANT CHANGE UP (ref 8.4–10.5)
CHLORIDE SERPL-SCNC: 103 MMOL/L — SIGNIFICANT CHANGE UP (ref 96–108)
CO2 SERPL-SCNC: 26 MMOL/L — SIGNIFICANT CHANGE UP (ref 22–31)
CREAT SERPL-MCNC: 0.54 MG/DL — SIGNIFICANT CHANGE UP (ref 0.5–1.3)
EGFR: 93 ML/MIN/1.73M2 — SIGNIFICANT CHANGE UP
EOSINOPHIL # BLD AUTO: 0 K/UL — SIGNIFICANT CHANGE UP (ref 0–0.5)
EOSINOPHIL NFR BLD AUTO: 0 % — SIGNIFICANT CHANGE UP (ref 0–6)
GIANT PLATELETS BLD QL SMEAR: PRESENT — SIGNIFICANT CHANGE UP
GLUCOSE BLDC GLUCOMTR-MCNC: 100 MG/DL — HIGH (ref 70–99)
GLUCOSE BLDC GLUCOMTR-MCNC: 100 MG/DL — HIGH (ref 70–99)
GLUCOSE BLDC GLUCOMTR-MCNC: 112 MG/DL — HIGH (ref 70–99)
GLUCOSE BLDC GLUCOMTR-MCNC: 213 MG/DL — HIGH (ref 70–99)
GLUCOSE SERPL-MCNC: 99 MG/DL — SIGNIFICANT CHANGE UP (ref 70–99)
HCT VFR BLD CALC: 23 % — LOW (ref 34.5–45)
HCT VFR BLD CALC: 26.1 % — LOW (ref 34.5–45)
HEMATOPATHOLOGY REPORT: SIGNIFICANT CHANGE UP
HGB BLD-MCNC: 7.8 G/DL — LOW (ref 11.5–15.5)
HGB BLD-MCNC: 8.7 G/DL — LOW (ref 11.5–15.5)
INR BLD: 1.04 RATIO — SIGNIFICANT CHANGE UP (ref 0.88–1.16)
LDH SERPL L TO P-CCNC: 311 U/L — HIGH (ref 50–242)
LYMPHOCYTES # BLD AUTO: 0.49 K/UL — LOW (ref 1–3.3)
LYMPHOCYTES # BLD AUTO: 43 % — SIGNIFICANT CHANGE UP (ref 13–44)
MAGNESIUM SERPL-MCNC: 1.8 MG/DL — SIGNIFICANT CHANGE UP (ref 1.6–2.6)
MANUAL SMEAR VERIFICATION: SIGNIFICANT CHANGE UP
MCHC RBC-ENTMCNC: 29.6 PG — SIGNIFICANT CHANGE UP (ref 27–34)
MCHC RBC-ENTMCNC: 29.8 PG — SIGNIFICANT CHANGE UP (ref 27–34)
MCHC RBC-ENTMCNC: 33.3 GM/DL — SIGNIFICANT CHANGE UP (ref 32–36)
MCHC RBC-ENTMCNC: 33.9 GM/DL — SIGNIFICANT CHANGE UP (ref 32–36)
MCV RBC AUTO: 87.8 FL — SIGNIFICANT CHANGE UP (ref 80–100)
MCV RBC AUTO: 88.8 FL — SIGNIFICANT CHANGE UP (ref 80–100)
MONOCYTES # BLD AUTO: 0.13 K/UL — SIGNIFICANT CHANGE UP (ref 0–0.9)
MONOCYTES NFR BLD AUTO: 11.4 % — SIGNIFICANT CHANGE UP (ref 2–14)
NEUTROPHILS # BLD AUTO: 0.52 K/UL — LOW (ref 1.8–7.4)
NEUTROPHILS NFR BLD AUTO: 45.6 % — SIGNIFICANT CHANGE UP (ref 43–77)
NRBC # BLD: 0 /100 WBCS — SIGNIFICANT CHANGE UP (ref 0–0)
PHOSPHATE SERPL-MCNC: 2.3 MG/DL — LOW (ref 2.5–4.5)
PLAT MORPH BLD: ABNORMAL
PLATELET # BLD AUTO: 38 K/UL — LOW (ref 150–400)
PLATELET # BLD AUTO: 47 K/UL — LOW (ref 150–400)
POTASSIUM SERPL-MCNC: 3.2 MMOL/L — LOW (ref 3.5–5.3)
POTASSIUM SERPL-SCNC: 3.2 MMOL/L — LOW (ref 3.5–5.3)
PROT SERPL-MCNC: 5.8 G/DL — LOW (ref 6–8.3)
PROTHROM AB SERPL-ACNC: 12 SEC — SIGNIFICANT CHANGE UP (ref 10.5–13.4)
RBC # BLD: 2.62 M/UL — LOW (ref 3.8–5.2)
RBC # BLD: 2.94 M/UL — LOW (ref 3.8–5.2)
RBC # FLD: 14.9 % — HIGH (ref 10.3–14.5)
RBC # FLD: 14.9 % — HIGH (ref 10.3–14.5)
RBC BLD AUTO: SIGNIFICANT CHANGE UP
SODIUM SERPL-SCNC: 137 MMOL/L — SIGNIFICANT CHANGE UP (ref 135–145)
URATE SERPL-MCNC: 2.2 MG/DL — LOW (ref 2.5–7)
WBC # BLD: 1.14 K/UL — LOW (ref 3.8–10.5)
WBC # BLD: 2.06 K/UL — LOW (ref 3.8–10.5)
WBC # FLD AUTO: 1.14 K/UL — LOW (ref 3.8–10.5)
WBC # FLD AUTO: 2.06 K/UL — LOW (ref 3.8–10.5)

## 2023-02-08 PROCEDURE — 99233 SBSQ HOSP IP/OBS HIGH 50: CPT

## 2023-02-08 RX ORDER — DEXAMETHASONE 0.5 MG/5ML
16 ELIXIR ORAL DAILY
Refills: 0 | Status: DISCONTINUED | OUTPATIENT
Start: 2023-02-08 | End: 2023-02-10

## 2023-02-08 RX ORDER — POTASSIUM PHOSPHATE, MONOBASIC POTASSIUM PHOSPHATE, DIBASIC 236; 224 MG/ML; MG/ML
15 INJECTION, SOLUTION INTRAVENOUS ONCE
Refills: 0 | Status: COMPLETED | OUTPATIENT
Start: 2023-02-08 | End: 2023-02-08

## 2023-02-08 RX ORDER — POTASSIUM CHLORIDE 20 MEQ
20 PACKET (EA) ORAL ONCE
Refills: 0 | Status: COMPLETED | OUTPATIENT
Start: 2023-02-08 | End: 2023-02-08

## 2023-02-08 RX ADMIN — ZINC OXIDE 1 APPLICATION(S): 200 OINTMENT TOPICAL at 05:46

## 2023-02-08 RX ADMIN — ZINC OXIDE 1 APPLICATION(S): 200 OINTMENT TOPICAL at 17:03

## 2023-02-08 RX ADMIN — DASATINIB 140 MILLIGRAM(S): 80 TABLET ORAL at 12:15

## 2023-02-08 RX ADMIN — Medication 1 GRAM(S): at 11:14

## 2023-02-08 RX ADMIN — CASPOFUNGIN ACETATE 260 MILLIGRAM(S): 7 INJECTION, POWDER, LYOPHILIZED, FOR SOLUTION INTRAVENOUS at 14:39

## 2023-02-08 RX ADMIN — PREGABALIN 1000 MICROGRAM(S): 225 CAPSULE ORAL at 11:13

## 2023-02-08 RX ADMIN — Medication 240 MILLIGRAM(S): at 05:46

## 2023-02-08 RX ADMIN — PANTOPRAZOLE SODIUM 40 MILLIGRAM(S): 20 TABLET, DELAYED RELEASE ORAL at 05:45

## 2023-02-08 RX ADMIN — Medication 1 APPLICATION(S): at 05:46

## 2023-02-08 RX ADMIN — Medication 1 GRAM(S): at 23:11

## 2023-02-08 RX ADMIN — Medication 20 MILLIEQUIVALENT(S): at 18:34

## 2023-02-08 RX ADMIN — PANTOPRAZOLE SODIUM 40 MILLIGRAM(S): 20 TABLET, DELAYED RELEASE ORAL at 17:03

## 2023-02-08 RX ADMIN — Medication 5 MILLIGRAM(S): at 21:35

## 2023-02-08 RX ADMIN — POTASSIUM PHOSPHATE, MONOBASIC POTASSIUM PHOSPHATE, DIBASIC 62.5 MILLIMOLE(S): 236; 224 INJECTION, SOLUTION INTRAVENOUS at 21:34

## 2023-02-08 RX ADMIN — Medication 650 MILLIGRAM(S): at 10:35

## 2023-02-08 RX ADMIN — VALSARTAN 320 MILLIGRAM(S): 80 TABLET ORAL at 05:45

## 2023-02-08 RX ADMIN — Medication 108 MILLIGRAM(S): at 18:34

## 2023-02-08 RX ADMIN — Medication 1 APPLICATION(S): at 17:02

## 2023-02-08 RX ADMIN — Medication 400 MILLIGRAM(S): at 21:35

## 2023-02-08 RX ADMIN — Medication 650 MILLIGRAM(S): at 09:44

## 2023-02-08 RX ADMIN — CHLORHEXIDINE GLUCONATE 1 APPLICATION(S): 213 SOLUTION TOPICAL at 10:34

## 2023-02-08 RX ADMIN — Medication 400 MILLIGRAM(S): at 13:47

## 2023-02-08 RX ADMIN — Medication 500 MILLIGRAM(S): at 12:15

## 2023-02-08 RX ADMIN — Medication 1 GRAM(S): at 17:02

## 2023-02-08 RX ADMIN — Medication 400 MILLIGRAM(S): at 05:45

## 2023-02-08 RX ADMIN — Medication 1 GRAM(S): at 05:45

## 2023-02-08 NOTE — PROGRESS NOTE ADULT - PROBLEM SELECTOR PLAN 4
Continue accuchecks/ISS  1/19 Started Lantus 10u while on steroids (last dose of decadron 1/24)  1/29 D/C'd Lantus Continue accuchecks/ISS  1/19 Started Lantus 10u while on steroids (last dose of decadron 1/24)  1/29 D/C'd Lantus  monitor bg back on steroids for second round of dex.

## 2023-02-08 NOTE — PROGRESS NOTE ADULT - PROBLEM SELECTOR PLAN 10
2/4- Dark stool yesterday, stool occult +, transfuse one unit PRBC, keep platelets >50.  Continue PPI.  Amylase, Lipase WNL.  2/7- Melena resolved will lower platelet threshold to 40K.

## 2023-02-08 NOTE — PROGRESS NOTE ADULT - ATTENDING COMMENTS
82F with PMH of AFib (s/p [employer] mechanical AVR, 1992, [facility]) for rheumatic aortic valve stenosis, ascending aortic aneurysm, HTN,  HLD, depression, GERD, who presented with newly diagnosed Ph-positive B-ALL.     # Ph-positive B-ALL: Bone marrow biopsy on 1/17/23 showed 99% B-lymphoblasts. She started treatment per the CALGB 49026 protocol with steroids and dasatinib on 1/18/23. She received 1 dose of vincristine 1.5 mg on 1/25/23 for LDH plateau. She received rasburicase twice, but no current evidence of TLS. She also received cryoprecipitate several times for hypofibrinogenemia, now normal. She has therapy- and disease-associated pancytopenia.   - S/p dexamethasone 20 mg D1-7. Continue dasatinib 140 mg daily. Today is D21.  - Trend CBC with differential and coagulation studies daily. Supportive transfusions to maintain Hgb > 7, plt > 40 given GI bleed, and fibrinogen > 100.   - Trend TLS labs daily. Replete electrolytes PRN.   - Antimicrobial prophylaxis: levaquin, caspofungin, acyclovir  - S/p LP with IT MTX on D15 (2/1/23). CSF flow negative.   - Follow up results from D16 bone marrow biopsy (2/2/23). Pending results, will determine next course of therapy.     # SDH: CTH on 1/15/23 showed an 8 mm bleed, which was stable on subsequent scans. No neurologic deficits. No surgical intervention.  - Hold Coumadin   - Maintain plt > 20 per Neurosurgery    # Mechanical valve: Currently off AC, discussed with Cardiology  - Hold anticoagulation in setting of SDH and persistent thrombocytopenia    # GI bleed: Had dark stools on 2/3/23, with FOBT+.  - Trend CBC with differential and coagulation studies daily. Supportive transfusions to maintain Hgb > 7, plt > 40 given GI bleed, and fibrinogen > 100.   - Continue PPI twice daily    # Elevated transaminases: Previously elevated earlier in her admission -  (1/21/23). CT A/P showed stable subcentimeter hepatic cysts. Now improving.   - Trend LFTs daily  - Hold atorvastatin     # Renal mass: CT A/P (2/4/23) - 1.4 cm heterogeneous region within the peripheral upper pole of the left kidney that has increased in size since 12/6/2019 and is concerning for renal cell carcinoma.  - IR consulted for renal biopsy. Scheduled for MRI A/P with IV contrast tomorrow.    The information documented within the attending attestation was documented solely by Carri Jesus. 82F with PMH of AFib (s/p [employer] mechanical AVR, 1992, [facility]) for rheumatic aortic valve stenosis, ascending aortic aneurysm, HTN,  HLD, depression, GERD, who presented with newly diagnosed Ph-positive B-ALL.     # Ph-positive B-ALL: Bone marrow biopsy on 1/17/23 showed 99% B-lymphoblasts. She started treatment per the CALGB 77050 protocol with steroids and dasatinib on 1/18/23. She received 1 dose of vincristine 1.5 mg on 1/25/23 for LDH plateau. She received rasburicase twice, but no current evidence of TLS. She also received cryoprecipitate several times for hypofibrinogenemia, now normal. She has therapy- and disease-associated pancytopenia.   - S/p dexamethasone 20 mg D1-7. Continue dasatinib 140 mg daily. Today is D22.  - Trend CBC with differential and coagulation studies daily. Supportive transfusions to maintain Hgb > 7, plt > 40 given GI bleed, and fibrinogen > 100.   - Trend TLS labs daily. Replete electrolytes PRN.   - Antimicrobial prophylaxis: levaquin, caspofungin, acyclovir  - S/p LP with IT MTX on D15 (2/1/23). CSF flow negative.   - Follow up results from D16 bone marrow biopsy (2/2/23). Pending results, will determine next course of therapy.     # SDH: CTH on 1/15/23 showed an 8 mm bleed, which was stable on subsequent scans. No neurologic deficits. No surgical intervention.  - Hold Coumadin   - Maintain plt > 20 per Neurosurgery    # Mechanical valve: Currently off AC, discussed with Cardiology  - Hold anticoagulation in setting of SDH and persistent thrombocytopenia    # GI bleed: Had dark stools on 2/3/23, with FOBT+.  - Trend CBC with differential and coagulation studies daily. Supportive transfusions to maintain Hgb > 7, plt > 40 given GI bleed, and fibrinogen > 100.   - Continue PPI twice daily    # Elevated transaminases: Previously elevated earlier in her admission -  (1/21/23). CT A/P showed stable subcentimeter hepatic cysts. Now improving.   - Trend LFTs daily  - Hold atorvastatin     # Renal mass: CT A/P (2/4/23) - 1.4 cm heterogeneous region within the peripheral upper pole of the left kidney that has increased in size since 12/6/2019 and is concerning for renal cell carcinoma.  - IR consulted for renal biopsy. Scheduled for MRI A/P with IV contrast today.    The information documented within the attending attestation was documented solely by Carri Jesus. 82F with PMH of AFib (s/p [employer] mechanical AVR, 1992, [facility]) for rheumatic aortic valve stenosis, ascending aortic aneurysm, HTN,  HLD, depression, GERD, who presented with newly diagnosed Ph-positive B-ALL.     # Ph-positive B-ALL: Bone marrow biopsy on 1/17/23 showed 99% B-lymphoblasts. She started treatment per the CALGB 19525 protocol with steroids and dasatinib on 1/18/23. She received 1 dose of vincristine 1.5 mg on 1/25/23 for LDH plateau. She received rasburicase twice, but no current evidence of TLS. She also received cryoprecipitate several times for hypofibrinogenemia, now normal. She has therapy- and disease-associated pancytopenia. S/p LP with IT MTX on D15 (2/1/23). CSF flow negative. D16 bone marrow biopsy (2/2/23) showed < 20% blasts, so we will plan to continue chemo-free Course II with dexamethasone and dasatinib.  - Today is Course II Day 2. Continue dexamethasone 20 mg D1-7 and dasatinib 140 mg daily.   - Trend CBC with differential and coagulation studies daily. Supportive transfusions to maintain Hgb > 7, plt > 40 given GI bleed, and fibrinogen > 100.   - Trend TLS labs daily. Replete electrolytes PRN.   - Antimicrobial prophylaxis: levaquin, caspofungin, acyclovir  - Pending dasatinib availability for outpatient, will discharge home. She has high co-pay so we are applying for financial assistance.     # SDH: CTH on 1/15/23 showed an 8 mm bleed, which was stable on subsequent scans. No neurologic deficits. No surgical intervention.  - Hold Coumadin   - Maintain plt > 20 per Neurosurgery    # Mechanical valve: Currently off AC, discussed with Cardiology  - Hold anticoagulation in setting of SDH and persistent thrombocytopenia    # GI bleed: Had dark stools on 2/3/23, with FOBT+.  - Trend CBC with differential and coagulation studies daily. Supportive transfusions to maintain Hgb > 7, plt > 40 given GI bleed, and fibrinogen > 100.   - Continue PPI twice daily    # Elevated transaminases: Previously elevated earlier in her admission -  (1/21/23). CT A/P showed stable subcentimeter hepatic cysts. Now improving.   - Trend LFTs daily  - Hold atorvastatin     # Renal mass: CT A/P (2/4/23) - 1.4 cm heterogeneous region within the peripheral upper pole of the left kidney that has increased in size since 12/6/2019 and is concerning for renal cell carcinoma.  - Follow up MRI A/P with IV contrast (performed today). Pending results, can have IR biopsy as outpatient if needed.     The information documented within the attending attestation was documented solely by Carri Jesus.

## 2023-02-08 NOTE — PROGRESS NOTE ADULT - ASSESSMENT
82 year old female with atrial fibrillation, s/p [employer] mechanical AVR, (1992–[facility]) for rheumatic aortic valve stenosis, ascending aortic aneurysm, HTN, HLD, depression, GERD transferred from [facility] for management of newly diagnosed PH + B cell ALL. Peripheral flow performed, BMBx consistent with B-cell ALL, FISH Ph+ treated with Dex and Dasatinib following CALGB 60596. Course complicated by hypofibrinogenemia treated with cryoprecipitate, subdural hemorrhage plt goal >20 seen by neurosurgery, GIB. Anemia and thrombocytopenia secondary to disease.

## 2023-02-08 NOTE — PROGRESS NOTE ADULT - SUBJECTIVE AND OBJECTIVE BOX
Diagnosis: newly diagnosed B-ALL, Ph (+)    Protocol/Chemo Regimen: Following 27758 (Decadron days 1-7 + Dasatinib), Vincristine 1.5mg (on D8)    Day: 22     Pt endorsed: +fatigue  abdominal pan better today.     Review of Systems: Denies any nausea, vomiting, diarrhea, chest pain, palpitation, SOB    Pain scale: Denies    Diet: regular    Allergies    No Known Allergies    Intolerances        ANTIMICROBIALS  acyclovir   Oral Tab/Cap 400 milliGRAM(s) Oral every 8 hours  caspofungin IVPB 50 milliGRAM(s) IV Intermittent every 24 hours  levoFLOXacin  Tablet 500 milliGRAM(s) Oral every 24 hours      HEME/ONC MEDICATIONS  dasatinib 140 milliGRAM(s) Oral daily  methotrexate PF IntraThecal (eMAR) 12 milliGRAM(s) IntraThecal once      STANDING MEDICATIONS  ascorbic acid 500 milliGRAM(s) Oral daily  chlorhexidine 4% Liquid 1 Application(s) Topical <User Schedule>  cyanocobalamin 1000 MICROGram(s) Oral daily  dextrose 5%. 1000 milliLiter(s) IV Continuous <Continuous>  dextrose 5%. 1000 milliLiter(s) IV Continuous <Continuous>  dextrose 50% Injectable 25 Gram(s) IV Push once  dextrose 50% Injectable 12.5 Gram(s) IV Push once  diltiazem    milliGRAM(s) Oral daily  glucagon  Injectable 1 milliGRAM(s) IntraMuscular once  hydrochlorothiazide 25 milliGRAM(s) Oral daily  hydrocortisone 2.5% Rectal Cream 1 Application(s) Rectal two times a day  insulin lispro (ADMELOG) corrective regimen sliding scale   SubCutaneous three times a day before meals  melatonin 5 milliGRAM(s) Oral at bedtime  pantoprazole    Tablet 40 milliGRAM(s) Oral two times a day  sodium chloride 0.9%. 1000 milliLiter(s) IV Continuous <Continuous>  sucralfate suspension 1 Gram(s) Oral every 6 hours  valsartan 320 milliGRAM(s) Oral daily  zinc oxide 40% Paste 1 Application(s) Topical two times a day      PRN MEDICATIONS  acetaminophen     Tablet .. 650 milliGRAM(s) Oral every 6 hours PRN  aluminum hydroxide/magnesium hydroxide/simethicone Suspension 30 milliLiter(s) Oral every 4 hours PRN  dextrose Oral Gel 15 Gram(s) Oral once PRN  loperamide 2 milliGRAM(s) Oral three times a day PRN  ondansetron Injectable 8 milliGRAM(s) IV Push every 8 hours PRN  sodium chloride 0.9% lock flush 10 milliLiter(s) IV Push every 1 hour PRN        Vital Signs Last 24 Hrs  T(C): 36.9 (08 Feb 2023 05:38), Max: 37.2 (07 Feb 2023 21:20)  T(F): 98.4 (08 Feb 2023 05:38), Max: 99 (07 Feb 2023 21:20)  HR: 84 (08 Feb 2023 05:38) (70 - 84)  BP: 134/70 (08 Feb 2023 05:38) (108/60 - 138/75)  BP(mean): --  RR: 18 (08 Feb 2023 05:38) (16 - 18)  SpO2: 96% (08 Feb 2023 05:38) (96% - 100%)    Parameters below as of 08 Feb 2023 05:38  Patient On (Oxygen Delivery Method): room air    PHYSICAL EXAM  General: NAD  HEENT: PERRLA, EOMOI, clear oropharynx  CV: (+) S1/S2 RRR  Lungs: clear to auscultation, no wheezes or rales  Abdomen: soft, non-tender, non-distended (+) BS  Ext: no clubbing, cyanosis or edema  Skin: no rashes and no petechiae  Neuro: alert and oriented X 3, no focal deficits  Central Line: CDI       LABS:             Diagnosis: newly diagnosed B-ALL, Ph (+)    Protocol/Chemo Regimen: Following [id number] (Decadron days 1-7 + Dasatinib), Vincristine 1.5mg (on D8)    Day: 22     Pt endorsed: +fatigue    abdominal pan better today.     Review of Systems: Denies any nausea, vomiting, diarrhea, chest pain, palpitation, SOB    Pain scale: Denies    Diet: regular    Allergies    No Known Allergies    Intolerances        ANTIMICROBIALS  acyclovir   Oral Tab/Cap 400 milliGRAM(s) Oral every 8 hours  caspofungin IVPB 50 milliGRAM(s) IV Intermittent every 24 hours  levoFLOXacin  Tablet 500 milliGRAM(s) Oral every 24 hours      HEME/ONC MEDICATIONS  dasatinib 140 milliGRAM(s) Oral daily  methotrexate PF IntraThecal (eMAR) 12 milliGRAM(s) IntraThecal once      STANDING MEDICATIONS  ascorbic acid 500 milliGRAM(s) Oral daily  chlorhexidine 4% Liquid 1 Application(s) Topical <User Schedule>  cyanocobalamin 1000 MICROGram(s) Oral daily  dextrose 5%. 1000 milliLiter(s) IV Continuous <Continuous>  dextrose 5%. 1000 milliLiter(s) IV Continuous <Continuous>  dextrose 50% Injectable 25 Gram(s) IV Push once  dextrose 50% Injectable 12.5 Gram(s) IV Push once  diltiazem    milliGRAM(s) Oral daily  glucagon  Injectable 1 milliGRAM(s) IntraMuscular once  hydrochlorothiazide 25 milliGRAM(s) Oral daily  hydrocortisone 2.5% Rectal Cream 1 Application(s) Rectal two times a day  insulin lispro (ADMELOG) corrective regimen sliding scale   SubCutaneous three times a day before meals  melatonin 5 milliGRAM(s) Oral at bedtime  pantoprazole    Tablet 40 milliGRAM(s) Oral two times a day  sodium chloride 0.9%. 1000 milliLiter(s) IV Continuous <Continuous>  sucralfate suspension 1 Gram(s) Oral every 6 hours  valsartan 320 milliGRAM(s) Oral daily  zinc oxide 40% Paste 1 Application(s) Topical two times a day      PRN MEDICATIONS  acetaminophen     Tablet .. 650 milliGRAM(s) Oral every 6 hours PRN  aluminum hydroxide/magnesium hydroxide/simethicone Suspension 30 milliLiter(s) Oral every 4 hours PRN  dextrose Oral Gel 15 Gram(s) Oral once PRN  loperamide 2 milliGRAM(s) Oral three times a day PRN  ondansetron Injectable 8 milliGRAM(s) IV Push every 8 hours PRN  sodium chloride 0.9% lock flush 10 milliLiter(s) IV Push every 1 hour PRN        Vital Signs Last 24 Hrs  T(C): 36.9 (08 Feb 2023 05:38), Max: 37.2 (07 Feb 2023 21:20)  T(F): 98.4 (08 Feb 2023 05:38), Max: 99 (07 Feb 2023 21:20)  HR: 84 (08 Feb 2023 05:38) (70 - 84)  BP: 134/70 (08 Feb 2023 05:38) (108/60 - 138/75)  BP(mean): --  RR: 18 (08 Feb 2023 05:38) (16 - 18)  SpO2: 96% (08 Feb 2023 05:38) (96% - 100%)    Parameters below as of 08 Feb 2023 05:38  Patient On (Oxygen Delivery Method): room air    PHYSICAL EXAM  General: NAD  HEENT: PERRLA, EOMOI, clear oropharynx  CV: (+) S1/S2 RRR  Lungs: clear to auscultation, no wheezes or rales  Abdomen: soft, non-tender, non-distended (+) BS  Ext: no clubbing, cyanosis or edema  Skin: no rashes and no petechiae  Neuro: alert and oriented X 3, no focal deficits  Central Line: CDI                      Diagnosis: newly diagnosed B-ALL, Ph (+)    Protocol/Chemo Regimen: COURSE 2  Following 10701 (Decadron days 1-7 + Dasatinib),   Day: 1     Pt endorsed: +fatigue    abdominal pan better today.     Review of Systems: Denies any nausea, vomiting, diarrhea, chest pain, palpitation, SOB    Pain scale: Denies    Diet: regular    Allergies    No Known Allergies    Intolerances        ANTIMICROBIALS  acyclovir   Oral Tab/Cap 400 milliGRAM(s) Oral every 8 hours  caspofungin IVPB 50 milliGRAM(s) IV Intermittent every 24 hours  levoFLOXacin  Tablet 500 milliGRAM(s) Oral every 24 hours      HEME/ONC MEDICATIONS  dasatinib 140 milliGRAM(s) Oral daily  methotrexate PF IntraThecal (eMAR) 12 milliGRAM(s) IntraThecal once      STANDING MEDICATIONS  ascorbic acid 500 milliGRAM(s) Oral daily  chlorhexidine 4% Liquid 1 Application(s) Topical <User Schedule>  cyanocobalamin 1000 MICROGram(s) Oral daily  dextrose 5%. 1000 milliLiter(s) IV Continuous <Continuous>  dextrose 5%. 1000 milliLiter(s) IV Continuous <Continuous>  dextrose 50% Injectable 25 Gram(s) IV Push once  dextrose 50% Injectable 12.5 Gram(s) IV Push once  diltiazem    milliGRAM(s) Oral daily  glucagon  Injectable 1 milliGRAM(s) IntraMuscular once  hydrochlorothiazide 25 milliGRAM(s) Oral daily  hydrocortisone 2.5% Rectal Cream 1 Application(s) Rectal two times a day  insulin lispro (ADMELOG) corrective regimen sliding scale   SubCutaneous three times a day before meals  melatonin 5 milliGRAM(s) Oral at bedtime  pantoprazole    Tablet 40 milliGRAM(s) Oral two times a day  sodium chloride 0.9%. 1000 milliLiter(s) IV Continuous <Continuous>  sucralfate suspension 1 Gram(s) Oral every 6 hours  valsartan 320 milliGRAM(s) Oral daily  zinc oxide 40% Paste 1 Application(s) Topical two times a day      PRN MEDICATIONS  acetaminophen     Tablet .. 650 milliGRAM(s) Oral every 6 hours PRN  aluminum hydroxide/magnesium hydroxide/simethicone Suspension 30 milliLiter(s) Oral every 4 hours PRN  dextrose Oral Gel 15 Gram(s) Oral once PRN  loperamide 2 milliGRAM(s) Oral three times a day PRN  ondansetron Injectable 8 milliGRAM(s) IV Push every 8 hours PRN  sodium chloride 0.9% lock flush 10 milliLiter(s) IV Push every 1 hour PRN        Vital Signs Last 24 Hrs  T(C): 36.9 (08 Feb 2023 05:38), Max: 37.2 (07 Feb 2023 21:20)  T(F): 98.4 (08 Feb 2023 05:38), Max: 99 (07 Feb 2023 21:20)  HR: 84 (08 Feb 2023 05:38) (70 - 84)  BP: 134/70 (08 Feb 2023 05:38) (108/60 - 138/75)  BP(mean): --  RR: 18 (08 Feb 2023 05:38) (16 - 18)  SpO2: 96% (08 Feb 2023 05:38) (96% - 100%)    Parameters below as of 08 Feb 2023 05:38  Patient On (Oxygen Delivery Method): room air    PHYSICAL EXAM  General: NAD  HEENT: PERRLA, EOMOI, clear oropharynx  CV: (+) S1/S2 RRR  Lungs: clear to auscultation, no wheezes or rales  Abdomen: soft, non-tender, non-distended (+) BS  Ext: no clubbing, cyanosis or edema  Skin: no rashes and no petechiae  Neuro: alert and oriented X 3, no focal deficits  Central Line: CDI

## 2023-02-08 NOTE — PROGRESS NOTE ADULT - PROBLEM SELECTOR PLAN 1
Flow cytometry (1/14/23) consistent with acute leukemia (B ALL), FISH detected BCR/ABL (1/19)  Monitor daily CBC with Diff/CMP and transfuse/replete PRN  Patient was on warfarin-NOW HELD for mechanical AVR ([facility]), atrial fibrillation  last dose of warfarin 1/13 at [facility] (5mg)-cardiology consulted.   TLS labs daily, IVF's, Strict I/O's, daily wights  1/14 TTE - EF 57%, mild pulm HTN, Mechanical AVR likely normal function  1/17  s/p BMbx c/w ALL (98% blasts) (with Clonoseq sampling)  Decadron 1/18-1/24-monitor sugars now off steroids.   1/19 Started Dasatinib  Vincristine 1.5 mg on Day 8 1/25 with Elitek 3mg IV x1.   Keep Platelet count >40, GI beed, (+SDH)  FU Day 15 BMbx on 2/1 done on 2/2   CSF LP done on 2/1 (-)  2/7-  Hypokalemia- Replete K+. Flow cytometry (1/14/23) consistent with acute leukemia (B ALL), FISH detected BCR/ABL (1/19)  Monitor daily CBC with Diff/CMP and transfuse/replete PRN  Patient was on warfarin-NOW HELD for mechanical AVR ([facility]), atrial fibrillation  last dose of warfarin 1/13 at [facility] (5mg)-cardiology consulted.   TLS labs daily, IVF's, Strict I/O's, daily wights  1/14 TTE - EF 57%, mild pulm HTN, Mechanical AVR likely normal function  1/17  s/p BMbx c/w ALL (98% blasts) (with Clonoseq sampling)  Decadron 1/18-1/24-monitor sugars now off steroids.   1/19 Started Dasatinib  Vincristine 1.5 mg on Day 8 1/25 with Elitek 3mg IV x1.   Keep Platelet count >40, GI beed, (+SDH)  FU Day 15 BMbx on 2/1 done on 2/2   CSF LP done on 2/1 (-) Flow cytometry (1/14/23) consistent with acute leukemia (B ALL), FISH detected BCR/ABL (1/19)  Monitor daily CBC with Diff/CMP and transfuse/replete PRN  Patient was on warfarin-NOW HELD for mechanical AVR (1994 [facility]), atrial fibrillation  last dose of warfarin 1/13 at [facility] (5mg)-cardiology consulted.   TLS labs daily, IVF's, Strict I/O's, daily wights  1/14 TTE - EF 57%, mild pulm HTN, Mechanical AVR likely normal function  1/17  s/p BMbx c/w ALL (98% blasts) (with Clonoseq sampling)  Decadron 1/18-1/24-monitor sugars now off steroids.   1/19 Started Dasatinib  Vincristine 1.5 mg on Day 8 1/25 with Elitek 3mg IV x1.   Keep Platelet count >40, GI beed, (+SDH)  FU Day 15 BMbx on 2/1 done on 2/2 showed <5% blasts.   CSF LP done on 2/1 (-)  2/8 Start second 7 day course of Dex 16mg following 10701-monitor blood glucose. Will likely need to add lantus back.   EPIC form given to patient 2/8 for son to complete in order to get assistance for dasatinib.   Discharge planning. Flow cytometry (1/14/23) consistent with acute leukemia (B ALL), FISH detected BCR/ABL (1/19)  Monitor daily CBC with Diff/CMP and transfuse/replete PRN  Patient was on warfarin-NOW HELD for mechanical AVR (1994 [facility]), atrial fibrillation  last dose of warfarin 1/13 at [facility] (5mg)-cardiology consulted.   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 Vincristine 1.5 mg on Day 8 1/25 with Elitek 3mg IV x1.   Keep Platelet count >40, GI beed, (+SDH)  FU Day 15 BMbx on 2/1 done on 2/2 showed <5% blasts.   CSF LP done on 2/1 (-)  2/8 Now started course 2 Dex 16mg days 1-7 following [id number] with Dasatinib. monitor blood glucose. Will likely need to add lantus back.   EPIC form given to patient 2/8 for son to complete in order to get assistance for dasatinib.   Discharge planning.

## 2023-02-08 NOTE — PROGRESS NOTE ADULT - PROBLEM SELECTOR PLAN 9
2/3- Grade 1 transaminitis, will monitor closely, avoid hepatotoxins.  D/Brock Atorvastatin.  2/5- CTA/P-  Shows 1.4 cm heterogeneous region within the peripheral upper pole of the left   kidney that has increased in size since 12/6/2019 and is concerning for  renal cell carcinoma.  Will consult IR for bx.  2/6 - Will need MRI abd/pelvis with contrast to better evaluate renal mass.  2/7- MRI abd scheduled for 2/8 noon, has mechanical heart valve, product info sent to MRI, compatible as per MRI.

## 2023-02-09 LAB
ALBUMIN SERPL ELPH-MCNC: 3.6 G/DL — SIGNIFICANT CHANGE UP (ref 3.3–5)
ALP SERPL-CCNC: 84 U/L — SIGNIFICANT CHANGE UP (ref 40–120)
ALT FLD-CCNC: 51 U/L — HIGH (ref 10–45)
ANION GAP SERPL CALC-SCNC: 10 MMOL/L — SIGNIFICANT CHANGE UP (ref 5–17)
APTT BLD: 28.1 SEC — SIGNIFICANT CHANGE UP (ref 27.5–35.5)
AST SERPL-CCNC: 23 U/L — SIGNIFICANT CHANGE UP (ref 10–40)
BASOPHILS # BLD AUTO: 0 K/UL — SIGNIFICANT CHANGE UP (ref 0–0.2)
BASOPHILS # BLD AUTO: 0 K/UL — SIGNIFICANT CHANGE UP (ref 0–0.2)
BASOPHILS NFR BLD AUTO: 0 % — SIGNIFICANT CHANGE UP (ref 0–2)
BASOPHILS NFR BLD AUTO: 0 % — SIGNIFICANT CHANGE UP (ref 0–2)
BILIRUB SERPL-MCNC: 0.4 MG/DL — SIGNIFICANT CHANGE UP (ref 0.2–1.2)
BUN SERPL-MCNC: 8 MG/DL — SIGNIFICANT CHANGE UP (ref 7–23)
CALCIUM SERPL-MCNC: 8.5 MG/DL — SIGNIFICANT CHANGE UP (ref 8.4–10.5)
CHLORIDE SERPL-SCNC: 105 MMOL/L — SIGNIFICANT CHANGE UP (ref 96–108)
CO2 SERPL-SCNC: 25 MMOL/L — SIGNIFICANT CHANGE UP (ref 22–31)
CREAT SERPL-MCNC: 0.59 MG/DL — SIGNIFICANT CHANGE UP (ref 0.5–1.3)
EGFR: 91 ML/MIN/1.73M2 — SIGNIFICANT CHANGE UP
EOSINOPHIL # BLD AUTO: 0 K/UL — SIGNIFICANT CHANGE UP (ref 0–0.5)
EOSINOPHIL # BLD AUTO: 0 K/UL — SIGNIFICANT CHANGE UP (ref 0–0.5)
EOSINOPHIL NFR BLD AUTO: 0 % — SIGNIFICANT CHANGE UP (ref 0–6)
EOSINOPHIL NFR BLD AUTO: 0 % — SIGNIFICANT CHANGE UP (ref 0–6)
GIANT PLATELETS BLD QL SMEAR: PRESENT — SIGNIFICANT CHANGE UP
GLUCOSE BLDC GLUCOMTR-MCNC: 173 MG/DL — HIGH (ref 70–99)
GLUCOSE BLDC GLUCOMTR-MCNC: 183 MG/DL — HIGH (ref 70–99)
GLUCOSE BLDC GLUCOMTR-MCNC: 199 MG/DL — HIGH (ref 70–99)
GLUCOSE BLDC GLUCOMTR-MCNC: 202 MG/DL — HIGH (ref 70–99)
GLUCOSE SERPL-MCNC: 151 MG/DL — HIGH (ref 70–99)
HCT VFR BLD CALC: 24.1 % — LOW (ref 34.5–45)
HCT VFR BLD CALC: 24.5 % — LOW (ref 34.5–45)
HCT VFR BLD CALC: 24.8 % — LOW (ref 34.5–45)
HGB BLD-MCNC: 8.1 G/DL — LOW (ref 11.5–15.5)
HGB BLD-MCNC: 8.2 G/DL — LOW (ref 11.5–15.5)
HGB BLD-MCNC: 8.2 G/DL — LOW (ref 11.5–15.5)
IMM GRANULOCYTES NFR BLD AUTO: 0.6 % — SIGNIFICANT CHANGE UP (ref 0–0.9)
INR BLD: 0.98 RATIO — SIGNIFICANT CHANGE UP (ref 0.88–1.16)
LDH SERPL L TO P-CCNC: 304 U/L — HIGH (ref 50–242)
LYMPHOCYTES # BLD AUTO: 0.18 K/UL — LOW (ref 1–3.3)
LYMPHOCYTES # BLD AUTO: 0.33 K/UL — LOW (ref 1–3.3)
LYMPHOCYTES # BLD AUTO: 21.2 % — SIGNIFICANT CHANGE UP (ref 13–44)
LYMPHOCYTES # BLD AUTO: 30.3 % — SIGNIFICANT CHANGE UP (ref 13–44)
MAGNESIUM SERPL-MCNC: 1.8 MG/DL — SIGNIFICANT CHANGE UP (ref 1.6–2.6)
MANUAL SMEAR VERIFICATION: SIGNIFICANT CHANGE UP
MCHC RBC-ENTMCNC: 29.6 PG — SIGNIFICANT CHANGE UP (ref 27–34)
MCHC RBC-ENTMCNC: 29.7 PG — SIGNIFICANT CHANGE UP (ref 27–34)
MCHC RBC-ENTMCNC: 29.8 PG — SIGNIFICANT CHANGE UP (ref 27–34)
MCHC RBC-ENTMCNC: 33.1 GM/DL — SIGNIFICANT CHANGE UP (ref 32–36)
MCHC RBC-ENTMCNC: 33.5 GM/DL — SIGNIFICANT CHANGE UP (ref 32–36)
MCHC RBC-ENTMCNC: 33.6 GM/DL — SIGNIFICANT CHANGE UP (ref 32–36)
MCV RBC AUTO: 88.3 FL — SIGNIFICANT CHANGE UP (ref 80–100)
MCV RBC AUTO: 89.1 FL — SIGNIFICANT CHANGE UP (ref 80–100)
MCV RBC AUTO: 89.5 FL — SIGNIFICANT CHANGE UP (ref 80–100)
MONOCYTES # BLD AUTO: 0.02 K/UL — SIGNIFICANT CHANGE UP (ref 0–0.9)
MONOCYTES # BLD AUTO: 0.06 K/UL — SIGNIFICANT CHANGE UP (ref 0–0.9)
MONOCYTES NFR BLD AUTO: 3.7 % — SIGNIFICANT CHANGE UP (ref 2–14)
MONOCYTES NFR BLD AUTO: 3.8 % — SIGNIFICANT CHANGE UP (ref 2–14)
NEUTROPHILS # BLD AUTO: 0.38 K/UL — LOW (ref 1.8–7.4)
NEUTROPHILS # BLD AUTO: 1.16 K/UL — LOW (ref 1.8–7.4)
NEUTROPHILS NFR BLD AUTO: 66 % — SIGNIFICANT CHANGE UP (ref 43–77)
NEUTROPHILS NFR BLD AUTO: 74.4 % — SIGNIFICANT CHANGE UP (ref 43–77)
NRBC # BLD: 0 /100 WBCS — SIGNIFICANT CHANGE UP (ref 0–0)
NRBC # BLD: 0 /100 WBCS — SIGNIFICANT CHANGE UP (ref 0–0)
PHOSPHATE SERPL-MCNC: 3.2 MG/DL — SIGNIFICANT CHANGE UP (ref 2.5–4.5)
PLAT MORPH BLD: NORMAL — SIGNIFICANT CHANGE UP
PLATELET # BLD AUTO: 70 K/UL — LOW (ref 150–400)
PLATELET # BLD AUTO: 84 K/UL — LOW (ref 150–400)
PLATELET # BLD AUTO: 88 K/UL — LOW (ref 150–400)
POTASSIUM SERPL-MCNC: 3.4 MMOL/L — LOW (ref 3.5–5.3)
POTASSIUM SERPL-SCNC: 3.4 MMOL/L — LOW (ref 3.5–5.3)
PROT SERPL-MCNC: 5.9 G/DL — LOW (ref 6–8.3)
PROTHROM AB SERPL-ACNC: 11.3 SEC — SIGNIFICANT CHANGE UP (ref 10.5–13.4)
RBC # BLD: 2.73 M/UL — LOW (ref 3.8–5.2)
RBC # BLD: 2.75 M/UL — LOW (ref 3.8–5.2)
RBC # BLD: 2.77 M/UL — LOW (ref 3.8–5.2)
RBC # FLD: 14.8 % — HIGH (ref 10.3–14.5)
RBC # FLD: 14.9 % — HIGH (ref 10.3–14.5)
RBC # FLD: 14.9 % — HIGH (ref 10.3–14.5)
RBC BLD AUTO: SIGNIFICANT CHANGE UP
SODIUM SERPL-SCNC: 140 MMOL/L — SIGNIFICANT CHANGE UP (ref 135–145)
URATE SERPL-MCNC: 2.3 MG/DL — LOW (ref 2.5–7)
WBC # BLD: 0.58 K/UL — CRITICAL LOW (ref 3.8–10.5)
WBC # BLD: 0.92 K/UL — CRITICAL LOW (ref 3.8–10.5)
WBC # BLD: 1.56 K/UL — LOW (ref 3.8–10.5)
WBC # FLD AUTO: 0.58 K/UL — CRITICAL LOW (ref 3.8–10.5)
WBC # FLD AUTO: 0.92 K/UL — CRITICAL LOW (ref 3.8–10.5)
WBC # FLD AUTO: 1.56 K/UL — LOW (ref 3.8–10.5)

## 2023-02-09 PROCEDURE — 74183 MRI ABD W/O CNTR FLWD CNTR: CPT | Mod: 26

## 2023-02-09 PROCEDURE — 99233 SBSQ HOSP IP/OBS HIGH 50: CPT

## 2023-02-09 RX ORDER — FAMOTIDINE 10 MG/ML
20 INJECTION INTRAVENOUS
Refills: 0 | Status: DISCONTINUED | OUTPATIENT
Start: 2023-02-10 | End: 2023-02-10

## 2023-02-09 RX ORDER — POTASSIUM CHLORIDE 20 MEQ
40 PACKET (EA) ORAL ONCE
Refills: 0 | Status: COMPLETED | OUTPATIENT
Start: 2023-02-09 | End: 2023-02-09

## 2023-02-09 RX ADMIN — VALSARTAN 320 MILLIGRAM(S): 80 TABLET ORAL at 05:57

## 2023-02-09 RX ADMIN — ZINC OXIDE 1 APPLICATION(S): 200 OINTMENT TOPICAL at 17:42

## 2023-02-09 RX ADMIN — ZINC OXIDE 1 APPLICATION(S): 200 OINTMENT TOPICAL at 05:58

## 2023-02-09 RX ADMIN — Medication 1: at 17:36

## 2023-02-09 RX ADMIN — PANTOPRAZOLE SODIUM 40 MILLIGRAM(S): 20 TABLET, DELAYED RELEASE ORAL at 17:37

## 2023-02-09 RX ADMIN — CHLORHEXIDINE GLUCONATE 1 APPLICATION(S): 213 SOLUTION TOPICAL at 09:03

## 2023-02-09 RX ADMIN — Medication 2: at 09:00

## 2023-02-09 RX ADMIN — PREGABALIN 1000 MICROGRAM(S): 225 CAPSULE ORAL at 11:57

## 2023-02-09 RX ADMIN — Medication 240 MILLIGRAM(S): at 05:56

## 2023-02-09 RX ADMIN — SODIUM CHLORIDE 50 MILLILITER(S): 9 INJECTION INTRAMUSCULAR; INTRAVENOUS; SUBCUTANEOUS at 05:58

## 2023-02-09 RX ADMIN — Medication 1 GRAM(S): at 05:57

## 2023-02-09 RX ADMIN — Medication 5 MILLIGRAM(S): at 22:09

## 2023-02-09 RX ADMIN — Medication 1 GRAM(S): at 17:37

## 2023-02-09 RX ADMIN — CASPOFUNGIN ACETATE 260 MILLIGRAM(S): 7 INJECTION, POWDER, LYOPHILIZED, FOR SOLUTION INTRAVENOUS at 15:10

## 2023-02-09 RX ADMIN — PANTOPRAZOLE SODIUM 40 MILLIGRAM(S): 20 TABLET, DELAYED RELEASE ORAL at 05:57

## 2023-02-09 RX ADMIN — DASATINIB 140 MILLIGRAM(S): 80 TABLET ORAL at 11:57

## 2023-02-09 RX ADMIN — Medication 1 APPLICATION(S): at 05:58

## 2023-02-09 RX ADMIN — Medication 400 MILLIGRAM(S): at 22:08

## 2023-02-09 RX ADMIN — Medication 400 MILLIGRAM(S): at 14:05

## 2023-02-09 RX ADMIN — Medication 108 MILLIGRAM(S): at 05:56

## 2023-02-09 RX ADMIN — Medication 500 MILLIGRAM(S): at 11:57

## 2023-02-09 RX ADMIN — Medication 40 MILLIEQUIVALENT(S): at 22:08

## 2023-02-09 RX ADMIN — Medication 400 MILLIGRAM(S): at 05:57

## 2023-02-09 RX ADMIN — Medication 1 GRAM(S): at 11:56

## 2023-02-09 RX ADMIN — Medication 1: at 12:18

## 2023-02-09 RX ADMIN — Medication 1 APPLICATION(S): at 17:43

## 2023-02-09 NOTE — PROVIDER CONTACT NOTE (CRITICAL VALUE NOTIFICATION) - SITUATION
H/H= 7.1/20.6
Fibrinogen=78
WBC 67.01
HGB =6.3  HCT =19.3
WBC 77.52
low cbc
Fibrinogen 35
Fibrinogen 58
H/H 6.9/20.8, Platelets 18
H/H= 6.8/20.6
Fibrinogen 92
WBC 61.27
WBC 77.84
WBC=99.74
fibrinogen=89
Fibrinogen=98
elevated wbc
H/H 6.2/18.3
Patient with ALL< s/p chemotherapywith positice occult blood, CBC Q 8hrs , CBC from 2200 result with HG of 7.1 and HCT of 20.6
wbc 0.92
WBC 74.41
fibrinogen 82

## 2023-02-09 NOTE — PROVIDER CONTACT NOTE (CRITICAL VALUE NOTIFICATION) - NAME OF MD/NP/PA/DO NOTIFIED:
ALEJANDRINA Pichardo
RADHIKA Carrasco
RADHIKA Correia
RADHIKA Krause
ALEJANDRINA Roldan
RADHIKA Krause
Zander
ALEJANDRINA Mattson
Laurie Velasquez, NP
jamie loera np
RADHIKA Krause
RADHIKA sandy
Luana TINOCO
Luana TINOCO
ALEJANDRINA Pichardo
ALEJANDRINA Wright
Ryan Rashid
ALEJANDRINA MAHER
Laurie Velasquez, NP
RADHIKA Hermosillo
Laurie Velasquez, NP
RADHIKA Krause
ALEJANDRINA Wright
RADHIKA Piña

## 2023-02-09 NOTE — PROGRESS NOTE ADULT - ATTENDING COMMENTS
82F with PMH of AFib (s/p Saint Darian mechanical AVR, 1994, [facility]) for rheumatic aortic valve stenosis, ascending aortic aneurysm, HTN,  HLD, depression, GERD, who presented with newly diagnosed Ph-positive B-ALL.     # Ph-positive B-ALL: Bone marrow biopsy on 1/17/23 showed 99% B-lymphoblasts. She started treatment per the CALGB 62511 protocol with steroids and dasatinib on 1/18/23. She received 1 dose of vincristine 1.5 mg on 1/25/23 for LDH plateau. She received rasburicase twice, but no current evidence of TLS. She also received cryoprecipitate several times for hypofibrinogenemia, now normal. She has therapy- and disease-associated pancytopenia.   - S/p dexamethasone 20 mg D1-7. Continue dasatinib 140 mg daily. Today is D22.  - Trend CBC with differential and coagulation studies daily. Supportive transfusions to maintain Hgb > 7, plt > 40 given GI bleed, and fibrinogen > 100.   - Trend TLS labs daily. Replete electrolytes PRN.   - Antimicrobial prophylaxis: levaquin, caspofungin, acyclovir  - S/p LP with IT MTX on D15 (2/1/23). CSF flow negative.   - Follow up results from D16 bone marrow biopsy (2/2/23). Pending results, will determine next course of therapy.     # SDH: CTH on 1/15/23 showed an 8 mm bleed, which was stable on subsequent scans. No neurologic deficits. No surgical intervention.  - Hold Coumadin   - Maintain plt > 20 per Neurosurgery    # Mechanical valve: Currently off AC, discussed with Cardiology  - Hold anticoagulation in setting of SDH and persistent thrombocytopenia    # GI bleed: Had dark stools on 2/3/23, with FOBT+.  - Trend CBC with differential and coagulation studies daily. Supportive transfusions to maintain Hgb > 7, plt > 40 given GI bleed, and fibrinogen > 100.   - Continue PPI twice daily    # Elevated transaminases: Previously elevated earlier in her admission -  (1/21/23). CT A/P showed stable subcentimeter hepatic cysts. Now improving.   - Trend LFTs daily  - Hold atorvastatin     # Renal mass: CT A/P (2/4/23) - 1.4 cm heterogeneous region within the peripheral upper pole of the left kidney that has increased in size since 12/6/2019 and is concerning for renal cell carcinoma.  - IR consulted for renal biopsy. Scheduled for MRI A/P with IV contrast today.    The information documented within the attending attestation was documented solely by Carri Jesus. 82F with PMH of AFib (s/p Saint Darian mechanical AVR, 1992, [facility]) for rheumatic aortic valve stenosis, ascending aortic aneurysm, HTN,  HLD, depression, GERD, who presented with newly diagnosed Ph-positive B-ALL.     # Ph-positive B-ALL: Bone marrow biopsy on 1/17/23 showed 99% B-lymphoblasts. She started treatment per the CALGB 21944 protocol with steroids and dasatinib on 1/18/23. She received 1 dose of vincristine 1.5 mg on 1/25/23 for LDH plateau. She received rasburicase twice, but no current evidence of TLS. She also received cryoprecipitate several times for hypofibrinogenemia, now normal. S/p LP with IT MTX on D15 (2/1/23). CSF flow negative. Course I Day 16 bone marrow sohwed < 20% blasts. She has therapy- and disease-associated pancytopenia. Her Day  - Continue dexamethasone 20 mg D1-7 and dasatinib 140 mg daily. Today is Course II Day 2.   - Trend CBC with differential and coagulation studies daily. Supportive transfusions to maintain Hgb > 7, plt > 40 given GI bleed, and fibrinogen > 100.   - Trend TLS labs daily. Replete electrolytes PRN.   - Antimicrobial prophylaxis: levaquin, caspofungin, acyclovir. Start Bactrim.   - We have initiated her discharge planning. She has a high co-pay for dasatinib, so she is completing forms for financial assistance. She would also like to return to [place] so we have recommended a leukemia specialist at [facility] and she will reach out to schedule an appointment.     # SDH: CTH on 1/15/23 showed an 8 mm bleed, which was stable on subsequent scans. No neurologic deficits. No surgical intervention.  - Hold Coumadin   - Maintain plt > 20 per Neurosurgery    # Mechanical valve: Currently off AC, discussed with Cardiology  - Hold anticoagulation in setting of SDH and persistent thrombocytopenia    # GI bleed: Had dark stools on 2/3/23, with FOBT+.  - Trend CBC with differential and coagulation studies daily. Supportive transfusions to maintain Hgb > 7, plt > 40 given GI bleed, and fibrinogen > 100.   - Continue PPI twice daily    # Elevated transaminases: Previously elevated earlier in her admission -  (1/21/23). CT A/P showed stable subcentimeter hepatic cysts. Now improving.   - Trend LFTs daily  - Hold atorvastatin     # Renal mass: CT A/P (2/4/23) - 1.4 cm heterogeneous region within the peripheral upper pole of the left kidney that has increased in size since 12/6/2019 and is concerning for renal cell carcinoma.  - MRI A/P with IV contrast today. She can have biopsy as outpatient.     The information documented within the attending attestation was documented solely by Carri Jesus.

## 2023-02-09 NOTE — PROVIDER CONTACT NOTE (CRITICAL VALUE NOTIFICATION) - PERSON GIVING RESULT:
Amira Cortes
Frantz
Brandon from Lab
fibrinogen 82
Arben Roldan, lab
Kamran Francois
Mikhail Roldan
Nohemi Weiss
roro bourgeois
Ashley Lara
CAIN RAUSCH/lab
Frantz Saavedra
Demario Guerrier
Frantz Hein, lab
keyla
Lilo Stanton, lab
Maximino Nagy
Gregorio Villanueva, Lab
Ashley Jacome
Pravin Melania
Sophia Collier
yissel garrison
Gregorio Villanueva, Lab
Bentley Ch

## 2023-02-09 NOTE — PROGRESS NOTE ADULT - PROBLEM SELECTOR PLAN 8
Palliative care consult due to need for supportive care new dx. Palliative care consulted due to need for supportive care new dx.

## 2023-02-09 NOTE — PROGRESS NOTE ADULT - ASSESSMENT
82 year old female with atrial fibrillation, s/p Saint Darian mechanical AVR, ([facility]) for rheumatic aortic valve stenosis, ascending aortic aneurysm, HTN, HLD, depression, GERD transferred from [facility] for management of newly diagnosed PH + B cell ALL. Peripheral flow performed, BMBx consistent with B-cell ALL, FISH Ph+ treated with Dex and Dasatinib following CALGB 26096. Course complicated by hypofibrinogenemia treated with cryoprecipitate, subdural hemorrhage plt goal >20 seen by neurosurgery, GIB. Anemia and thrombocytopenia secondary to disease.   82 year old female with atrial fibrillation, s/p Saint Darian mechanical AVR, (1992[facility]) for rheumatic aortic valve stenosis, ascending aortic aneurysm, HTN, HLD, depression, GERD transferred from [facility] for management of newly diagnosed PH + B cell ALL. Peripheral flow performed, BMBx consistent with B-cell ALL, FISH Ph+ treated with Dex and Dasatinib following CALGB 04269. Course complicated by hypofibrinogenemia treated with cryoprecipitate, subdural hemorrhage plt goal >20 seen by neurosurgery, GIB. pancytopenia  secondary to disease and chemo .

## 2023-02-09 NOTE — PROVIDER CONTACT NOTE (CRITICAL VALUE NOTIFICATION) - TEST AND RESULT REPORTED:
Fibrinogen 92
Fibrinogen=98
CBC result from this evenig with HG 7.1 and HCT 20.6
Kamran Francois
WBC 74.41
Hgb =6.3  HCT= 19.3
WBC 67.01
hgb=7 hct=20.7
wbc 90.44
Fibrinogen 35
HGB 7.0
H/H= 7.1/20.6
WBC 61.27
H/H= 6.8/20.6
hgb 6.5,
H/H 6.9/20.8, Platelets 18
fibrinogen=89
WBC 77.84
WBC=99.74
Fibrinogen 58
Fibrinogen=78
H/H 6.2/18.3
WBC 77.52
wbc 0.92

## 2023-02-09 NOTE — PROGRESS NOTE ADULT - PROBLEM SELECTOR PLAN 9
2/3- Grade 1 transaminitis, will monitor closely, avoid hepatotoxins.  D/Brock Atorvastatin.  2/5- CTA/P-  Shows 1.4 cm heterogeneous region within the peripheral upper pole of the left   kidney that has increased in size since 12/6/2019 and is concerning for  renal cell carcinoma.  Will consult IR for bx.  2/6 - Will need MRI abd/pelvis with contrast to better evaluate renal mass.  2/7- MRI abd scheduled for 2/8 noon, has mechanical heart valve, product info sent to MRI, compatible as per MRI. 2/3- Grade 1 transaminitis, will monitor closely, avoid hepatotoxins.  D/Brock Atorvastatin.  2/5- CTA/P-  Shows 1.4 cm heterogeneous region within the peripheral upper pole of the left   kidney that has increased in size since 12/6/2019 and is concerning for  renal cell carcinoma.  Will consult IR for bx.  2/6 - Will need MRI abd/pelvis with contrast to better evaluate renal mass.  2/7- MRI abd scheduled for 2/8 noon, has mechanical heart valve, product info sent to MRI, compatible as per MRI.  2/9 MRI abd Left upper pole renal lesion measuring 1.4 cm, as described, and suspicious for renal cell carcinoma.  IR bx and ablation next week or as outpatient

## 2023-02-09 NOTE — PROGRESS NOTE ADULT - ASSESSMENT
IR follow up note  MRI reviewed. Left renal lesion concerning for RCC.  Case reviewed with interventional radiology attending. Would recommend ablation of lesion together with biopsy. Team to consider outpatient scheduling.     --  Donovan Sierra, PGY-3  Vascular and Interventional Radiology   Available on [employer] Teams    - Non-emergent consults: Place IR consult order in [place]  - Emergent issues (pager): [facility] [phone]; [facility] [phone]; [phone]  - Scheduling questions: [facility] [phone]; [facility] [phone]  - Clinic/outpatient booking: [place] [address] [phone]; [facility] [phone]

## 2023-02-09 NOTE — PROGRESS NOTE ADULT - PROBLEM SELECTOR PLAN 1
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 Vincristine 1.5 mg on Day 8 1/25 with Elitek 3mg IV x1.   Keep Platelet count >40, GI beed, (+SDH)  FU Day 15 BMbx on 2/1 done on 2/2 showed <5% blasts.   CSF LP done on 2/1 (-)  2/8 Now started course 2 Dex 16mg days 1-7 following [id number] with Dasatinib. monitor blood glucose. Will likely need to add lantus back.   EPIC form given to patient 2/8 for son to complete in order to get assistance for dasatinib.   Discharge planning. 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 Vincristine 1.5 mg on Day 8 1/25 with Elitek 3mg IV x1.   Keep Platelet count >40, GI beed, (+SDH)  FU Day 15 BMbx on 2/1 done on 2/2 showed <5% blasts.   CSF LP done on 2/1 (-)  2/8 Now started course II A Dex 16mg days 1-7 following [id number] with Dasatinib. monitor blood glucose. Will likely need to add lantus back.   CFAM to get assistance for dasatinib to help with copay.   Discharge planning.

## 2023-02-09 NOTE — PROGRESS NOTE ADULT - PROBLEM SELECTOR PLAN 4
Continue accuchecks/ISS  1/19 Started Lantus 10u while on steroids (last dose of decadron 1/24)  1/29 D/C'd Lantus  monitor bg back on steroids for second round of dex.

## 2023-02-09 NOTE — PROGRESS NOTE ADULT - PROBLEM SELECTOR PLAN 2
[facility]  [address]  [place] [address]  Phone: [phone]    March 31, 2020        Kannan Ordonez  [address] [id number]          To whom it may concern:    RE: Kannan Ordonez    Patient was treated today and will be out of work on 4/1/2020 d/t underlining condition of asthma with the potential to get covid-19 virus will re-evaluate 4/15/2020    Please contact me for questions or concerns.      Sincerely,        Yeimy Corona, CNP   patient is neutropenic, afebrile  c/w levaquin, Caspofungin and acyclovir ppx   if spike panculture and CXR  GI PCR (-)  1/21, 1/26 c.diff toxin negative. patient is neutropenic, afebrile  c/w levaquin, Caspofungin and acyclovir ppx   if fever, panculture, CXR and switch Levaquin to Cefepime    GI PCR (-)  1/21, 1/26 c.diff toxin negative.

## 2023-02-09 NOTE — PROGRESS NOTE ADULT - PROBLEM SELECTOR PLAN 7
Hold warfarin for now due to SDH and thrombocytopenia  PPI Hold warfarin for now due to SDH and thrombocytopenia  PPI  Encourage OOB and ambulation

## 2023-02-09 NOTE — PROGRESS NOTE ADULT - SUBJECTIVE AND OBJECTIVE BOX
Diagnosis: newly diagnosed B-ALL, Ph (+)    Protocol/Chemo Regimen: COURSE 2  Following 10701 (Decadron days 1-7 + Dasatinib),   Day: 2     Pt endorsed: +fatigue    abdominal pan better today.     Review of Systems: Denies any nausea, vomiting, diarrhea, chest pain, palpitation, SOB    Pain scale: Denies    Diet: regular    Allergies    No Known Allergies          ANTIMICROBIALS  acyclovir   Oral Tab/Cap 400 milliGRAM(s) Oral every 8 hours  caspofungin IVPB 50 milliGRAM(s) IV Intermittent every 24 hours  levoFLOXacin  Tablet 500 milliGRAM(s) Oral every 24 hours      HEME/ONC MEDICATIONS  dasatinib 140 milliGRAM(s) Oral daily  methotrexate PF IntraThecal (eMAR) 12 milliGRAM(s) IntraThecal once      STANDING MEDICATIONS  ascorbic acid 500 milliGRAM(s) Oral daily  chlorhexidine 4% Liquid 1 Application(s) Topical <User Schedule>  cyanocobalamin 1000 MICROGram(s) Oral daily  dexAMETHasone  IVPB 16 milliGRAM(s) IV Intermittent daily  dextrose 5%. 1000 milliLiter(s) IV Continuous <Continuous>  dextrose 5%. 1000 milliLiter(s) IV Continuous <Continuous>  dextrose 50% Injectable 25 Gram(s) IV Push once  dextrose 50% Injectable 12.5 Gram(s) IV Push once  diltiazem    milliGRAM(s) Oral daily  glucagon  Injectable 1 milliGRAM(s) IntraMuscular once  hydrochlorothiazide 25 milliGRAM(s) Oral daily  hydrocortisone 2.5% Rectal Cream 1 Application(s) Rectal two times a day  insulin lispro (ADMELOG) corrective regimen sliding scale   SubCutaneous three times a day before meals  melatonin 5 milliGRAM(s) Oral at bedtime  pantoprazole    Tablet 40 milliGRAM(s) Oral two times a day  sodium chloride 0.9%. 1000 milliLiter(s) IV Continuous <Continuous>  sucralfate suspension 1 Gram(s) Oral every 6 hours  valsartan 320 milliGRAM(s) Oral daily  zinc oxide 40% Paste 1 Application(s) Topical two times a day      PRN MEDICATIONS  acetaminophen     Tablet .. 650 milliGRAM(s) Oral every 6 hours PRN  aluminum hydroxide/magnesium hydroxide/simethicone Suspension 30 milliLiter(s) Oral every 4 hours PRN  dextrose Oral Gel 15 Gram(s) Oral once PRN  loperamide 2 milliGRAM(s) Oral three times a day PRN  ondansetron Injectable 8 milliGRAM(s) IV Push every 8 hours PRN  sodium chloride 0.9% lock flush 10 milliLiter(s) IV Push every 1 hour PRN        Vital Signs Last 24 Hrs  T(C): 36.7 (09 Feb 2023 05:00), Max: 36.9 (08 Feb 2023 09:40)  T(F): 98.1 (09 Feb 2023 05:00), Max: 98.5 (08 Feb 2023 09:40)  HR: 74 (09 Feb 2023 05:00) (72 - 85)  BP: 120/78 (09 Feb 2023 05:00) (114/66 - 144/76)  BP(mean): --  RR: 18 (09 Feb 2023 05:00) (17 - 18)  SpO2: 97% (09 Feb 2023 05:00) (95% - 98%)    Parameters below as of 09 Feb 2023 05:00  Patient On (Oxygen Delivery Method): room air        PHYSICAL EXAM  General: NAD  HEENT: PERRLA, EOMOI, clear oropharynx  CV: (+) S1/S2 RRR  Lungs: clear to auscultation, no wheezes or rales  Abdomen: soft, non-tender, non-distended (+) BS  Ext: no clubbing, cyanosis or edema  Skin: no rashes and no petechiae  Neuro: alert and oriented X 3, no focal deficits  Central Line: CDI               LABS:                        8.2    0.92  )-----------( 70       ( 09 Feb 2023 00:24 )             24.5         Mean Cell Volume : 89.1 fl  Mean Cell Hemoglobin : 29.8 pg  Mean Cell Hemoglobin Concentration : 33.5 gm/dL  Auto Neutrophil # : x  Auto Lymphocyte # : x  Auto Monocyte # : x  Auto Eosinophil # : x  Auto Basophil # : x  Auto Neutrophil % : x  Auto Lymphocyte % : x  Auto Monocyte % : x  Auto Eosinophil % : x  Auto Basophil % : x      02-09    140  |  105  |  8   ----------------------------<  151<H>  3.4<L>   |  25  |  0.59    Ca    8.5      09 Feb 2023 06:25  Phos  3.2     02-09  Mg     1.8     02-09    TPro  5.9<L>  /  Alb  3.6  /  TBili  0.4  /  DBili  x   /  AST  23  /  ALT  51<H>  /  AlkPhos  84  02-09      Mg 1.8  Phos 3.2  Mg 1.8  Phos 2.3      PT/INR - ( 08 Feb 2023 13:53 )   PT: 12.0 sec;   INR: 1.04 ratio         PTT - ( 08 Feb 2023 13:53 )  PTT:27.9 sec      Uric Acid 2.3      Uric Acid 2.2        RECENT CULTURES:      RADIOLOGY & ADDITIONAL STUDIES:         Diagnosis: newly diagnosed B-ALL, Ph (+)    Protocol/Chemo Regimen: COURSE II A  Following 10701 (Decadron days 1-7 + Dasatinib),   Day: 2     Pt endorsed: decreased appetite       Review of Systems: Patient denied nausea, vomiting, odynophagia, chest pain, cough, dyspnea, abdominal pain, constipation, diarrhea, rash, fatigue, headache      Pain scale: Denies    Diet: regular consistent carbo with evening snacks, Banatrol BID Ensure Clear TID     Allergies: No Known Allergies      ANTIMICROBIALS  acyclovir   Oral Tab/Cap 400 milliGRAM(s) Oral every 8 hours  caspofungin IVPB 50 milliGRAM(s) IV Intermittent every 24 hours  levoFLOXacin  Tablet 500 milliGRAM(s) Oral every 24 hours      HEME/ONC MEDICATIONS  dasatinib 140 milliGRAM(s) Oral daily  methotrexate PF IntraThecal (eMAR) 12 milliGRAM(s) IntraThecal once      STANDING MEDICATIONS  ascorbic acid 500 milliGRAM(s) Oral daily  chlorhexidine 4% Liquid 1 Application(s) Topical <User Schedule>  cyanocobalamin 1000 MICROGram(s) Oral daily  dexAMETHasone  IVPB 16 milliGRAM(s) IV Intermittent daily  dextrose 5%. 1000 milliLiter(s) IV Continuous <Continuous>  dextrose 5%. 1000 milliLiter(s) IV Continuous <Continuous>  dextrose 50% Injectable 25 Gram(s) IV Push once  dextrose 50% Injectable 12.5 Gram(s) IV Push once  diltiazem    milliGRAM(s) Oral daily  glucagon  Injectable 1 milliGRAM(s) IntraMuscular once  hydrochlorothiazide 25 milliGRAM(s) Oral daily  hydrocortisone 2.5% Rectal Cream 1 Application(s) Rectal two times a day  insulin lispro (ADMELOG) corrective regimen sliding scale   SubCutaneous three times a day before meals  melatonin 5 milliGRAM(s) Oral at bedtime  pantoprazole    Tablet 40 milliGRAM(s) Oral two times a day  sodium chloride 0.9%. 1000 milliLiter(s) IV Continuous <Continuous>  sucralfate suspension 1 Gram(s) Oral every 6 hours  valsartan 320 milliGRAM(s) Oral daily  zinc oxide 40% Paste 1 Application(s) Topical two times a day      PRN MEDICATIONS  acetaminophen     Tablet .. 650 milliGRAM(s) Oral every 6 hours PRN  aluminum hydroxide/magnesium hydroxide/simethicone Suspension 30 milliLiter(s) Oral every 4 hours PRN  dextrose Oral Gel 15 Gram(s) Oral once PRN  loperamide 2 milliGRAM(s) Oral three times a day PRN  ondansetron Injectable 8 milliGRAM(s) IV Push every 8 hours PRN  sodium chloride 0.9% lock flush 10 milliLiter(s) IV Push every 1 hour PRN      Vital Signs Last 24 Hrs  T(C): 36.7 (09 Feb 2023 05:00), Max: 36.9 (08 Feb 2023 09:40)  T(F): 98.1 (09 Feb 2023 05:00), Max: 98.5 (08 Feb 2023 09:40)  HR: 74 (09 Feb 2023 05:00) (72 - 85)  BP: 120/78 (09 Feb 2023 05:00) (114/66 - 144/76)  BP(mean): --  RR: 18 (09 Feb 2023 05:00) (17 - 18)  SpO2: 97% (09 Feb 2023 05:00) (95% - 98%)    Parameters below as of 09 Feb 2023 05:00  Patient On (Oxygen Delivery Method): room air      PHYSICAL EXAM  General: NAD  HEENT: PERRLA, EOMOI, clear oropharynx  CV: (+) S1/S2 RRR  Lungs: clear to auscultation, no wheezes or rales  Abdomen: soft, non-tender, non-distended (+) BS  Ext: no clubbing, cyanosis or edema  Skin: no rashes and no petechiae  Neuro: alert and oriented X 3, no focal deficits  Central Line: CDI         LABS:                        8.1    0.58  )-----------( 84       ( 09 Feb 2023 06:23 )             24.1         Mean Cell Volume : 88.3 fl  Mean Cell Hemoglobin : 29.7 pg  Mean Cell Hemoglobin Concentration : 33.6 gm/dL  Auto Neutrophil # : 0.38 K/uL  Auto Lymphocyte # : 0.18 K/uL  Auto Monocyte # : 0.02 K/uL  Auto Eosinophil # : 0.00 K/uL  Auto Basophil # : 0.00 K/uL  Auto Neutrophil % : 66.0 %  Auto Lymphocyte % : 30.3 %  Auto Monocyte % : 3.7 %  Auto Eosinophil % : 0.0 %  Auto Basophil % : 0.0 %      02-09    140  |  105  |  8   ----------------------------<  151<H>  3.4<L>   |  25  |  0.59    Ca    8.5      09 Feb 2023 06:25  Phos  3.2     02-09  Mg     1.8     02-09    TPro  5.9<L>  /  Alb  3.6  /  TBili  0.4  /  DBili  x   /  AST  23  /  ALT  51<H>  /  AlkPhos  84  02-09      PT/INR - ( 09 Feb 2023 06:25 )   PT: 11.3 sec;   INR: 0.98 ratio         PTT - ( 09 Feb 2023 06:25 )  PTT:28.1 sec      Uric Acid 2.3        RADIOLOGY & ADDITIONAL STUDIES:    < from: MR Abdomen w/wo IV Cont (02.09.23 @ 10:38) >  IMPRESSION:  Left upper pole renal lesion measuring 1.4 cm, as described, and   suspicious for renal cell carcinoma.  Findings of iron deposition within the spleen.  Scattered small liver cysts. No suspicious liver lesion.  Small right and trace left pleural effusions.      < from: CT Abdomen and Pelvis w/ Oral Cont and w/ IV Cont (02.04.23 @ 18:24) >  IMPRESSION:  1.4 cm heterogeneous region within the peripheral upper pole of the left   kidney that has increased in size since 12/6/2019 and is concerning for   renal cell carcinoma.      < from: Xray Chest 1 View- PORTABLE-Urgent (Xray Chest 1 View- PORTABLE-Urgent .) (01.16.23 @ 13:16) >  Impression:  Right PICC terminating within the distal SVC.   (4) no limitation

## 2023-02-10 ENCOUNTER — APPOINTMENT (OUTPATIENT)
Dept: HEMATOLOGY ONCOLOGY | Facility: CLINIC | Age: 83
End: 2023-02-10

## 2023-02-10 ENCOUNTER — OUTPATIENT (OUTPATIENT)
Dept: OUTPATIENT SERVICES | Facility: HOSPITAL | Age: 83
LOS: 1 days | End: 2023-02-10
Payer: MEDICARE

## 2023-02-10 ENCOUNTER — TRANSCRIPTION ENCOUNTER (OUTPATIENT)
Age: 83
End: 2023-02-10

## 2023-02-10 ENCOUNTER — OUTPATIENT (OUTPATIENT)
Dept: OUTPATIENT SERVICES | Facility: HOSPITAL | Age: 83
LOS: 1 days | Discharge: HOME | End: 2023-02-10

## 2023-02-10 VITALS
TEMPERATURE: 98 F | SYSTOLIC BLOOD PRESSURE: 124 MMHG | RESPIRATION RATE: 18 BRPM | HEART RATE: 75 BPM | OXYGEN SATURATION: 97 % | DIASTOLIC BLOOD PRESSURE: 72 MMHG

## 2023-02-10 DIAGNOSIS — Z95.2 PRESENCE OF PROSTHETIC HEART VALVE: Chronic | ICD-10-CM

## 2023-02-10 DIAGNOSIS — D64.9 ANEMIA, UNSPECIFIED: ICD-10-CM

## 2023-02-10 DIAGNOSIS — Z98.891 HISTORY OF UTERINE SCAR FROM PREVIOUS SURGERY: Chronic | ICD-10-CM

## 2023-02-10 DIAGNOSIS — D69.1 QUALITATIVE PLATELET DEFECTS: ICD-10-CM

## 2023-02-10 LAB
ALBUMIN SERPL ELPH-MCNC: 3.5 G/DL — SIGNIFICANT CHANGE UP (ref 3.3–5)
ALP SERPL-CCNC: 71 U/L — SIGNIFICANT CHANGE UP (ref 40–120)
ALT FLD-CCNC: 41 U/L — SIGNIFICANT CHANGE UP (ref 10–45)
ANION GAP SERPL CALC-SCNC: 6 MMOL/L — SIGNIFICANT CHANGE UP (ref 5–17)
APTT BLD: 26.3 SEC — LOW (ref 27.5–35.5)
AST SERPL-CCNC: 19 U/L — SIGNIFICANT CHANGE UP (ref 10–40)
BASOPHILS # BLD AUTO: 0 K/UL — SIGNIFICANT CHANGE UP (ref 0–0.2)
BASOPHILS NFR BLD AUTO: 0 % — SIGNIFICANT CHANGE UP (ref 0–2)
BILIRUB SERPL-MCNC: 0.3 MG/DL — SIGNIFICANT CHANGE UP (ref 0.2–1.2)
BLD GP AB SCN SERPL QL: NEGATIVE — SIGNIFICANT CHANGE UP
BUN SERPL-MCNC: 17 MG/DL — SIGNIFICANT CHANGE UP (ref 7–23)
CALCIUM SERPL-MCNC: 8.1 MG/DL — LOW (ref 8.4–10.5)
CHLORIDE SERPL-SCNC: 106 MMOL/L — SIGNIFICANT CHANGE UP (ref 96–108)
CO2 SERPL-SCNC: 27 MMOL/L — SIGNIFICANT CHANGE UP (ref 22–31)
CREAT SERPL-MCNC: 0.72 MG/DL — SIGNIFICANT CHANGE UP (ref 0.5–1.3)
EGFR: 84 ML/MIN/1.73M2 — SIGNIFICANT CHANGE UP
EOSINOPHIL # BLD AUTO: 0 K/UL — SIGNIFICANT CHANGE UP (ref 0–0.5)
EOSINOPHIL NFR BLD AUTO: 0 % — SIGNIFICANT CHANGE UP (ref 0–6)
GLUCOSE BLDC GLUCOMTR-MCNC: 150 MG/DL — HIGH (ref 70–99)
GLUCOSE BLDC GLUCOMTR-MCNC: 165 MG/DL — HIGH (ref 70–99)
GLUCOSE SERPL-MCNC: 150 MG/DL — HIGH (ref 70–99)
HCT VFR BLD CALC: 20.8 % — CRITICAL LOW (ref 34.5–45)
HGB BLD-MCNC: 6.8 G/DL — CRITICAL LOW (ref 11.5–15.5)
INR BLD: 1.01 RATIO — SIGNIFICANT CHANGE UP (ref 0.88–1.16)
LDH SERPL L TO P-CCNC: 293 U/L — HIGH (ref 50–242)
LYMPHOCYTES # BLD AUTO: 0.16 K/UL — LOW (ref 1–3.3)
LYMPHOCYTES # BLD AUTO: 9.9 % — LOW (ref 13–44)
MAGNESIUM SERPL-MCNC: 1.9 MG/DL — SIGNIFICANT CHANGE UP (ref 1.6–2.6)
MANUAL SMEAR VERIFICATION: SIGNIFICANT CHANGE UP
MCHC RBC-ENTMCNC: 29.1 PG — SIGNIFICANT CHANGE UP (ref 27–34)
MCHC RBC-ENTMCNC: 32.7 GM/DL — SIGNIFICANT CHANGE UP (ref 32–36)
MCV RBC AUTO: 88.9 FL — SIGNIFICANT CHANGE UP (ref 80–100)
MONOCYTES # BLD AUTO: 0.1 K/UL — SIGNIFICANT CHANGE UP (ref 0–0.9)
MONOCYTES NFR BLD AUTO: 6.3 % — SIGNIFICANT CHANGE UP (ref 2–14)
NEUTROPHILS # BLD AUTO: 1.38 K/UL — LOW (ref 1.8–7.4)
NEUTROPHILS NFR BLD AUTO: 83.8 % — HIGH (ref 43–77)
PHOSPHATE SERPL-MCNC: 2.5 MG/DL — SIGNIFICANT CHANGE UP (ref 2.5–4.5)
PLAT MORPH BLD: NORMAL — SIGNIFICANT CHANGE UP
PLATELET # BLD AUTO: 84 K/UL — LOW (ref 150–400)
POTASSIUM SERPL-MCNC: 3.7 MMOL/L — SIGNIFICANT CHANGE UP (ref 3.5–5.3)
POTASSIUM SERPL-SCNC: 3.7 MMOL/L — SIGNIFICANT CHANGE UP (ref 3.5–5.3)
PROT SERPL-MCNC: 5.5 G/DL — LOW (ref 6–8.3)
PROTHROM AB SERPL-ACNC: 11.6 SEC — SIGNIFICANT CHANGE UP (ref 10.5–13.4)
RBC # BLD: 2.34 M/UL — LOW (ref 3.8–5.2)
RBC # FLD: 15.2 % — HIGH (ref 10.3–14.5)
RBC BLD AUTO: SIGNIFICANT CHANGE UP
RH IG SCN BLD-IMP: POSITIVE — SIGNIFICANT CHANGE UP
SODIUM SERPL-SCNC: 139 MMOL/L — SIGNIFICANT CHANGE UP (ref 135–145)
URATE SERPL-MCNC: 2.6 MG/DL — SIGNIFICANT CHANGE UP (ref 2.5–7)
WBC # BLD: 1.65 K/UL — LOW (ref 3.8–10.5)
WBC # FLD AUTO: 1.65 K/UL — LOW (ref 3.8–10.5)

## 2023-02-10 PROCEDURE — 82150 ASSAY OF AMYLASE: CPT

## 2023-02-10 PROCEDURE — 87045 FECES CULTURE AEROBIC BACT: CPT

## 2023-02-10 PROCEDURE — 71045 X-RAY EXAM CHEST 1 VIEW: CPT

## 2023-02-10 PROCEDURE — 88264 CHROMOSOME ANALYSIS 20-25: CPT

## 2023-02-10 PROCEDURE — 74183 MRI ABD W/O CNTR FLWD CNTR: CPT

## 2023-02-10 PROCEDURE — 81207 BCR/ABL1 GENE MINOR BP: CPT

## 2023-02-10 PROCEDURE — 86901 BLOOD TYPING SEROLOGIC RH(D): CPT

## 2023-02-10 PROCEDURE — 82962 GLUCOSE BLOOD TEST: CPT

## 2023-02-10 PROCEDURE — 76376 3D RENDER W/INTRP POSTPROCES: CPT

## 2023-02-10 PROCEDURE — 87205 SMEAR GRAM STAIN: CPT

## 2023-02-10 PROCEDURE — 62329 THER SPI PNXR CSF FLUOR/CT: CPT

## 2023-02-10 PROCEDURE — 81246 FLT3 GENE ANALYSIS: CPT

## 2023-02-10 PROCEDURE — 36415 COLL VENOUS BLD VENIPUNCTURE: CPT

## 2023-02-10 PROCEDURE — A9585: CPT

## 2023-02-10 PROCEDURE — 85384 FIBRINOGEN ACTIVITY: CPT

## 2023-02-10 PROCEDURE — P9037: CPT

## 2023-02-10 PROCEDURE — 81245 FLT3 GENE: CPT

## 2023-02-10 PROCEDURE — 36430 TRANSFUSION BLD/BLD COMPNT: CPT

## 2023-02-10 PROCEDURE — 82955 ASSAY OF G6PD ENZYME: CPT

## 2023-02-10 PROCEDURE — 82945 GLUCOSE OTHER FLUID: CPT

## 2023-02-10 PROCEDURE — 83010 ASSAY OF HAPTOGLOBIN QUANT: CPT

## 2023-02-10 PROCEDURE — 83615 LACTATE (LD) (LDH) ENZYME: CPT

## 2023-02-10 PROCEDURE — 81342 TRG GENE REARRANGEMENT ANAL: CPT

## 2023-02-10 PROCEDURE — 88305 TISSUE EXAM BY PATHOLOGIST: CPT

## 2023-02-10 PROCEDURE — 87507 IADNA-DNA/RNA PROBE TQ 12-25: CPT

## 2023-02-10 PROCEDURE — 80048 BASIC METABOLIC PNL TOTAL CA: CPT

## 2023-02-10 PROCEDURE — 84132 ASSAY OF SERUM POTASSIUM: CPT

## 2023-02-10 PROCEDURE — 83690 ASSAY OF LIPASE: CPT

## 2023-02-10 PROCEDURE — 93356 MYOCRD STRAIN IMG SPCKL TRCK: CPT

## 2023-02-10 PROCEDURE — 88342 IMHCHEM/IMCYTCHM 1ST ANTB: CPT

## 2023-02-10 PROCEDURE — P9011: CPT

## 2023-02-10 PROCEDURE — 93306 TTE W/DOPPLER COMPLETE: CPT

## 2023-02-10 PROCEDURE — 74177 CT ABD & PELVIS W/CONTRAST: CPT

## 2023-02-10 PROCEDURE — 88319 ENZYME HISTOCHEMISTRY: CPT

## 2023-02-10 PROCEDURE — 88341 IMHCHEM/IMCYTCHM EA ADD ANTB: CPT

## 2023-02-10 PROCEDURE — 85027 COMPLETE CBC AUTOMATED: CPT

## 2023-02-10 PROCEDURE — P9040: CPT

## 2023-02-10 PROCEDURE — 88275 CYTOGENETICS 100-300: CPT

## 2023-02-10 PROCEDURE — 86900 BLOOD TYPING SEROLOGIC ABO: CPT

## 2023-02-10 PROCEDURE — 81340 TRB@ GENE REARRANGE AMPLIFY: CPT

## 2023-02-10 PROCEDURE — C1751: CPT

## 2023-02-10 PROCEDURE — 86985 SPLIT BLOOD OR PRODUCTS: CPT

## 2023-02-10 PROCEDURE — 87077 CULTURE AEROBIC IDENTIFY: CPT

## 2023-02-10 PROCEDURE — 85049 AUTOMATED PLATELET COUNT: CPT

## 2023-02-10 PROCEDURE — 81206 BCR/ABL1 GENE MAJOR BP: CPT

## 2023-02-10 PROCEDURE — 99233 SBSQ HOSP IP/OBS HIGH 50: CPT

## 2023-02-10 PROCEDURE — 36569 INSJ PICC 5 YR+ W/O IMAGING: CPT

## 2023-02-10 PROCEDURE — 81270 JAK2 GENE: CPT

## 2023-02-10 PROCEDURE — 88185 FLOWCYTOMETRY/TC ADD-ON: CPT

## 2023-02-10 PROCEDURE — 89051 BODY FLUID CELL COUNT: CPT

## 2023-02-10 PROCEDURE — 88285 CHROMOSOME COUNT ADDITIONAL: CPT

## 2023-02-10 PROCEDURE — 84100 ASSAY OF PHOSPHORUS: CPT

## 2023-02-10 PROCEDURE — 81264 IGK REARRANGEABN CLONAL POP: CPT

## 2023-02-10 PROCEDURE — 85379 FIBRIN DEGRADATION QUANT: CPT

## 2023-02-10 PROCEDURE — 87324 CLOSTRIDIUM AG IA: CPT

## 2023-02-10 PROCEDURE — 82272 OCCULT BLD FECES 1-3 TESTS: CPT

## 2023-02-10 PROCEDURE — 87449 NOS EACH ORGANISM AG IA: CPT

## 2023-02-10 PROCEDURE — 83735 ASSAY OF MAGNESIUM: CPT

## 2023-02-10 PROCEDURE — 88237 TISSUE CULTURE BONE MARROW: CPT

## 2023-02-10 PROCEDURE — 86965 POOLING BLOOD PLATELETS: CPT

## 2023-02-10 PROCEDURE — 88271 CYTOGENETICS DNA PROBE: CPT

## 2023-02-10 PROCEDURE — 84550 ASSAY OF BLOOD/URIC ACID: CPT

## 2023-02-10 PROCEDURE — 88280 CHROMOSOME KARYOTYPE STUDY: CPT

## 2023-02-10 PROCEDURE — 81261 IGH GENE REARRANGE AMP METH: CPT

## 2023-02-10 PROCEDURE — P9100: CPT

## 2023-02-10 PROCEDURE — 80053 COMPREHEN METABOLIC PANEL: CPT

## 2023-02-10 PROCEDURE — 85097 BONE MARROW INTERPRETATION: CPT

## 2023-02-10 PROCEDURE — 87046 STOOL CULTR AEROBIC BACT EA: CPT

## 2023-02-10 PROCEDURE — 85610 PROTHROMBIN TIME: CPT

## 2023-02-10 PROCEDURE — 85730 THROMBOPLASTIN TIME PARTIAL: CPT

## 2023-02-10 PROCEDURE — 70450 CT HEAD/BRAIN W/O DYE: CPT

## 2023-02-10 PROCEDURE — P9012: CPT

## 2023-02-10 PROCEDURE — P9073: CPT

## 2023-02-10 PROCEDURE — 86923 COMPATIBILITY TEST ELECTRIC: CPT

## 2023-02-10 PROCEDURE — 85025 COMPLETE CBC W/AUTO DIFF WBC: CPT

## 2023-02-10 PROCEDURE — 88313 SPECIAL STAINS GROUP 2: CPT

## 2023-02-10 PROCEDURE — 84157 ASSAY OF PROTEIN OTHER: CPT

## 2023-02-10 PROCEDURE — 93005 ELECTROCARDIOGRAM TRACING: CPT

## 2023-02-10 PROCEDURE — 86850 RBC ANTIBODY SCREEN: CPT

## 2023-02-10 RX ORDER — WARFARIN SODIUM 2.5 MG/1
1 TABLET ORAL
Qty: 0 | Refills: 0 | DISCHARGE

## 2023-02-10 RX ORDER — DEXAMETHASONE 0.5 MG/5ML
4 ELIXIR ORAL
Qty: 16 | Refills: 0
Start: 2023-02-10 | End: 2023-02-13

## 2023-02-10 RX ORDER — METOCLOPRAMIDE HCL 10 MG
1 TABLET ORAL
Qty: 120 | Refills: 0
Start: 2023-02-10 | End: 2023-03-11

## 2023-02-10 RX ORDER — VALSARTAN 80 MG/1
1 TABLET ORAL
Qty: 0 | Refills: 0 | DISCHARGE
Start: 2023-02-10

## 2023-02-10 RX ORDER — OMEPRAZOLE 10 MG/1
1 CAPSULE, DELAYED RELEASE ORAL
Qty: 0 | Refills: 0 | DISCHARGE

## 2023-02-10 RX ORDER — FAMOTIDINE 10 MG/ML
1 INJECTION INTRAVENOUS
Qty: 60 | Refills: 0
Start: 2023-02-10 | End: 2023-03-11

## 2023-02-10 RX ORDER — FERROUS SULFATE 325(65) MG
1 TABLET ORAL
Qty: 0 | Refills: 0 | DISCHARGE

## 2023-02-10 RX ORDER — THIAMINE MONONITRATE (VIT B1) 100 MG
2 TABLET ORAL
Qty: 0 | Refills: 0 | DISCHARGE

## 2023-02-10 RX ORDER — DILTIAZEM HCL 120 MG
1 CAPSULE, EXT RELEASE 24 HR ORAL
Qty: 0 | Refills: 0 | DISCHARGE

## 2023-02-10 RX ORDER — DASATINIB 80 MG/1
1 TABLET ORAL
Qty: 30 | Refills: 0
Start: 2023-02-10 | End: 2023-03-11

## 2023-02-10 RX ORDER — SUCRALFATE 1 G
1 TABLET ORAL
Qty: 120 | Refills: 0
Start: 2023-02-10 | End: 2023-03-11

## 2023-02-10 RX ADMIN — Medication 1 GRAM(S): at 11:17

## 2023-02-10 RX ADMIN — DASATINIB 140 MILLIGRAM(S): 80 TABLET ORAL at 11:18

## 2023-02-10 RX ADMIN — CHLORHEXIDINE GLUCONATE 1 APPLICATION(S): 213 SOLUTION TOPICAL at 08:52

## 2023-02-10 RX ADMIN — ZINC OXIDE 1 APPLICATION(S): 200 OINTMENT TOPICAL at 05:47

## 2023-02-10 RX ADMIN — Medication 400 MILLIGRAM(S): at 14:19

## 2023-02-10 RX ADMIN — Medication 1 APPLICATION(S): at 05:47

## 2023-02-10 RX ADMIN — Medication 1: at 12:38

## 2023-02-10 RX ADMIN — Medication 400 MILLIGRAM(S): at 05:46

## 2023-02-10 RX ADMIN — PREGABALIN 1000 MICROGRAM(S): 225 CAPSULE ORAL at 11:19

## 2023-02-10 RX ADMIN — Medication 1 GRAM(S): at 00:08

## 2023-02-10 RX ADMIN — SODIUM CHLORIDE 50 MILLILITER(S): 9 INJECTION INTRAMUSCULAR; INTRAVENOUS; SUBCUTANEOUS at 05:48

## 2023-02-10 RX ADMIN — Medication 500 MILLIGRAM(S): at 11:18

## 2023-02-10 RX ADMIN — Medication 1 TABLET(S): at 08:48

## 2023-02-10 RX ADMIN — Medication 108 MILLIGRAM(S): at 05:48

## 2023-02-10 RX ADMIN — Medication 1 GRAM(S): at 05:47

## 2023-02-10 RX ADMIN — VALSARTAN 320 MILLIGRAM(S): 80 TABLET ORAL at 05:46

## 2023-02-10 RX ADMIN — Medication 240 MILLIGRAM(S): at 05:47

## 2023-02-10 NOTE — DISCHARGE NOTE NURSING/CASE MANAGEMENT/SOCIAL WORK - PATIENT PORTAL LINK FT
You can access the FollowMyHealth Patient Portal offered by [facility] by registering at the following website: [email or web address]. By joining [employer]’s FollowMyHealth portal, you will also be able to view your health information using other applications (apps) compatible with our system.

## 2023-02-10 NOTE — PHARMACOTHERAPY INTERVENTION NOTE - COMMENTS
Drug Availability Assistance  Reason for unavailability: High Co-pay (~$3000), pending additional financial documents for Epic Support   Assistance Provided:   - Spoke to pt regarding Epic Form. Upon further discussion with [facility], pt will not be eligible for Epic support as a [place] resident  - [facility] pt is eligible for an open cas  - Placed pt's daughter in touch with [facility] contact to facilitate cas approval, relayed information that is needed  - Update yesterday evening that pt dasatinib is approved with assistance from the cas for this fill and pharmacy is aware to fill    Case discussed with attending/primary team    Geena Lopez, PharmD, BCPS  Clinical Pharmacy Specialist | Hematology/Oncology  [facility]  Email: [email or web address] or available on [employer]

## 2023-02-10 NOTE — PROGRESS NOTE ADULT - SUBJECTIVE AND OBJECTIVE BOX
Diagnosis: newly diagnosed B-ALL, Ph (+)    Protocol/Chemo Regimen: COURSE II A  Following 10701 (Decadron days 1-7 + Dasatinib),   Day: 3     Pt endorsed: decreased appetite       Review of Systems: Patient denied nausea, vomiting, odynophagia, chest pain, cough, dyspnea, abdominal pain, constipation, diarrhea, rash, fatigue, headache      Pain scale: Denies    Diet: regular consistent carbo with evening snacks, Banatrol BID Ensure Clear TID     Allergies: No Known Allergies      ANTIMICROBIALS  acyclovir   Oral Tab/Cap 400 milliGRAM(s) Oral every 8 hours  caspofungin IVPB 50 milliGRAM(s) IV Intermittent every 24 hours  levoFLOXacin  Tablet 500 milliGRAM(s) Oral every 24 hours  trimethoprim  160 mG/sulfamethoxazole 800 mG 1 Tablet(s) Oral <User Schedule>      HEME/ONC MEDICATIONS  dasatinib 140 milliGRAM(s) Oral daily  methotrexate PF IntraThecal (eMAR) 12 milliGRAM(s) IntraThecal once      STANDING MEDICATIONS  ascorbic acid 500 milliGRAM(s) Oral daily  chlorhexidine 4% Liquid 1 Application(s) Topical <User Schedule>  cyanocobalamin 1000 MICROGram(s) Oral daily  dexAMETHasone  IVPB 16 milliGRAM(s) IV Intermittent daily  dextrose 5%. 1000 milliLiter(s) IV Continuous <Continuous>  dextrose 5%. 1000 milliLiter(s) IV Continuous <Continuous>  dextrose 50% Injectable 25 Gram(s) IV Push once  dextrose 50% Injectable 12.5 Gram(s) IV Push once  diltiazem    milliGRAM(s) Oral daily  famotidine    Tablet 20 milliGRAM(s) Oral two times a day  glucagon  Injectable 1 milliGRAM(s) IntraMuscular once  hydrochlorothiazide 25 milliGRAM(s) Oral daily  hydrocortisone 2.5% Rectal Cream 1 Application(s) Rectal two times a day  insulin lispro (ADMELOG) corrective regimen sliding scale   SubCutaneous three times a day before meals  melatonin 5 milliGRAM(s) Oral at bedtime  sodium chloride 0.9%. 1000 milliLiter(s) IV Continuous <Continuous>  sucralfate suspension 1 Gram(s) Oral every 6 hours  valsartan 320 milliGRAM(s) Oral daily  zinc oxide 40% Paste 1 Application(s) Topical two times a day      PRN MEDICATIONS  acetaminophen     Tablet .. 650 milliGRAM(s) Oral every 6 hours PRN  aluminum hydroxide/magnesium hydroxide/simethicone Suspension 30 milliLiter(s) Oral every 4 hours PRN  dextrose Oral Gel 15 Gram(s) Oral once PRN  loperamide 2 milliGRAM(s) Oral three times a day PRN  ondansetron Injectable 8 milliGRAM(s) IV Push every 8 hours PRN  sodium chloride 0.9% lock flush 10 milliLiter(s) IV Push every 1 hour PRN        Vital Signs Last 24 Hrs  T(C): 36.9 (10 Feb 2023 04:48), Max: 36.9 (10 Feb 2023 04:48)  T(F): 98.5 (10 Feb 2023 04:48), Max: 98.5 (10 Feb 2023 04:48)  HR: 84 (10 Feb 2023 04:48) (76 - 88)  BP: 126/72 (10 Feb 2023 04:48) (107/68 - 130/76)  BP(mean): --  RR: 16 (10 Feb 2023 04:48) (16 - 18)  SpO2: 96% (10 Feb 2023 04:48) (96% - 98%)    Parameters below as of 10 Feb 2023 04:48  Patient On (Oxygen Delivery Method): room air        PHYSICAL EXAM  General: NAD  HEENT: PERRLA, EOMOI, clear oropharynx  CV: (+) S1/S2 RRR  Lungs: clear to auscultation, no wheezes or rales  Abdomen: soft, non-tender, non-distended (+) BS  Ext: no clubbing, cyanosis or edema  Skin: no rashes and no petechiae  Neuro: alert and oriented X 3, no focal deficits  Central Line: CDI               RECENT CULTURES:      RADIOLOGY & ADDITIONAL STUDIES:      < from: MR Abdomen w/wo IV Cont (02.09.23 @ 10:38) >  IMPRESSION:  Left upper pole renal lesion measuring 1.4 cm, as described, and   suspicious for renal cell carcinoma.  Findings of iron deposition within the spleen.  Scattered small liver cysts. No suspicious liver lesion.  Small right and trace left pleural effusions.      < from: CT Abdomen and Pelvis w/ Oral Cont and w/ IV Cont (02.04.23 @ 18:24) >  IMPRESSION:  1.4 cm heterogeneous region within the peripheral upper pole of the left   kidney that has increased in size since 12/6/2019 and is concerning for   renal cell carcinoma.      < from: Xray Chest 1 View- PORTABLE-Urgent (Xray Chest 1 View- PORTABLE-Urgent .) (01.16.23 @ 13:16) >  Impression:  Right PICC terminating within the distal SVC.       Diagnosis: newly diagnosed B-ALL, Ph (+)    Protocol/Chemo Regimen: COURSE II A  Following 10701 (Decadron days 1-7 + Dasatinib),   Day: 3     Pt endorsed: decreased appetite, improved        Review of Systems: Patient denied nausea, vomiting, odynophagia, chest pain, cough, dyspnea, abdominal pain, constipation, diarrhea, rash, fatigue, headache      Pain scale: Denies    Diet: regular consistent carbo with evening snacks, Banatrol BID Ensure Clear TID     Allergies: No Known Allergies      ANTIMICROBIALS  acyclovir   Oral Tab/Cap 400 milliGRAM(s) Oral every 8 hours  caspofungin IVPB 50 milliGRAM(s) IV Intermittent every 24 hours  levoFLOXacin  Tablet 500 milliGRAM(s) Oral every 24 hours  trimethoprim  160 mG/sulfamethoxazole 800 mG 1 Tablet(s) Oral <User Schedule>      HEME/ONC MEDICATIONS  dasatinib 140 milliGRAM(s) Oral daily  methotrexate PF IntraThecal (eMAR) 12 milliGRAM(s) IntraThecal once      STANDING MEDICATIONS  ascorbic acid 500 milliGRAM(s) Oral daily  chlorhexidine 4% Liquid 1 Application(s) Topical <User Schedule>  cyanocobalamin 1000 MICROGram(s) Oral daily  dexAMETHasone  IVPB 16 milliGRAM(s) IV Intermittent daily  dextrose 5%. 1000 milliLiter(s) IV Continuous <Continuous>  dextrose 5%. 1000 milliLiter(s) IV Continuous <Continuous>  dextrose 50% Injectable 25 Gram(s) IV Push once  dextrose 50% Injectable 12.5 Gram(s) IV Push once  diltiazem    milliGRAM(s) Oral daily  famotidine    Tablet 20 milliGRAM(s) Oral two times a day  glucagon  Injectable 1 milliGRAM(s) IntraMuscular once  hydrochlorothiazide 25 milliGRAM(s) Oral daily  hydrocortisone 2.5% Rectal Cream 1 Application(s) Rectal two times a day  insulin lispro (ADMELOG) corrective regimen sliding scale   SubCutaneous three times a day before meals  melatonin 5 milliGRAM(s) Oral at bedtime  sodium chloride 0.9%. 1000 milliLiter(s) IV Continuous <Continuous>  sucralfate suspension 1 Gram(s) Oral every 6 hours  valsartan 320 milliGRAM(s) Oral daily  zinc oxide 40% Paste 1 Application(s) Topical two times a day      PRN MEDICATIONS  acetaminophen     Tablet .. 650 milliGRAM(s) Oral every 6 hours PRN  aluminum hydroxide/magnesium hydroxide/simethicone Suspension 30 milliLiter(s) Oral every 4 hours PRN  dextrose Oral Gel 15 Gram(s) Oral once PRN  loperamide 2 milliGRAM(s) Oral three times a day PRN  ondansetron Injectable 8 milliGRAM(s) IV Push every 8 hours PRN  sodium chloride 0.9% lock flush 10 milliLiter(s) IV Push every 1 hour PRN        Vital Signs Last 24 Hrs  T(C): 36.9 (10 Feb 2023 04:48), Max: 36.9 (10 Feb 2023 04:48)  T(F): 98.5 (10 Feb 2023 04:48), Max: 98.5 (10 Feb 2023 04:48)  HR: 84 (10 Feb 2023 04:48) (76 - 88)  BP: 126/72 (10 Feb 2023 04:48) (107/68 - 130/76)  BP(mean): --  RR: 16 (10 Feb 2023 04:48) (16 - 18)  SpO2: 96% (10 Feb 2023 04:48) (96% - 98%)    Parameters below as of 10 Feb 2023 04:48  Patient On (Oxygen Delivery Method): room air        PHYSICAL EXAM  General: NAD  HEENT: PERRLA, EOMOI, clear oropharynx  CV: (+) S1/S2 RRR  Lungs: clear to auscultation, no wheezes or rales  Abdomen: soft, non-tender, non-distended (+) BS  Ext: no clubbing, cyanosis or edema  Skin: no rashes and no petechiae  Neuro: alert and oriented X 3, no focal deficits  Central Line: CDI       LABS:                        6.8    1.65  )-----------( 84       ( 10 Feb 2023 06:53 )             20.8         Mean Cell Volume : 88.9 fl  Mean Cell Hemoglobin : 29.1 pg  Mean Cell Hemoglobin Concentration : 32.7 gm/dL  Auto Neutrophil # : 1.38 K/uL  Auto Lymphocyte # : 0.16 K/uL  Auto Monocyte # : 0.10 K/uL  Auto Eosinophil # : 0.00 K/uL  Auto Basophil # : 0.00 K/uL  Auto Neutrophil % : 83.8 %  Auto Lymphocyte % : 9.9 %  Auto Monocyte % : 6.3 %  Auto Eosinophil % : 0.0 %  Auto Basophil % : 0.0 %      02-10    139  |  106  |  17  ----------------------------<  150<H>  3.7   |  27  |  0.72    Ca    8.1<L>      10 Feb 2023 06:53  Phos  2.5     02-10  Mg     1.9     02-10    TPro  5.5<L>  /  Alb  3.5  /  TBili  0.3  /  DBili  x   /  AST  19  /  ALT  41  /  AlkPhos  71  02-10      PT/INR - ( 10 Feb 2023 06:53 )   PT: 11.6 sec;   INR: 1.01 ratio         PTT - ( 10 Feb 2023 06:53 )  PTT:26.3 sec      Uric Acid 2.6        RADIOLOGY & ADDITIONAL STUDIES:      < from: MR Abdomen w/wo IV Cont (02.09.23 @ 10:38) >  IMPRESSION:  Left upper pole renal lesion measuring 1.4 cm, as described, and   suspicious for renal cell carcinoma.  Findings of iron deposition within the spleen.  Scattered small liver cysts. No suspicious liver lesion.  Small right and trace left pleural effusions.      < from: CT Abdomen and Pelvis w/ Oral Cont and w/ IV Cont (02.04.23 @ 18:24) >  IMPRESSION:  1.4 cm heterogeneous region within the peripheral upper pole of the left   kidney that has increased in size since 12/6/2019 and is concerning for   renal cell carcinoma.      < from: Xray Chest 1 View- PORTABLE-Urgent (Xray Chest 1 View- PORTABLE-Urgent .) (01.16.23 @ 13:16) >  Impression:  Right PICC terminating within the distal SVC.

## 2023-02-10 NOTE — DISCHARGE NOTE NURSING/CASE MANAGEMENT/SOCIAL WORK - [email or web address]_ALL_CORE
For information on Fall & Injury Prevention, visit: [email or web address] OR  [email or web address] OR  https://www.cdc.gov/steadi/patient.html

## 2023-02-10 NOTE — PROGRESS NOTE ADULT - ATTENDING COMMENTS
82F with PMH of AFib (s/p Saint Darian mechanical AVR, 1994, [facility]) for rheumatic aortic valve stenosis, ascending aortic aneurysm, HTN,  HLD, depression, GERD, who presented with newly diagnosed Ph-positive B-ALL.     # Ph-positive B-ALL: Bone marrow biopsy on 1/17/23 showed 99% B-lymphoblasts. She started treatment per the CALGB 79527 protocol with steroids and dasatinib on 1/18/23. She received 1 dose of vincristine 1.5 mg on 1/25/23 for LDH plateau. She received rasburicase twice, but no current evidence of TLS. She also received cryoprecipitate several times for hypofibrinogenemia, now normal. S/p LP with IT MTX on D15 (2/1/23). CSF flow negative. Course I Day 16 bone marrow sohwed < 20% blasts. She has therapy- and disease-associated pancytopenia. Her Day  - Continue dexamethasone 20 mg D1-7 and dasatinib 140 mg daily. Today is Course II Day 2.   - Trend CBC with differential and coagulation studies daily. Supportive transfusions to maintain Hgb > 7, plt > 40 given GI bleed, and fibrinogen > 100.   - Trend TLS labs daily. Replete electrolytes PRN.   - Antimicrobial prophylaxis: levaquin, caspofungin, acyclovir. Start Bactrim.   - We have initiated her discharge planning. She has a high co-pay for dasatinib, so she is completing forms for financial assistance. She would also like to return to [place] so we have recommended a leukemia specialist at [facility] and she will reach out to schedule an appointment.     # SDH: CTH on 1/15/23 showed an 8 mm bleed, which was stable on subsequent scans. No neurologic deficits. No surgical intervention.  - Hold Coumadin   - Maintain plt > 20 per Neurosurgery    # Mechanical valve: Currently off AC, discussed with Cardiology  - Hold anticoagulation in setting of SDH and persistent thrombocytopenia    # GI bleed: Had dark stools on 2/3/23, with FOBT+.  - Trend CBC with differential and coagulation studies daily. Supportive transfusions to maintain Hgb > 7, plt > 40 given GI bleed, and fibrinogen > 100.   - Continue PPI twice daily    # Elevated transaminases: Previously elevated earlier in her admission -  (1/21/23). CT A/P showed stable subcentimeter hepatic cysts. Now improving.   - Trend LFTs daily  - Hold atorvastatin     # Renal mass: CT A/P (2/4/23) - 1.4 cm heterogeneous region within the peripheral upper pole of the left kidney that has increased in size since 12/6/2019 and is concerning for renal cell carcinoma.  - MRI A/P with IV contrast today. She can have biopsy as outpatient.     The information documented within the attending attestation was documented solely by Carri Jesus. 82F with PMH of AFib (s/p Saint Darian mechanical AVR, 1992, [facility]) for rheumatic aortic valve stenosis, ascending aortic aneurysm, HTN,  HLD, depression, GERD, who presented with newly diagnosed Ph-positive B-ALL.     # Ph-positive B-ALL: Bone marrow biopsy on 1/17/23 showed 99% B-lymphoblasts. She started treatment per the CALGB 57020 protocol with steroids and dasatinib on 1/18/23. She received 1 dose of vincristine 1.5 mg on 1/25/23 for LDH plateau. She received rasburicase twice, but no current evidence of TLS. She also received cryoprecipitate several times for hypofibrinogenemia, now normal. S/p LP with IT MTX on D15 (2/1/23). CSF flow negative. Course I Day 16 bone marrow sohwed < 20% blasts. She has therapy- and disease-associated pancytopenia. Her Day  - Continue dexamethasone 20 mg D1-7 and dasatinib 140 mg daily. Today is Course II Day 3.   - Trend CBC with differential and coagulation studies daily. Supportive transfusions to maintain Hgb > 7, plt > 40 given GI bleed, and fibrinogen > 100.   - Trend TLS labs daily. Replete electrolytes PRN.   - Antimicrobial prophylaxis: levaquin, caspofungin, acyclovir, Bactrim.   - We have initiated her discharge planning. Her dasatinib will be delivered today, and she will plan to discharge later today after pRBC transfusion. She will have a lab appointment on 2/13/23 and appointment with Dr. Joe on 2/16/23.    # SDH: CTH on 1/15/23 showed an 8 mm bleed, which was stable on subsequent scans. No neurologic deficits. No surgical intervention.  - Hold Coumadin   - Maintain plt > 20 per Neurosurgery    # Mechanical valve: Currently off AC, discussed with Cardiology  - Hold anticoagulation in setting of SDH and persistent thrombocytopenia    # GI bleed: Had dark stools on 2/3/23, with FOBT+.  - Trend CBC with differential and coagulation studies daily. Supportive transfusions to maintain Hgb > 7, plt > 40 given GI bleed, and fibrinogen > 100.   - Continue PPI twice daily    # Elevated transaminases: Previously elevated earlier in her admission -  (1/21/23). CT A/P showed stable subcentimeter hepatic cysts. Now improving.   - Trend LFTs daily  - Hold atorvastatin     # Renal mass: CT A/P (2/4/23) - 1.4 cm heterogeneous region within the peripheral upper pole of the left kidney that has increased in size since 12/6/2019 and is concerning for renal cell carcinoma. MRI A/P (2/9/23): left upper pole renal lesion measuring 1.4 cm, suspicious for renal cell carcinoma.  - She can have IR-guided biopsy as outpatient.     The information documented within the attending attestation was documented solely by Carri Jesus.

## 2023-02-10 NOTE — PROGRESS NOTE ADULT - PROBLEM SELECTOR PROBLEM 8
Palliative care encounter

## 2023-02-10 NOTE — PROGRESS NOTE ADULT - NUTRITIONAL ASSESSMENT
This patient has been assessed with a concern for Malnutrition and has been determined to have a diagnosis/diagnoses of Severe protein-calorie malnutrition.    This patient is being managed with:   Diet Consistent Carbohydrate w/Evening Snack-  Banatrol TF     Qty per Day:  2  Supplement Feeding Modality:  Oral  Ensure Clear Cans or Servings Per Day:  3       Frequency:  Daily  Entered: Jan 26 2023 10:38AM    

## 2023-02-10 NOTE — PROGRESS NOTE ADULT - PROBLEM SELECTOR PROBLEM 3
Subdural hemorrhage

## 2023-02-10 NOTE — PHARMACOTHERAPY INTERVENTION NOTE - COMMENTS
Pharmacy Discharge Teaching Note    Diagnosis:  ALL  Protocol/Chemo Regimen:  -Dexamethasone D1-7  -Dasatinib 140mg daily  (no vincristine)  Cycle: 2   Day: 3 (D1-2/8)    New/Updated Medications:  -Bactrim  -Dasatinib  -Sucralfate    Counseled patient on new discharge medications. Discussed indications, common side effects, and any special instructions. Patient/ Patient’s caregiver expresses understanding of all indications, common side effects, and any special instructions. All medication related questions have been answered at this time.     Printed teaching materials provided and reviewed from [employer] and ***[employer]       Geena Lopez, PharmD, BCPS  Clinical Pharmacy Specialist | Hematology/Oncology  [facility]  Available on [email or web address]  Email: [email or web address]

## 2023-02-10 NOTE — PROGRESS NOTE ADULT - PROVIDER SPECIALTY LIST ADULT
Intervent Radiology
Neurosurgery
Cardiology
Heme/Onc

## 2023-02-10 NOTE — PROGRESS NOTE ADULT - PROBLEM SELECTOR PROBLEM 1
B-cell acute lymphoblastic leukemia (ALL)

## 2023-02-10 NOTE — PROGRESS NOTE ADULT - PROBLEM SELECTOR PLAN 2
patient is neutropenic, afebrile  c/w levaquin, Caspofungin and acyclovir ppx   if fever, panculture, CXR and switch Levaquin to Cefepime    GI PCR (-)  1/21, 1/26 c.diff toxin negative. patient is not neutropenic, afebrile  2/10 to d/c evaquin, Caspofungin and acyclovir ppx   if fever, panculture, CXR and switch Levaquin to Cefepime    GI PCR (-)  1/21, 1/26 c.diff toxin negative.  Continue Bactrim ppx

## 2023-02-10 NOTE — PROGRESS NOTE ADULT - PROBLEM SELECTOR PLAN 9
2/3- Grade 1 transaminitis, will monitor closely, avoid hepatotoxins.  D/Brock Atorvastatin.  2/5- CTA/P-  Shows 1.4 cm heterogeneous region within the peripheral upper pole of the left   kidney that has increased in size since 12/6/2019 and is concerning for  renal cell carcinoma.  Will consult IR for bx.  2/6 - Will need MRI abd/pelvis with contrast to better evaluate renal mass.  2/7- MRI abd scheduled for 2/8 noon, has mechanical heart valve, product info sent to MRI, compatible as per MRI.  2/9 MRI abd Left upper pole renal lesion measuring 1.4 cm, as described, and suspicious for renal cell carcinoma.  IR bx and ablation next week or as outpatient

## 2023-02-10 NOTE — PROGRESS NOTE ADULT - ATTENDING SUPERVISION STATEMENT
Fellow

## 2023-02-10 NOTE — PROGRESS NOTE ADULT - ASSESSMENT
82 year old female with atrial fibrillation, s/p Saint Darian mechanical AVR, (1994–[place] [place]) for rheumatic aortic valve stenosis, ascending aortic aneurysm, HTN, HLD, depression, GERD transferred from [facility] for management of newly diagnosed PH + B cell ALL. Peripheral flow performed, BMBx consistent with B-cell ALL, FISH Ph+ treated with Dex and Dasatinib following CALGB 71627. Course complicated by hypofibrinogenemia treated with cryoprecipitate, subdural hemorrhage plt goal >20 seen by neurosurgery, GIB. pancytopenia  secondary to disease and chemo .

## 2023-02-10 NOTE — PROGRESS NOTE ADULT - PROBLEM SELECTOR PLAN 1
Flow cytometry (1/14/23) consistent with acute leukemia (B ALL), FISH detected BCR/ABL (1/19)  Monitor daily CBC with Diff/CMP and transfuse/replete PRN  Patient was on warfarin-NOW HELD for mechanical AVR (1994 [facility] [place]), atrial fibrillation  last dose of warfarin 1/13 at [facility] (5mg)-cardiology consulted.   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 Vincristine 1.5 mg on Day 8 1/25 with Elitek 3mg IV x1.   Keep Platelet count >40, GI beed, (+SDH)  FU Day 15 BMbx on 2/1 done on 2/2 showed <5% blasts.   CSF LP done on 2/1 (-)  2/8 Now started course II A Dex 16mg days 1-7 following [id number] with Dasatinib. monitor blood glucose. Will likely need to add lantus back.   CFAM to get assistance for dasatinib to help with copay.   Discharge planning. Flow cytometry (1/14/23) consistent with acute leukemia (B ALL), FISH detected BCR/ABL (1/19)  Monitor daily CBC with Diff/CMP and transfuse/replete PRN  Patient was on warfarin-NOW HELD for mechanical AVR (1994 [place] [facility] [place]), atrial fibrillation  last dose of warfarin 1/13 at [facility] (5mg)-cardiology consulted.   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 Vincristine 1.5 mg on Day 8 1/25 with Elitek 3mg IV x1.   Keep Platelet count >40, GI beed, (+SDH)  FU Day 15 BMbx on 2/1 done on 2/2 showed <5% blasts.   CSF LP done on 2/1 (-)  2/8 Now started course II A Dex 16mg days 1-7 following [id number] with Dasatinib. monitor blood glucose. Will likely need to add lantus back.   Pt received dasatinib without need for  copay.   Discharge home today Flow cytometry (1/14/23) consistent with acute leukemia (B ALL), FISH detected BCR/ABL (1/19)  Monitor daily CBC with Diff/CMP and transfuse/replete PRN  Patient was on warfarin-NOW HELD for mechanical AVR (1994 [place] [facility] [place]), atrial fibrillation  last dose of warfarin 1/13 at [facility] (5mg)-cardiology consulted.   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 Vincristine 1.5 mg on Day 8 1/25 with Elitek 3mg IV x1.   Keep Platelet count >40, GI beed, (+SDH)  FU Day 15 BMbx on 2/1 done on 2/2 showed <5% blasts.   CSF LP done on 2/1 (-)  2/8 Now started course II A Dex 16mg days 1-7 following [id number] with Dasatinib. monitor blood glucose.   Anemia: PRBC x1  Pt received dasatinib without need for  copay.   Discharge home today

## 2023-02-10 NOTE — DISCHARGE NOTE NURSING/CASE MANAGEMENT/SOCIAL WORK - NSDCFUADDAPPT_GEN_ALL_CORE_FT
To [facility] on Monday 2/13 at 1pm for blood work at satellite lab  1:20pm appointment in the treatment room, HFU with BISHOP Sharma and possible transfusion     To [facility] on  Thursday 2/16 at 2pm to see Dr Joe    Please call Dr Carlos Cope's office at [phone] to schedule biopsy and ablation of renal mass

## 2023-02-10 NOTE — PROGRESS NOTE ADULT - NS ATTEST RISK PROBLEM GEN_ALL_CORE FT
Patient has ALL and is being treated with inpatient chemotherapy, which carries a risk for infection, bleeding, and other life-threatening complications.

## 2023-02-10 NOTE — PROGRESS NOTE ADULT - REASON FOR ADMISSION
Leukocytosis, Thrombocytopenia and Anemia

## 2023-02-13 ENCOUNTER — RESULT REVIEW (OUTPATIENT)
Age: 83
End: 2023-02-13

## 2023-02-13 ENCOUNTER — APPOINTMENT (OUTPATIENT)
Dept: HEMATOLOGY ONCOLOGY | Facility: CLINIC | Age: 83
End: 2023-02-13
Payer: MEDICARE

## 2023-02-13 ENCOUNTER — APPOINTMENT (OUTPATIENT)
Dept: INFUSION THERAPY | Facility: HOSPITAL | Age: 83
End: 2023-02-13

## 2023-02-13 VITALS
HEART RATE: 68 BPM | OXYGEN SATURATION: 96 % | SYSTOLIC BLOOD PRESSURE: 138 MMHG | TEMPERATURE: 98.24 F | DIASTOLIC BLOOD PRESSURE: 80 MMHG | RESPIRATION RATE: 16 BRPM

## 2023-02-13 LAB
BASOPHILS # BLD AUTO: 0.01 K/UL — SIGNIFICANT CHANGE UP (ref 0–0.2)
BASOPHILS NFR BLD AUTO: 0.2 % — SIGNIFICANT CHANGE UP (ref 0–2)
CHROM ANALY INTERPHASE BLD FISH-IMP: SIGNIFICANT CHANGE UP
EOSINOPHIL # BLD AUTO: 0.01 K/UL — SIGNIFICANT CHANGE UP (ref 0–0.5)
EOSINOPHIL NFR BLD AUTO: 0.2 % — SIGNIFICANT CHANGE UP (ref 0–6)
HCT VFR BLD CALC: 31 % — LOW (ref 34.5–45)
HGB BLD-MCNC: 10.7 G/DL — LOW (ref 11.5–15.5)
IMM GRANULOCYTES NFR BLD AUTO: 2 % — HIGH (ref 0–0.9)
LYMPHOCYTES # BLD AUTO: 1.08 K/UL — SIGNIFICANT CHANGE UP (ref 1–3.3)
LYMPHOCYTES # BLD AUTO: 17.7 % — SIGNIFICANT CHANGE UP (ref 13–44)
MCHC RBC-ENTMCNC: 30 PG — SIGNIFICANT CHANGE UP (ref 27–34)
MCHC RBC-ENTMCNC: 34.5 G/DL — SIGNIFICANT CHANGE UP (ref 32–36)
MCV RBC AUTO: 86.8 FL — SIGNIFICANT CHANGE UP (ref 80–100)
MONOCYTES # BLD AUTO: 0.73 K/UL — SIGNIFICANT CHANGE UP (ref 0–0.9)
MONOCYTES NFR BLD AUTO: 12 % — SIGNIFICANT CHANGE UP (ref 2–14)
NEUTROPHILS # BLD AUTO: 4.14 K/UL — SIGNIFICANT CHANGE UP (ref 1.8–7.4)
NEUTROPHILS NFR BLD AUTO: 67.9 % — SIGNIFICANT CHANGE UP (ref 43–77)
NRBC # BLD: 0 /100 WBCS — SIGNIFICANT CHANGE UP (ref 0–0)
PLATELET # BLD AUTO: 193 K/UL — SIGNIFICANT CHANGE UP (ref 150–400)
RBC # BLD: 3.57 M/UL — LOW (ref 3.8–5.2)
RBC # FLD: 17.2 % — HIGH (ref 10.3–14.5)
WBC # BLD: 6.09 K/UL — SIGNIFICANT CHANGE UP (ref 3.8–10.5)
WBC # FLD AUTO: 6.09 K/UL — SIGNIFICANT CHANGE UP (ref 3.8–10.5)

## 2023-02-13 PROCEDURE — 86900 BLOOD TYPING SEROLOGIC ABO: CPT

## 2023-02-13 PROCEDURE — 86923 COMPATIBILITY TEST ELECTRIC: CPT

## 2023-02-13 PROCEDURE — 99215 OFFICE O/P EST HI 40 MIN: CPT

## 2023-02-13 PROCEDURE — 86901 BLOOD TYPING SEROLOGIC RH(D): CPT

## 2023-02-13 PROCEDURE — 86850 RBC ANTIBODY SCREEN: CPT

## 2023-02-13 RX ORDER — UBIDECARENONE 100 MG
100 CAPSULE ORAL
Refills: 0 | Status: ACTIVE | COMMUNITY
Start: 2023-02-13

## 2023-02-13 RX ORDER — OMEPRAZOLE 20 MG/1
20 CAPSULE, DELAYED RELEASE ORAL DAILY
Qty: 90 | Refills: 0 | Status: DISCONTINUED | COMMUNITY
Start: 2019-03-04 | End: 2023-02-13

## 2023-02-13 RX ORDER — POLYMYXIN B SULFATE AND TRIMETHOPRIM 10000; 1 [USP'U]/ML; MG/ML
10000-0.1 SOLUTION OPHTHALMIC
Qty: 10 | Refills: 0 | Status: DISCONTINUED | COMMUNITY
Start: 2022-09-07

## 2023-02-13 RX ORDER — ATORVASTATIN CALCIUM 80 MG/1
80 TABLET, FILM COATED ORAL
Qty: 90 | Refills: 3 | Status: DISCONTINUED | COMMUNITY
Start: 2019-03-04 | End: 2023-02-13

## 2023-02-13 RX ORDER — DILTIAZEM HYDROCHLORIDE 240 MG/1
240 CAPSULE, EXTENDED RELEASE ORAL
Qty: 30 | Refills: 0 | Status: ACTIVE | COMMUNITY
Start: 2022-12-08

## 2023-02-13 RX ORDER — BUSPIRONE HYDROCHLORIDE 5 MG/1
5 TABLET ORAL
Qty: 180 | Refills: 0 | Status: DISCONTINUED | COMMUNITY
Start: 2022-09-07

## 2023-02-13 RX ORDER — SUCRALFATE 1 G/1
1 TABLET ORAL
Qty: 120 | Refills: 0 | Status: ACTIVE | COMMUNITY
Start: 2023-02-10

## 2023-02-13 RX ORDER — ROSUVASTATIN CALCIUM 40 MG/1
40 TABLET, FILM COATED ORAL
Qty: 90 | Refills: 0 | Status: ACTIVE | COMMUNITY
Start: 2022-12-24

## 2023-02-13 RX ORDER — LOSARTAN POTASSIUM AND HYDROCHLOROTHIAZIDE 12.5; 1 MG/1; MG/1
100-12.5 TABLET ORAL DAILY
Qty: 90 | Refills: 3 | Status: DISCONTINUED | COMMUNITY
Start: 2019-03-04 | End: 2023-02-13

## 2023-02-13 RX ORDER — ZOLPIDEM TARTRATE 5 MG/1
5 TABLET ORAL
Qty: 30 | Refills: 0 | Status: DISCONTINUED | COMMUNITY
Start: 2019-10-25 | End: 2023-02-13

## 2023-02-13 RX ORDER — DASATINIB 140 MG/1
140 TABLET ORAL
Qty: 30 | Refills: 0 | Status: ACTIVE | COMMUNITY
Start: 2023-02-06

## 2023-02-13 RX ORDER — FAMOTIDINE 20 MG/1
20 TABLET, FILM COATED ORAL
Qty: 60 | Refills: 0 | Status: ACTIVE | COMMUNITY
Start: 2023-02-10

## 2023-02-13 RX ORDER — AMOXICILLIN 500 MG/1
500 TABLET, FILM COATED ORAL
Qty: 20 | Refills: 0 | Status: DISCONTINUED | COMMUNITY
Start: 2022-10-26

## 2023-02-13 RX ORDER — ERGOCALCIFEROL 1.25 MG/1
1.25 MG CAPSULE, LIQUID FILLED ORAL
Qty: 4 | Refills: 0 | Status: DISCONTINUED | COMMUNITY
Start: 2019-01-08 | End: 2023-02-13

## 2023-02-13 RX ORDER — CICLOPIROX OLAMINE 7.7 MG/ML
0.77 SUSPENSION TOPICAL TWICE DAILY
Qty: 1 | Refills: 0 | Status: DISCONTINUED | COMMUNITY
Start: 2020-02-05 | End: 2023-02-13

## 2023-02-13 RX ORDER — DILTIAZEM HYDROCHLORIDE 180 MG/1
180 CAPSULE, EXTENDED RELEASE ORAL DAILY
Qty: 90 | Refills: 3 | Status: DISCONTINUED | COMMUNITY
Start: 2019-03-04 | End: 2023-02-13

## 2023-02-13 RX ORDER — MULTIVIT-MIN/IRON/FOLIC ACID/K 18-600-40
500 CAPSULE ORAL
Refills: 0 | Status: ACTIVE | COMMUNITY
Start: 2023-02-13

## 2023-02-13 RX ORDER — VALSARTAN AND HYDROCHLOROTHIAZIDE 320; 25 MG/1; MG/1
320-25 TABLET, FILM COATED ORAL
Qty: 90 | Refills: 0 | Status: ACTIVE | COMMUNITY
Start: 2023-02-08

## 2023-02-13 RX ORDER — POTASSIUM CHLORIDE 750 MG/1
10 CAPSULE, EXTENDED RELEASE ORAL
Qty: 30 | Refills: 0 | Status: DISCONTINUED | COMMUNITY
Start: 2019-04-01 | End: 2023-02-13

## 2023-02-13 RX ORDER — FERROUS SULFATE TAB 325 MG (65 MG ELEMENTAL FE) 325 (65 FE) MG
325 (65 FE) TAB ORAL
Qty: 90 | Refills: 0 | Status: DISCONTINUED | COMMUNITY
Start: 2022-09-09

## 2023-02-13 RX ORDER — CALCIUM CITRATE 200(950)MG
950 (200 CA) TABLET ORAL
Refills: 0 | Status: ACTIVE | COMMUNITY
Start: 2023-02-13

## 2023-02-13 RX ORDER — DEXAMETHASONE 4 MG/1
4 TABLET ORAL
Qty: 16 | Refills: 0 | Status: ACTIVE | COMMUNITY
Start: 2023-02-10

## 2023-02-13 RX ORDER — METOCLOPRAMIDE 10 MG/1
10 TABLET ORAL
Qty: 120 | Refills: 0 | Status: ACTIVE | COMMUNITY
Start: 2023-02-10

## 2023-02-13 RX ORDER — SULFAMETHOXAZOLE AND TRIMETHOPRIM 800; 160 MG/1; MG/1
800-160 TABLET ORAL
Qty: 72 | Refills: 0 | Status: ACTIVE | COMMUNITY
Start: 2023-02-10

## 2023-02-13 RX ORDER — WARFARIN 3 MG/1
3 TABLET ORAL
Qty: 90 | Refills: 3 | Status: DISCONTINUED | COMMUNITY
Start: 2020-06-10 | End: 2023-02-13

## 2023-02-13 RX ORDER — CHLORHEXIDINE GLUCONATE 4 %
1000 LIQUID (ML) TOPICAL
Refills: 0 | Status: ACTIVE | COMMUNITY
Start: 2023-02-13

## 2023-02-13 NOTE — HISTORY OF PRESENT ILLNESS
[de-identified] : This is a pleasant 82 year old woman with PMH AFib, rheumatic aortic valve stenosis (s/p Saint Darian mechanical AVR, 1994, [facility] [place]), ascending aortic aneurysm, HTN, HLD, depression, T2DM, and GERD. \par \par Pt lives in [place] and was in [place] visiting family for the holidays. She initially presented to [facility] on 1/9/23 with complaints of black tarry stool and epigastric pain, and was subsequently found to be with anemia, thrombocytopenia and leukocytosis (lymphocytic predominant). She was also noted to have a supratherapeutic INR. Work-up showed no evidence of active GIB.\par \par Pt was transferred to [facility] for further work-up and management. Peripheral flow performed, and 1/17 BMBx consistent with B-cell ALL, FISH Ph+, 98% blasts. \par \par Treatment started following CALGB 15504 protocol: dexamethasone 16mg x 7 days + daily dasatinib. \par Course I - Decadron 16mg daily 1/18 - 1/24, started dasatinib 140mg daily on 1/19, Vincristine x 1 dose on 1/25 (day 8), Elitek 3mg IV, and IT Methotrexate. \par Day 16 bone marrow showed <20% blasts. \par Proceeded with Course II A on 2/8 - dexamethasone 16mg x 7 days + daily dasatinib 140mg daily. \par \par Today is Course II A Day 6. \[facility] course c/b acute on chronic subdural hemorrhage in setting of thrombocytopenia and warfarin use (now off warfarin, last dose 1/13), hypofibrinoginemia treated with cryoprecipitate, GI bleed, transaminitis, and known left kidney lesion - 1.4 cm mass within the peripheral upper pole of the left kidney (increased in size since 2019) concerning for renal cell carcinoma; will plan for IR guided biopsy as outpatient. \par Given SDH and GI bleed, PLT goal >40. \par Last PRBC transfusion 2/10/23 for Hbg 6.8. \par \[facility] Long Term Oncologist: Dr. Joe \[facility] \[facility] Pt presents today for evaluation in the Early Discharge Clinic. She is accompanied by her son-in-law Will. \par \par Ms. Parada was d/c from [facility] on 2/10/23. Since discharge, she is feeling well, although reports altered sense of taste and smell affecting her appetite. Having regular BMs, not dark or tarry, no constipation or diarrhea. Mild dyspnea on exertion, mildly unsteady, ambulating with walker, no falls. \par \par ROS: Pt does not spontaneously report fever, chills, sweats, weight loss, skin rash, petechiae, easy bruising, eye irritation, mouth sores, cough, hemoptysis, pleuritic chest pain, change in vision, focal numbness or weakness, chest pain, palpitations, nausea, vomiting, dysuria, hematuria, bowel or bladder incontinence, joint pain or back pain. \par \par Medications: \par biotin 1000 mcg oral tablet: 1 tab(s) orally once a day\par calcium citrate 950 mg (200 mg elemental calcium) oral tablet: 1 tab(s) orally once a day\par CoQ10 100 mg oral capsule: 1 cap(s) orally once a day\par dasatinib 140 mg oral tablet: 1 tab(s) orally once a day (only dispensed 1 month of medication)\par dexamethasone 4 mg oral tablet: 4 tab(s) orally once a day start on 2/11 am after meal, stop after 2/14 dose \par DilTIAZem (Eqv-Cardizem CD) 240 mg/24 hours oral capsule, extended release: 1 cap(s) orally once a day\par famotidine 20 mg oral tablet: 1 tab(s) orally 2 times a day\par metFORMIN 500 mg oral tablet, extended release: 1 tab(s) orally once a day\par metoclopramide 10 mg oral tablet: 1 tab(s) orally every 6 hours, As Needed -for nausea \par Omega-3 350 mg oral capsule: 1 cap(s) orally once a day\par rosuvastatin 40 mg oral tablet: 1 tab(s) orally once a day\par sucralfate 1 g oral tablet: 1 tab(s) orally 4 times a day (before meals and at bedtime) \par sulfamethoxazole-trimethoprim 800 mg-160 mg oral tablet: 1 tab(s) orally 2 times a day  TIW on Mon Wed Friday\par Tiadylt  mg/24 hours oral capsule, extended release: 1 cap(s) orally once a day\par valsartan-hydrochlorothiazide 320 mg-25 mg oral tablet: 1 tab(s) orally once a day\par Vitamin B12 1000 mcg oral tablet: 1 tab(s) orally once a day\par Vitamin C 500 mg oral tablet: 1 tab(s) orally once a day\par \par PE: \par General: awake and alert, articulate, in no distress\par HEENT: pharynx with no redness or exudate. No occipital, posterior cervical, anterior cervical, submandibular, sublingual, submental, supraclavicular, nor axillary adenopathy\par Lungs: CTA b/l \par Cardiac: HR irregular (hx of Afib) \par Abd: soft and non-tender, positive bowel sounds \par Extremities: no inguinal or femoral adenopathy. Mild edema 1+ b/l ankles. No calf pain or tenderness to palpation. \par Gait: ambulating steadily with walker \par \par PMH: AFib, rheumatic aortic valve stenosis (s/p Saint Darian mechanical AVR, 1994, [facility]), ascending aortic aneurysm, HTN, HLD, depression, T2DM, and GERD. \par \par Assessment: 82 year old woman with newly diagnosed B cell ALL, started treatment following CALGB 96155 protocol, currently on Course II A Day 6 - dexamethasone 16mg x 7 days + daily dasatinib 140mg daily. \par \par PLAN: \par \par Heme: \par - CBC today, counts improving - WBC 6.09, Hgb 10.7, \par - Hgb goal >7, PLT goal >40 given recent SDH and GI bleed\par - Continue daily dasatinib. Last dose of dexamethasone tomorrow. Ankle edema likely due to steroids, she will let us know if worsening/no improvement off steroids. \par - Following up with Dr. Joe on 2/16. \par \par ID: \par - No neutropenia. Continue Bactrim ppx (instructions reviewed.) \par - Covid swab done today, pending. \par \par HTN: \par - Stable. Continue home meds as per cardiology. \par \par Anticoagulation: \par - Continue to hold warfarin. To be discussed with Dr. Joe and cardiology. \par \par GI bleed: \par - No s&s of bleeding. \par - Reviewed use of PPI and sucralfate. \par \par Renal mass: \par - Will require biopsy. \par \par DISPO: All questions answered. Pt scheduled to see Dr. Joe on 2/16. She would like to return to [place] when safe to do so. If she will stay in [place] longer, would recommend homecare for PT (gait and balance.) \par \par \par \par \par \par \par \par \par \par \par \par \par \par

## 2023-02-13 NOTE — REASON FOR VISIT
[Follow-Up Visit] : a follow-up [Family Member] : family member [FreeTextEntry2] : Early Discharge Clinic

## 2023-02-14 ENCOUNTER — NON-APPOINTMENT (OUTPATIENT)
Age: 83
End: 2023-02-14

## 2023-02-14 LAB
ALBUMIN SERPL ELPH-MCNC: 4.1 G/DL
ALP BLD-CCNC: 78 U/L
ALT SERPL-CCNC: 38 U/L
ANION GAP SERPL CALC-SCNC: 13 MMOL/L
AST SERPL-CCNC: 26 U/L
BILIRUB SERPL-MCNC: 0.4 MG/DL
BUN SERPL-MCNC: 20 MG/DL
CALCIUM SERPL-MCNC: 9.3 MG/DL
CHLORIDE SERPL-SCNC: 98 MMOL/L
CO2 SERPL-SCNC: 23 MMOL/L
CREAT SERPL-MCNC: 0.96 MG/DL
EGFR: 60 ML/MIN/1.73M2
GLUCOSE SERPL-MCNC: 155 MG/DL
LDH SERPL-CCNC: 372 U/L
POTASSIUM SERPL-SCNC: 4.1 MMOL/L
PROT SERPL-MCNC: 6 G/DL
SARS-COV-2 RNA SPEC QL NAA+PROBE: SIGNIFICANT CHANGE UP
SODIUM SERPL-SCNC: 133 MMOL/L
URATE SERPL-MCNC: 3.8 MG/DL

## 2023-02-15 LAB — CHROM ANALY OVERALL INTERP SPEC-IMP: SIGNIFICANT CHANGE UP

## 2023-02-16 ENCOUNTER — RESULT REVIEW (OUTPATIENT)
Age: 83
End: 2023-02-16

## 2023-02-16 ENCOUNTER — APPOINTMENT (OUTPATIENT)
Dept: HEMATOLOGY ONCOLOGY | Facility: CLINIC | Age: 83
End: 2023-02-16
Payer: MEDICARE

## 2023-02-16 ENCOUNTER — APPOINTMENT (OUTPATIENT)
Dept: INFUSION THERAPY | Facility: HOSPITAL | Age: 83
End: 2023-02-16

## 2023-02-16 ENCOUNTER — NON-APPOINTMENT (OUTPATIENT)
Age: 83
End: 2023-02-16

## 2023-02-16 VITALS
SYSTOLIC BLOOD PRESSURE: 119 MMHG | HEIGHT: 62.99 IN | BODY MASS INDEX: 21.95 KG/M2 | WEIGHT: 123.9 LBS | TEMPERATURE: 206.96 F | DIASTOLIC BLOOD PRESSURE: 77 MMHG | HEART RATE: 82 BPM | RESPIRATION RATE: 16 BRPM | OXYGEN SATURATION: 99 %

## 2023-02-16 DIAGNOSIS — R43.2 PARAGEUSIA: ICD-10-CM

## 2023-02-16 DIAGNOSIS — C91.00 ACUTE LYMPHOBLASTIC LEUKEMIA NOT HAVING ACHIEVED REMISSION: ICD-10-CM

## 2023-02-16 DIAGNOSIS — Z80.1 FAMILY HISTORY OF MALIGNANT NEOPLASM OF TRACHEA, BRONCHUS AND LUNG: ICD-10-CM

## 2023-02-16 DIAGNOSIS — D63.0 ANEMIA IN NEOPLASTIC DISEASE: ICD-10-CM

## 2023-02-16 DIAGNOSIS — I48.91 UNSPECIFIED ATRIAL FIBRILLATION: ICD-10-CM

## 2023-02-16 DIAGNOSIS — I72.9 ANEURYSM OF UNSPECIFIED SITE: ICD-10-CM

## 2023-02-16 DIAGNOSIS — Z51.11 ENCOUNTER FOR ANTINEOPLASTIC CHEMOTHERAPY: ICD-10-CM

## 2023-02-16 DIAGNOSIS — R10.13 EPIGASTRIC PAIN: ICD-10-CM

## 2023-02-16 DIAGNOSIS — Z87.891 PERSONAL HISTORY OF NICOTINE DEPENDENCE: ICD-10-CM

## 2023-02-16 DIAGNOSIS — Z95.2 PRESENCE OF PROSTHETIC HEART VALVE: ICD-10-CM

## 2023-02-16 DIAGNOSIS — G47.00 INSOMNIA, UNSPECIFIED: ICD-10-CM

## 2023-02-16 LAB
ACANTHOCYTES BLD QL SMEAR: SLIGHT — SIGNIFICANT CHANGE UP
BASOPHILS # BLD AUTO: 0 K/UL — SIGNIFICANT CHANGE UP (ref 0–0.2)
BASOPHILS NFR BLD AUTO: 0 % — SIGNIFICANT CHANGE UP (ref 0–2)
ELLIPTOCYTES BLD QL SMEAR: SLIGHT — SIGNIFICANT CHANGE UP
EOSINOPHIL # BLD AUTO: 0 K/UL — SIGNIFICANT CHANGE UP (ref 0–0.5)
EOSINOPHIL NFR BLD AUTO: 0 % — SIGNIFICANT CHANGE UP (ref 0–6)
HCT VFR BLD CALC: 40 % — SIGNIFICANT CHANGE UP (ref 34.5–45)
HGB BLD-MCNC: 13.9 G/DL — SIGNIFICANT CHANGE UP (ref 11.5–15.5)
LYMPHOCYTES # BLD AUTO: 1.92 K/UL — SIGNIFICANT CHANGE UP (ref 1–3.3)
LYMPHOCYTES # BLD AUTO: 18.5 % — SIGNIFICANT CHANGE UP (ref 13–44)
MCHC RBC-ENTMCNC: 30.3 PG — SIGNIFICANT CHANGE UP (ref 27–34)
MCHC RBC-ENTMCNC: 34.8 G/DL — SIGNIFICANT CHANGE UP (ref 32–36)
MCV RBC AUTO: 87.1 FL — SIGNIFICANT CHANGE UP (ref 80–100)
MONOCYTES # BLD AUTO: 2.03 K/UL — HIGH (ref 0–0.9)
MONOCYTES NFR BLD AUTO: 19.5 % — HIGH (ref 2–14)
MYELOCYTES NFR BLD: 1 % — HIGH (ref 0–0)
NEUTROPHILS # BLD AUTO: 6.34 K/UL — SIGNIFICANT CHANGE UP (ref 1.8–7.4)
NEUTROPHILS NFR BLD AUTO: 61 % — SIGNIFICANT CHANGE UP (ref 43–77)
NRBC # BLD: 0 /100 — SIGNIFICANT CHANGE UP (ref 0–0)
NRBC # BLD: SIGNIFICANT CHANGE UP /100 WBCS (ref 0–0)
PLAT MORPH BLD: NORMAL — SIGNIFICANT CHANGE UP
PLATELET # BLD AUTO: 271 K/UL — SIGNIFICANT CHANGE UP (ref 150–400)
POIKILOCYTOSIS BLD QL AUTO: SLIGHT — SIGNIFICANT CHANGE UP
RBC # BLD: 4.59 M/UL — SIGNIFICANT CHANGE UP (ref 3.8–5.2)
RBC # FLD: 18.3 % — HIGH (ref 10.3–14.5)
RBC BLD AUTO: ABNORMAL
WBC # BLD: 10.4 K/UL — SIGNIFICANT CHANGE UP (ref 3.8–10.5)
WBC # FLD AUTO: 10.4 K/UL — SIGNIFICANT CHANGE UP (ref 3.8–10.5)

## 2023-02-16 PROCEDURE — 99215 OFFICE O/P EST HI 40 MIN: CPT

## 2023-02-16 RX ORDER — ENOXAPARIN SODIUM 60 MG/.6ML
60 INJECTION, SOLUTION SUBCUTANEOUS
Qty: 60 | Refills: 2 | Status: ACTIVE | COMMUNITY
Start: 2023-02-16 | End: 1900-01-01

## 2023-02-16 RX ORDER — MIRTAZAPINE 7.5 MG/1
7.5 TABLET, FILM COATED ORAL
Qty: 60 | Refills: 1 | Status: ACTIVE | COMMUNITY
Start: 2023-02-16 | End: 1900-01-01

## 2023-02-17 NOTE — PROGRESS NOTE ADULT - PROBLEM SELECTOR PLAN 1
Flow cytometry (1/14/23) consistent with acute leukemia (B ALL), FISH detected BCR/ABL (1/19)  Monitor daily CBC with Diff/CMP and transfuse/replete PRN  Patient was on warfarin-NOW HELD for mechanical AVR (1994 [place] [facility] [place]), atrial fibrillation  last dose of warfarin 1/13 at [facility] (5mg)-cardiology consulted.   TLS labs daily, IVF's, Strict I/O's, daily wights  1/14 TTE - EF 57%, mild pulm HTN, Mechanical AVR likely normal function  1/16 HLA sent  1/17  s/p BMbx c/w ALL (98% blasts) (with Clonoseq sampling)  Decadron 1/18-1/24-monitor sugars now off steroids.   1/19 Started Dasatinib  Vincristine 1.5 mg on Day 8 1/25 with Elitek 3mg IV x1.   Platelet count > 20 (+SDH)  FU Day 15 BMbx on 2/1 done on 2/2   FU CSF LP done on 2/1.   Social work consult to address family issues  2/3- Transfuse one unit PRBC and one unit platelets today, Lasix in between transfusions.  2/3- Hypophosphatemia- Replete phos. Yes Flow cytometry (1/14/23) consistent with acute leukemia (B ALL), FISH detected BCR/ABL (1/19)  Monitor daily CBC with Diff/CMP and transfuse/replete PRN  Patient was on warfarin-NOW HELD for mechanical AVR (1994 [place] [facility] [place]), atrial fibrillation  last dose of warfarin 1/13 at [facility] (5mg)-cardiology consulted.   TLS labs daily, IVF's, Strict I/O's, daily wights  1/14 TTE - EF 57%, mild pulm HTN, Mechanical AVR likely normal function  1/16 HLA sent  1/17  s/p BMbx c/w ALL (98% blasts) (with Clonoseq sampling)  Decadron 1/18-1/24-monitor sugars now off steroids.   1/19 Started Dasatinib  Vincristine 1.5 mg on Day 8 1/25 with Elitek 3mg IV x1.   Platelet count > 20 (+SDH)  FU Day 15 BMbx on 2/1 done on 2/2   FU CSF LP done on 2/1.   Social work consult to address family issues  2/4 - Transfuse one unit PRBC and one unit platelets today, Lasix in between transfusions.  2/4-  Hypophosphatemia- Replete phos.  2/4- Hypokalemia - Replete K+

## 2023-02-18 PROBLEM — G47.00 INSOMNIA: Status: ACTIVE | Noted: 2019-03-04

## 2023-02-18 PROBLEM — Z80.1 FAMILY HISTORY OF LUNG CANCER: Status: ACTIVE | Noted: 2019-03-01

## 2023-02-18 PROBLEM — D63.0 ANEMIA IN NEOPLASTIC DISEASE: Status: ACTIVE | Noted: 2023-02-18

## 2023-02-18 PROBLEM — R43.2 DYSGEUSIA: Status: ACTIVE | Noted: 2023-02-18

## 2023-02-18 PROBLEM — Z51.11 ENCOUNTER FOR CHEMOTHERAPY MANAGEMENT: Status: ACTIVE | Noted: 2023-02-18

## 2023-02-18 PROBLEM — Z95.2 HISTORY OF MECHANICAL AORTIC VALVE REPLACEMENT: Status: ACTIVE | Noted: 2019-03-01

## 2023-02-18 PROBLEM — Z87.891 FORMER SMOKER: Status: ACTIVE | Noted: 2019-03-04

## 2023-02-18 PROBLEM — R10.13 MIDEPIGASTRIC PAIN: Status: ACTIVE | Noted: 2023-02-18

## 2023-02-18 NOTE — REVIEW OF SYSTEMS
[Fatigue] : fatigue [SOB on Exertion] : shortness of breath during exertion [Negative] : Cardiovascular [Fever] : no fever [Night Sweats] : no night sweats [Dysphagia] : no dysphagia [Odynophagia] : no odynophagia [Shortness Of Breath] : no shortness of breath [Cough] : no cough [FreeTextEntry2] : 12 lb wt loss vs 6/2020 [FreeTextEntry4] : dysgeusia [FreeTextEntry7] : midepigastric discomfort, intermittent, not related to meals

## 2023-02-18 NOTE — HISTORY OF PRESENT ILLNESS
[Disease:__________________________] : Disease: [unfilled] [de-identified] : Ms. Parada is an 82 yr old woman with newly diagnosed Ph+ B-lineage acute lymphoblastic leukemia.\par She has a h/o  AFib, rheumatic aortic valve stenosis (s/p [employer] mechanical AVR, 1994, [facility]),\par ascendic thoracic aneurysm, HTN, HLD, depression, T2DM, and GERD. \par \par She lives in [place] and was visiting family in [place] when she  presented to [facility] on 1/9/23 with black tarry stool and \par epigastric pain, and found to be with anemic, thrombocytopenic and leukocytosis (lymphocytic predominant). She had a supratherapeutic INR\par and no source of GI bleeding was found-up showed no evidence of active GIB. Work-up included neg CTA A/P except for focal diminished cortical enhancement involving the \par upper left kidney concerning for focal pyelonephritis or possibly infarct. EGD was (-) and colonoscopy showed showed grade I internal hemorrhoids and polyps in transverse colon and hepatic flexure.\par \par Pt was transferred to [facility] 1/14/23 for further work-up and management. Peripheral flow performed, and 1/17 \par BMBx consistent with B-cell ALL, FISH Ph+, 98% blasts. Flow showed 87% B-lymphoblasts (+) for\par CD10, CD19, dim CD22, CD33, CD34, dim CD58, CD9, (-) CD2, CD3, CD15, CD20, CD38, , , CRLF2.\par MPO stain was (-). 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 \par No significant associated mass effect. Repeat CT brain 24 and 24 hrs later were unchanged. Platelet transfusion were used during her treatment to initially maintain plt > 100K then > 50K. Anticoagulation with coumadin was d/c'd.\par \par Therapy was started on 1/18/2023 with dexamethasone 10 mg/sq d1-7, dasatinib 140 mg daily following CALGB 81630. WBC \par at that time was 90.4, 81% blasts, .\par Development of TLS and exacerbation of antecedent DIC ensued requiring infusions of cryoprecipitate; one dose of VCR \par was given on day 8 to hasten blast clearance. PB blasts cleared before day 16 (one day late) marrow, which showed hypocellularity with no \par residual lymphoblastic  leukemia seen; there were < 5% CD34+ cells and CD71 highlighted erythroid elements.\par On day 15, she had an LP, which was (-), and received IT MTX.\par \par Course IIA started on 2/8, dexamethasone 10 mg/sq m d1-7 while continuing dasatinib 140 mg po daily.\par \par She had no major infectious complications. Last PRBC transfusion 2/10/23, day of d/c, for Hbg 6.8. Plt and ANC recovery occurred before d/c home.\par Additional finding was known left kidney lesion - 1.4 cm mass, increased in size since 2019, concerning for renal cell carcinoma.\par \par Major c/o since d/c are dysgeusia, nausea, decreased appetite and fatigue. She has no significant crdiac or resp c/o but has been largely\par sedetary. She has had no constitutional c/o.\par \par Long Term Oncologist: Dr. Joe \par \par Pt presents today for evaluation in the [facility]. She is accompanied by her son-in-law Will. \par \par Ms. Parada was d/c from [facility] on 2/10/23. Since discharge, she is feeling well, although reports altered sense of taste and smell affecting her appetite. Having regular BMs, not dark or tarry, no constipation or diarrhea. Mild dyspnea on exertion, mildly unsteady, ambulating with walker, no falls. \par \par 5 cc ascending thor an level of PA in CTA 12/19\par \par \par ROS: Pt does not spontaneously report fever, chills, sweats, weight loss, skin rash, petechiae, easy bruising, eye irritation, mouth sores, cough, hemoptysis, pleuritic chest pain, change in vision, focal numbness or weakness, chest pain, palpitations, nausea, vomiting, dysuria, hematuria, bowel or bladder incontinence, joint pain or back pain. \par \par Medications: \par biotin 1000 mcg oral tablet: 1 tab(s) orally once a day\par calcium citrate 950 mg (200 mg elemental calcium) oral tablet: 1 tab(s) orally once a day\par CoQ10 100 mg oral capsule: 1 cap(s) orally once a day\par dasatinib 140 mg oral tablet: 1 tab(s) orally once a day (only dispensed 1 month of medication)\par dexamethasone 4 mg oral tablet: 4 tab(s) orally once a day start on 2/11 am after meal, stop after 2/14 dose \par DilTIAZem (Eqv-Cardizem CD) 240 mg/24 hours oral capsule, extended release: 1 cap(s) orally once a day\par famotidine 20 mg oral tablet: 1 tab(s) orally 2 times a day\par metFORMIN 500 mg oral tablet, extended release: 1 tab(s) orally once a day\par metoclopramide 10 mg oral tablet: 1 tab(s) orally every 6 hours, As Needed -for nausea \par Omega-3 350 mg oral capsule: 1 cap(s) orally once a day\par rosuvastatin 40 mg oral tablet: 1 tab(s) orally once a day\par sucralfate 1 g oral tablet: 1 tab(s) orally 4 times a day (before meals and at bedtime) \par sulfamethoxazole-trimethoprim 800 mg-160 mg oral tablet: 1 tab(s) orally 2 times a day TIW on Mon Wed Friday\par Tiadylt  mg/24 hours oral capsule, extended release: 1 cap(s) orally once a day\par valsartan-hydrochlorothiazide 320 mg-25 mg oral tablet: 1 tab(s) orally once a day\par Vitamin B12 1000 mcg oral tablet: 1 tab(s) orally once a day\par Vitamin C 500 mg oral tablet: 1 tab(s) orally once a day\par \par PE: \par General: awake and alert, articulate, in no distress\par HEENT: pharynx with no redness or exudate. No occipital, posterior cervical, anterior cervical, submandibular, sublingual, submental, supraclavicular, nor axillary adenopathy\par Lungs: CTA b/l \par Cardiac: HR irregular (hx of Afib) \par Abd: soft and non-tender, positive bowel sounds \par Extremities: no inguinal or femoral adenopathy. Mild edema 1+ b/l ankles. No calf pain or tenderness to palpation. \par Gait: ambulating steadily with walker \par \par PMH: AFib, rheumatic aortic valve stenosis (s/p [employer] mechanical AVR, 1994, [facility]), ascending aortic aneurysm, HTN, HLD, depression, T2DM, and GERD. \par \par  [de-identified] : initial OPD visit post visit in [facility]

## 2023-02-18 NOTE — ASSESSMENT
[FreeTextEntry1] : Ph positive B lineage ALL in an 82-year-old woman with several comorbidities.  He is being treated following CALGB 98439 with the addition of a single dose of vincristine during induction.  Day 15 marrow showed clearance of leukemia and she has now CR blood counts with no circulating blasts.\par The karyotype has the standard Ph chromosome along with an incompletely characterized translocation involving 3q27.\par CBC results reviewed and d/w pt and family:\par WBC 10.40, Hgb 13.9, Plt 271K, ANC 6.34, incr monos\par Today is cycle IIA, day 9\par She will remain on dasatinib 140 mg/d through at least day 22, when repeat BMA/Bx will be done, to include BCR-ABL PCR. Complementary MRD information may be obtained through [facility].\par Enoxaparin 1 mg/kg sc 2x/d will be started today. The next cycle of therapy, if marrow shows CR,  includes several LPs so restarting warfarin will be delayed until after that sequence is completed. That cycle, the CNS prophylaxis portion of therapy, includes several doses of IV, PO and IT MTX, along with VCR. Dasatinib will be held during that part of treatment.\par She plans to return to [place] soon and I have been in touch with Dr. Umesh Coleman, a leukemia doctor with a [facility] in [facility], where pt lives.\par There are a few unresolved issues, including management of growing renal mass and ascending thoracic aortic aneurysm which last measured 5 cm.\par Outline of planned therapy was given to pt and family. Risks, and potential toxicities of planned therapy were reviewed.\par She will be receiving physical therapy at home.\par He is depressed and has insomnia and a poor appetite.  She will start a trial of mirtazapine, 7.5 mg p.o. nightly for 1 week to then be increased to 15 mg p.o. nightly.\par She will next return here for the planned course II, day 22 marrow.\par \par \par \par

## 2023-02-18 NOTE — PHYSICAL EXAM
[Restricted in physically strenuous activity but ambulatory and able to carry out work of a light or sedentary nature] : Status 1- Restricted in physically strenuous activity but ambulatory and able to carry out work of a light or sedentary nature, e.g., light house work, office work [Normal] : grossly intact [de-identified] : mild pallor [de-identified] : irrer irrer, 2/6 DEXTER LSB, base [de-identified] : no CVAT [de-identified] : somewhat flat affect, appropriate

## 2023-03-02 ENCOUNTER — LABORATORY RESULT (OUTPATIENT)
Age: 83
End: 2023-03-02

## 2023-03-02 ENCOUNTER — APPOINTMENT (OUTPATIENT)
Dept: HEMATOLOGY ONCOLOGY | Facility: CLINIC | Age: 83
End: 2023-03-02
Payer: MEDICARE

## 2023-03-02 ENCOUNTER — RESULT REVIEW (OUTPATIENT)
Age: 83
End: 2023-03-02

## 2023-03-02 VITALS
SYSTOLIC BLOOD PRESSURE: 142 MMHG | WEIGHT: 124.56 LBS | BODY MASS INDEX: 22.07 KG/M2 | TEMPERATURE: 98.2 F | DIASTOLIC BLOOD PRESSURE: 60 MMHG | OXYGEN SATURATION: 97 % | HEART RATE: 74 BPM | RESPIRATION RATE: 16 BRPM

## 2023-03-02 DIAGNOSIS — C91.00 ACUTE LYMPHOBLASTIC LEUKEMIA NOT HAVING ACHIEVED REMISSION: ICD-10-CM

## 2023-03-02 LAB
BASOPHILS # BLD AUTO: 0.05 K/UL — SIGNIFICANT CHANGE UP (ref 0–0.2)
BASOPHILS NFR BLD AUTO: 0.8 % — SIGNIFICANT CHANGE UP (ref 0–2)
EOSINOPHIL # BLD AUTO: 0.32 K/UL — SIGNIFICANT CHANGE UP (ref 0–0.5)
EOSINOPHIL NFR BLD AUTO: 4.8 % — SIGNIFICANT CHANGE UP (ref 0–6)
HCT VFR BLD CALC: 26.5 % — LOW (ref 34.5–45)
HGB BLD-MCNC: 8.9 G/DL — LOW (ref 11.5–15.5)
IMM GRANULOCYTES NFR BLD AUTO: 0.8 % — SIGNIFICANT CHANGE UP (ref 0–0.9)
LYMPHOCYTES # BLD AUTO: 1.05 K/UL — SIGNIFICANT CHANGE UP (ref 1–3.3)
LYMPHOCYTES # BLD AUTO: 15.8 % — SIGNIFICANT CHANGE UP (ref 13–44)
MCHC RBC-ENTMCNC: 30.1 PG — SIGNIFICANT CHANGE UP (ref 27–34)
MCHC RBC-ENTMCNC: 32.5 G/DL — SIGNIFICANT CHANGE UP (ref 32–36)
MCV RBC AUTO: 92.7 FL — SIGNIFICANT CHANGE UP (ref 80–100)
MONOCYTES # BLD AUTO: 0.76 K/UL — SIGNIFICANT CHANGE UP (ref 0–0.9)
MONOCYTES NFR BLD AUTO: 11.4 % — SIGNIFICANT CHANGE UP (ref 2–14)
NEUTROPHILS # BLD AUTO: 4.43 K/UL — SIGNIFICANT CHANGE UP (ref 1.8–7.4)
NEUTROPHILS NFR BLD AUTO: 66.4 % — SIGNIFICANT CHANGE UP (ref 43–77)
NRBC # BLD: 0 /100 WBCS — SIGNIFICANT CHANGE UP (ref 0–0)
PLATELET # BLD AUTO: 141 K/UL — LOW (ref 150–400)
RBC # BLD: 2.86 M/UL — LOW (ref 3.8–5.2)
RBC # FLD: 19 % — HIGH (ref 10.3–14.5)
WBC # BLD: 6.66 K/UL — SIGNIFICANT CHANGE UP (ref 3.8–10.5)
WBC # FLD AUTO: 6.66 K/UL — SIGNIFICANT CHANGE UP (ref 3.8–10.5)

## 2023-03-02 PROCEDURE — 38222 DX BONE MARROW BX & ASPIR: CPT | Mod: RT

## 2023-03-02 NOTE — REASON FOR VISIT
[Bone Marrow Biopsy] : bone marrow biopsy [Bone Marrow Aspiration] : bone marrow aspiration [FreeTextEntry2] : 82 year old female w/ph+ B-ALL on dasatinib, BMbx to assess for MRD

## 2023-03-02 NOTE — PROCEDURE
[Bone Marrow Biopsy] : bone marrow biopsy [Bone Marrow Aspiration] : bone marrow aspiration  [Patient] : the patient [Patient identification verified] : patient identification verified [Procedure verified and consent obtained] : procedure verified and consent obtained [Correct positioning] : correct positioning [Prone] : prone [The right posterior iliac crest was prepped with betadine and draped, using sterile technique.] : The right posterior iliac crest was prepped with betadine and draped, using sterile technique. [Lidocaine was injected and into the periosteum overlying the site.] : Lidocaine was injected and into the periosteum overlying the site. [Aspirate] : aspirate [Cytogenetics] : cytogenetics [FISH] : FISH [Biopsy] : biopsy [Flow Cytometry] : flow cytometry [] : The patient was instructed to remove the bandage the following AM. The patient may bathe. Acetaminophen may be taken for discomfort, as per package directions.If there are any other problems, the patient was instructed to call the office. The patient verbalized understanding, and is aware of the office contact numbers. [FreeTextEntry1] : 82 year old female w/ph+ B-ALL on dasatinib, BMbx to assess for MRD [FreeTextEntry2] : CBC prior to procedure\par WBC 6.66\par Hgb  8.9 Hct 26.5\par Plt 141\par BM Bx and aspiration was performed by RADHIKA Hogan.\par

## 2023-03-06 LAB — T(9;22)(ABL1,BCR)/CONTROL BLD/T: NORMAL
